# Patient Record
Sex: FEMALE | Race: WHITE | Employment: OTHER | URBAN - METROPOLITAN AREA
[De-identification: names, ages, dates, MRNs, and addresses within clinical notes are randomized per-mention and may not be internally consistent; named-entity substitution may affect disease eponyms.]

---

## 2018-01-17 ENCOUNTER — HOSPITAL ENCOUNTER (OUTPATIENT)
Age: 58
Setting detail: OBSERVATION
LOS: 1 days | Discharge: HOME | End: 2018-01-18
Attending: INTERNAL MEDICINE | Admitting: INTERNAL MEDICINE
Payer: MEDICARE

## 2018-01-17 ENCOUNTER — APPOINTMENT (OUTPATIENT)
Dept: GENERAL RADIOLOGY | Age: 58
End: 2018-01-17
Payer: MEDICARE

## 2018-01-17 ENCOUNTER — APPOINTMENT (OUTPATIENT)
Dept: CT IMAGING | Age: 58
End: 2018-01-17
Payer: MEDICARE

## 2018-01-17 ENCOUNTER — APPOINTMENT (OUTPATIENT)
Dept: ULTRASOUND IMAGING | Age: 58
End: 2018-01-17
Payer: MEDICARE

## 2018-01-17 ENCOUNTER — HOSPITAL ENCOUNTER (EMERGENCY)
Age: 58
Discharge: OTHER INSTITUTION | End: 2018-01-17
Attending: EMERGENCY MEDICINE
Payer: MEDICARE

## 2018-01-17 VITALS
HEIGHT: 61 IN | WEIGHT: 160 LBS | OXYGEN SATURATION: 99 % | BODY MASS INDEX: 30.21 KG/M2 | SYSTOLIC BLOOD PRESSURE: 110 MMHG | RESPIRATION RATE: 13 BRPM | DIASTOLIC BLOOD PRESSURE: 66 MMHG | TEMPERATURE: 98 F | HEART RATE: 90 BPM

## 2018-01-17 DIAGNOSIS — R55 SYNCOPE AND COLLAPSE: ICD-10-CM

## 2018-01-17 DIAGNOSIS — E86.0 DEHYDRATION: Primary | ICD-10-CM

## 2018-01-17 DIAGNOSIS — D72.829 LEUKOCYTOSIS, UNSPECIFIED TYPE: ICD-10-CM

## 2018-01-17 DIAGNOSIS — R77.8 TROPONIN LEVEL ELEVATED: ICD-10-CM

## 2018-01-17 LAB
ALBUMIN SERPL-MCNC: 3.8 G/DL (ref 3.9–4.9)
ALP BLD-CCNC: 92 U/L (ref 40–130)
ALT SERPL-CCNC: 18 U/L (ref 0–33)
AMORPHOUS: NORMAL
AMYLASE: 35 U/L (ref 28–100)
ANION GAP SERPL CALCULATED.3IONS-SCNC: 19 MEQ/L (ref 7–13)
AST SERPL-CCNC: 15 U/L (ref 0–35)
BACTERIA: NORMAL /HPF
BANDED NEUTROPHILS RELATIVE PERCENT: 10 % (ref 5–11)
BASOPHILS ABSOLUTE: 0 K/UL (ref 0–0.2)
BASOPHILS RELATIVE PERCENT: 0 %
BETA-HYDROXYBUTYRATE: 14.1 MG/DL (ref 0.2–2.8)
BILIRUB SERPL-MCNC: 0.4 MG/DL (ref 0–1.2)
BILIRUBIN URINE: ABNORMAL
BLOOD, URINE: NEGATIVE
BUN BLDV-MCNC: 33 MG/DL (ref 6–20)
CALCIUM SERPL-MCNC: 9.2 MG/DL (ref 8.6–10.2)
CHLORIDE BLD-SCNC: 94 MEQ/L (ref 98–107)
CLARITY: CLEAR
CO2: 25 MEQ/L (ref 22–29)
COLOR: YELLOW
CREAT SERPL-MCNC: 1.27 MG/DL (ref 0.5–0.9)
EKG ATRIAL RATE: 91 BPM
EKG P AXIS: 73 DEGREES
EKG P-R INTERVAL: 134 MS
EKG Q-T INTERVAL: 360 MS
EKG QRS DURATION: 76 MS
EKG QTC CALCULATION (BAZETT): 442 MS
EKG R AXIS: 94 DEGREES
EKG T AXIS: 17 DEGREES
EKG VENTRICULAR RATE: 91 BPM
EOSINOPHILS ABSOLUTE: 0 K/UL (ref 0–0.7)
EOSINOPHILS RELATIVE PERCENT: 0 %
EPITHELIAL CELLS, UA: NORMAL /HPF
ETHANOL PERCENT: NORMAL G/DL
ETHANOL: <10 MG/DL (ref 0–0.08)
GFR AFRICAN AMERICAN: 52.5
GFR NON-AFRICAN AMERICAN: 43.4
GLOBULIN: 3.5 G/DL (ref 2.3–3.5)
GLUCOSE BLD-MCNC: 173 MG/DL (ref 60–115)
GLUCOSE BLD-MCNC: 176 MG/DL (ref 60–115)
GLUCOSE BLD-MCNC: 198 MG/DL (ref 74–109)
GLUCOSE URINE: NEGATIVE MG/DL
HBA1C MFR BLD: 8.9 % (ref 4.8–5.9)
HCT VFR BLD CALC: 52.5 % (ref 37–47)
HEMOGLOBIN: 17.6 G/DL (ref 12–16)
INR BLD: 4.9
KETONES, URINE: >=160 MG/DL
LACTIC ACID: 1.9 MMOL/L (ref 0.5–2.2)
LEUKOCYTE ESTERASE, URINE: NEGATIVE
LIPASE: 15 U/L (ref 13–60)
LYMPHOCYTES ABSOLUTE: 5.3 K/UL (ref 1–4.8)
LYMPHOCYTES RELATIVE PERCENT: 17 %
MCH RBC QN AUTO: 32 PG (ref 27–31.3)
MCHC RBC AUTO-ENTMCNC: 33.4 % (ref 33–37)
MCV RBC AUTO: 95.8 FL (ref 82–100)
MONOCYTES ABSOLUTE: 1.2 K/UL (ref 0.2–0.8)
MONOCYTES RELATIVE PERCENT: 4 %
MUCUS: PRESENT
NEUTROPHILS ABSOLUTE: 24.4 K/UL (ref 1.4–6.5)
NEUTROPHILS RELATIVE PERCENT: 69 %
NITRITE, URINE: NEGATIVE
PDW BLD-RTO: 12.9 % (ref 11.5–14.5)
PERFORMED ON: ABNORMAL
PERFORMED ON: ABNORMAL
PH UA: 5.5 (ref 5–9)
PLATELET # BLD: 254 K/UL (ref 130–400)
PLATELET SLIDE REVIEW: ADEQUATE
POTASSIUM SERPL-SCNC: 3.5 MEQ/L (ref 3.5–5.1)
PROTEIN UA: 30 MG/DL
PROTHROMBIN TIME: 50 SEC (ref 9.6–12.3)
RBC # BLD: 5.49 M/UL (ref 4.2–5.4)
RBC # BLD: NORMAL 10*6/UL
RBC UA: NORMAL /HPF (ref 0–2)
SODIUM BLD-SCNC: 138 MEQ/L (ref 132–144)
SPECIFIC GRAVITY UA: >=1.03 (ref 1–1.03)
TOTAL PROTEIN: 7.3 G/DL (ref 6.4–8.1)
TROPONIN: 0.03 NG/ML (ref 0–0.01)
TROPONIN: 0.05 NG/ML (ref 0–0.01)
URINE REFLEX TO CULTURE: YES
UROBILINOGEN, URINE: 0.2 E.U./DL
WBC # BLD: 30.9 K/UL (ref 4.8–10.8)
WBC UA: NORMAL /HPF (ref 0–5)

## 2018-01-17 PROCEDURE — 82010 KETONE BODYS QUAN: CPT

## 2018-01-17 PROCEDURE — 2580000003 HC RX 258: Performed by: EMERGENCY MEDICINE

## 2018-01-17 PROCEDURE — 83036 HEMOGLOBIN GLYCOSYLATED A1C: CPT

## 2018-01-17 PROCEDURE — 82150 ASSAY OF AMYLASE: CPT

## 2018-01-17 PROCEDURE — 87077 CULTURE AEROBIC IDENTIFY: CPT

## 2018-01-17 PROCEDURE — 36415 COLL VENOUS BLD VENIPUNCTURE: CPT

## 2018-01-17 PROCEDURE — 6370000000 HC RX 637 (ALT 250 FOR IP): Performed by: INTERNAL MEDICINE

## 2018-01-17 PROCEDURE — 2580000003 HC RX 258: Performed by: INTERNAL MEDICINE

## 2018-01-17 PROCEDURE — 85610 PROTHROMBIN TIME: CPT

## 2018-01-17 PROCEDURE — 70450 CT HEAD/BRAIN W/O DYE: CPT

## 2018-01-17 PROCEDURE — 87086 URINE CULTURE/COLONY COUNT: CPT

## 2018-01-17 PROCEDURE — 87149 DNA/RNA DIRECT PROBE: CPT

## 2018-01-17 PROCEDURE — 2500000003 HC RX 250 WO HCPCS: Performed by: EMERGENCY MEDICINE

## 2018-01-17 PROCEDURE — 76705 ECHO EXAM OF ABDOMEN: CPT

## 2018-01-17 PROCEDURE — 85025 COMPLETE CBC W/AUTO DIFF WBC: CPT

## 2018-01-17 PROCEDURE — 99285 EMERGENCY DEPT VISIT HI MDM: CPT

## 2018-01-17 PROCEDURE — 96375 TX/PRO/DX INJ NEW DRUG ADDON: CPT

## 2018-01-17 PROCEDURE — 83605 ASSAY OF LACTIC ACID: CPT

## 2018-01-17 PROCEDURE — 93005 ELECTROCARDIOGRAM TRACING: CPT

## 2018-01-17 PROCEDURE — 1210000000 HC MED SURG R&B

## 2018-01-17 PROCEDURE — 80053 COMPREHEN METABOLIC PANEL: CPT

## 2018-01-17 PROCEDURE — 71046 X-RAY EXAM CHEST 2 VIEWS: CPT

## 2018-01-17 PROCEDURE — 96361 HYDRATE IV INFUSION ADD-ON: CPT

## 2018-01-17 PROCEDURE — 6360000002 HC RX W HCPCS: Performed by: EMERGENCY MEDICINE

## 2018-01-17 PROCEDURE — G0378 HOSPITAL OBSERVATION PER HR: HCPCS

## 2018-01-17 PROCEDURE — 81001 URINALYSIS AUTO W/SCOPE: CPT

## 2018-01-17 PROCEDURE — 83690 ASSAY OF LIPASE: CPT

## 2018-01-17 PROCEDURE — 84484 ASSAY OF TROPONIN QUANT: CPT

## 2018-01-17 PROCEDURE — 96374 THER/PROPH/DIAG INJ IV PUSH: CPT

## 2018-01-17 PROCEDURE — S0028 INJECTION, FAMOTIDINE, 20 MG: HCPCS | Performed by: EMERGENCY MEDICINE

## 2018-01-17 PROCEDURE — 87040 BLOOD CULTURE FOR BACTERIA: CPT

## 2018-01-17 PROCEDURE — G0480 DRUG TEST DEF 1-7 CLASSES: HCPCS

## 2018-01-17 RX ORDER — ATORVASTATIN CALCIUM 10 MG/1
20 TABLET, FILM COATED ORAL DAILY
Status: CANCELLED | OUTPATIENT
Start: 2018-01-17

## 2018-01-17 RX ORDER — SODIUM CHLORIDE 9 MG/ML
INJECTION, SOLUTION INTRAVENOUS CONTINUOUS
Status: DISCONTINUED | OUTPATIENT
Start: 2018-01-17 | End: 2018-01-18

## 2018-01-17 RX ORDER — ONDANSETRON 2 MG/ML
4 INJECTION INTRAMUSCULAR; INTRAVENOUS EVERY 6 HOURS PRN
Status: DISCONTINUED | OUTPATIENT
Start: 2018-01-17 | End: 2018-01-18 | Stop reason: HOSPADM

## 2018-01-17 RX ORDER — SODIUM CHLORIDE 9 MG/ML
INJECTION, SOLUTION INTRAVENOUS CONTINUOUS
Status: DISCONTINUED | OUTPATIENT
Start: 2018-01-17 | End: 2018-01-17 | Stop reason: HOSPADM

## 2018-01-17 RX ORDER — ONDANSETRON 2 MG/ML
4 INJECTION INTRAMUSCULAR; INTRAVENOUS EVERY 6 HOURS PRN
Status: CANCELLED | OUTPATIENT
Start: 2018-01-17

## 2018-01-17 RX ORDER — DULOXETIN HYDROCHLORIDE 30 MG/1
60 CAPSULE, DELAYED RELEASE ORAL DAILY
Status: CANCELLED | OUTPATIENT
Start: 2018-01-17

## 2018-01-17 RX ORDER — MAGNESIUM OXIDE 400 MG/1
400 TABLET ORAL 2 TIMES DAILY
Status: CANCELLED | OUTPATIENT
Start: 2018-01-17

## 2018-01-17 RX ORDER — CARVEDILOL 12.5 MG/1
12.5 TABLET ORAL 2 TIMES DAILY
Status: DISCONTINUED | OUTPATIENT
Start: 2018-01-17 | End: 2018-01-18 | Stop reason: HOSPADM

## 2018-01-17 RX ORDER — LEVOTHYROXINE SODIUM 0.07 MG/1
75 TABLET ORAL DAILY
Status: DISCONTINUED | OUTPATIENT
Start: 2018-01-18 | End: 2018-01-18 | Stop reason: HOSPADM

## 2018-01-17 RX ORDER — DIPHENHYDRAMINE HYDROCHLORIDE 50 MG/ML
50 INJECTION INTRAMUSCULAR; INTRAVENOUS ONCE
Status: COMPLETED | OUTPATIENT
Start: 2018-01-17 | End: 2018-01-17

## 2018-01-17 RX ORDER — CLONAZEPAM 1 MG/1
1 TABLET ORAL NIGHTLY
Status: DISCONTINUED | OUTPATIENT
Start: 2018-01-17 | End: 2018-01-18 | Stop reason: HOSPADM

## 2018-01-17 RX ORDER — HYDROCODONE BITARTRATE AND ACETAMINOPHEN 5; 325 MG/1; MG/1
1 TABLET ORAL EVERY 4 HOURS PRN
Status: CANCELLED | OUTPATIENT
Start: 2018-01-17

## 2018-01-17 RX ORDER — WARFARIN SODIUM 7.5 MG/1
7.5 TABLET ORAL
Status: ON HOLD | COMMUNITY
End: 2019-01-23 | Stop reason: HOSPADM

## 2018-01-17 RX ORDER — LABETALOL HYDROCHLORIDE 5 MG/ML
5 INJECTION, SOLUTION INTRAVENOUS ONCE
Status: COMPLETED | OUTPATIENT
Start: 2018-01-17 | End: 2018-01-17

## 2018-01-17 RX ORDER — SODIUM CHLORIDE 0.9 % (FLUSH) 0.9 %
10 SYRINGE (ML) INJECTION PRN
Status: CANCELLED | OUTPATIENT
Start: 2018-01-17

## 2018-01-17 RX ORDER — METOCLOPRAMIDE 5 MG/1
5 TABLET ORAL
Status: DISCONTINUED | OUTPATIENT
Start: 2018-01-18 | End: 2018-01-18 | Stop reason: HOSPADM

## 2018-01-17 RX ORDER — SODIUM CHLORIDE 0.9 % (FLUSH) 0.9 %
3 SYRINGE (ML) INJECTION EVERY 8 HOURS
Status: DISCONTINUED | OUTPATIENT
Start: 2018-01-17 | End: 2018-01-17 | Stop reason: HOSPADM

## 2018-01-17 RX ORDER — METOCLOPRAMIDE 5 MG/1
5 TABLET ORAL 2 TIMES DAILY
Status: ON HOLD | COMMUNITY
End: 2019-01-23 | Stop reason: HOSPADM

## 2018-01-17 RX ORDER — MORPHINE SULFATE 4 MG/ML
4 INJECTION, SOLUTION INTRAMUSCULAR; INTRAVENOUS ONCE
Status: COMPLETED | OUTPATIENT
Start: 2018-01-17 | End: 2018-01-17

## 2018-01-17 RX ORDER — DIGOXIN 250 MCG
250 TABLET ORAL DAILY
Status: DISCONTINUED | OUTPATIENT
Start: 2018-01-18 | End: 2018-01-18 | Stop reason: HOSPADM

## 2018-01-17 RX ORDER — INSULIN GLARGINE 100 [IU]/ML
27 INJECTION, SOLUTION SUBCUTANEOUS NIGHTLY
Status: CANCELLED | OUTPATIENT
Start: 2018-01-17

## 2018-01-17 RX ORDER — INSULIN GLARGINE 100 [IU]/ML
27 INJECTION, SOLUTION SUBCUTANEOUS NIGHTLY
COMMUNITY

## 2018-01-17 RX ORDER — HYDROCODONE BITARTRATE AND ACETAMINOPHEN 5; 325 MG/1; MG/1
2 TABLET ORAL EVERY 4 HOURS PRN
Status: DISCONTINUED | OUTPATIENT
Start: 2018-01-17 | End: 2018-01-18

## 2018-01-17 RX ORDER — SODIUM CHLORIDE 0.9 % (FLUSH) 0.9 %
10 SYRINGE (ML) INJECTION PRN
Status: DISCONTINUED | OUTPATIENT
Start: 2018-01-17 | End: 2018-01-18 | Stop reason: HOSPADM

## 2018-01-17 RX ORDER — METOCLOPRAMIDE 10 MG/1
5 TABLET ORAL 2 TIMES DAILY
Status: CANCELLED | OUTPATIENT
Start: 2018-01-17

## 2018-01-17 RX ORDER — SODIUM CHLORIDE 0.9 % (FLUSH) 0.9 %
10 SYRINGE (ML) INJECTION EVERY 12 HOURS SCHEDULED
Status: DISCONTINUED | OUTPATIENT
Start: 2018-01-17 | End: 2018-01-18 | Stop reason: HOSPADM

## 2018-01-17 RX ORDER — ONDANSETRON 2 MG/ML
4 INJECTION INTRAMUSCULAR; INTRAVENOUS ONCE
Status: COMPLETED | OUTPATIENT
Start: 2018-01-17 | End: 2018-01-17

## 2018-01-17 RX ORDER — ROPINIROLE 2 MG/1
2 TABLET, FILM COATED ORAL NIGHTLY
COMMUNITY

## 2018-01-17 RX ORDER — ASPIRIN 81 MG/1
81 TABLET ORAL DAILY
Status: DISCONTINUED | OUTPATIENT
Start: 2018-01-18 | End: 2018-01-18 | Stop reason: HOSPADM

## 2018-01-17 RX ORDER — DIGOXIN 250 MCG
125 TABLET ORAL DAILY
COMMUNITY

## 2018-01-17 RX ORDER — DEXTROSE MONOHYDRATE 50 MG/ML
100 INJECTION, SOLUTION INTRAVENOUS PRN
Status: CANCELLED | OUTPATIENT
Start: 2018-01-17

## 2018-01-17 RX ORDER — LEVOTHYROXINE SODIUM 0.07 MG/1
75 TABLET ORAL DAILY
Status: CANCELLED | OUTPATIENT
Start: 2018-01-17

## 2018-01-17 RX ORDER — ACETAMINOPHEN 325 MG/1
650 TABLET ORAL EVERY 4 HOURS PRN
Status: DISCONTINUED | OUTPATIENT
Start: 2018-01-17 | End: 2018-01-18 | Stop reason: HOSPADM

## 2018-01-17 RX ORDER — CLONAZEPAM 1 MG/1
1 TABLET ORAL NIGHTLY
Status: ON HOLD | COMMUNITY
End: 2019-08-09

## 2018-01-17 RX ORDER — METOCLOPRAMIDE HYDROCHLORIDE 5 MG/ML
10 INJECTION INTRAMUSCULAR; INTRAVENOUS ONCE
Status: COMPLETED | OUTPATIENT
Start: 2018-01-17 | End: 2018-01-17

## 2018-01-17 RX ORDER — DEXTROSE MONOHYDRATE 50 MG/ML
100 INJECTION, SOLUTION INTRAVENOUS PRN
Status: DISCONTINUED | OUTPATIENT
Start: 2018-01-17 | End: 2018-01-18 | Stop reason: HOSPADM

## 2018-01-17 RX ORDER — INSULIN GLARGINE 100 [IU]/ML
27 INJECTION, SOLUTION SUBCUTANEOUS NIGHTLY
Status: DISCONTINUED | OUTPATIENT
Start: 2018-01-17 | End: 2018-01-18 | Stop reason: HOSPADM

## 2018-01-17 RX ORDER — VALSARTAN 80 MG/1
80 TABLET ORAL DAILY
Status: DISCONTINUED | OUTPATIENT
Start: 2018-01-18 | End: 2018-01-18 | Stop reason: HOSPADM

## 2018-01-17 RX ORDER — CARVEDILOL 6.25 MG/1
12.5 TABLET ORAL 2 TIMES DAILY
Status: CANCELLED | OUTPATIENT
Start: 2018-01-17

## 2018-01-17 RX ORDER — SODIUM CHLORIDE 0.9 % (FLUSH) 0.9 %
10 SYRINGE (ML) INJECTION EVERY 12 HOURS SCHEDULED
Status: CANCELLED | OUTPATIENT
Start: 2018-01-17

## 2018-01-17 RX ORDER — ASPIRIN 81 MG/1
81 TABLET ORAL DAILY
COMMUNITY

## 2018-01-17 RX ORDER — HYDROCODONE BITARTRATE AND ACETAMINOPHEN 5; 325 MG/1; MG/1
1 TABLET ORAL EVERY 4 HOURS PRN
Status: DISCONTINUED | OUTPATIENT
Start: 2018-01-17 | End: 2018-01-18

## 2018-01-17 RX ORDER — VALSARTAN 80 MG/1
40 TABLET ORAL DAILY
COMMUNITY

## 2018-01-17 RX ORDER — SODIUM CHLORIDE 9 MG/ML
INJECTION, SOLUTION INTRAVENOUS CONTINUOUS
Status: CANCELLED | OUTPATIENT
Start: 2018-01-17

## 2018-01-17 RX ORDER — ATORVASTATIN CALCIUM 20 MG/1
20 TABLET, FILM COATED ORAL DAILY
Status: DISCONTINUED | OUTPATIENT
Start: 2018-01-18 | End: 2018-01-18 | Stop reason: HOSPADM

## 2018-01-17 RX ORDER — DULOXETIN HYDROCHLORIDE 60 MG/1
60 CAPSULE, DELAYED RELEASE ORAL DAILY
COMMUNITY

## 2018-01-17 RX ORDER — DEXTROSE MONOHYDRATE 25 G/50ML
12.5 INJECTION, SOLUTION INTRAVENOUS PRN
Status: CANCELLED | OUTPATIENT
Start: 2018-01-17

## 2018-01-17 RX ORDER — NICOTINE POLACRILEX 4 MG
15 LOZENGE BUCCAL PRN
Status: CANCELLED | OUTPATIENT
Start: 2018-01-17

## 2018-01-17 RX ORDER — ATORVASTATIN CALCIUM 20 MG/1
20 TABLET, FILM COATED ORAL DAILY
COMMUNITY

## 2018-01-17 RX ORDER — ASPIRIN 81 MG/1
81 TABLET ORAL DAILY
Status: CANCELLED | OUTPATIENT
Start: 2018-01-17

## 2018-01-17 RX ORDER — DIGOXIN 125 MCG
250 TABLET ORAL DAILY
Status: CANCELLED | OUTPATIENT
Start: 2018-01-17

## 2018-01-17 RX ORDER — LEVOTHYROXINE SODIUM 0.07 MG/1
88 TABLET ORAL DAILY
Status: ON HOLD | COMMUNITY
End: 2019-08-09

## 2018-01-17 RX ORDER — ACETAMINOPHEN 325 MG/1
650 TABLET ORAL EVERY 4 HOURS PRN
Status: CANCELLED | OUTPATIENT
Start: 2018-01-17

## 2018-01-17 RX ORDER — NICOTINE POLACRILEX 4 MG
15 LOZENGE BUCCAL PRN
Status: DISCONTINUED | OUTPATIENT
Start: 2018-01-17 | End: 2018-01-18 | Stop reason: HOSPADM

## 2018-01-17 RX ORDER — CARVEDILOL 12.5 MG/1
12.5 TABLET ORAL 2 TIMES DAILY
COMMUNITY

## 2018-01-17 RX ORDER — HYDROCODONE BITARTRATE AND ACETAMINOPHEN 5; 325 MG/1; MG/1
2 TABLET ORAL EVERY 4 HOURS PRN
Status: CANCELLED | OUTPATIENT
Start: 2018-01-17

## 2018-01-17 RX ORDER — MAGNESIUM OXIDE 400 MG/1
400 TABLET ORAL 2 TIMES DAILY
Status: ON HOLD | COMMUNITY
End: 2019-01-08 | Stop reason: HOSPADM

## 2018-01-17 RX ORDER — 0.9 % SODIUM CHLORIDE 0.9 %
1000 INTRAVENOUS SOLUTION INTRAVENOUS ONCE
Status: COMPLETED | OUTPATIENT
Start: 2018-01-17 | End: 2018-01-17

## 2018-01-17 RX ORDER — DULOXETIN HYDROCHLORIDE 60 MG/1
60 CAPSULE, DELAYED RELEASE ORAL DAILY
Status: DISCONTINUED | OUTPATIENT
Start: 2018-01-18 | End: 2018-01-18 | Stop reason: HOSPADM

## 2018-01-17 RX ORDER — VALSARTAN 80 MG/1
80 TABLET ORAL DAILY
Status: CANCELLED | OUTPATIENT
Start: 2018-01-17

## 2018-01-17 RX ORDER — DEXTROSE MONOHYDRATE 25 G/50ML
12.5 INJECTION, SOLUTION INTRAVENOUS PRN
Status: DISCONTINUED | OUTPATIENT
Start: 2018-01-17 | End: 2018-01-18 | Stop reason: HOSPADM

## 2018-01-17 RX ADMIN — CARVEDILOL 12.5 MG: 12.5 TABLET, FILM COATED ORAL at 21:40

## 2018-01-17 RX ADMIN — Medication 10 ML: at 21:41

## 2018-01-17 RX ADMIN — DIPHENHYDRAMINE HYDROCHLORIDE 50 MG: 50 INJECTION INTRAMUSCULAR; INTRAVENOUS at 14:29

## 2018-01-17 RX ADMIN — LABETALOL HYDROCHLORIDE 5 MG: 5 INJECTION, SOLUTION INTRAVENOUS at 15:33

## 2018-01-17 RX ADMIN — TOPIRAMATE 200 MG: 200 TABLET, FILM COATED ORAL at 21:40

## 2018-01-17 RX ADMIN — INSULIN LISPRO 1 UNITS: 100 INJECTION, SOLUTION INTRAVENOUS; SUBCUTANEOUS at 23:48

## 2018-01-17 RX ADMIN — CLONAZEPAM 1 MG: 1 TABLET ORAL at 21:40

## 2018-01-17 RX ADMIN — ROPINIROLE HYDROCHLORIDE 2.5 MG: 2 TABLET, FILM COATED ORAL at 21:40

## 2018-01-17 RX ADMIN — SODIUM CHLORIDE: 9 INJECTION, SOLUTION INTRAVENOUS at 21:40

## 2018-01-17 RX ADMIN — ONDANSETRON 4 MG: 2 INJECTION INTRAMUSCULAR; INTRAVENOUS at 15:33

## 2018-01-17 RX ADMIN — INSULIN GLARGINE 27 UNITS: 100 INJECTION, SOLUTION SUBCUTANEOUS at 23:48

## 2018-01-17 RX ADMIN — SODIUM CHLORIDE: 9 INJECTION, SOLUTION INTRAVENOUS at 14:31

## 2018-01-17 RX ADMIN — Medication 3 ML: at 14:32

## 2018-01-17 RX ADMIN — FAMOTIDINE 20 MG: 10 INJECTION, SOLUTION INTRAVENOUS at 14:29

## 2018-01-17 RX ADMIN — SODIUM CHLORIDE 1000 ML: 9 INJECTION, SOLUTION INTRAVENOUS at 14:36

## 2018-01-17 RX ADMIN — MORPHINE SULFATE 4 MG: 4 INJECTION INTRAVENOUS at 15:33

## 2018-01-17 RX ADMIN — METOCLOPRAMIDE 10 MG: 5 INJECTION, SOLUTION INTRAMUSCULAR; INTRAVENOUS at 14:29

## 2018-01-17 RX ADMIN — LABETALOL HYDROCHLORIDE 5 MG: 5 INJECTION, SOLUTION INTRAVENOUS at 15:34

## 2018-01-17 RX ADMIN — Medication 400 MG: at 21:40

## 2018-01-17 ASSESSMENT — ENCOUNTER SYMPTOMS
CHEST TIGHTNESS: 0
COUGH: 0
TROUBLE SWALLOWING: 0
FACIAL SWELLING: 0
EYE DISCHARGE: 0
SORE THROAT: 0
VOMITING: 0
STRIDOR: 0
EYE PAIN: 0
NAUSEA: 1
BLOOD IN STOOL: 0
BACK PAIN: 0
DIARRHEA: 0
SINUS PRESSURE: 0
VOICE CHANGE: 0
CONSTIPATION: 0
WHEEZING: 0
EYE REDNESS: 0
CHOKING: 0
ABDOMINAL PAIN: 0
SHORTNESS OF BREATH: 0

## 2018-01-17 ASSESSMENT — PAIN SCALES - GENERAL
PAINLEVEL_OUTOF10: 0
PAINLEVEL_OUTOF10: 0
PAINLEVEL_OUTOF10: 8

## 2018-01-17 NOTE — ED NOTES
Spoke with ultrasound tech and she stated that she will be down to get the patient for ultrasound @ [facility].  01/17/18 1520

## 2018-01-17 NOTE — ED TRIAGE NOTES
Patient in after having a syncope episode yesterday , denies head injury and  states she was out with her sister drinking on Saturday night and got sick and started vomiting on Sunday afternoon. She also had loose stools . Complains of  Indigestion, denies chest pain ,jaw or arm pain. hypertension noted on arrival .

## 2018-01-17 NOTE — ED PROVIDER NOTES
METOCLOPRAMIDE (REGLAN) 5 MG TABLET    Take 5 mg by mouth 2 times daily     TOPIRAMATE (TOPAMAX PO)    Take 200 mg by mouth 2 times daily     VALSARTAN (DIOVAN) 80 MG TABLET    Take 80 mg by mouth daily    WARFARIN (COUMADIN) 7.5 MG TABLET    Take 7.5 mg by mouth Daily with supper       ALLERGIES     Review of patient's allergies indicates no known allergies. FAMILY HISTORY     History reviewed. No pertinent family history. SOCIAL HISTORY       Social History     Social History    Marital status:      Spouse name: N/A    Number of children: N/A    Years of education: N/A     Social History Main Topics    Smoking status: Never Smoker    Smokeless tobacco: Never Used    Alcohol use Yes    Drug use: Yes     Types: Marijuana    Sexual activity: Yes     Partners: Male     Other Topics Concern    None     Social History Narrative    None       SCREENINGS             PHYSICAL EXAM    (up to 7 for level 4, 8 or more for level 5)     ED Triage Vitals [01/17/18 1344]   BP Temp Temp src Pulse Resp SpO2 Height Weight   (!) 190/102 97.5 °F (36.4 °C) -- 95 16 95 % 5' 1\" (1.549 m) 160 lb (72.6 kg)       Physical Exam   Constitutional: She is oriented to person, place, and time. She appears well-developed. Alert cooperative patient looks mildly dehydrated cooperative moving all extremities very well   HENT:   Head: Normocephalic and atraumatic. Attention given to the scalp patient has no scalp hematoma no laceration to the head   Neck: Normal range of motion. Neck supple. No JVD present. No thyromegaly present. Cardiovascular: Normal rate and normal heart sounds. Exam reveals no gallop. No murmur heard. Pulmonary/Chest: Breath sounds normal. No respiratory distress. She has no wheezes. Abdominal: Soft. Bowel sounds are normal. She exhibits no mass. There is no rebound. Musculoskeletal: Normal range of motion. She exhibits no edema or tenderness.    Lymphadenopathy:     She has no cervical 30minutes, excluding separately reportable procedures. There was a high probability of clinically significant/life threatening deterioration in the patient's condition which required my urgent intervention. ONSULTS:  None    PROCEDURES:  Unless otherwise noted below, none     Procedures    FINAL IMPRESSION      1. Dehydration    2. Syncope and collapse    3. Troponin level elevated    4. Leukocytosis, unspecified type          DISPOSITION/PLAN   DISPOSITION        PATIENT REFERRED TO:  No follow-up provider specified.     DISCHARGE MEDICATIONS:  New Prescriptions    No medications on file          (Please note that portions of this note were completed with a voice recognition program.  Efforts were made to edit the dictations but occasionally words are mis-transcribed.)    Shavon Street MD (electronically signed)  Attending Emergency Physician       Shavon Street MD  01/17/18 1313

## 2018-01-18 VITALS
SYSTOLIC BLOOD PRESSURE: 80 MMHG | BODY MASS INDEX: 28.3 KG/M2 | RESPIRATION RATE: 16 BRPM | OXYGEN SATURATION: 100 % | DIASTOLIC BLOOD PRESSURE: 63 MMHG | HEIGHT: 61 IN | WEIGHT: 149.91 LBS | TEMPERATURE: 97.3 F | HEART RATE: 99 BPM

## 2018-01-18 PROBLEM — F10.10 ALCOHOL ABUSE: Status: ACTIVE | Noted: 2018-01-18

## 2018-01-18 PROBLEM — I50.22 CHRONIC SYSTOLIC CHF (CONGESTIVE HEART FAILURE) (HCC): Status: ACTIVE | Noted: 2017-11-21

## 2018-01-18 LAB
ALBUMIN SERPL-MCNC: 2.7 G/DL (ref 3.9–4.9)
ALP BLD-CCNC: 69 U/L (ref 40–130)
ALT SERPL-CCNC: 14 U/L (ref 0–33)
ANION GAP SERPL CALCULATED.3IONS-SCNC: 13 MEQ/L (ref 7–13)
AST SERPL-CCNC: 16 U/L (ref 0–35)
BILIRUB SERPL-MCNC: 0.2 MG/DL (ref 0–1.2)
BUN BLDV-MCNC: 26 MG/DL (ref 6–20)
CALCIUM SERPL-MCNC: 7.6 MG/DL (ref 8.6–10.2)
CHLORIDE BLD-SCNC: 101 MEQ/L (ref 98–107)
CO2: 24 MEQ/L (ref 22–29)
CREAT SERPL-MCNC: 0.96 MG/DL (ref 0.5–0.9)
EKG ATRIAL RATE: 79 BPM
EKG P AXIS: 74 DEGREES
EKG P-R INTERVAL: 136 MS
EKG Q-T INTERVAL: 362 MS
EKG QRS DURATION: 94 MS
EKG QTC CALCULATION (BAZETT): 415 MS
EKG R AXIS: 86 DEGREES
EKG T AXIS: 23 DEGREES
EKG VENTRICULAR RATE: 79 BPM
GFR AFRICAN AMERICAN: >60
GFR NON-AFRICAN AMERICAN: 59.9
GLOBULIN: 2.7 G/DL (ref 2.3–3.5)
GLUCOSE BLD-MCNC: 180 MG/DL (ref 60–115)
GLUCOSE BLD-MCNC: 220 MG/DL (ref 60–115)
GLUCOSE BLD-MCNC: 262 MG/DL (ref 74–109)
HCT VFR BLD CALC: 41.1 % (ref 37–47)
HEMOGLOBIN: 13.6 G/DL (ref 12–16)
INR BLD: 4.3
MCH RBC QN AUTO: 32.8 PG (ref 27–31.3)
MCHC RBC AUTO-ENTMCNC: 33.1 % (ref 33–37)
MCV RBC AUTO: 99.1 FL (ref 82–100)
PDW BLD-RTO: 13.1 % (ref 11.5–14.5)
PERFORMED ON: ABNORMAL
PERFORMED ON: ABNORMAL
PLATELET # BLD: 196 K/UL (ref 130–400)
POTASSIUM REFLEX MAGNESIUM: 3.7 MEQ/L (ref 3.5–5.1)
PROTHROMBIN TIME: 45.5 SEC (ref 8.1–13.7)
RBC # BLD: 4.15 M/UL (ref 4.2–5.4)
SODIUM BLD-SCNC: 138 MEQ/L (ref 132–144)
TOTAL PROTEIN: 5.4 G/DL (ref 6.4–8.1)
TROPONIN: 0.03 NG/ML (ref 0–0.01)
WBC # BLD: 14.8 K/UL (ref 4.8–10.8)

## 2018-01-18 PROCEDURE — 85027 COMPLETE CBC AUTOMATED: CPT

## 2018-01-18 PROCEDURE — G0378 HOSPITAL OBSERVATION PER HR: HCPCS

## 2018-01-18 PROCEDURE — 6370000000 HC RX 637 (ALT 250 FOR IP): Performed by: INTERNAL MEDICINE

## 2018-01-18 PROCEDURE — 84484 ASSAY OF TROPONIN QUANT: CPT

## 2018-01-18 PROCEDURE — 93005 ELECTROCARDIOGRAM TRACING: CPT

## 2018-01-18 PROCEDURE — 36415 COLL VENOUS BLD VENIPUNCTURE: CPT

## 2018-01-18 PROCEDURE — 2580000003 HC RX 258: Performed by: INTERNAL MEDICINE

## 2018-01-18 PROCEDURE — 85610 PROTHROMBIN TIME: CPT

## 2018-01-18 PROCEDURE — 80053 COMPREHEN METABOLIC PANEL: CPT

## 2018-01-18 RX ORDER — PANTOPRAZOLE SODIUM 40 MG/1
40 TABLET, DELAYED RELEASE ORAL
Status: DISCONTINUED | OUTPATIENT
Start: 2018-01-19 | End: 2018-01-18 | Stop reason: HOSPADM

## 2018-01-18 RX ORDER — PANTOPRAZOLE SODIUM 40 MG/1
40 TABLET, DELAYED RELEASE ORAL
Qty: 30 TABLET | Refills: 0 | Status: ON HOLD | OUTPATIENT
Start: 2018-01-19 | End: 2019-01-06 | Stop reason: ALTCHOICE

## 2018-01-18 RX ADMIN — Medication 400 MG: at 08:56

## 2018-01-18 RX ADMIN — INSULIN LISPRO 1 UNITS: 100 INJECTION, SOLUTION INTRAVENOUS; SUBCUTANEOUS at 08:53

## 2018-01-18 RX ADMIN — DIGOXIN 250 MCG: 0.25 TABLET ORAL at 08:54

## 2018-01-18 RX ADMIN — ASPIRIN 81 MG: 81 TABLET, COATED ORAL at 08:54

## 2018-01-18 RX ADMIN — INSULIN LISPRO 2 UNITS: 100 INJECTION, SOLUTION INTRAVENOUS; SUBCUTANEOUS at 12:56

## 2018-01-18 RX ADMIN — ATORVASTATIN CALCIUM 20 MG: 20 TABLET, FILM COATED ORAL at 08:54

## 2018-01-18 RX ADMIN — METOCLOPRAMIDE HYDROCHLORIDE 5 MG: 5 TABLET ORAL at 08:54

## 2018-01-18 RX ADMIN — DULOXETINE HYDROCHLORIDE 60 MG: 60 CAPSULE, DELAYED RELEASE ORAL at 08:55

## 2018-01-18 RX ADMIN — VALSARTAN 80 MG: 80 TABLET ORAL at 08:54

## 2018-01-18 RX ADMIN — TOPIRAMATE 200 MG: 200 TABLET, FILM COATED ORAL at 08:55

## 2018-01-18 RX ADMIN — Medication 10 ML: at 08:56

## 2018-01-18 ASSESSMENT — ENCOUNTER SYMPTOMS
DIARRHEA: 1
COUGH: 1
HEMOPTYSIS: 0
SHORTNESS OF BREATH: 1
HEARTBURN: 0
DOUBLE VISION: 0
BLOOD IN STOOL: 0
WHEEZING: 1
ABDOMINAL PAIN: 0
NAUSEA: 1
VOMITING: 1
CONSTIPATION: 0
SORE THROAT: 0

## 2018-01-18 NOTE — H&P
Addi Arizmendi is an 65 y.o.  female. Past medical history of ischemic cardiomyopathy, long-term Coumadin use, diabetes type 2, hypothyroidism. Patient was binge drinking alcohol with sister, became nauseated and vomiting with diarrhea. Apparently passed out. Patient presented to ER in [facility].  Given IV fluids. Patient complains some chest discomfort, some shortness of breath. No longer has nausea vomiting or diarrhea. Patient feels better since she received IV fluids. Past Medical History:   Diagnosis Date    [facility]     CHF (congestive heart failure) (HCC)     Depression     Diabetes mellitus ([facility]     History of heart artery stent     Hyperlipidemia     Hypertension     Pacemaker      Past Surgical History:   Procedure Laterality Date    CARDIAC SURGERY       SECTION      HYSTERECTOMY      VASCULAR SURGERY       Social History     Social History    Marital status:      Spouse name: N/A    Number of children: N/A    Years of education: N/A     Social History Main Topics    Smoking status: Never Smoker    Smokeless tobacco: Never Used    Alcohol use Yes    Drug use: Yes     Types: Marijuana    Sexual activity: Yes     Partners: Male     Other Topics Concern    None     Social History Narrative    None     No family history on file. Noncontributory  Allergies: No Known Allergies    Active Problems:    Dehydration    Blood pressure 95/75, pulse 83, temperature 98.4 °F (36.9 °C), temperature source Oral, resp. rate 16, height 5' 1\" (1.549 m), weight 149 lb 14.6 oz (68 kg), SpO2 97 %. Review of Systems   Constitutional: Positive for chills and malaise/fatigue. Negative for fever. HENT: Negative for sore throat. Eyes: Negative for double vision. Respiratory: Positive for cough, shortness of breath and wheezing. Negative for hemoptysis. Cardiovascular: Positive for chest pain.  Negative for palpitations, claudication, leg swelling and PND. Gastrointestinal: Positive for diarrhea, nausea and vomiting. Negative for abdominal pain, blood in stool, constipation, heartburn and melena. Genitourinary: Negative for dysuria, frequency and urgency. Musculoskeletal: Positive for myalgias. Skin: Negative for itching and rash. Neurological: Positive for dizziness, loss of consciousness and weakness. Negative for seizures. Psychiatric/Behavioral: Positive for substance abuse. Physical Exam   Constitutional: She is oriented to person, place, and time. HENT:   Head: Normocephalic and atraumatic. Eyes: Conjunctivae and EOM are normal. Pupils are equal, round, and reactive to light. Neck: Neck supple. Cardiovascular: Normal rate, regular rhythm and normal heart sounds. Pulmonary/Chest: Effort normal and breath sounds normal.   Abdominal: Soft. Bowel sounds are normal. She exhibits no distension. There is no tenderness. Lymphadenopathy:     She has no cervical adenopathy. Neurological: She is alert and oriented to person, place, and time. No cranial nerve deficit. Skin: Skin is warm and dry. Nursing note and vitals reviewed.     Results for orders placed or performed during the hospital encounter of 01/17/18   CBC   Result Value Ref Range    WBC 14.8 (H) 4.8 - 10.8 K/uL    RBC 4.15 (L) 4.20 - 5.40 M/uL    Hemoglobin 13.6 12.0 - 16.0 g/dL    Hematocrit 41.1 37.0 - 47.0 %    MCV 99.1 82.0 - 100.0 fL    MCH 32.8 (H) 27.0 - 31.3 pg    MCHC 33.1 33.0 - 37.0 %    RDW 13.1 11.5 - 14.5 %    Platelets 065 246 - 307 K/uL   Hemoglobin A1c   Result Value Ref Range    Hemoglobin A1C 8.9 (H) 4.8 - 5.9 %   Comprehensive Metabolic Panel w/ Reflex to MG   Result Value Ref Range    Sodium 138 132 - 144 mEq/L    Potassium reflex Magnesium 3.7 3.5 - 5.1 mEq/L    Chloride 101 98 - 107 mEq/L    CO2 24 22 - 29 mEq/L    Anion Gap 13 7 - 13 mEq/L    Glucose 262 (H) 74 - 109 mg/dL    BUN 26 (H) 6 - 20 mg/dL CREATININE 0.96 (H) 0.50 - 0.90 mg/dL    GFR Non-African American 59.9 (L) >60    GFR  >60.0 >60    Calcium 7.6 (L) 8.6 - 10.2 mg/dL    Total Protein 5.4 (L) 6.4 - 8.1 g/dL    Alb 2.7 (L) 3.9 - 4.9 g/dL    Total Bilirubin 0.2 0.0 - 1.2 mg/dL    Alkaline Phosphatase 69 40 - 130 U/L    ALT 14 0 - 33 U/L    AST 16 0 - 35 U/L    Globulin 2.7 2.3 - 3.5 g/dL   Troponin   Result Value Ref Range    Troponin 0.025 (HH) 0.000 - 0.010 ng/mL   Troponin   Result Value Ref Range    Troponin 0.033 (HH) 0.000 - 0.010 ng/mL   Protime-INR   Result Value Ref Range    Protime 45.5 (H) 8.1 - 13.7 sec    INR 4.3    POCT Glucose   Result Value Ref Range    POC Glucose 176 (H) 60 - 115 mg/dl    Performed on ACCU-CHEK    POCT Glucose   Result Value Ref Range    POC Glucose 180 (H) 60 - 115 mg/dl    Performed on ACCU-CHEK    EKG 12 Lead   Result Value Ref Range    Ventricular Rate 79 BPM    Atrial Rate 79 BPM    P-R Interval 136 ms    QRS Duration 94 ms    Q-T Interval 362 ms    QTc Calculation (Bazett) 415 ms    P Axis 74 degrees    R Axis 86 degrees    T Axis 23 degrees         Assessment:  Syncope  Elevated troponins  Nausea and vomiting, now resolved  Dehydration  Alcohol abuse  Coronary artery disease  Ischemic cardiomyopathy  Diabetes type 2  Hypothyroidism  Long-term anticoagulation      Plan:  Admit to medical floor  IV fluids  Telemetry  Serial troponins ordered  Coumadin today, due to elevated INR  Daily PT and INR  Cardiology was consulted  Insulin coverage  Resume home medications when appropriate

## 2018-01-18 NOTE — PROGRESS NOTES
Physical Therapy  Facility/Department: [facility] SURG L750/F173-44      PT evaluation attempted 1/18/18, at 10:13 AM, however this patient status is currently observation. Per facility protocol, PT evaluation and treatment orders for patients in observation status must include a PT specific diagnosis (i.e. \"difficulty ambulating\", \"lack of coordination\", etc.) These order specifications may be added to the \"comments\" section of the PT evaluation and treatment order. Requested updated Physical Therapy orders from referring provider via \"sticky note\". Coordination team notified.      We thank you for your referral!    [address] [place] [address],  [address] [place] [address] Physical Therapy Department    Electronically signed by Esa Prieto PT on 1/18/18 at 10:13 AM

## 2018-01-19 LAB
BLOOD CULTURE, ROUTINE: ABNORMAL
BLOOD CULTURE, ROUTINE: ABNORMAL
CULTURE, BLOOD 2: ABNORMAL
ORGANISM: ABNORMAL
URINE CULTURE, ROUTINE: NORMAL

## 2018-01-19 PROCEDURE — 93010 ELECTROCARDIOGRAM REPORT: CPT | Performed by: INTERNAL MEDICINE

## 2018-04-11 PROBLEM — E86.0 DEHYDRATION: Status: RESOLVED | Noted: 2018-01-17 | Resolved: 2018-04-11

## 2018-06-17 ENCOUNTER — HOSPITAL ENCOUNTER (EMERGENCY)
Age: 58
Discharge: HOME | End: 2018-06-17
Attending: EMERGENCY MEDICINE
Payer: MEDICARE

## 2018-06-17 ENCOUNTER — APPOINTMENT (OUTPATIENT)
Dept: GENERAL RADIOLOGY | Age: 58
End: 2018-06-17
Payer: MEDICARE

## 2018-06-17 VITALS
HEART RATE: 84 BPM | WEIGHT: 155 LBS | HEIGHT: 61 IN | TEMPERATURE: 98.7 F | BODY MASS INDEX: 29.27 KG/M2 | RESPIRATION RATE: 19 BRPM | OXYGEN SATURATION: 96 % | DIASTOLIC BLOOD PRESSURE: 61 MMHG | SYSTOLIC BLOOD PRESSURE: 119 MMHG

## 2018-06-17 DIAGNOSIS — S82.002A CLOSED DISPLACED FRACTURE OF LEFT PATELLA, UNSPECIFIED FRACTURE MORPHOLOGY, INITIAL ENCOUNTER: Primary | ICD-10-CM

## 2018-06-17 PROCEDURE — 73564 X-RAY EXAM KNEE 4 OR MORE: CPT

## 2018-06-17 PROCEDURE — 6370000000 HC RX 637 (ALT 250 FOR IP): Performed by: EMERGENCY MEDICINE

## 2018-06-17 PROCEDURE — 99283 EMERGENCY DEPT VISIT LOW MDM: CPT

## 2018-06-17 PROCEDURE — 29505 APPLICATION LONG LEG SPLINT: CPT

## 2018-06-17 RX ORDER — HYDROCODONE BITARTRATE AND ACETAMINOPHEN 5; 325 MG/1; MG/1
1 TABLET ORAL EVERY 6 HOURS PRN
Qty: 20 TABLET | Refills: 0 | Status: SHIPPED | OUTPATIENT
Start: 2018-06-17 | End: 2018-06-24

## 2018-06-17 RX ORDER — HYDROCODONE BITARTRATE AND ACETAMINOPHEN 5; 325 MG/1; MG/1
1 TABLET ORAL ONCE
Status: COMPLETED | OUTPATIENT
Start: 2018-06-17 | End: 2018-06-17

## 2018-06-17 RX ADMIN — HYDROCODONE BITARTRATE AND ACETAMINOPHEN 1 TABLET: 5; 325 TABLET ORAL at 19:41

## 2018-06-17 ASSESSMENT — PAIN DESCRIPTION - ORIENTATION
ORIENTATION: LEFT
ORIENTATION: LEFT

## 2018-06-17 ASSESSMENT — ENCOUNTER SYMPTOMS
CHEST TIGHTNESS: 0
EYE PAIN: 0
SINUS PRESSURE: 0
VOICE CHANGE: 0
FACIAL SWELLING: 0
DIARRHEA: 0
CONSTIPATION: 0
COUGH: 0
STRIDOR: 0
BLOOD IN STOOL: 0
VOMITING: 0
SORE THROAT: 0
TROUBLE SWALLOWING: 0
CHOKING: 0
WHEEZING: 0
EYE REDNESS: 0
ABDOMINAL PAIN: 0
BACK PAIN: 0
SHORTNESS OF BREATH: 0
EYE DISCHARGE: 0

## 2018-06-17 ASSESSMENT — PAIN DESCRIPTION - PAIN TYPE
TYPE: ACUTE PAIN
TYPE: ACUTE PAIN

## 2018-06-17 ASSESSMENT — PAIN DESCRIPTION - ONSET: ONSET: SUDDEN

## 2018-06-17 ASSESSMENT — PAIN DESCRIPTION - DESCRIPTORS
DESCRIPTORS: SHARP;SHOOTING
DESCRIPTORS: ACHING

## 2018-06-17 ASSESSMENT — PAIN DESCRIPTION - LOCATION
LOCATION: KNEE
LOCATION: KNEE

## 2018-06-17 ASSESSMENT — PAIN SCALES - GENERAL: PAINLEVEL_OUTOF10: 10

## 2018-06-17 ASSESSMENT — PAIN DESCRIPTION - FREQUENCY: FREQUENCY: CONTINUOUS

## 2018-07-02 ENCOUNTER — HOSPITAL ENCOUNTER (EMERGENCY)
Age: 58
Discharge: HOME | End: 2018-07-02
Payer: MEDICARE

## 2018-07-02 VITALS
SYSTOLIC BLOOD PRESSURE: 111 MMHG | TEMPERATURE: 98.4 F | WEIGHT: 156 LBS | HEART RATE: 107 BPM | OXYGEN SATURATION: 98 % | BODY MASS INDEX: 28.71 KG/M2 | RESPIRATION RATE: 16 BRPM | HEIGHT: 62 IN | DIASTOLIC BLOOD PRESSURE: 79 MMHG

## 2018-07-02 DIAGNOSIS — R73.9 HYPERGLYCEMIA: Primary | ICD-10-CM

## 2018-07-02 LAB
ALBUMIN SERPL-MCNC: 3.1 G/DL (ref 3.9–4.9)
ALP BLD-CCNC: 62 U/L (ref 40–130)
ALT SERPL-CCNC: 29 U/L (ref 0–33)
ANION GAP SERPL CALCULATED.3IONS-SCNC: 10 MEQ/L (ref 7–13)
AST SERPL-CCNC: 31 U/L (ref 0–35)
BASOPHILS ABSOLUTE: 0.1 K/UL (ref 0–0.2)
BASOPHILS RELATIVE PERCENT: 1.2 %
BETA-HYDROXYBUTYRATE: 4 MG/DL (ref 0.2–2.8)
BILIRUB SERPL-MCNC: <0.2 MG/DL (ref 0–1.2)
BUN BLDV-MCNC: 22 MG/DL (ref 6–20)
CALCIUM SERPL-MCNC: 8.6 MG/DL (ref 8.6–10.2)
CHLORIDE BLD-SCNC: 100 MEQ/L (ref 98–107)
CHP ED QC CHECK: NORMAL
CO2: 30 MEQ/L (ref 22–29)
CREAT SERPL-MCNC: 0.73 MG/DL (ref 0.5–0.9)
EOSINOPHILS ABSOLUTE: 0.1 K/UL (ref 0–0.7)
EOSINOPHILS RELATIVE PERCENT: 0.7 %
GFR AFRICAN AMERICAN: >60
GFR NON-AFRICAN AMERICAN: >60
GLOBULIN: 3.4 G/DL (ref 2.3–3.5)
GLUCOSE BLD-MCNC: 137 MG/DL
GLUCOSE BLD-MCNC: 137 MG/DL (ref 60–115)
GLUCOSE BLD-MCNC: 141 MG/DL (ref 74–109)
HCT VFR BLD CALC: 38.8 % (ref 37–47)
HEMOGLOBIN: 12.6 G/DL (ref 12–16)
INR BLD: 3.5
LYMPHOCYTES ABSOLUTE: 2.7 K/UL (ref 1–4.8)
LYMPHOCYTES RELATIVE PERCENT: 26.1 %
MCH RBC QN AUTO: 31.6 PG (ref 27–31.3)
MCHC RBC AUTO-ENTMCNC: 32.5 % (ref 33–37)
MCV RBC AUTO: 97.2 FL (ref 82–100)
MONOCYTES ABSOLUTE: 0.7 K/UL (ref 0.2–0.8)
MONOCYTES RELATIVE PERCENT: 6.4 %
NEUTROPHILS ABSOLUTE: 6.8 K/UL (ref 1.4–6.5)
NEUTROPHILS RELATIVE PERCENT: 65.6 %
PDW BLD-RTO: 13.8 % (ref 11.5–14.5)
PERFORMED ON: ABNORMAL
PLATELET # BLD: 252 K/UL (ref 130–400)
PLATELET SLIDE REVIEW: NORMAL
POTASSIUM SERPL-SCNC: 3.9 MEQ/L (ref 3.5–5.1)
PROTHROMBIN TIME: 37.4 SEC (ref 9.6–12.3)
RBC # BLD: 3.99 M/UL (ref 4.2–5.4)
RBC # BLD: NORMAL 10*6/UL
SLIDE REVIEW: ABNORMAL
SODIUM BLD-SCNC: 140 MEQ/L (ref 132–144)
TOTAL PROTEIN: 6.5 G/DL (ref 6.4–8.1)
WBC # BLD: 10.4 K/UL (ref 4.8–10.8)

## 2018-07-02 PROCEDURE — 85610 PROTHROMBIN TIME: CPT

## 2018-07-02 PROCEDURE — 82010 KETONE BODYS QUAN: CPT

## 2018-07-02 PROCEDURE — 6370000000 HC RX 637 (ALT 250 FOR IP): Performed by: NURSE PRACTITIONER

## 2018-07-02 PROCEDURE — 36415 COLL VENOUS BLD VENIPUNCTURE: CPT

## 2018-07-02 PROCEDURE — 2580000003 HC RX 258: Performed by: NURSE PRACTITIONER

## 2018-07-02 PROCEDURE — 80053 COMPREHEN METABOLIC PANEL: CPT

## 2018-07-02 PROCEDURE — 99284 EMERGENCY DEPT VISIT MOD MDM: CPT

## 2018-07-02 PROCEDURE — 85025 COMPLETE CBC W/AUTO DIFF WBC: CPT

## 2018-07-02 RX ORDER — 0.9 % SODIUM CHLORIDE 0.9 %
2000 INTRAVENOUS SOLUTION INTRAVENOUS ONCE
Status: COMPLETED | OUTPATIENT
Start: 2018-07-02 | End: 2018-07-02

## 2018-07-02 RX ORDER — OXYCODONE HYDROCHLORIDE AND ACETAMINOPHEN 5; 325 MG/1; MG/1
2 TABLET ORAL EVERY 4 HOURS PRN
Status: ON HOLD | COMMUNITY
End: 2019-01-06

## 2018-07-02 RX ORDER — OXYCODONE HYDROCHLORIDE AND ACETAMINOPHEN 5; 325 MG/1; MG/1
2 TABLET ORAL ONCE
Status: COMPLETED | OUTPATIENT
Start: 2018-07-02 | End: 2018-07-02

## 2018-07-02 RX ADMIN — OXYCODONE HYDROCHLORIDE AND ACETAMINOPHEN 2 TABLET: 5; 325 TABLET ORAL at 16:06

## 2018-07-02 RX ADMIN — SODIUM CHLORIDE 2000 ML: 9 INJECTION, SOLUTION INTRAVENOUS at 15:37

## 2018-07-02 ASSESSMENT — PAIN DESCRIPTION - ORIENTATION: ORIENTATION: LEFT

## 2018-07-02 ASSESSMENT — PAIN DESCRIPTION - DESCRIPTORS: DESCRIPTORS: ACHING

## 2018-07-02 ASSESSMENT — ENCOUNTER SYMPTOMS
SHORTNESS OF BREATH: 0
VOMITING: 0
NAUSEA: 0
DIARRHEA: 0
ABDOMINAL PAIN: 0
COUGH: 0

## 2018-07-02 ASSESSMENT — PAIN SCALES - GENERAL
PAINLEVEL_OUTOF10: 7
PAINLEVEL_OUTOF10: 7

## 2018-07-02 ASSESSMENT — PAIN DESCRIPTION - PAIN TYPE: TYPE: ACUTE PAIN

## 2018-07-02 ASSESSMENT — PAIN DESCRIPTION - LOCATION: LOCATION: KNEE

## 2018-07-02 NOTE — ED PROVIDER NOTES
HISTORY     Past Medical History:   Diagnosis Date    [facility]     CHF [facility]congestive heart failure) ([facility]     Depression     Diabetes mellitus ([facility]     History of heart artery stent     Hyperlipidemia     Hypertension     Pacemaker      Past Surgical History:   Procedure Laterality Date    CARDIAC SURGERY       SECTION      HYSTERECTOMY      VASCULAR SURGERY       Social History     Social History    Marital status:      Spouse name: N/A    Number of children: N/A    Years of education: N/A     Social History Main Topics    Smoking status: Never Smoker    Smokeless tobacco: Never Used    Alcohol use Yes      Comment: once a week    Drug use: No    Sexual activity: Yes     Partners: Male     Other Topics Concern    None     Social History Narrative    None       SCREENINGS             PHYSICAL EXAM    (up to 7 for level 4, 8 or more for level 5)     ED Triage Vitals [18 1438]   BP Temp Temp Source Pulse Resp SpO2 Height Weight   111/79 98.4 °F (36.9 °C) Oral 107 16 98 % 5' 2\" (1.575 m) 156 lb (70.8 kg)       Physical Exam   Constitutional: She is oriented to person, place, and time. She appears well-developed and well-nourished. She is active. No distress. HENT:   Head: Normocephalic and atraumatic. Mouth/Throat: Mucous membranes are normal.   Neck: Normal range of motion. Neck supple. Cardiovascular: Normal rate, regular rhythm, normal heart sounds, intact distal pulses and normal pulses. Pulmonary/Chest: Effort normal and breath sounds normal.   Abdominal: Soft. Normal appearance and bowel sounds are normal. There is no tenderness. Neurological: She is alert and oriented to person, place, and time. She has normal strength. Skin: Skin is warm, dry and intact. No rash noted. She is not diaphoretic. Nursing note and vitals reviewed.       DIAGNOSTIC RESULTS     EKG: All EKG's are interpreted by the Emergency Department Physician who either signs or Co-signs this chart in the absence of a cardiologist.        RADIOLOGY:   Non-plain film images such as CT, Ultrasound and MRI are read by the radiologist. Plain radiographic images are visualized and preliminarily interpreted by the emergency physician with the below findings:        Interpretation per the Radiologist below, if available at the time of this note:    No orders to display         ED BEDSIDE ULTRASOUND:   Performed by ED Physician - none    LABS:  Labs Reviewed   CBC WITH AUTO DIFFERENTIAL - Abnormal; Notable for the following:        Result Value    RBC 3.99 (*)     MCH 31.6 (*)     MCHC 32.5 (*)     Neutrophils # 6.8 (*)     All other components within normal limits   COMPREHENSIVE METABOLIC PANEL - Abnormal; Notable for the following:     CO2 30 (*)     Glucose 141 (*)     BUN 22 (*)     Alb 3.1 (*)     All other components within normal limits   BETA-HYDROXYBUTYRATE - Abnormal; Notable for the following:     Beta-Hydroxybutyrate 4.0 (*)     All other components within normal limits   POCT GLUCOSE - Abnormal; Notable for the following:     POC Glucose 137 (*)     All other components within normal limits   POCT GLUCOSE - Normal   URINE RT REFLEX TO CULTURE   PROTIME-INR       All other labs were within normal range or not returned as of this dictation. EMERGENCY DEPARTMENT COURSE and DIFFERENTIAL DIAGNOSIS/MDM:   Vitals:    Vitals:    07/02/18 1438   BP: 111/79   Pulse: 107   Resp: 16   Temp: 98.4 °F (36.9 °C)   TempSrc: Oral   SpO2: 98%   Weight: 156 lb (70.8 kg)   Height: 5' 2\" (1.575 m)            MDM patient was reevaluated. She continues to appear well nontoxic and in no distress. Her laboratory studies did not demonstrate any acute pathology. She is not acidotic here in the emergency department. She will be discharged home. I've advised her to follow up with her diabetic provider. She can also follow up with her primary care provider.

## 2018-07-02 NOTE — ED TRIAGE NOTES
Pt arrives to ER  For hyperglycemia x 1 week. Pt was sent by [facility] NP. Per pt, sugars running high at monitor at home since knee sx on the 06/25/18.  Pt took insulin and got sugars down to 200's but was still advised to come to ED

## 2019-01-06 ENCOUNTER — APPOINTMENT (OUTPATIENT)
Dept: GENERAL RADIOLOGY | Age: 59
DRG: 637 | End: 2019-01-06
Payer: MEDICARE

## 2019-01-06 ENCOUNTER — HOSPITAL ENCOUNTER (INPATIENT)
Age: 59
LOS: 3 days | Discharge: HOME | DRG: 637 | End: 2019-01-09
Attending: STUDENT IN AN ORGANIZED HEALTH CARE EDUCATION/TRAINING PROGRAM | Admitting: INTERNAL MEDICINE
Payer: MEDICARE

## 2019-01-06 ENCOUNTER — APPOINTMENT (OUTPATIENT)
Dept: CT IMAGING | Age: 59
DRG: 637 | End: 2019-01-06
Payer: MEDICARE

## 2019-01-06 DIAGNOSIS — E78.5 DYSLIPIDEMIA (HIGH LDL; LOW HDL): ICD-10-CM

## 2019-01-06 DIAGNOSIS — D72.829 LEUKOCYTOSIS, UNSPECIFIED TYPE: ICD-10-CM

## 2019-01-06 DIAGNOSIS — N17.9 AKI (ACUTE KIDNEY INJURY) (HCC): ICD-10-CM

## 2019-01-06 DIAGNOSIS — R79.1 SUPRATHERAPEUTIC INR: ICD-10-CM

## 2019-01-06 DIAGNOSIS — R41.0 DISORIENTATION: ICD-10-CM

## 2019-01-06 DIAGNOSIS — E11.10 DIABETIC KETOACIDOSIS WITHOUT COMA ASSOCIATED WITH TYPE 2 DIABETES MELLITUS (HCC): Primary | ICD-10-CM

## 2019-01-06 DIAGNOSIS — R34 ANURIA AND OLIGURIA: ICD-10-CM

## 2019-01-06 DIAGNOSIS — R63.8 POOR FLUID INTAKE: ICD-10-CM

## 2019-01-06 DIAGNOSIS — R77.8 ELEVATED TROPONIN: ICD-10-CM

## 2019-01-06 DIAGNOSIS — R11.2 INTRACTABLE VOMITING WITH NAUSEA, UNSPECIFIED VOMITING TYPE: ICD-10-CM

## 2019-01-06 DIAGNOSIS — E78.1 HYPERTRIGLYCERIDEMIA: ICD-10-CM

## 2019-01-06 DIAGNOSIS — E86.0 DEHYDRATION: ICD-10-CM

## 2019-01-06 DIAGNOSIS — K76.82 HEPATIC ENCEPHALOPATHY: ICD-10-CM

## 2019-01-06 PROBLEM — I21.4 NSTEMI (NON-ST ELEVATED MYOCARDIAL INFARCTION) (HCC): Status: ACTIVE | Noted: 2019-01-06

## 2019-01-06 PROBLEM — E10.10 DKA, TYPE 1, NOT AT GOAL (HCC): Status: ACTIVE | Noted: 2019-01-06

## 2019-01-06 PROBLEM — E10.10 DKA, TYPE 1, NOT AT GOAL (HCC): Chronic | Status: ACTIVE | Noted: 2019-01-06

## 2019-01-06 PROBLEM — F32.A DEPRESSION: Chronic | Status: ACTIVE | Noted: 2019-01-06

## 2019-01-06 PROBLEM — M47.817 LUMBOSACRAL SPONDYLOSIS WITHOUT MYELOPATHY: Status: ACTIVE | Noted: 2019-01-06

## 2019-01-06 PROBLEM — Z87.81 S/P ORIF (OPEN REDUCTION INTERNAL FIXATION) FRACTURE: Status: ACTIVE | Noted: 2018-07-03

## 2019-01-06 PROBLEM — G93.41 ACUTE METABOLIC ENCEPHALOPATHY: Chronic | Status: ACTIVE | Noted: 2019-01-06

## 2019-01-06 PROBLEM — G93.41 ACUTE METABOLIC ENCEPHALOPATHY: Status: ACTIVE | Noted: 2019-01-06

## 2019-01-06 PROBLEM — Z95.0 PACEMAKER: Chronic | Status: ACTIVE | Noted: 2019-01-06

## 2019-01-06 PROBLEM — Z79.01 ON BRIDGING TREATMENT WITH LOVENOX: Status: ACTIVE | Noted: 2018-02-26

## 2019-01-06 PROBLEM — F10.10 ALCOHOL ABUSE: Chronic | Status: ACTIVE | Noted: 2018-01-18

## 2019-01-06 PROBLEM — S82.009A PATELLA FRACTURE: Status: ACTIVE | Noted: 2018-06-19

## 2019-01-06 PROBLEM — Z98.890 S/P ORIF (OPEN REDUCTION INTERNAL FIXATION) FRACTURE: Status: ACTIVE | Noted: 2018-07-03

## 2019-01-06 PROBLEM — Z12.11 SCREENING FOR COLON CANCER: Status: ACTIVE | Noted: 2018-02-26

## 2019-01-06 LAB
ACETAMINOPHEN LEVEL: <5 UG/ML (ref 10–30)
ALBUMIN SERPL-MCNC: 3.4 G/DL (ref 3.9–4.9)
ALP BLD-CCNC: 87 U/L (ref 40–130)
ALT SERPL-CCNC: 25 U/L (ref 0–33)
AMMONIA: 64 UMOL/L (ref 11–51)
AMPHETAMINE SCREEN, URINE: ABNORMAL
ANION GAP SERPL CALCULATED.3IONS-SCNC: 20 MEQ/L (ref 7–13)
APTT: 52.3 SEC (ref 21.6–35.4)
AST SERPL-CCNC: 33 U/L (ref 0–35)
ATYPICAL LYMPHOCYTE RELATIVE PERCENT: 4 %
BARBITURATE SCREEN URINE: ABNORMAL
BASE EXCESS VENOUS: -5 (ref -3–3)
BASOPHILS ABSOLUTE: 0 K/UL (ref 0–0.2)
BASOPHILS RELATIVE PERCENT: 1 %
BENZODIAZEPINE SCREEN, URINE: ABNORMAL
BETA-HYDROXYBUTYRATE: 16.6 MG/DL (ref 0.2–2.8)
BILIRUB SERPL-MCNC: 0.5 MG/DL (ref 0–1.2)
BUN BLDV-MCNC: 53 MG/DL (ref 6–20)
C-REACTIVE PROTEIN, HIGH SENSITIVITY: 12.1 MG/L (ref 0–5)
CALCIUM IONIZED: 0.9 MMOL/L (ref 1.12–1.32)
CALCIUM SERPL-MCNC: 9.3 MG/DL (ref 8.6–10.2)
CANNABINOID SCREEN URINE: POSITIVE
CARBOXYHEMOGLOBIN: 3.4 % (ref 0–4)
CHLORIDE BLD-SCNC: 89 MEQ/L (ref 98–107)
CHOLESTEROL, TOTAL: 295 MG/DL (ref 0–199)
CO2: 23 MEQ/L (ref 22–29)
COCAINE METABOLITE SCREEN URINE: ABNORMAL
CREAT SERPL-MCNC: 1.44 MG/DL (ref 0.5–0.9)
DIGOXIN LEVEL: 1.4 NG/ML (ref 0.8–2)
EKG ATRIAL RATE: 106 BPM
EKG P AXIS: 74 DEGREES
EKG P-R INTERVAL: 128 MS
EKG Q-T INTERVAL: 334 MS
EKG QRS DURATION: 74 MS
EKG QTC CALCULATION (BAZETT): 443 MS
EKG R AXIS: 77 DEGREES
EKG T AXIS: 9 DEGREES
EKG VENTRICULAR RATE: 106 BPM
EOSINOPHILS ABSOLUTE: 0.3 K/UL (ref 0–0.7)
EOSINOPHILS RELATIVE PERCENT: 1 %
ETHANOL PERCENT: NORMAL G/DL
ETHANOL: <10 MG/DL (ref 0–0.08)
GFR AFRICAN AMERICAN: 45.3
GFR AFRICAN AMERICAN: 51
GFR NON-AFRICAN AMERICAN: 37.4
GFR NON-AFRICAN AMERICAN: 42
GLOBULIN: 4.5 G/DL (ref 2.3–3.5)
GLUCOSE BLD-MCNC: 263 MG/DL (ref 60–115)
GLUCOSE BLD-MCNC: 298 MG/DL (ref 60–115)
GLUCOSE BLD-MCNC: 303 MG/DL (ref 60–115)
GLUCOSE BLD-MCNC: 320 MG/DL (ref 60–115)
GLUCOSE BLD-MCNC: 325 MG/DL (ref 60–115)
GLUCOSE BLD-MCNC: 334 MG/DL (ref 74–109)
HBA1C MFR BLD: 9.8 % (ref 4.8–5.9)
HCO3 VENOUS: 20.1 MMOL/L (ref 23–29)
HCT VFR BLD CALC: 59.7 % (ref 37–47)
HDLC SERPL-MCNC: 35 MG/DL (ref 40–59)
HEMOGLOBIN: 17.5 GM/DL (ref 12–16)
HEMOGLOBIN: 20 G/DL (ref 12–16)
INR BLD: 11
LACTATE: 1.79 MMOL/L (ref 0.4–2)
LACTIC ACID: 2.7 MMOL/L (ref 0.5–2.2)
LDL CHOLESTEROL CALCULATED: 182 MG/DL (ref 0–129)
LYMPHOCYTES ABSOLUTE: 1.8 K/UL (ref 1–4.8)
LYMPHOCYTES RELATIVE PERCENT: 3 %
Lab: ABNORMAL
MAGNESIUM: 2.6 MG/DL (ref 1.7–2.3)
MAGNESIUM: 2.6 MG/DL (ref 1.7–2.3)
MCH RBC QN AUTO: 32.4 PG (ref 27–31.3)
MCHC RBC AUTO-ENTMCNC: 33.5 % (ref 33–37)
MCV RBC AUTO: 96.7 FL (ref 82–100)
MONOCYTES ABSOLUTE: 0.5 K/UL (ref 0.2–0.8)
MONOCYTES RELATIVE PERCENT: 1.8 %
NEUTROPHILS ABSOLUTE: 23.1 K/UL (ref 1.4–6.5)
NEUTROPHILS RELATIVE PERCENT: 91 %
O2 SAT, VEN: 92 %
OPIATE SCREEN URINE: ABNORMAL
PCO2, VEN: 32.9 MM HG (ref 40–50)
PDW BLD-RTO: 13.6 % (ref 11.5–14.5)
PERFORMED ON: ABNORMAL
PH VENOUS: 7.39 (ref 7.35–7.45)
PHENCYCLIDINE SCREEN URINE: ABNORMAL
PLATELET # BLD: 243 K/UL (ref 130–400)
PO2, VEN: 64 MM HG
POC CHLORIDE: 111 MEQ/L (ref 99–110)
POC CREATININE WHOLE BLOOD: 1.5
POC CREATININE: 1.3 MG/DL (ref 0.6–1.1)
POC HEMATOCRIT: 51 % (ref 36–48)
POC POTASSIUM: 5.6 MEQ/L (ref 3.5–5.1)
POC SAMPLE TYPE: ABNORMAL
POC SODIUM: 130 MEQ/L (ref 136–145)
POTASSIUM SERPL-SCNC: 3.9 MEQ/L (ref 3.5–5.1)
PRO-BNP: NORMAL PG/ML
PROTHROMBIN TIME: 102.1 SEC (ref 9–11.5)
RAPID INFLUENZA  B AGN: NEGATIVE
RAPID INFLUENZA A AGN: NEGATIVE
RBC # BLD: 6.17 M/UL (ref 4.2–5.4)
RBC # BLD: NORMAL 10*6/UL
SALICYLATE, SERUM: <0.3 MG/DL (ref 15–30)
SMUDGE CELLS: 6.3
SODIUM BLD-SCNC: 132 MEQ/L (ref 132–144)
TCO2 CALC VENOUS: 21 MMOL/L
TOTAL CK: 153 U/L (ref 0–170)
TOTAL PROTEIN: 7.9 G/DL (ref 6.4–8.1)
TRIGL SERPL-MCNC: 390 MG/DL (ref 0–200)
TROPONIN: 0.05 NG/ML (ref 0–0.01)
TSH SERPL DL<=0.05 MIU/L-ACNC: 1.96 UIU/ML (ref 0.27–4.2)
WBC # BLD: 25.4 K/UL (ref 4.8–10.8)

## 2019-01-06 PROCEDURE — 85025 COMPLETE CBC W/AUTO DIFF WBC: CPT

## 2019-01-06 PROCEDURE — 80053 COMPREHEN METABOLIC PANEL: CPT

## 2019-01-06 PROCEDURE — 6360000004 HC RX CONTRAST MEDICATION: Performed by: STUDENT IN AN ORGANIZED HEALTH CARE EDUCATION/TRAINING PROGRAM

## 2019-01-06 PROCEDURE — 96365 THER/PROPH/DIAG IV INF INIT: CPT

## 2019-01-06 PROCEDURE — 2580000003 HC RX 258: Performed by: STUDENT IN AN ORGANIZED HEALTH CARE EDUCATION/TRAINING PROGRAM

## 2019-01-06 PROCEDURE — 6370000000 HC RX 637 (ALT 250 FOR IP): Performed by: INTERNAL MEDICINE

## 2019-01-06 PROCEDURE — 93005 ELECTROCARDIOGRAM TRACING: CPT

## 2019-01-06 PROCEDURE — 87040 BLOOD CULTURE FOR BACTERIA: CPT

## 2019-01-06 PROCEDURE — 82565 ASSAY OF CREATININE: CPT

## 2019-01-06 PROCEDURE — 82550 ASSAY OF CK (CPK): CPT

## 2019-01-06 PROCEDURE — 82375 ASSAY CARBOXYHB QUANT: CPT

## 2019-01-06 PROCEDURE — 84295 ASSAY OF SERUM SODIUM: CPT

## 2019-01-06 PROCEDURE — G0480 DRUG TEST DEF 1-7 CLASSES: HCPCS

## 2019-01-06 PROCEDURE — 6360000002 HC RX W HCPCS: Performed by: STUDENT IN AN ORGANIZED HEALTH CARE EDUCATION/TRAINING PROGRAM

## 2019-01-06 PROCEDURE — 86141 C-REACTIVE PROTEIN HS: CPT

## 2019-01-06 PROCEDURE — 6370000000 HC RX 637 (ALT 250 FOR IP): Performed by: STUDENT IN AN ORGANIZED HEALTH CARE EDUCATION/TRAINING PROGRAM

## 2019-01-06 PROCEDURE — 2000000000 HC ICU R&B

## 2019-01-06 PROCEDURE — 96361 HYDRATE IV INFUSION ADD-ON: CPT

## 2019-01-06 PROCEDURE — 83036 HEMOGLOBIN GLYCOSYLATED A1C: CPT

## 2019-01-06 PROCEDURE — 83735 ASSAY OF MAGNESIUM: CPT

## 2019-01-06 PROCEDURE — 80162 ASSAY OF DIGOXIN TOTAL: CPT

## 2019-01-06 PROCEDURE — 6370000000 HC RX 637 (ALT 250 FOR IP): Performed by: PHYSICIAN ASSISTANT

## 2019-01-06 PROCEDURE — 84443 ASSAY THYROID STIM HORMONE: CPT

## 2019-01-06 PROCEDURE — 6360000002 HC RX W HCPCS: Performed by: PHYSICIAN ASSISTANT

## 2019-01-06 PROCEDURE — 96375 TX/PRO/DX INJ NEW DRUG ADDON: CPT

## 2019-01-06 PROCEDURE — 70450 CT HEAD/BRAIN W/O DYE: CPT

## 2019-01-06 PROCEDURE — 85730 THROMBOPLASTIN TIME PARTIAL: CPT

## 2019-01-06 PROCEDURE — 36415 COLL VENOUS BLD VENIPUNCTURE: CPT

## 2019-01-06 PROCEDURE — 84484 ASSAY OF TROPONIN QUANT: CPT

## 2019-01-06 PROCEDURE — 82435 ASSAY OF BLOOD CHLORIDE: CPT

## 2019-01-06 PROCEDURE — 84132 ASSAY OF SERUM POTASSIUM: CPT

## 2019-01-06 PROCEDURE — 82140 ASSAY OF AMMONIA: CPT

## 2019-01-06 PROCEDURE — 82010 KETONE BODYS QUAN: CPT

## 2019-01-06 PROCEDURE — 85014 HEMATOCRIT: CPT

## 2019-01-06 PROCEDURE — 82803 BLOOD GASES ANY COMBINATION: CPT

## 2019-01-06 PROCEDURE — 99285 EMERGENCY DEPT VISIT HI MDM: CPT

## 2019-01-06 PROCEDURE — 80307 DRUG TEST PRSMV CHEM ANLYZR: CPT

## 2019-01-06 PROCEDURE — 83605 ASSAY OF LACTIC ACID: CPT

## 2019-01-06 PROCEDURE — 74177 CT ABD & PELVIS W/CONTRAST: CPT

## 2019-01-06 PROCEDURE — 2580000003 HC RX 258: Performed by: PHYSICIAN ASSISTANT

## 2019-01-06 PROCEDURE — 87804 INFLUENZA ASSAY W/OPTIC: CPT

## 2019-01-06 PROCEDURE — 80061 LIPID PANEL: CPT

## 2019-01-06 PROCEDURE — 36600 WITHDRAWAL OF ARTERIAL BLOOD: CPT

## 2019-01-06 PROCEDURE — 82948 REAGENT STRIP/BLOOD GLUCOSE: CPT

## 2019-01-06 PROCEDURE — 85610 PROTHROMBIN TIME: CPT

## 2019-01-06 PROCEDURE — 6360000002 HC RX W HCPCS

## 2019-01-06 PROCEDURE — 71045 X-RAY EXAM CHEST 1 VIEW: CPT

## 2019-01-06 PROCEDURE — 82330 ASSAY OF CALCIUM: CPT

## 2019-01-06 PROCEDURE — 83880 ASSAY OF NATRIURETIC PEPTIDE: CPT

## 2019-01-06 RX ORDER — LEVOTHYROXINE SODIUM 0.07 MG/1
75 TABLET ORAL DAILY
Status: DISCONTINUED | OUTPATIENT
Start: 2019-01-07 | End: 2019-01-09 | Stop reason: HOSPADM

## 2019-01-06 RX ORDER — PHYTONADIONE 10 MG/ML
10 INJECTION, EMULSION INTRAMUSCULAR; INTRAVENOUS; SUBCUTANEOUS ONCE
Status: COMPLETED | OUTPATIENT
Start: 2019-01-06 | End: 2019-01-06

## 2019-01-06 RX ORDER — OXYBUTYNIN CHLORIDE 5 MG/1
5 TABLET ORAL DAILY
COMMUNITY
Start: 2018-11-07

## 2019-01-06 RX ORDER — NICOTINE POLACRILEX 4 MG
15 LOZENGE BUCCAL PRN
Status: DISCONTINUED | OUTPATIENT
Start: 2019-01-06 | End: 2019-01-09 | Stop reason: HOSPADM

## 2019-01-06 RX ORDER — SODIUM CHLORIDE 9 MG/ML
INJECTION, SOLUTION INTRAVENOUS CONTINUOUS
Status: DISCONTINUED | OUTPATIENT
Start: 2019-01-06 | End: 2019-01-07

## 2019-01-06 RX ORDER — BUPROPION HYDROCHLORIDE 150 MG/1
150 TABLET, EXTENDED RELEASE ORAL 2 TIMES DAILY
Status: DISCONTINUED | OUTPATIENT
Start: 2019-01-06 | End: 2019-01-09 | Stop reason: HOSPADM

## 2019-01-06 RX ORDER — CARVEDILOL 12.5 MG/1
12.5 TABLET ORAL 2 TIMES DAILY
Status: DISCONTINUED | OUTPATIENT
Start: 2019-01-06 | End: 2019-01-09 | Stop reason: HOSPADM

## 2019-01-06 RX ORDER — SODIUM CHLORIDE 0.9 % (FLUSH) 0.9 %
10 SYRINGE (ML) INJECTION PRN
Status: DISCONTINUED | OUTPATIENT
Start: 2019-01-06 | End: 2019-01-09 | Stop reason: HOSPADM

## 2019-01-06 RX ORDER — CLONAZEPAM 1 MG/1
1 TABLET ORAL NIGHTLY
Status: DISCONTINUED | OUTPATIENT
Start: 2019-01-06 | End: 2019-01-09 | Stop reason: HOSPADM

## 2019-01-06 RX ORDER — ATORVASTATIN CALCIUM 20 MG/1
20 TABLET, FILM COATED ORAL DAILY
Status: DISCONTINUED | OUTPATIENT
Start: 2019-01-07 | End: 2019-01-09 | Stop reason: HOSPADM

## 2019-01-06 RX ORDER — OXYBUTYNIN CHLORIDE 5 MG/1
5 TABLET ORAL DAILY
Status: DISCONTINUED | OUTPATIENT
Start: 2019-01-07 | End: 2019-01-09 | Stop reason: HOSPADM

## 2019-01-06 RX ORDER — LACTULOSE 10 G/15ML
20 SOLUTION ORAL 3 TIMES DAILY
Status: DISCONTINUED | OUTPATIENT
Start: 2019-01-06 | End: 2019-01-09 | Stop reason: HOSPADM

## 2019-01-06 RX ORDER — DULOXETIN HYDROCHLORIDE 60 MG/1
60 CAPSULE, DELAYED RELEASE ORAL DAILY
Status: DISCONTINUED | OUTPATIENT
Start: 2019-01-07 | End: 2019-01-09 | Stop reason: HOSPADM

## 2019-01-06 RX ORDER — 0.9 % SODIUM CHLORIDE 0.9 %
1000 INTRAVENOUS SOLUTION INTRAVENOUS ONCE
Status: COMPLETED | OUTPATIENT
Start: 2019-01-06 | End: 2019-01-06

## 2019-01-06 RX ORDER — DEXTROSE MONOHYDRATE 50 MG/ML
100 INJECTION, SOLUTION INTRAVENOUS PRN
Status: DISCONTINUED | OUTPATIENT
Start: 2019-01-06 | End: 2019-01-09 | Stop reason: HOSPADM

## 2019-01-06 RX ORDER — SODIUM CHLORIDE 0.9 % (FLUSH) 0.9 %
10 SYRINGE (ML) INJECTION EVERY 12 HOURS SCHEDULED
Status: DISCONTINUED | OUTPATIENT
Start: 2019-01-06 | End: 2019-01-09 | Stop reason: HOSPADM

## 2019-01-06 RX ORDER — ASPIRIN 81 MG/1
81 TABLET ORAL DAILY
Status: DISCONTINUED | OUTPATIENT
Start: 2019-01-07 | End: 2019-01-09 | Stop reason: HOSPADM

## 2019-01-06 RX ORDER — BUPROPION HYDROCHLORIDE 150 MG/1
200 TABLET, EXTENDED RELEASE ORAL 2 TIMES DAILY
Status: ON HOLD | COMMUNITY
Start: 2018-11-28 | End: 2019-08-09

## 2019-01-06 RX ORDER — ONDANSETRON 2 MG/ML
4 INJECTION INTRAMUSCULAR; INTRAVENOUS EVERY 6 HOURS PRN
Status: DISCONTINUED | OUTPATIENT
Start: 2019-01-06 | End: 2019-01-09 | Stop reason: HOSPADM

## 2019-01-06 RX ORDER — ONDANSETRON 2 MG/ML
INJECTION INTRAMUSCULAR; INTRAVENOUS
Status: COMPLETED
Start: 2019-01-06 | End: 2019-01-06

## 2019-01-06 RX ORDER — VALSARTAN 40 MG/1
40 TABLET ORAL DAILY
Status: DISCONTINUED | OUTPATIENT
Start: 2019-01-07 | End: 2019-01-07

## 2019-01-06 RX ORDER — DIGOXIN 250 MCG
250 TABLET ORAL DAILY
Status: DISCONTINUED | OUTPATIENT
Start: 2019-01-07 | End: 2019-01-09 | Stop reason: HOSPADM

## 2019-01-06 RX ORDER — DEXTROSE MONOHYDRATE 25 G/50ML
12.5 INJECTION, SOLUTION INTRAVENOUS PRN
Status: DISCONTINUED | OUTPATIENT
Start: 2019-01-06 | End: 2019-01-09 | Stop reason: HOSPADM

## 2019-01-06 RX ADMIN — CLONAZEPAM 1 MG: 1 TABLET ORAL at 21:54

## 2019-01-06 RX ADMIN — SODIUM CHLORIDE 1000 ML: 9 INJECTION, SOLUTION INTRAVENOUS at 16:44

## 2019-01-06 RX ADMIN — SODIUM CHLORIDE 0.5 UNITS/HR: 9 INJECTION, SOLUTION INTRAVENOUS at 19:50

## 2019-01-06 RX ADMIN — ROPINIROLE HYDROCHLORIDE 2.5 MG: 2 TABLET, FILM COATED ORAL at 21:55

## 2019-01-06 RX ADMIN — SODIUM CHLORIDE 1000 ML: 9 INJECTION, SOLUTION INTRAVENOUS at 19:49

## 2019-01-06 RX ADMIN — LACTULOSE 20 G: 20 SOLUTION ORAL at 21:54

## 2019-01-06 RX ADMIN — Medication 10 ML: at 22:09

## 2019-01-06 RX ADMIN — PHYTONADIONE 10 MG: 10 INJECTION, EMULSION INTRAMUSCULAR; INTRAVENOUS; SUBCUTANEOUS at 21:56

## 2019-01-06 RX ADMIN — ONDANSETRON 4 MG: 2 INJECTION INTRAMUSCULAR; INTRAVENOUS at 17:52

## 2019-01-06 RX ADMIN — ONDANSETRON 4 MG: 2 INJECTION INTRAMUSCULAR; INTRAVENOUS at 21:56

## 2019-01-06 RX ADMIN — PIPERACILLIN SODIUM,TAZOBACTAM SODIUM 3.38 G: 3; .375 INJECTION, POWDER, FOR SOLUTION INTRAVENOUS at 17:10

## 2019-01-06 RX ADMIN — IOPAMIDOL 100 ML: 612 INJECTION, SOLUTION INTRAVENOUS at 17:35

## 2019-01-06 RX ADMIN — BUPROPION HYDROCHLORIDE 150 MG: 150 TABLET, EXTENDED RELEASE ORAL at 21:54

## 2019-01-06 RX ADMIN — SODIUM CHLORIDE 8 UNITS/HR: 9 INJECTION, SOLUTION INTRAVENOUS at 22:06

## 2019-01-06 RX ADMIN — SODIUM CHLORIDE: 9 INJECTION, SOLUTION INTRAVENOUS at 22:06

## 2019-01-06 ASSESSMENT — ENCOUNTER SYMPTOMS
SHORTNESS OF BREATH: 0
ABDOMINAL PAIN: 0
TROUBLE SWALLOWING: 0
DIARRHEA: 0
BACK PAIN: 0
SINUS PRESSURE: 0
CHEST TIGHTNESS: 0
NAUSEA: 1
VOMITING: 1
COUGH: 0

## 2019-01-07 LAB
AMYLASE: 27 U/L (ref 28–100)
ANION GAP SERPL CALCULATED.3IONS-SCNC: 11 MEQ/L (ref 7–13)
BASOPHILS ABSOLUTE: 0.1 K/UL (ref 0–0.2)
BASOPHILS RELATIVE PERCENT: 0.5 %
BUN BLDV-MCNC: 34 MG/DL (ref 6–20)
CALCIUM SERPL-MCNC: 7.3 MG/DL (ref 8.6–10.2)
CHLORIDE BLD-SCNC: 102 MEQ/L (ref 98–107)
CO2: 25 MEQ/L (ref 22–29)
CREAT SERPL-MCNC: 1.07 MG/DL (ref 0.5–0.9)
EOSINOPHILS ABSOLUTE: 0 K/UL (ref 0–0.7)
EOSINOPHILS RELATIVE PERCENT: 0.1 %
FOLATE: >20 NG/ML (ref 7.3–26.1)
GFR AFRICAN AMERICAN: 43
GFR AFRICAN AMERICAN: >60
GFR NON-AFRICAN AMERICAN: 36
GFR NON-AFRICAN AMERICAN: 52.7
GLUCOSE BLD-MCNC: 104 MG/DL (ref 60–115)
GLUCOSE BLD-MCNC: 106 MG/DL (ref 74–109)
GLUCOSE BLD-MCNC: 170 MG/DL (ref 60–115)
GLUCOSE BLD-MCNC: 205 MG/DL (ref 60–115)
GLUCOSE BLD-MCNC: 324 MG/DL (ref 60–115)
GLUCOSE BLD-MCNC: 336 MG/DL (ref 60–115)
GLUCOSE BLD-MCNC: 89 MG/DL (ref 60–115)
GLUCOSE BLD-MCNC: 93 MG/DL (ref 60–115)
GLUCOSE BLD-MCNC: 98 MG/DL (ref 60–115)
GLUCOSE BLD-MCNC: 99 MG/DL (ref 60–115)
HCT VFR BLD CALC: 44.4 % (ref 37–47)
HEMOGLOBIN: 15.2 G/DL (ref 12–16)
HOMOCYSTEINE: 5.2 UMOL/L (ref 0–15)
INR BLD: 7.5
LACTIC ACID: 1 MMOL/L (ref 0.5–2.2)
LIPASE: 15 U/L (ref 13–60)
LYMPHOCYTES ABSOLUTE: 3.8 K/UL (ref 1–4.8)
LYMPHOCYTES RELATIVE PERCENT: 20.1 %
MCH RBC QN AUTO: 32.9 PG (ref 27–31.3)
MCHC RBC AUTO-ENTMCNC: 34.2 % (ref 33–37)
MCV RBC AUTO: 96.3 FL (ref 82–100)
MONOCYTES ABSOLUTE: 0.9 K/UL (ref 0.2–0.8)
MONOCYTES RELATIVE PERCENT: 4.7 %
NEUTROPHILS ABSOLUTE: 14.1 K/UL (ref 1.4–6.5)
NEUTROPHILS RELATIVE PERCENT: 74.6 %
PDW BLD-RTO: 13.6 % (ref 11.5–14.5)
PERFORMED ON: ABNORMAL
PERFORMED ON: NORMAL
PLATELET # BLD: 171 K/UL (ref 130–400)
POC CREATININE: 1.5 MG/DL (ref 0.6–1.1)
POC SAMPLE TYPE: ABNORMAL
POTASSIUM REFLEX MAGNESIUM: 3.7 MEQ/L (ref 3.5–5.1)
PROTHROMBIN TIME: 71 SEC (ref 9–11.5)
RBC # BLD: 4.61 M/UL (ref 4.2–5.4)
SODIUM BLD-SCNC: 138 MEQ/L (ref 132–144)
VITAMIN B-12: >2000 PG/ML (ref 232–1245)
WBC # BLD: 18.9 K/UL (ref 4.8–10.8)

## 2019-01-07 PROCEDURE — 6370000000 HC RX 637 (ALT 250 FOR IP): Performed by: INTERNAL MEDICINE

## 2019-01-07 PROCEDURE — 82746 ASSAY OF FOLIC ACID SERUM: CPT

## 2019-01-07 PROCEDURE — G8987 SELF CARE CURRENT STATUS: HCPCS

## 2019-01-07 PROCEDURE — 83690 ASSAY OF LIPASE: CPT

## 2019-01-07 PROCEDURE — 82607 VITAMIN B-12: CPT

## 2019-01-07 PROCEDURE — 97165 OT EVAL LOW COMPLEX 30 MIN: CPT

## 2019-01-07 PROCEDURE — 83605 ASSAY OF LACTIC ACID: CPT

## 2019-01-07 PROCEDURE — 93010 ELECTROCARDIOGRAM REPORT: CPT | Performed by: INTERNAL MEDICINE

## 2019-01-07 PROCEDURE — G8979 MOBILITY GOAL STATUS: HCPCS

## 2019-01-07 PROCEDURE — 2580000003 HC RX 258: Performed by: INTERNAL MEDICINE

## 2019-01-07 PROCEDURE — 99221 1ST HOSP IP/OBS SF/LOW 40: CPT | Performed by: INTERNAL MEDICINE

## 2019-01-07 PROCEDURE — 6360000002 HC RX W HCPCS: Performed by: PHYSICIAN ASSISTANT

## 2019-01-07 PROCEDURE — 36415 COLL VENOUS BLD VENIPUNCTURE: CPT

## 2019-01-07 PROCEDURE — 97162 PT EVAL MOD COMPLEX 30 MIN: CPT

## 2019-01-07 PROCEDURE — 85025 COMPLETE CBC W/AUTO DIFF WBC: CPT

## 2019-01-07 PROCEDURE — G8989 SELF CARE D/C STATUS: HCPCS

## 2019-01-07 PROCEDURE — G8988 SELF CARE GOAL STATUS: HCPCS

## 2019-01-07 PROCEDURE — 83090 ASSAY OF HOMOCYSTEINE: CPT

## 2019-01-07 PROCEDURE — 80048 BASIC METABOLIC PNL TOTAL CA: CPT

## 2019-01-07 PROCEDURE — 6370000000 HC RX 637 (ALT 250 FOR IP): Performed by: PHYSICIAN ASSISTANT

## 2019-01-07 PROCEDURE — 81241 F5 GENE: CPT

## 2019-01-07 PROCEDURE — 2580000003 HC RX 258: Performed by: PHYSICIAN ASSISTANT

## 2019-01-07 PROCEDURE — 82150 ASSAY OF AMYLASE: CPT

## 2019-01-07 PROCEDURE — 99222 1ST HOSP IP/OBS MODERATE 55: CPT | Performed by: INTERNAL MEDICINE

## 2019-01-07 PROCEDURE — G8978 MOBILITY CURRENT STATUS: HCPCS

## 2019-01-07 PROCEDURE — 85610 PROTHROMBIN TIME: CPT

## 2019-01-07 PROCEDURE — 1210000000 HC MED SURG R&B

## 2019-01-07 RX ORDER — SODIUM CHLORIDE 9 MG/ML
INJECTION, SOLUTION INTRAVENOUS CONTINUOUS
Status: DISCONTINUED | OUTPATIENT
Start: 2019-01-07 | End: 2019-01-08

## 2019-01-07 RX ORDER — INSULIN GLARGINE 100 [IU]/ML
30 INJECTION, SOLUTION SUBCUTANEOUS NIGHTLY
Status: DISCONTINUED | OUTPATIENT
Start: 2019-01-07 | End: 2019-01-07

## 2019-01-07 RX ORDER — DEXTROSE MONOHYDRATE 50 MG/ML
INJECTION, SOLUTION INTRAVENOUS CONTINUOUS
Status: DISCONTINUED | OUTPATIENT
Start: 2019-01-07 | End: 2019-01-07

## 2019-01-07 RX ORDER — INSULIN GLARGINE 100 [IU]/ML
35 INJECTION, SOLUTION SUBCUTANEOUS NIGHTLY
Status: DISCONTINUED | OUTPATIENT
Start: 2019-01-07 | End: 2019-01-09 | Stop reason: HOSPADM

## 2019-01-07 RX ORDER — METOCLOPRAMIDE HYDROCHLORIDE 5 MG/ML
10 INJECTION INTRAMUSCULAR; INTRAVENOUS EVERY 4 HOURS PRN
Status: DISCONTINUED | OUTPATIENT
Start: 2019-01-07 | End: 2019-01-09 | Stop reason: HOSPADM

## 2019-01-07 RX ORDER — PANTOPRAZOLE SODIUM 40 MG/1
40 TABLET, DELAYED RELEASE ORAL
Status: DISCONTINUED | OUTPATIENT
Start: 2019-01-07 | End: 2019-01-09 | Stop reason: HOSPADM

## 2019-01-07 RX ADMIN — Medication 5000 UNITS: at 09:08

## 2019-01-07 RX ADMIN — OXYBUTYNIN CHLORIDE 5 MG: 5 TABLET ORAL at 09:08

## 2019-01-07 RX ADMIN — INSULIN LISPRO 8 UNITS: 100 INJECTION, SOLUTION INTRAVENOUS; SUBCUTANEOUS at 16:42

## 2019-01-07 RX ADMIN — PANTOPRAZOLE SODIUM 40 MG: 40 TABLET, DELAYED RELEASE ORAL at 16:46

## 2019-01-07 RX ADMIN — DIGOXIN 250 MCG: 250 TABLET ORAL at 09:08

## 2019-01-07 RX ADMIN — PANTOPRAZOLE SODIUM 40 MG: 40 TABLET, DELAYED RELEASE ORAL at 12:05

## 2019-01-07 RX ADMIN — Medication 10 ML: at 09:20

## 2019-01-07 RX ADMIN — LACTULOSE 20 G: 20 SOLUTION ORAL at 09:08

## 2019-01-07 RX ADMIN — CARVEDILOL 12.5 MG: 12.5 TABLET, FILM COATED ORAL at 21:21

## 2019-01-07 RX ADMIN — PIPERACILLIN SODIUM,TAZOBACTAM SODIUM 3.38 G: 3; .375 INJECTION, POWDER, FOR SOLUTION INTRAVENOUS at 18:36

## 2019-01-07 RX ADMIN — ATORVASTATIN CALCIUM 20 MG: 20 TABLET, FILM COATED ORAL at 21:21

## 2019-01-07 RX ADMIN — CLONAZEPAM 1 MG: 1 TABLET ORAL at 21:21

## 2019-01-07 RX ADMIN — VALSARTAN 40 MG: 40 TABLET, FILM COATED ORAL at 09:08

## 2019-01-07 RX ADMIN — LACTULOSE 20 G: 20 SOLUTION ORAL at 14:55

## 2019-01-07 RX ADMIN — INSULIN GLARGINE 35 UNITS: 100 INJECTION, SOLUTION SUBCUTANEOUS at 21:20

## 2019-01-07 RX ADMIN — DEXTROSE MONOHYDRATE: 50 INJECTION, SOLUTION INTRAVENOUS at 09:51

## 2019-01-07 RX ADMIN — BUPROPION HYDROCHLORIDE 150 MG: 150 TABLET, EXTENDED RELEASE ORAL at 21:21

## 2019-01-07 RX ADMIN — ROPINIROLE HYDROCHLORIDE 2.5 MG: 2 TABLET, FILM COATED ORAL at 21:21

## 2019-01-07 RX ADMIN — Medication 10 ML: at 21:22

## 2019-01-07 RX ADMIN — LEVOTHYROXINE SODIUM 75 MCG: 75 TABLET ORAL at 06:13

## 2019-01-07 RX ADMIN — PIPERACILLIN SODIUM,TAZOBACTAM SODIUM 3.38 G: 3; .375 INJECTION, POWDER, FOR SOLUTION INTRAVENOUS at 01:50

## 2019-01-07 RX ADMIN — LACTULOSE 20 G: 20 SOLUTION ORAL at 21:20

## 2019-01-07 RX ADMIN — CARVEDILOL 12.5 MG: 12.5 TABLET, FILM COATED ORAL at 09:08

## 2019-01-07 RX ADMIN — SODIUM CHLORIDE: 9 INJECTION, SOLUTION INTRAVENOUS at 18:36

## 2019-01-07 RX ADMIN — BUPROPION HYDROCHLORIDE 150 MG: 150 TABLET, EXTENDED RELEASE ORAL at 09:08

## 2019-01-07 RX ADMIN — PIPERACILLIN SODIUM,TAZOBACTAM SODIUM 3.38 G: 3; .375 INJECTION, POWDER, FOR SOLUTION INTRAVENOUS at 09:10

## 2019-01-07 RX ADMIN — DULOXETINE HYDROCHLORIDE 60 MG: 60 CAPSULE, DELAYED RELEASE ORAL at 09:08

## 2019-01-07 RX ADMIN — DEXTROSE MONOHYDRATE 100 ML/HR: 50 INJECTION, SOLUTION INTRAVENOUS at 09:50

## 2019-01-07 ASSESSMENT — ENCOUNTER SYMPTOMS
SHORTNESS OF BREATH: 0
VOMITING: 1
EYE DISCHARGE: 0
NAUSEA: 1

## 2019-01-08 LAB
ANION GAP SERPL CALCULATED.3IONS-SCNC: 10 MEQ/L (ref 7–13)
BASOPHILS ABSOLUTE: 0.1 K/UL (ref 0–0.2)
BASOPHILS RELATIVE PERCENT: 0.5 %
BUN BLDV-MCNC: 14 MG/DL (ref 6–20)
CALCIUM SERPL-MCNC: 7.1 MG/DL (ref 8.6–10.2)
CHLORIDE BLD-SCNC: 102 MEQ/L (ref 98–107)
CO2: 24 MEQ/L (ref 22–29)
CREAT SERPL-MCNC: 0.78 MG/DL (ref 0.5–0.9)
EOSINOPHILS ABSOLUTE: 0.1 K/UL (ref 0–0.7)
EOSINOPHILS RELATIVE PERCENT: 0.5 %
GFR AFRICAN AMERICAN: >60
GFR NON-AFRICAN AMERICAN: >60
GLUCOSE BLD-MCNC: 100 MG/DL (ref 60–115)
GLUCOSE BLD-MCNC: 112 MG/DL (ref 60–115)
GLUCOSE BLD-MCNC: 203 MG/DL (ref 60–115)
GLUCOSE BLD-MCNC: 254 MG/DL (ref 60–115)
GLUCOSE BLD-MCNC: 257 MG/DL (ref 74–109)
HCT VFR BLD CALC: 37.1 % (ref 37–47)
HEMOGLOBIN: 12.6 G/DL (ref 12–16)
INR BLD: 1.3
LACTIC ACID: 0.8 MMOL/L (ref 0.5–2.2)
LYMPHOCYTES ABSOLUTE: 3.2 K/UL (ref 1–4.8)
LYMPHOCYTES RELATIVE PERCENT: 25.8 %
MAGNESIUM: 1.8 MG/DL (ref 1.7–2.3)
MCH RBC QN AUTO: 33.3 PG (ref 27–31.3)
MCHC RBC AUTO-ENTMCNC: 34 % (ref 33–37)
MCV RBC AUTO: 98 FL (ref 82–100)
MONOCYTES ABSOLUTE: 0.7 K/UL (ref 0.2–0.8)
MONOCYTES RELATIVE PERCENT: 5.9 %
NEUTROPHILS ABSOLUTE: 8.4 K/UL (ref 1.4–6.5)
NEUTROPHILS RELATIVE PERCENT: 67.3 %
PDW BLD-RTO: 13.4 % (ref 11.5–14.5)
PERFORMED ON: ABNORMAL
PERFORMED ON: ABNORMAL
PERFORMED ON: NORMAL
PERFORMED ON: NORMAL
PLATELET # BLD: 125 K/UL (ref 130–400)
POTASSIUM REFLEX MAGNESIUM: 3.3 MEQ/L (ref 3.5–5.1)
PROTHROMBIN TIME: 12.7 SEC (ref 9–11.5)
RBC # BLD: 3.79 M/UL (ref 4.2–5.4)
SODIUM BLD-SCNC: 136 MEQ/L (ref 132–144)
WBC # BLD: 12.5 K/UL (ref 4.8–10.8)

## 2019-01-08 PROCEDURE — 2580000003 HC RX 258: Performed by: PHYSICIAN ASSISTANT

## 2019-01-08 PROCEDURE — 6360000002 HC RX W HCPCS: Performed by: PHYSICIAN ASSISTANT

## 2019-01-08 PROCEDURE — 97116 GAIT TRAINING THERAPY: CPT

## 2019-01-08 PROCEDURE — 83735 ASSAY OF MAGNESIUM: CPT

## 2019-01-08 PROCEDURE — 36415 COLL VENOUS BLD VENIPUNCTURE: CPT

## 2019-01-08 PROCEDURE — 1210000000 HC MED SURG R&B

## 2019-01-08 PROCEDURE — 85025 COMPLETE CBC W/AUTO DIFF WBC: CPT

## 2019-01-08 PROCEDURE — 6370000000 HC RX 637 (ALT 250 FOR IP): Performed by: PHYSICIAN ASSISTANT

## 2019-01-08 PROCEDURE — 6370000000 HC RX 637 (ALT 250 FOR IP): Performed by: INTERNAL MEDICINE

## 2019-01-08 PROCEDURE — 85610 PROTHROMBIN TIME: CPT

## 2019-01-08 PROCEDURE — 83605 ASSAY OF LACTIC ACID: CPT

## 2019-01-08 PROCEDURE — 80048 BASIC METABOLIC PNL TOTAL CA: CPT

## 2019-01-08 PROCEDURE — 99233 SBSQ HOSP IP/OBS HIGH 50: CPT | Performed by: INTERNAL MEDICINE

## 2019-01-08 PROCEDURE — 2580000003 HC RX 258: Performed by: INTERNAL MEDICINE

## 2019-01-08 PROCEDURE — 6360000002 HC RX W HCPCS: Performed by: INTERNAL MEDICINE

## 2019-01-08 RX ORDER — POTASSIUM CHLORIDE AND SODIUM CHLORIDE 900; 300 MG/100ML; MG/100ML
INJECTION, SOLUTION INTRAVENOUS CONTINUOUS
Status: DISCONTINUED | OUTPATIENT
Start: 2019-01-08 | End: 2019-01-09 | Stop reason: HOSPADM

## 2019-01-08 RX ADMIN — CARVEDILOL 12.5 MG: 12.5 TABLET, FILM COATED ORAL at 09:30

## 2019-01-08 RX ADMIN — PANTOPRAZOLE SODIUM 40 MG: 40 TABLET, DELAYED RELEASE ORAL at 16:37

## 2019-01-08 RX ADMIN — ASPIRIN 81 MG: 81 TABLET, COATED ORAL at 09:30

## 2019-01-08 RX ADMIN — SODIUM CHLORIDE: 9 INJECTION, SOLUTION INTRAVENOUS at 02:17

## 2019-01-08 RX ADMIN — PIPERACILLIN SODIUM,TAZOBACTAM SODIUM 3.38 G: 3; .375 INJECTION, POWDER, FOR SOLUTION INTRAVENOUS at 16:37

## 2019-01-08 RX ADMIN — POTASSIUM CHLORIDE AND SODIUM CHLORIDE: 900; 300 INJECTION, SOLUTION INTRAVENOUS at 15:07

## 2019-01-08 RX ADMIN — LEVOTHYROXINE SODIUM 75 MCG: 75 TABLET ORAL at 06:10

## 2019-01-08 RX ADMIN — DULOXETINE HYDROCHLORIDE 60 MG: 60 CAPSULE, DELAYED RELEASE ORAL at 09:30

## 2019-01-08 RX ADMIN — CARVEDILOL 12.5 MG: 12.5 TABLET, FILM COATED ORAL at 21:02

## 2019-01-08 RX ADMIN — ROPINIROLE HYDROCHLORIDE 2.5 MG: 2 TABLET, FILM COATED ORAL at 21:00

## 2019-01-08 RX ADMIN — ATORVASTATIN CALCIUM 20 MG: 20 TABLET, FILM COATED ORAL at 21:02

## 2019-01-08 RX ADMIN — CLONAZEPAM 1 MG: 1 TABLET ORAL at 21:02

## 2019-01-08 RX ADMIN — INSULIN GLARGINE 35 UNITS: 100 INJECTION, SOLUTION SUBCUTANEOUS at 22:13

## 2019-01-08 RX ADMIN — ONDANSETRON 4 MG: 2 INJECTION INTRAMUSCULAR; INTRAVENOUS at 09:30

## 2019-01-08 RX ADMIN — PIPERACILLIN SODIUM,TAZOBACTAM SODIUM 3.38 G: 3; .375 INJECTION, POWDER, FOR SOLUTION INTRAVENOUS at 09:30

## 2019-01-08 RX ADMIN — INSULIN LISPRO 6 UNITS: 100 INJECTION, SOLUTION INTRAVENOUS; SUBCUTANEOUS at 08:40

## 2019-01-08 RX ADMIN — Medication 10 ML: at 09:31

## 2019-01-08 RX ADMIN — PIPERACILLIN SODIUM,TAZOBACTAM SODIUM 3.38 G: 3; .375 INJECTION, POWDER, FOR SOLUTION INTRAVENOUS at 02:16

## 2019-01-08 RX ADMIN — OXYBUTYNIN CHLORIDE 5 MG: 5 TABLET ORAL at 09:30

## 2019-01-08 RX ADMIN — DIGOXIN 250 MCG: 250 TABLET ORAL at 09:30

## 2019-01-08 RX ADMIN — BUPROPION HYDROCHLORIDE 150 MG: 150 TABLET, EXTENDED RELEASE ORAL at 21:02

## 2019-01-08 RX ADMIN — BUPROPION HYDROCHLORIDE 150 MG: 150 TABLET, EXTENDED RELEASE ORAL at 09:30

## 2019-01-08 RX ADMIN — LACTULOSE 20 G: 20 SOLUTION ORAL at 13:58

## 2019-01-08 RX ADMIN — PANTOPRAZOLE SODIUM 40 MG: 40 TABLET, DELAYED RELEASE ORAL at 06:10

## 2019-01-08 RX ADMIN — LACTULOSE 20 G: 20 SOLUTION ORAL at 09:30

## 2019-01-08 RX ADMIN — METOCLOPRAMIDE 10 MG: 5 INJECTION, SOLUTION INTRAMUSCULAR; INTRAVENOUS at 16:37

## 2019-01-08 RX ADMIN — Medication 5000 UNITS: at 09:30

## 2019-01-08 RX ADMIN — SODIUM CHLORIDE: 9 INJECTION, SOLUTION INTRAVENOUS at 12:16

## 2019-01-09 VITALS
RESPIRATION RATE: 18 BRPM | HEIGHT: 61 IN | DIASTOLIC BLOOD PRESSURE: 63 MMHG | WEIGHT: 150 LBS | SYSTOLIC BLOOD PRESSURE: 112 MMHG | BODY MASS INDEX: 28.32 KG/M2 | HEART RATE: 85 BPM | TEMPERATURE: 98.1 F | OXYGEN SATURATION: 98 %

## 2019-01-09 PROBLEM — E11.10 DIABETIC KETOACIDOSIS WITHOUT COMA ASSOCIATED WITH TYPE 2 DIABETES MELLITUS (HCC): Status: RESOLVED | Noted: 2019-01-06 | Resolved: 2019-01-09

## 2019-01-09 PROBLEM — E10.10 TYPE 1 DIABETES MELLITUS WITH KETOACIDOSIS (HCC): Status: ACTIVE | Noted: 2019-01-09

## 2019-01-09 LAB
ANION GAP SERPL CALCULATED.3IONS-SCNC: 8 MEQ/L (ref 7–13)
BASOPHILS ABSOLUTE: 0 K/UL (ref 0–0.2)
BASOPHILS RELATIVE PERCENT: 0.4 %
BUN BLDV-MCNC: 8 MG/DL (ref 6–20)
CALCIUM SERPL-MCNC: 7.3 MG/DL (ref 8.6–10.2)
CHLORIDE BLD-SCNC: 108 MEQ/L (ref 98–107)
CO2: 23 MEQ/L (ref 22–29)
CREAT SERPL-MCNC: 0.78 MG/DL (ref 0.5–0.9)
EOSINOPHILS ABSOLUTE: 0.1 K/UL (ref 0–0.7)
EOSINOPHILS RELATIVE PERCENT: 0.4 %
GFR AFRICAN AMERICAN: >60
GFR NON-AFRICAN AMERICAN: >60
GLUCOSE BLD-MCNC: 103 MG/DL (ref 60–115)
GLUCOSE BLD-MCNC: 117 MG/DL (ref 74–109)
GLUCOSE BLD-MCNC: 141 MG/DL (ref 60–115)
HCT VFR BLD CALC: 37.2 % (ref 37–47)
HEMOGLOBIN: 12.4 G/DL (ref 12–16)
INR BLD: 1
LACTIC ACID: 0.6 MMOL/L (ref 0.5–2.2)
LYMPHOCYTES ABSOLUTE: 3.7 K/UL (ref 1–4.8)
LYMPHOCYTES RELATIVE PERCENT: 32.9 %
MCH RBC QN AUTO: 33.4 PG (ref 27–31.3)
MCHC RBC AUTO-ENTMCNC: 33.5 % (ref 33–37)
MCV RBC AUTO: 99.9 FL (ref 82–100)
MONOCYTES ABSOLUTE: 0.7 K/UL (ref 0.2–0.8)
MONOCYTES RELATIVE PERCENT: 6.6 %
NEUTROPHILS ABSOLUTE: 6.7 K/UL (ref 1.4–6.5)
NEUTROPHILS RELATIVE PERCENT: 59.7 %
PDW BLD-RTO: 13.8 % (ref 11.5–14.5)
PERFORMED ON: ABNORMAL
PERFORMED ON: NORMAL
PLATELET # BLD: 128 K/UL (ref 130–400)
POTASSIUM REFLEX MAGNESIUM: 3.7 MEQ/L (ref 3.5–5.1)
PROTHROMBIN TIME: 10.5 SEC (ref 9–11.5)
RBC # BLD: 3.72 M/UL (ref 4.2–5.4)
SODIUM BLD-SCNC: 139 MEQ/L (ref 132–144)
WBC # BLD: 11.3 K/UL (ref 4.8–10.8)

## 2019-01-09 PROCEDURE — 85025 COMPLETE CBC W/AUTO DIFF WBC: CPT

## 2019-01-09 PROCEDURE — 36415 COLL VENOUS BLD VENIPUNCTURE: CPT

## 2019-01-09 PROCEDURE — 99232 SBSQ HOSP IP/OBS MODERATE 35: CPT | Performed by: INTERNAL MEDICINE

## 2019-01-09 PROCEDURE — 83605 ASSAY OF LACTIC ACID: CPT

## 2019-01-09 PROCEDURE — 80048 BASIC METABOLIC PNL TOTAL CA: CPT

## 2019-01-09 PROCEDURE — 85610 PROTHROMBIN TIME: CPT

## 2019-01-09 PROCEDURE — 6370000000 HC RX 637 (ALT 250 FOR IP): Performed by: INTERNAL MEDICINE

## 2019-01-09 PROCEDURE — 6360000002 HC RX W HCPCS: Performed by: INTERNAL MEDICINE

## 2019-01-09 RX ADMIN — BUPROPION HYDROCHLORIDE 150 MG: 150 TABLET, EXTENDED RELEASE ORAL at 08:28

## 2019-01-09 RX ADMIN — DIGOXIN 250 MCG: 250 TABLET ORAL at 08:28

## 2019-01-09 RX ADMIN — PANTOPRAZOLE SODIUM 40 MG: 40 TABLET, DELAYED RELEASE ORAL at 16:48

## 2019-01-09 RX ADMIN — ASPIRIN 81 MG: 81 TABLET, COATED ORAL at 08:29

## 2019-01-09 RX ADMIN — POTASSIUM CHLORIDE AND SODIUM CHLORIDE: 900; 300 INJECTION, SOLUTION INTRAVENOUS at 03:25

## 2019-01-09 RX ADMIN — CARVEDILOL 12.5 MG: 12.5 TABLET, FILM COATED ORAL at 08:28

## 2019-01-09 RX ADMIN — PANTOPRAZOLE SODIUM 40 MG: 40 TABLET, DELAYED RELEASE ORAL at 04:45

## 2019-01-09 RX ADMIN — DULOXETINE HYDROCHLORIDE 60 MG: 60 CAPSULE, DELAYED RELEASE ORAL at 08:28

## 2019-01-09 RX ADMIN — Medication 5000 UNITS: at 08:28

## 2019-01-09 RX ADMIN — OXYBUTYNIN CHLORIDE 5 MG: 5 TABLET ORAL at 08:28

## 2019-01-09 RX ADMIN — POTASSIUM CHLORIDE AND SODIUM CHLORIDE: 900; 300 INJECTION, SOLUTION INTRAVENOUS at 12:58

## 2019-01-09 RX ADMIN — INSULIN LISPRO 2 UNITS: 100 INJECTION, SOLUTION INTRAVENOUS; SUBCUTANEOUS at 12:22

## 2019-01-09 RX ADMIN — LEVOTHYROXINE SODIUM 75 MCG: 75 TABLET ORAL at 04:45

## 2019-01-11 LAB
BLOOD CULTURE, ROUTINE: NORMAL
CULTURE, BLOOD 2: NORMAL

## 2019-01-21 ENCOUNTER — HOSPITAL ENCOUNTER (OUTPATIENT)
Age: 59
Setting detail: OBSERVATION
Discharge: HOME | End: 2019-01-23
Attending: INTERNAL MEDICINE | Admitting: INTERNAL MEDICINE
Payer: MEDICARE

## 2019-01-21 DIAGNOSIS — R11.2 INTRACTABLE VOMITING WITH NAUSEA, UNSPECIFIED VOMITING TYPE: ICD-10-CM

## 2019-01-21 DIAGNOSIS — Z79.01 ON BRIDGING TREATMENT WITH LOVENOX: ICD-10-CM

## 2019-01-21 DIAGNOSIS — E86.0 DEHYDRATION: ICD-10-CM

## 2019-01-21 DIAGNOSIS — N17.9 AKI (ACUTE KIDNEY INJURY) (HCC): Primary | ICD-10-CM

## 2019-01-21 DIAGNOSIS — R00.0 TACHYCARDIA: ICD-10-CM

## 2019-01-21 DIAGNOSIS — D72.829 LEUKOCYTOSIS, UNSPECIFIED TYPE: ICD-10-CM

## 2019-01-21 PROBLEM — R11.0 NAUSEA: Status: ACTIVE | Noted: 2019-01-21

## 2019-01-21 LAB
ACANTHOCYTES: 0
ALBUMIN SERPL-MCNC: 3.7 G/DL (ref 3.9–4.9)
ALP BLD-CCNC: 73 U/L (ref 40–130)
ALT SERPL-CCNC: 19 U/L (ref 0–33)
AMYLASE: 26 U/L (ref 28–100)
ANION GAP SERPL CALCULATED.3IONS-SCNC: 18 MEQ/L (ref 7–13)
ANISOCYTOSIS: 0
AST SERPL-CCNC: 29 U/L (ref 0–35)
ATYPICAL LYMPHOCYTE RELATIVE PERCENT: 4 %
AUER RODS: 0
BACTERIA: ABNORMAL /HPF
BASE EXCESS VENOUS: 0 (ref -3–3)
BASOPHILIC STIPPLING: 0
BASOPHILS ABSOLUTE: 0.2 K/UL (ref 0–0.2)
BASOPHILS RELATIVE PERCENT: 1 %
BETA-HYDROXYBUTYRATE: 17.8 MG/DL (ref 0.2–2.8)
BILIRUB SERPL-MCNC: 0.6 MG/DL (ref 0–1.2)
BILIRUBIN URINE: NEGATIVE
BLOOD, URINE: NEGATIVE
BUN BLDV-MCNC: 29 MG/DL (ref 6–20)
BURR CELLS: 0
CABOT RINGS: 0
CALCIUM IONIZED: 1.12 MMOL/L (ref 1.12–1.32)
CALCIUM SERPL-MCNC: 9.6 MG/DL (ref 8.6–10.2)
CASTS: ABNORMAL /LPF
CHLORIDE BLD-SCNC: 90 MEQ/L (ref 98–107)
CHP ED QC CHECK: NORMAL
CHP ED QC CHECK: NORMAL
CLARITY: ABNORMAL
CO2: 25 MEQ/L (ref 22–29)
COLOR: YELLOW
CREAT SERPL-MCNC: 1.26 MG/DL (ref 0.5–0.9)
DIGOXIN LEVEL: 1.2 NG/ML (ref 0.8–2)
DOHLE BODIES: 0
EKG ATRIAL RATE: 116 BPM
EKG P AXIS: 78 DEGREES
EKG P-R INTERVAL: 122 MS
EKG Q-T INTERVAL: 320 MS
EKG QRS DURATION: 72 MS
EKG QTC CALCULATION (BAZETT): 444 MS
EKG R AXIS: 85 DEGREES
EKG T AXIS: 33 DEGREES
EKG VENTRICULAR RATE: 116 BPM
EOSINOPHILS ABSOLUTE: 0 K/UL (ref 0–0.7)
EOSINOPHILS RELATIVE PERCENT: 0.1 %
EPITHELIAL CELLS, UA: ABNORMAL /HPF (ref 0–5)
ETHANOL PERCENT: NORMAL G/DL
ETHANOL: <10 MG/DL (ref 0–0.08)
GFR AFRICAN AMERICAN: 47
GFR AFRICAN AMERICAN: 52.8
GFR NON-AFRICAN AMERICAN: 39
GFR NON-AFRICAN AMERICAN: 43.6
GLOBULIN: 4 G/DL (ref 2.3–3.5)
GLUCOSE BLD-MCNC: 186 MG/DL
GLUCOSE BLD-MCNC: 186 MG/DL
GLUCOSE BLD-MCNC: 186 MG/DL (ref 60–115)
GLUCOSE BLD-MCNC: 190 MG/DL (ref 60–115)
GLUCOSE BLD-MCNC: 204 MG/DL (ref 74–109)
GLUCOSE URINE: NEGATIVE MG/DL
HAIRY CELLS: 0
HCO3 VENOUS: 25.8 MMOL/L (ref 23–29)
HCT VFR BLD CALC: 49.8 % (ref 37–47)
HEMOGLOBIN: 17.4 G/DL (ref 12–16)
HEMOGLOBIN: 20.4 GM/DL (ref 12–16)
HOWELL-JOLLY BODIES: 0
HYPERSEGMENTED NEUTROPHILS: 0
HYPOCHROMIA: 0
INR BLD: 2.6
KETONES, URINE: 40 MG/DL
LACTATE: 4.23 MMOL/L (ref 0.4–2)
LACTIC ACID: 2 MMOL/L (ref 0.5–2.2)
LACTIC ACID: 2.3 MMOL/L (ref 0.5–2.2)
LEUKOCYTE ESTERASE, URINE: NEGATIVE
LIPASE: 9 U/L (ref 13–60)
LYMPHOCYTES ABSOLUTE: 5.3 K/UL (ref 1–4.8)
LYMPHOCYTES RELATIVE PERCENT: 24 %
MACROCYTES: 0
MCH RBC QN AUTO: 33.8 PG (ref 27–31.3)
MCHC RBC AUTO-ENTMCNC: 34.9 % (ref 33–37)
MCV RBC AUTO: 96.9 FL (ref 82–100)
MICROCYTES: 0
MONOCYTES ABSOLUTE: 0.6 K/UL (ref 0.2–0.8)
MONOCYTES RELATIVE PERCENT: 2.9 %
NEUTROPHILS ABSOLUTE: 12.9 K/UL (ref 1.4–6.5)
NEUTROPHILS RELATIVE PERCENT: 68 %
NITRITE, URINE: POSITIVE
O2 SAT, VEN: 66 %
OVALOCYTES: 0
PAPPENHEIMER BODIES: 0
PCO2, VEN: 47.8 MM HG (ref 40–50)
PDW BLD-RTO: 13.5 % (ref 11.5–14.5)
PELGER HUET CELLS: 0 %
PERFORMED ON: ABNORMAL
PERFORMED ON: ABNORMAL
PH UA: 5 (ref 5–9)
PH VENOUS: 7.34 (ref 7.35–7.45)
PLATELET # BLD: 278 K/UL (ref 130–400)
PLATELET SLIDE REVIEW: NORMAL
PO2, VEN: 37 MM HG
POC CHLORIDE: 100 MEQ/L (ref 99–110)
POC CREATININE: 1.4 MG/DL (ref 0.6–1.1)
POC HEMATOCRIT: 60 % (ref 36–48)
POC POTASSIUM: 3.8 MEQ/L (ref 3.5–5.1)
POC SAMPLE TYPE: ABNORMAL
POC SODIUM: 134 MEQ/L (ref 136–145)
POIKILOCYTES: 0
POLYCHROMASIA: 0
POTASSIUM SERPL-SCNC: 3.7 MEQ/L (ref 3.5–5.1)
PROTEIN UA: NEGATIVE MG/DL
PROTHROMBIN TIME: 25.8 SEC (ref 9–11.5)
RBC # BLD: 5.14 M/UL (ref 4.2–5.4)
SCHISTOCYTES: 0
SICKLE CELLS: 0
SODIUM BLD-SCNC: 133 MEQ/L (ref 132–144)
SPECIFIC GRAVITY UA: 1.02 (ref 1–1.03)
SPHEROCYTES: 0
STOMATOCYTES: 0
TARGET CELLS: 0
TCO2 CALC VENOUS: 27 MMOL/L
TEAR DROP CELLS: 0
TOTAL PROTEIN: 7.7 G/DL (ref 6.4–8.1)
TOXIC GRANULATION: 0
TROPONIN: 0.01 NG/ML (ref 0–0.01)
URINE REFLEX TO CULTURE: YES
UROBILINOGEN, URINE: 0.2 E.U./DL
VACUOLATED NEUTROPHILS: 0
WBC # BLD: 18.9 K/UL (ref 4.8–10.8)
WBC UA: ABNORMAL /HPF (ref 0–5)

## 2019-01-21 PROCEDURE — 96361 HYDRATE IV INFUSION ADD-ON: CPT

## 2019-01-21 PROCEDURE — G0378 HOSPITAL OBSERVATION PER HR: HCPCS

## 2019-01-21 PROCEDURE — 85610 PROTHROMBIN TIME: CPT

## 2019-01-21 PROCEDURE — 82565 ASSAY OF CREATININE: CPT

## 2019-01-21 PROCEDURE — 82435 ASSAY OF BLOOD CHLORIDE: CPT

## 2019-01-21 PROCEDURE — 82010 KETONE BODYS QUAN: CPT

## 2019-01-21 PROCEDURE — 80053 COMPREHEN METABOLIC PANEL: CPT

## 2019-01-21 PROCEDURE — 96374 THER/PROPH/DIAG INJ IV PUSH: CPT

## 2019-01-21 PROCEDURE — 82330 ASSAY OF CALCIUM: CPT

## 2019-01-21 PROCEDURE — 85025 COMPLETE CBC W/AUTO DIFF WBC: CPT

## 2019-01-21 PROCEDURE — 83605 ASSAY OF LACTIC ACID: CPT

## 2019-01-21 PROCEDURE — 2580000003 HC RX 258: Performed by: EMERGENCY MEDICINE

## 2019-01-21 PROCEDURE — S0028 INJECTION, FAMOTIDINE, 20 MG: HCPCS | Performed by: NURSE PRACTITIONER

## 2019-01-21 PROCEDURE — 80162 ASSAY OF DIGOXIN TOTAL: CPT

## 2019-01-21 PROCEDURE — 93005 ELECTROCARDIOGRAM TRACING: CPT

## 2019-01-21 PROCEDURE — 85014 HEMATOCRIT: CPT

## 2019-01-21 PROCEDURE — 84484 ASSAY OF TROPONIN QUANT: CPT

## 2019-01-21 PROCEDURE — 87040 BLOOD CULTURE FOR BACTERIA: CPT

## 2019-01-21 PROCEDURE — 87186 SC STD MICRODIL/AGAR DIL: CPT

## 2019-01-21 PROCEDURE — 87077 CULTURE AEROBIC IDENTIFY: CPT

## 2019-01-21 PROCEDURE — 84295 ASSAY OF SERUM SODIUM: CPT

## 2019-01-21 PROCEDURE — 99285 EMERGENCY DEPT VISIT HI MDM: CPT

## 2019-01-21 PROCEDURE — G0480 DRUG TEST DEF 1-7 CLASSES: HCPCS

## 2019-01-21 PROCEDURE — 84132 ASSAY OF SERUM POTASSIUM: CPT

## 2019-01-21 PROCEDURE — 81001 URINALYSIS AUTO W/SCOPE: CPT

## 2019-01-21 PROCEDURE — 82150 ASSAY OF AMYLASE: CPT

## 2019-01-21 PROCEDURE — 82803 BLOOD GASES ANY COMBINATION: CPT

## 2019-01-21 PROCEDURE — 83690 ASSAY OF LIPASE: CPT

## 2019-01-21 PROCEDURE — 36415 COLL VENOUS BLD VENIPUNCTURE: CPT

## 2019-01-21 PROCEDURE — 87086 URINE CULTURE/COLONY COUNT: CPT

## 2019-01-21 PROCEDURE — 2580000003 HC RX 258: Performed by: NURSE PRACTITIONER

## 2019-01-21 PROCEDURE — 6360000002 HC RX W HCPCS: Performed by: NURSE PRACTITIONER

## 2019-01-21 PROCEDURE — 82948 REAGENT STRIP/BLOOD GLUCOSE: CPT

## 2019-01-21 RX ORDER — 0.9 % SODIUM CHLORIDE 0.9 %
1000 INTRAVENOUS SOLUTION INTRAVENOUS ONCE
Status: COMPLETED | OUTPATIENT
Start: 2019-01-21 | End: 2019-01-21

## 2019-01-21 RX ORDER — 0.9 % SODIUM CHLORIDE 0.9 %
1000 INTRAVENOUS SOLUTION INTRAVENOUS ONCE
Status: COMPLETED | OUTPATIENT
Start: 2019-01-21 | End: 2019-01-22

## 2019-01-21 RX ADMIN — FAMOTIDINE 20 MG: 10 INJECTION, SOLUTION INTRAVENOUS at 21:52

## 2019-01-21 RX ADMIN — SODIUM CHLORIDE 1000 ML: 9 INJECTION, SOLUTION INTRAVENOUS at 21:52

## 2019-01-21 RX ADMIN — SODIUM CHLORIDE 1000 ML: 9 INJECTION, SOLUTION INTRAVENOUS at 19:01

## 2019-01-21 ASSESSMENT — ENCOUNTER SYMPTOMS
TROUBLE SWALLOWING: 0
COUGH: 0
CONSTIPATION: 0
SORE THROAT: 0
CHEST TIGHTNESS: 0
VOMITING: 1
DIARRHEA: 0
ABDOMINAL DISTENTION: 0
COLOR CHANGE: 0
RHINORRHEA: 0
WHEEZING: 0
BACK PAIN: 0
ABDOMINAL PAIN: 1
SHORTNESS OF BREATH: 0
BLOOD IN STOOL: 0
NAUSEA: 1

## 2019-01-21 ASSESSMENT — PAIN DESCRIPTION - LOCATION: LOCATION: CHEST

## 2019-01-21 ASSESSMENT — PAIN SCALES - GENERAL: PAINLEVEL_OUTOF10: 4

## 2019-01-22 PROBLEM — K29.00 ACUTE GASTRITIS WITHOUT BLEEDING: Status: ACTIVE | Noted: 2019-01-22

## 2019-01-22 LAB
GLUCOSE BLD-MCNC: 127 MG/DL (ref 60–115)
GLUCOSE BLD-MCNC: 175 MG/DL (ref 60–115)
GLUCOSE BLD-MCNC: 188 MG/DL (ref 60–115)
GLUCOSE BLD-MCNC: 239 MG/DL (ref 60–115)
INR BLD: 2.5
LACTIC ACID: 1.3 MMOL/L (ref 0.5–2.2)
PERFORMED ON: ABNORMAL
PROTHROMBIN TIME: 24.1 SEC (ref 9–11.5)
TROPONIN: <0.01 NG/ML (ref 0–0.01)

## 2019-01-22 PROCEDURE — 96376 TX/PRO/DX INJ SAME DRUG ADON: CPT

## 2019-01-22 PROCEDURE — 6360000002 HC RX W HCPCS: Performed by: INTERNAL MEDICINE

## 2019-01-22 PROCEDURE — 85610 PROTHROMBIN TIME: CPT

## 2019-01-22 PROCEDURE — 6370000000 HC RX 637 (ALT 250 FOR IP): Performed by: NURSE PRACTITIONER

## 2019-01-22 PROCEDURE — 96372 THER/PROPH/DIAG INJ SC/IM: CPT

## 2019-01-22 PROCEDURE — 6360000002 HC RX W HCPCS: Performed by: NURSE PRACTITIONER

## 2019-01-22 PROCEDURE — 83605 ASSAY OF LACTIC ACID: CPT

## 2019-01-22 PROCEDURE — APPNB45 APP NON BILLABLE 31-45 MINUTES: Performed by: NURSE PRACTITIONER

## 2019-01-22 PROCEDURE — G0378 HOSPITAL OBSERVATION PER HR: HCPCS

## 2019-01-22 PROCEDURE — 2580000003 HC RX 258: Performed by: INTERNAL MEDICINE

## 2019-01-22 PROCEDURE — 84484 ASSAY OF TROPONIN QUANT: CPT

## 2019-01-22 PROCEDURE — 96361 HYDRATE IV INFUSION ADD-ON: CPT

## 2019-01-22 PROCEDURE — 96375 TX/PRO/DX INJ NEW DRUG ADDON: CPT

## 2019-01-22 PROCEDURE — 36415 COLL VENOUS BLD VENIPUNCTURE: CPT

## 2019-01-22 PROCEDURE — C9113 INJ PANTOPRAZOLE SODIUM, VIA: HCPCS | Performed by: INTERNAL MEDICINE

## 2019-01-22 PROCEDURE — 93010 ELECTROCARDIOGRAM REPORT: CPT | Performed by: INTERNAL MEDICINE

## 2019-01-22 PROCEDURE — 2580000003 HC RX 258: Performed by: NURSE PRACTITIONER

## 2019-01-22 RX ORDER — ASPIRIN 81 MG/1
81 TABLET ORAL DAILY
Status: DISCONTINUED | OUTPATIENT
Start: 2019-01-22 | End: 2019-01-22

## 2019-01-22 RX ORDER — PROMETHAZINE HYDROCHLORIDE 50 MG/ML
25 INJECTION, SOLUTION INTRAMUSCULAR; INTRAVENOUS EVERY 6 HOURS PRN
Status: DISCONTINUED | OUTPATIENT
Start: 2019-01-22 | End: 2019-01-22

## 2019-01-22 RX ORDER — SODIUM CHLORIDE 0.9 % (FLUSH) 0.9 %
10 SYRINGE (ML) INJECTION EVERY 12 HOURS SCHEDULED
Status: DISCONTINUED | OUTPATIENT
Start: 2019-01-22 | End: 2019-01-23 | Stop reason: HOSPADM

## 2019-01-22 RX ORDER — FOLIC ACID 1 MG/1
1 TABLET ORAL DAILY
Status: DISCONTINUED | OUTPATIENT
Start: 2019-01-22 | End: 2019-01-23 | Stop reason: HOSPADM

## 2019-01-22 RX ORDER — AMPICILLIN TRIHYDRATE 250 MG
500 CAPSULE ORAL DAILY
Status: DISCONTINUED | OUTPATIENT
Start: 2019-01-22 | End: 2019-01-22 | Stop reason: CLARIF

## 2019-01-22 RX ORDER — DIGOXIN 125 MCG
125 TABLET ORAL DAILY
Status: DISCONTINUED | OUTPATIENT
Start: 2019-01-22 | End: 2019-01-23 | Stop reason: HOSPADM

## 2019-01-22 RX ORDER — ONDANSETRON 2 MG/ML
4 INJECTION INTRAMUSCULAR; INTRAVENOUS EVERY 6 HOURS PRN
Status: DISCONTINUED | OUTPATIENT
Start: 2019-01-22 | End: 2019-01-23 | Stop reason: HOSPADM

## 2019-01-22 RX ORDER — ATORVASTATIN CALCIUM 20 MG/1
20 TABLET, FILM COATED ORAL DAILY
Status: DISCONTINUED | OUTPATIENT
Start: 2019-01-22 | End: 2019-01-23 | Stop reason: HOSPADM

## 2019-01-22 RX ORDER — INSULIN GLARGINE 100 [IU]/ML
30 INJECTION, SOLUTION SUBCUTANEOUS NIGHTLY
Status: DISCONTINUED | OUTPATIENT
Start: 2019-01-22 | End: 2019-01-23 | Stop reason: HOSPADM

## 2019-01-22 RX ORDER — OXYBUTYNIN CHLORIDE 5 MG/1
5 TABLET ORAL DAILY
Status: DISCONTINUED | OUTPATIENT
Start: 2019-01-22 | End: 2019-01-23 | Stop reason: HOSPADM

## 2019-01-22 RX ORDER — TOPIRAMATE 100 MG/1
200 TABLET, FILM COATED ORAL 2 TIMES DAILY
Status: DISCONTINUED | OUTPATIENT
Start: 2019-01-22 | End: 2019-01-23 | Stop reason: HOSPADM

## 2019-01-22 RX ORDER — SODIUM CHLORIDE 0.9 % (FLUSH) 0.9 %
10 SYRINGE (ML) INJECTION PRN
Status: DISCONTINUED | OUTPATIENT
Start: 2019-01-22 | End: 2019-01-23 | Stop reason: HOSPADM

## 2019-01-22 RX ORDER — PANTOPRAZOLE SODIUM 40 MG/10ML
40 INJECTION, POWDER, LYOPHILIZED, FOR SOLUTION INTRAVENOUS DAILY
Status: DISCONTINUED | OUTPATIENT
Start: 2019-01-22 | End: 2019-01-23 | Stop reason: HOSPADM

## 2019-01-22 RX ORDER — BUPROPION HYDROCHLORIDE 150 MG/1
150 TABLET, EXTENDED RELEASE ORAL 2 TIMES DAILY
Status: DISCONTINUED | OUTPATIENT
Start: 2019-01-22 | End: 2019-01-23 | Stop reason: HOSPADM

## 2019-01-22 RX ORDER — HYDRALAZINE HYDROCHLORIDE 20 MG/ML
10 INJECTION INTRAMUSCULAR; INTRAVENOUS EVERY 4 HOURS PRN
Status: DISCONTINUED | OUTPATIENT
Start: 2019-01-22 | End: 2019-01-23 | Stop reason: HOSPADM

## 2019-01-22 RX ORDER — PROMETHAZINE HYDROCHLORIDE 25 MG/ML
25 INJECTION, SOLUTION INTRAMUSCULAR; INTRAVENOUS EVERY 6 HOURS PRN
Status: DISCONTINUED | OUTPATIENT
Start: 2019-01-22 | End: 2019-01-23 | Stop reason: HOSPADM

## 2019-01-22 RX ORDER — CLONAZEPAM 1 MG/1
1 TABLET ORAL NIGHTLY
Status: DISCONTINUED | OUTPATIENT
Start: 2019-01-22 | End: 2019-01-23 | Stop reason: HOSPADM

## 2019-01-22 RX ORDER — METOCLOPRAMIDE 5 MG/1
5 TABLET ORAL
Status: DISCONTINUED | OUTPATIENT
Start: 2019-01-22 | End: 2019-01-22

## 2019-01-22 RX ORDER — LEVOTHYROXINE SODIUM 88 UG/1
88 TABLET ORAL DAILY
Status: DISCONTINUED | OUTPATIENT
Start: 2019-01-22 | End: 2019-01-23 | Stop reason: HOSPADM

## 2019-01-22 RX ORDER — AMPICILLIN TRIHYDRATE 250 MG
500 CAPSULE ORAL DAILY
COMMUNITY

## 2019-01-22 RX ORDER — THIAMINE MONONITRATE (VIT B1) 100 MG
50 TABLET ORAL DAILY
Status: DISCONTINUED | OUTPATIENT
Start: 2019-01-22 | End: 2019-01-23 | Stop reason: HOSPADM

## 2019-01-22 RX ORDER — NICOTINE POLACRILEX 4 MG
15 LOZENGE BUCCAL PRN
Status: DISCONTINUED | OUTPATIENT
Start: 2019-01-22 | End: 2019-01-23 | Stop reason: HOSPADM

## 2019-01-22 RX ORDER — DULOXETIN HYDROCHLORIDE 60 MG/1
60 CAPSULE, DELAYED RELEASE ORAL DAILY
Status: DISCONTINUED | OUTPATIENT
Start: 2019-01-22 | End: 2019-01-23 | Stop reason: HOSPADM

## 2019-01-22 RX ORDER — SODIUM CHLORIDE 9 MG/ML
INJECTION, SOLUTION INTRAVENOUS CONTINUOUS
Status: DISCONTINUED | OUTPATIENT
Start: 2019-01-22 | End: 2019-01-23 | Stop reason: HOSPADM

## 2019-01-22 RX ORDER — VALSARTAN 40 MG/1
40 TABLET ORAL DAILY
Status: DISCONTINUED | OUTPATIENT
Start: 2019-01-22 | End: 2019-01-22

## 2019-01-22 RX ORDER — DEXTROSE MONOHYDRATE 50 MG/ML
100 INJECTION, SOLUTION INTRAVENOUS PRN
Status: DISCONTINUED | OUTPATIENT
Start: 2019-01-22 | End: 2019-01-23 | Stop reason: HOSPADM

## 2019-01-22 RX ORDER — SUCRALFATE ORAL 1 G/10ML
1 SUSPENSION ORAL
Status: DISCONTINUED | OUTPATIENT
Start: 2019-01-22 | End: 2019-01-23 | Stop reason: HOSPADM

## 2019-01-22 RX ORDER — 0.9 % SODIUM CHLORIDE 0.9 %
10 VIAL (ML) INJECTION DAILY
Status: DISCONTINUED | OUTPATIENT
Start: 2019-01-22 | End: 2019-01-23 | Stop reason: HOSPADM

## 2019-01-22 RX ORDER — DEXTROSE MONOHYDRATE 25 G/50ML
12.5 INJECTION, SOLUTION INTRAVENOUS PRN
Status: DISCONTINUED | OUTPATIENT
Start: 2019-01-22 | End: 2019-01-23 | Stop reason: HOSPADM

## 2019-01-22 RX ORDER — CARVEDILOL 12.5 MG/1
12.5 TABLET ORAL 2 TIMES DAILY
Status: DISCONTINUED | OUTPATIENT
Start: 2019-01-22 | End: 2019-01-23 | Stop reason: HOSPADM

## 2019-01-22 RX ADMIN — INSULIN LISPRO 4 UNITS: 100 INJECTION, SOLUTION INTRAVENOUS; SUBCUTANEOUS at 17:36

## 2019-01-22 RX ADMIN — INSULIN GLARGINE 30 UNITS: 100 INJECTION, SOLUTION SUBCUTANEOUS at 22:10

## 2019-01-22 RX ADMIN — CARVEDILOL 12.5 MG: 12.5 TABLET, FILM COATED ORAL at 21:52

## 2019-01-22 RX ADMIN — METOCLOPRAMIDE HYDROCHLORIDE 5 MG: 5 TABLET ORAL at 05:26

## 2019-01-22 RX ADMIN — HYDRALAZINE HYDROCHLORIDE 10 MG: 20 INJECTION INTRAMUSCULAR; INTRAVENOUS at 14:54

## 2019-01-22 RX ADMIN — SUCRALFATE 1 G: 1 SUSPENSION ORAL at 11:08

## 2019-01-22 RX ADMIN — ONDANSETRON 4 MG: 2 INJECTION INTRAMUSCULAR; INTRAVENOUS at 17:31

## 2019-01-22 RX ADMIN — DIGOXIN 125 MCG: 125 TABLET ORAL at 14:36

## 2019-01-22 RX ADMIN — CLONAZEPAM 1 MG: 1 TABLET ORAL at 21:52

## 2019-01-22 RX ADMIN — BUPROPION HYDROCHLORIDE 150 MG: 150 TABLET, EXTENDED RELEASE ORAL at 15:58

## 2019-01-22 RX ADMIN — SUCRALFATE 1 G: 1 SUSPENSION ORAL at 15:57

## 2019-01-22 RX ADMIN — SODIUM CHLORIDE 100 ML/HR: 9 INJECTION, SOLUTION INTRAVENOUS at 04:29

## 2019-01-22 RX ADMIN — ONDANSETRON 4 MG: 2 INJECTION INTRAMUSCULAR; INTRAVENOUS at 08:35

## 2019-01-22 RX ADMIN — TOPIRAMATE 200 MG: 100 TABLET, FILM COATED ORAL at 15:58

## 2019-01-22 RX ADMIN — ROPINIROLE HYDROCHLORIDE 2.5 MG: 2 TABLET, FILM COATED ORAL at 21:53

## 2019-01-22 RX ADMIN — PROMETHAZINE HYDROCHLORIDE 25 MG: 25 INJECTION INTRAMUSCULAR; INTRAVENOUS at 14:13

## 2019-01-22 RX ADMIN — OXYBUTYNIN CHLORIDE 5 MG: 5 TABLET ORAL at 15:58

## 2019-01-22 RX ADMIN — CARVEDILOL 12.5 MG: 12.5 TABLET, FILM COATED ORAL at 14:36

## 2019-01-22 RX ADMIN — DULOXETINE HYDROCHLORIDE 60 MG: 60 CAPSULE, DELAYED RELEASE ORAL at 15:57

## 2019-01-22 RX ADMIN — TOPIRAMATE 200 MG: 100 TABLET, FILM COATED ORAL at 21:53

## 2019-01-22 RX ADMIN — Medication 10 ML: at 05:03

## 2019-01-22 RX ADMIN — Medication 10 ML: at 08:35

## 2019-01-22 RX ADMIN — LEVOTHYROXINE SODIUM 88 MCG: 88 TABLET ORAL at 05:27

## 2019-01-22 RX ADMIN — SODIUM CHLORIDE: 9 INJECTION, SOLUTION INTRAVENOUS at 14:38

## 2019-01-22 RX ADMIN — PANTOPRAZOLE SODIUM 40 MG: 40 INJECTION, POWDER, FOR SOLUTION INTRAVENOUS at 05:03

## 2019-01-22 RX ADMIN — ATORVASTATIN CALCIUM 20 MG: 20 TABLET, FILM COATED ORAL at 15:58

## 2019-01-22 RX ADMIN — WARFARIN SODIUM 12.5 MG: 2.5 TABLET ORAL at 17:30

## 2019-01-22 RX ADMIN — BUPROPION HYDROCHLORIDE 150 MG: 150 TABLET, EXTENDED RELEASE ORAL at 21:53

## 2019-01-22 ASSESSMENT — PAIN DESCRIPTION - DESCRIPTORS: DESCRIPTORS: ACHING;BURNING

## 2019-01-22 ASSESSMENT — ENCOUNTER SYMPTOMS
VOMITING: 1
ABDOMINAL PAIN: 0
DIARRHEA: 0
BLOOD IN STOOL: 0
SHORTNESS OF BREATH: 0
CHEST TIGHTNESS: 0
ANAL BLEEDING: 0
TROUBLE SWALLOWING: 0
ABDOMINAL DISTENTION: 0
CONSTIPATION: 0
RECTAL PAIN: 0
NAUSEA: 1

## 2019-01-22 ASSESSMENT — PAIN DESCRIPTION - FREQUENCY: FREQUENCY: INTERMITTENT

## 2019-01-22 ASSESSMENT — PAIN SCALES - GENERAL
PAINLEVEL_OUTOF10: 3
PAINLEVEL_OUTOF10: 1
PAINLEVEL_OUTOF10: 0

## 2019-01-22 ASSESSMENT — PAIN DESCRIPTION - LOCATION: LOCATION: ABDOMEN

## 2019-01-23 VITALS
HEIGHT: 61 IN | DIASTOLIC BLOOD PRESSURE: 65 MMHG | RESPIRATION RATE: 18 BRPM | SYSTOLIC BLOOD PRESSURE: 118 MMHG | OXYGEN SATURATION: 92 % | TEMPERATURE: 98.2 F | BODY MASS INDEX: 27.85 KG/M2 | HEART RATE: 60 BPM | WEIGHT: 147.49 LBS

## 2019-01-23 LAB
ALBUMIN SERPL-MCNC: 2.5 G/DL (ref 3.9–4.9)
ALP BLD-CCNC: 47 U/L (ref 40–130)
ALT SERPL-CCNC: 11 U/L (ref 0–33)
ANION GAP SERPL CALCULATED.3IONS-SCNC: 11 MEQ/L (ref 7–13)
AST SERPL-CCNC: 23 U/L (ref 0–35)
BASOPHILS ABSOLUTE: 0 K/UL (ref 0–0.2)
BASOPHILS RELATIVE PERCENT: 0.3 %
BILIRUB SERPL-MCNC: 0.3 MG/DL (ref 0–1.2)
BUN BLDV-MCNC: 12 MG/DL (ref 6–20)
CALCIUM SERPL-MCNC: 7.8 MG/DL (ref 8.6–10.2)
CHLORIDE BLD-SCNC: 106 MEQ/L (ref 98–107)
CO2: 22 MEQ/L (ref 22–29)
CREAT SERPL-MCNC: 0.7 MG/DL (ref 0.5–0.9)
EOSINOPHILS ABSOLUTE: 0 K/UL (ref 0–0.7)
EOSINOPHILS RELATIVE PERCENT: 0.4 %
GFR AFRICAN AMERICAN: >60
GFR NON-AFRICAN AMERICAN: >60
GLOBULIN: 2.5 G/DL (ref 2.3–3.5)
GLUCOSE BLD-MCNC: 248 MG/DL (ref 60–115)
GLUCOSE BLD-MCNC: 311 MG/DL (ref 60–115)
GLUCOSE BLD-MCNC: 88 MG/DL (ref 60–115)
GLUCOSE BLD-MCNC: 96 MG/DL (ref 74–109)
HCT VFR BLD CALC: 33.6 % (ref 37–47)
HEMOGLOBIN: 11.7 G/DL (ref 12–16)
INR BLD: 2.4
LYMPHOCYTES ABSOLUTE: 3.6 K/UL (ref 1–4.8)
LYMPHOCYTES RELATIVE PERCENT: 39.4 %
MCH RBC QN AUTO: 34 PG (ref 27–31.3)
MCHC RBC AUTO-ENTMCNC: 34.8 % (ref 33–37)
MCV RBC AUTO: 97.6 FL (ref 82–100)
MONOCYTES ABSOLUTE: 0.6 K/UL (ref 0.2–0.8)
MONOCYTES RELATIVE PERCENT: 6.2 %
NEUTROPHILS ABSOLUTE: 4.9 K/UL (ref 1.4–6.5)
NEUTROPHILS RELATIVE PERCENT: 53.7 %
PDW BLD-RTO: 13.6 % (ref 11.5–14.5)
PERFORMED ON: ABNORMAL
PERFORMED ON: ABNORMAL
PERFORMED ON: NORMAL
PLATELET # BLD: 175 K/UL (ref 130–400)
POTASSIUM SERPL-SCNC: 3.3 MEQ/L (ref 3.5–5.1)
PROTHROMBIN TIME: 23.2 SEC (ref 9–11.5)
RBC # BLD: 3.44 M/UL (ref 4.2–5.4)
SODIUM BLD-SCNC: 139 MEQ/L (ref 132–144)
TOTAL PROTEIN: 5 G/DL (ref 6.4–8.1)
WBC # BLD: 9.1 K/UL (ref 4.8–10.8)

## 2019-01-23 PROCEDURE — G0378 HOSPITAL OBSERVATION PER HR: HCPCS

## 2019-01-23 PROCEDURE — 96376 TX/PRO/DX INJ SAME DRUG ADON: CPT

## 2019-01-23 PROCEDURE — 6360000002 HC RX W HCPCS: Performed by: INTERNAL MEDICINE

## 2019-01-23 PROCEDURE — 6360000002 HC RX W HCPCS: Performed by: NURSE PRACTITIONER

## 2019-01-23 PROCEDURE — C9113 INJ PANTOPRAZOLE SODIUM, VIA: HCPCS | Performed by: INTERNAL MEDICINE

## 2019-01-23 PROCEDURE — 2580000003 HC RX 258: Performed by: INTERNAL MEDICINE

## 2019-01-23 PROCEDURE — 96361 HYDRATE IV INFUSION ADD-ON: CPT

## 2019-01-23 PROCEDURE — 80053 COMPREHEN METABOLIC PANEL: CPT

## 2019-01-23 PROCEDURE — 6370000000 HC RX 637 (ALT 250 FOR IP): Performed by: NURSE PRACTITIONER

## 2019-01-23 PROCEDURE — 85025 COMPLETE CBC W/AUTO DIFF WBC: CPT

## 2019-01-23 PROCEDURE — 85610 PROTHROMBIN TIME: CPT

## 2019-01-23 PROCEDURE — APPNB15 APP NON BILLABLE TIME 0-15 MINS: Performed by: NURSE PRACTITIONER

## 2019-01-23 PROCEDURE — 36415 COLL VENOUS BLD VENIPUNCTURE: CPT

## 2019-01-23 RX ORDER — PROMETHAZINE HYDROCHLORIDE 25 MG/1
25 TABLET ORAL EVERY 6 HOURS PRN
Qty: 20 TABLET | Refills: 0 | Status: SHIPPED | OUTPATIENT
Start: 2019-01-23 | End: 2019-01-30

## 2019-01-23 RX ORDER — CEPHALEXIN 500 MG/1
500 CAPSULE ORAL 4 TIMES DAILY
Qty: 10 CAPSULE | Refills: 0 | Status: SHIPPED | OUTPATIENT
Start: 2019-01-23 | End: 2019-01-28

## 2019-01-23 RX ORDER — PANTOPRAZOLE SODIUM 40 MG/1
40 TABLET, DELAYED RELEASE ORAL
Qty: 180 TABLET | Refills: 1 | Status: SHIPPED | OUTPATIENT
Start: 2019-01-23

## 2019-01-23 RX ORDER — MENTHOL AND METHYL SALICYLATE 7.6; 29 G/100G; G/100G
1 OINTMENT TOPICAL 4 TIMES DAILY PRN
Status: DISCONTINUED | OUTPATIENT
Start: 2019-01-23 | End: 2019-01-23 | Stop reason: CLARIF

## 2019-01-23 RX ORDER — SUCRALFATE 1 G/1
1 TABLET ORAL 4 TIMES DAILY
Qty: 120 TABLET | Refills: 3 | Status: SHIPPED | OUTPATIENT
Start: 2019-01-23

## 2019-01-23 RX ADMIN — ATORVASTATIN CALCIUM 20 MG: 20 TABLET, FILM COATED ORAL at 08:02

## 2019-01-23 RX ADMIN — SUCRALFATE 1 G: 1 SUSPENSION ORAL at 06:06

## 2019-01-23 RX ADMIN — LEVOTHYROXINE SODIUM 88 MCG: 88 TABLET ORAL at 05:37

## 2019-01-23 RX ADMIN — BUPROPION HYDROCHLORIDE 150 MG: 150 TABLET, EXTENDED RELEASE ORAL at 08:03

## 2019-01-23 RX ADMIN — CARVEDILOL 12.5 MG: 12.5 TABLET, FILM COATED ORAL at 08:02

## 2019-01-23 RX ADMIN — TOPIRAMATE 200 MG: 100 TABLET, FILM COATED ORAL at 08:03

## 2019-01-23 RX ADMIN — PANTOPRAZOLE SODIUM 40 MG: 40 INJECTION, POWDER, FOR SOLUTION INTRAVENOUS at 06:06

## 2019-01-23 RX ADMIN — DIGOXIN 125 MCG: 125 TABLET ORAL at 08:02

## 2019-01-23 RX ADMIN — SODIUM CHLORIDE: 9 INJECTION, SOLUTION INTRAVENOUS at 12:14

## 2019-01-23 RX ADMIN — SUCRALFATE 1 G: 1 SUSPENSION ORAL at 12:09

## 2019-01-23 RX ADMIN — INSULIN LISPRO 8 UNITS: 100 INJECTION, SOLUTION INTRAVENOUS; SUBCUTANEOUS at 12:09

## 2019-01-23 RX ADMIN — OXYBUTYNIN CHLORIDE 5 MG: 5 TABLET ORAL at 08:02

## 2019-01-23 RX ADMIN — SUCRALFATE 1 G: 1 SUSPENSION ORAL at 17:40

## 2019-01-23 RX ADMIN — DULOXETINE HYDROCHLORIDE 60 MG: 60 CAPSULE, DELAYED RELEASE ORAL at 08:02

## 2019-01-23 RX ADMIN — FOLIC ACID 1 MG: 1 TABLET ORAL at 08:02

## 2019-01-23 RX ADMIN — SODIUM CHLORIDE: 9 INJECTION, SOLUTION INTRAVENOUS at 02:54

## 2019-01-23 RX ADMIN — ONDANSETRON 4 MG: 2 INJECTION INTRAMUSCULAR; INTRAVENOUS at 02:54

## 2019-01-23 RX ADMIN — Medication 50 MG: at 08:01

## 2019-01-23 RX ADMIN — INSULIN LISPRO 4 UNITS: 100 INJECTION, SOLUTION INTRAVENOUS; SUBCUTANEOUS at 17:40

## 2019-01-23 ASSESSMENT — PAIN SCALES - GENERAL: PAINLEVEL_OUTOF10: 1

## 2019-01-24 LAB
ORGANISM: ABNORMAL
URINE CULTURE, ROUTINE: ABNORMAL
URINE CULTURE, ROUTINE: ABNORMAL

## 2019-01-26 LAB
BLOOD CULTURE, ROUTINE: NORMAL
CULTURE, BLOOD 2: NORMAL

## 2019-01-29 ENCOUNTER — OFFICE VISIT (OUTPATIENT)
Dept: GASTROENTEROLOGY | Age: 59
End: 2019-01-29
Payer: MEDICARE

## 2019-01-29 VITALS
OXYGEN SATURATION: 96 % | TEMPERATURE: 97.4 F | BODY MASS INDEX: 28.51 KG/M2 | HEART RATE: 85 BPM | HEIGHT: 61 IN | WEIGHT: 151 LBS

## 2019-01-29 DIAGNOSIS — K59.00 CONSTIPATION, UNSPECIFIED CONSTIPATION TYPE: ICD-10-CM

## 2019-01-29 DIAGNOSIS — K21.9 GASTROESOPHAGEAL REFLUX DISEASE, ESOPHAGITIS PRESENCE NOT SPECIFIED: ICD-10-CM

## 2019-01-29 DIAGNOSIS — R11.2 NAUSEA AND VOMITING, INTRACTABILITY OF VOMITING NOT SPECIFIED, UNSPECIFIED VOMITING TYPE: Primary | ICD-10-CM

## 2019-01-29 PROCEDURE — 1111F DSCHRG MED/CURRENT MED MERGE: CPT | Performed by: NURSE PRACTITIONER

## 2019-01-29 PROCEDURE — 99214 OFFICE O/P EST MOD 30 MIN: CPT | Performed by: NURSE PRACTITIONER

## 2019-01-29 PROCEDURE — G8427 DOCREV CUR MEDS BY ELIG CLIN: HCPCS | Performed by: NURSE PRACTITIONER

## 2019-01-29 PROCEDURE — G8598 ASA/ANTIPLAT THER USED: HCPCS | Performed by: NURSE PRACTITIONER

## 2019-01-29 PROCEDURE — G8419 CALC BMI OUT NRM PARAM NOF/U: HCPCS | Performed by: NURSE PRACTITIONER

## 2019-01-29 PROCEDURE — G8484 FLU IMMUNIZE NO ADMIN: HCPCS | Performed by: NURSE PRACTITIONER

## 2019-01-29 PROCEDURE — 3017F COLORECTAL CA SCREEN DOC REV: CPT | Performed by: NURSE PRACTITIONER

## 2019-01-29 PROCEDURE — 1036F TOBACCO NON-USER: CPT | Performed by: NURSE PRACTITIONER

## 2019-01-29 RX ORDER — WHEAT DEXTRIN 3 G/3.8 G
15 POWDER (GRAM) ORAL DAILY
Qty: 1 CAN | Refills: 3 | Status: SHIPPED | OUTPATIENT
Start: 2019-01-29

## 2019-01-29 RX ORDER — POLYETHYLENE GLYCOL 3350 17 G/17G
17 POWDER, FOR SOLUTION ORAL DAILY
Qty: 510 G | Refills: 3 | Status: SHIPPED | OUTPATIENT
Start: 2019-01-29 | End: 2019-02-28

## 2019-01-31 ENCOUNTER — ANESTHESIA (OUTPATIENT)
Dept: OPERATING ROOM | Age: 59
End: 2019-01-31
Payer: MEDICARE

## 2019-01-31 ENCOUNTER — ANESTHESIA EVENT (OUTPATIENT)
Dept: OPERATING ROOM | Age: 59
End: 2019-01-31
Payer: MEDICARE

## 2019-01-31 ENCOUNTER — HOSPITAL ENCOUNTER (OUTPATIENT)
Age: 59
Setting detail: OUTPATIENT SURGERY
Discharge: HOME | End: 2019-01-31
Attending: INTERNAL MEDICINE | Admitting: INTERNAL MEDICINE
Payer: MEDICARE

## 2019-01-31 VITALS — DIASTOLIC BLOOD PRESSURE: 58 MMHG | OXYGEN SATURATION: 100 % | SYSTOLIC BLOOD PRESSURE: 87 MMHG

## 2019-01-31 VITALS
RESPIRATION RATE: 16 BRPM | OXYGEN SATURATION: 96 % | WEIGHT: 154 LBS | HEART RATE: 75 BPM | SYSTOLIC BLOOD PRESSURE: 108 MMHG | BODY MASS INDEX: 29.07 KG/M2 | HEIGHT: 61 IN | DIASTOLIC BLOOD PRESSURE: 59 MMHG | TEMPERATURE: 97.8 F

## 2019-01-31 DIAGNOSIS — K31.84 GASTROPARESIS: Primary | Chronic | ICD-10-CM

## 2019-01-31 DIAGNOSIS — E10.10 TYPE 1 DIABETES MELLITUS WITH KETOACIDOSIS WITHOUT COMA (HCC): ICD-10-CM

## 2019-01-31 DIAGNOSIS — R11.2 NAUSEA AND VOMITING, INTRACTABILITY OF VOMITING NOT SPECIFIED, UNSPECIFIED VOMITING TYPE: ICD-10-CM

## 2019-01-31 LAB
GLUCOSE BLD-MCNC: 222 MG/DL (ref 60–115)
PERFORMED ON: ABNORMAL

## 2019-01-31 PROCEDURE — 3609017100 HC EGD: Performed by: INTERNAL MEDICINE

## 2019-01-31 PROCEDURE — 6360000002 HC RX W HCPCS: Performed by: NURSE ANESTHETIST, CERTIFIED REGISTERED

## 2019-01-31 PROCEDURE — 88305 TISSUE EXAM BY PATHOLOGIST: CPT

## 2019-01-31 PROCEDURE — 2709999900 HC NON-CHARGEABLE SUPPLY: Performed by: INTERNAL MEDICINE

## 2019-01-31 PROCEDURE — 2580000003 HC RX 258: Performed by: INTERNAL MEDICINE

## 2019-01-31 PROCEDURE — 3700000001 HC ADD 15 MINUTES (ANESTHESIA): Performed by: INTERNAL MEDICINE

## 2019-01-31 PROCEDURE — 7100000011 HC PHASE II RECOVERY - ADDTL 15 MIN: Performed by: INTERNAL MEDICINE

## 2019-01-31 PROCEDURE — 88312 SPECIAL STAINS GROUP 1: CPT

## 2019-01-31 PROCEDURE — 43239 EGD BIOPSY SINGLE/MULTIPLE: CPT | Performed by: INTERNAL MEDICINE

## 2019-01-31 PROCEDURE — 88313 SPECIAL STAINS GROUP 2: CPT

## 2019-01-31 PROCEDURE — 2580000003 HC RX 258: Performed by: ANESTHESIOLOGY

## 2019-01-31 PROCEDURE — 7100000010 HC PHASE II RECOVERY - FIRST 15 MIN: Performed by: INTERNAL MEDICINE

## 2019-01-31 PROCEDURE — 3700000000 HC ANESTHESIA ATTENDED CARE: Performed by: INTERNAL MEDICINE

## 2019-01-31 RX ORDER — FENTANYL CITRATE 50 UG/ML
50 INJECTION, SOLUTION INTRAMUSCULAR; INTRAVENOUS EVERY 10 MIN PRN
Status: DISCONTINUED | OUTPATIENT
Start: 2019-01-31 | End: 2019-01-31 | Stop reason: HOSPADM

## 2019-01-31 RX ORDER — MAGNESIUM HYDROXIDE 1200 MG/15ML
LIQUID ORAL PRN
Status: DISCONTINUED | OUTPATIENT
Start: 2019-01-31 | End: 2019-01-31 | Stop reason: HOSPADM

## 2019-01-31 RX ORDER — HYDROCODONE BITARTRATE AND ACETAMINOPHEN 5; 325 MG/1; MG/1
1 TABLET ORAL PRN
Status: DISCONTINUED | OUTPATIENT
Start: 2019-01-31 | End: 2019-01-31 | Stop reason: HOSPADM

## 2019-01-31 RX ORDER — PROPOFOL 10 MG/ML
INJECTION, EMULSION INTRAVENOUS PRN
Status: DISCONTINUED | OUTPATIENT
Start: 2019-01-31 | End: 2019-01-31 | Stop reason: SDUPTHER

## 2019-01-31 RX ORDER — SODIUM CHLORIDE, SODIUM LACTATE, POTASSIUM CHLORIDE, CALCIUM CHLORIDE 600; 310; 30; 20 MG/100ML; MG/100ML; MG/100ML; MG/100ML
INJECTION, SOLUTION INTRAVENOUS CONTINUOUS
Status: DISCONTINUED | OUTPATIENT
Start: 2019-01-31 | End: 2019-01-31 | Stop reason: HOSPADM

## 2019-01-31 RX ORDER — LIDOCAINE HYDROCHLORIDE 10 MG/ML
1 INJECTION, SOLUTION EPIDURAL; INFILTRATION; INTRACAUDAL; PERINEURAL ONCE
Status: DISCONTINUED | OUTPATIENT
Start: 2019-01-31 | End: 2019-01-31 | Stop reason: HOSPADM

## 2019-01-31 RX ORDER — DIPHENHYDRAMINE HYDROCHLORIDE 50 MG/ML
12.5 INJECTION INTRAMUSCULAR; INTRAVENOUS
Status: DISCONTINUED | OUTPATIENT
Start: 2019-01-31 | End: 2019-01-31 | Stop reason: HOSPADM

## 2019-01-31 RX ORDER — MEPERIDINE HYDROCHLORIDE 25 MG/ML
12.5 INJECTION INTRAMUSCULAR; INTRAVENOUS; SUBCUTANEOUS EVERY 5 MIN PRN
Status: DISCONTINUED | OUTPATIENT
Start: 2019-01-31 | End: 2019-01-31 | Stop reason: HOSPADM

## 2019-01-31 RX ORDER — METOCLOPRAMIDE HYDROCHLORIDE 5 MG/ML
10 INJECTION INTRAMUSCULAR; INTRAVENOUS
Status: DISCONTINUED | OUTPATIENT
Start: 2019-01-31 | End: 2019-01-31 | Stop reason: HOSPADM

## 2019-01-31 RX ORDER — ONDANSETRON 2 MG/ML
4 INJECTION INTRAMUSCULAR; INTRAVENOUS
Status: DISCONTINUED | OUTPATIENT
Start: 2019-01-31 | End: 2019-01-31 | Stop reason: HOSPADM

## 2019-01-31 RX ORDER — HYDROCODONE BITARTRATE AND ACETAMINOPHEN 5; 325 MG/1; MG/1
2 TABLET ORAL PRN
Status: DISCONTINUED | OUTPATIENT
Start: 2019-01-31 | End: 2019-01-31 | Stop reason: HOSPADM

## 2019-01-31 RX ADMIN — PROPOFOL 60 MG: 10 INJECTION, EMULSION INTRAVENOUS at 10:43

## 2019-01-31 RX ADMIN — PROPOFOL 80 MG: 10 INJECTION, EMULSION INTRAVENOUS at 10:42

## 2019-01-31 RX ADMIN — PROPOFOL 40 MG: 10 INJECTION, EMULSION INTRAVENOUS at 10:44

## 2019-01-31 RX ADMIN — SODIUM CHLORIDE, POTASSIUM CHLORIDE, SODIUM LACTATE AND CALCIUM CHLORIDE: 600; 310; 30; 20 INJECTION, SOLUTION INTRAVENOUS at 09:59

## 2019-01-31 ASSESSMENT — PULMONARY FUNCTION TESTS
PIF_VALUE: 1
PIF_VALUE: 0

## 2019-01-31 ASSESSMENT — PAIN SCALES - GENERAL
PAINLEVEL_OUTOF10: 0

## 2019-01-31 ASSESSMENT — PAIN - FUNCTIONAL ASSESSMENT: PAIN_FUNCTIONAL_ASSESSMENT: 0-10

## 2019-02-05 PROBLEM — Z12.11 SCREENING FOR COLON CANCER: Status: RESOLVED | Noted: 2018-02-26 | Resolved: 2019-02-05

## 2019-02-21 PROBLEM — E86.0 DEHYDRATION: Status: RESOLVED | Noted: 2018-01-17 | Resolved: 2019-02-21

## 2019-05-03 ENCOUNTER — APPOINTMENT (OUTPATIENT)
Dept: CT IMAGING | Age: 59
DRG: 683 | End: 2019-05-03
Payer: MEDICARE

## 2019-05-03 ENCOUNTER — APPOINTMENT (OUTPATIENT)
Dept: GENERAL RADIOLOGY | Age: 59
DRG: 683 | End: 2019-05-03
Payer: MEDICARE

## 2019-05-03 ENCOUNTER — HOSPITAL ENCOUNTER (INPATIENT)
Age: 59
LOS: 2 days | Discharge: HOME | DRG: 683 | End: 2019-05-05
Attending: STUDENT IN AN ORGANIZED HEALTH CARE EDUCATION/TRAINING PROGRAM | Admitting: INTERNAL MEDICINE
Payer: MEDICARE

## 2019-05-03 DIAGNOSIS — R79.1 ELEVATED INR: ICD-10-CM

## 2019-05-03 DIAGNOSIS — D72.829 LEUKOCYTOSIS, UNSPECIFIED TYPE: ICD-10-CM

## 2019-05-03 DIAGNOSIS — R55 SYNCOPE, UNSPECIFIED SYNCOPE TYPE: ICD-10-CM

## 2019-05-03 DIAGNOSIS — R41.0 CONFUSION: ICD-10-CM

## 2019-05-03 DIAGNOSIS — E86.1 HYPOVOLEMIA DEHYDRATION: Primary | ICD-10-CM

## 2019-05-03 LAB
ALBUMIN SERPL-MCNC: 3.8 G/DL (ref 3.5–4.6)
ALP BLD-CCNC: 87 U/L (ref 40–130)
ALT SERPL-CCNC: 23 U/L (ref 0–33)
AMMONIA: 37 UMOL/L (ref 11–51)
AMPHETAMINE SCREEN, URINE: ABNORMAL
ANION GAP SERPL CALCULATED.3IONS-SCNC: 17 MEQ/L (ref 9–15)
APTT: 47.9 SEC (ref 21.6–35.4)
AST SERPL-CCNC: 32 U/L (ref 0–35)
BARBITURATE SCREEN URINE: ABNORMAL
BASOPHILS ABSOLUTE: 0.3 K/UL (ref 0–0.2)
BASOPHILS RELATIVE PERCENT: 1.6 %
BENZODIAZEPINE SCREEN, URINE: ABNORMAL
BILIRUB SERPL-MCNC: 0.4 MG/DL (ref 0.2–0.7)
BILIRUBIN URINE: NEGATIVE
BLOOD, URINE: NEGATIVE
BUN BLDV-MCNC: 35 MG/DL (ref 6–20)
CALCIUM SERPL-MCNC: 8.9 MG/DL (ref 8.5–9.9)
CANNABINOID SCREEN URINE: POSITIVE
CHLORIDE BLD-SCNC: 87 MEQ/L (ref 95–107)
CLARITY: CLEAR
CO2: 25 MEQ/L (ref 20–31)
COCAINE METABOLITE SCREEN URINE: ABNORMAL
COLOR: ABNORMAL
CREAT SERPL-MCNC: 1.3 MG/DL (ref 0.5–0.9)
EKG ATRIAL RATE: 94 BPM
EKG P AXIS: 74 DEGREES
EKG P-R INTERVAL: 138 MS
EKG Q-T INTERVAL: 376 MS
EKG QRS DURATION: 82 MS
EKG QTC CALCULATION (BAZETT): 470 MS
EKG R AXIS: 100 DEGREES
EKG T AXIS: 15 DEGREES
EKG VENTRICULAR RATE: 94 BPM
EOSINOPHILS ABSOLUTE: 0 K/UL (ref 0–0.7)
EOSINOPHILS RELATIVE PERCENT: 0.2 %
ETHANOL PERCENT: NORMAL G/DL
ETHANOL: <10 MG/DL (ref 0–0.08)
GFR AFRICAN AMERICAN: 50.9
GFR NON-AFRICAN AMERICAN: 42
GLOBULIN: 3.6 G/DL (ref 2.3–3.5)
GLUCOSE BLD-MCNC: 274 MG/DL (ref 70–99)
GLUCOSE BLD-MCNC: 354 MG/DL (ref 60–115)
GLUCOSE URINE: 250 MG/DL
HBA1C MFR BLD: 8.5 % (ref 4.8–5.9)
HCT VFR BLD CALC: 50.4 % (ref 37–47)
HEMOGLOBIN: 17.2 G/DL (ref 12–16)
INR BLD: 5.5
KETONES, URINE: 15 MG/DL
LACTIC ACID: 2 MMOL/L (ref 0.5–2.2)
LEUKOCYTE ESTERASE, URINE: NEGATIVE
LYMPHOCYTES ABSOLUTE: 4.7 K/UL (ref 1–4.8)
LYMPHOCYTES RELATIVE PERCENT: 24.9 %
Lab: ABNORMAL
MCH RBC QN AUTO: 33.3 PG (ref 27–31.3)
MCHC RBC AUTO-ENTMCNC: 34.1 % (ref 33–37)
MCV RBC AUTO: 97.6 FL (ref 82–100)
MONOCYTES ABSOLUTE: 0.8 K/UL (ref 0.2–0.8)
MONOCYTES RELATIVE PERCENT: 4.1 %
NEUTROPHILS ABSOLUTE: 13.1 K/UL (ref 1.4–6.5)
NEUTROPHILS RELATIVE PERCENT: 69.2 %
NITRITE, URINE: NEGATIVE
OPIATE SCREEN URINE: ABNORMAL
PDW BLD-RTO: 13.4 % (ref 11.5–14.5)
PERFORMED ON: ABNORMAL
PH UA: 6 (ref 5–9)
PHENCYCLIDINE SCREEN URINE: ABNORMAL
PLATELET # BLD: 204 K/UL (ref 130–400)
PLATELET SLIDE REVIEW: NORMAL
POTASSIUM SERPL-SCNC: 3.6 MEQ/L (ref 3.4–4.9)
PROCALCITONIN: 0.07 NG/ML (ref 0–0.15)
PROTEIN UA: NEGATIVE MG/DL
PROTHROMBIN TIME: 52.2 SEC (ref 9–11.5)
RBC # BLD: 5.16 M/UL (ref 4.2–5.4)
SODIUM BLD-SCNC: 129 MEQ/L (ref 135–144)
SPECIFIC GRAVITY UA: 1.05 (ref 1–1.03)
TOTAL PROTEIN: 7.4 G/DL (ref 6.3–8)
TROPONIN: <0.01 NG/ML (ref 0–0.01)
URINE REFLEX TO CULTURE: ABNORMAL
UROBILINOGEN, URINE: 0.2 E.U./DL
WBC # BLD: 18.9 K/UL (ref 4.8–10.8)

## 2019-05-03 PROCEDURE — 2580000003 HC RX 258: Performed by: PHYSICIAN ASSISTANT

## 2019-05-03 PROCEDURE — 93010 ELECTROCARDIOGRAM REPORT: CPT | Performed by: INTERNAL MEDICINE

## 2019-05-03 PROCEDURE — 83036 HEMOGLOBIN GLYCOSYLATED A1C: CPT

## 2019-05-03 PROCEDURE — 85730 THROMBOPLASTIN TIME PARTIAL: CPT

## 2019-05-03 PROCEDURE — 1210000000 HC MED SURG R&B

## 2019-05-03 PROCEDURE — 99222 1ST HOSP IP/OBS MODERATE 55: CPT | Performed by: INTERNAL MEDICINE

## 2019-05-03 PROCEDURE — 84439 ASSAY OF FREE THYROXINE: CPT

## 2019-05-03 PROCEDURE — 6360000002 HC RX W HCPCS: Performed by: PHYSICIAN ASSISTANT

## 2019-05-03 PROCEDURE — G0480 DRUG TEST DEF 1-7 CLASSES: HCPCS

## 2019-05-03 PROCEDURE — 6360000004 HC RX CONTRAST MEDICATION: Performed by: PHYSICIAN ASSISTANT

## 2019-05-03 PROCEDURE — 6360000002 HC RX W HCPCS: Performed by: NURSE PRACTITIONER

## 2019-05-03 PROCEDURE — 84484 ASSAY OF TROPONIN QUANT: CPT

## 2019-05-03 PROCEDURE — 85025 COMPLETE CBC W/AUTO DIFF WBC: CPT

## 2019-05-03 PROCEDURE — 6370000000 HC RX 637 (ALT 250 FOR IP): Performed by: NURSE PRACTITIONER

## 2019-05-03 PROCEDURE — 93005 ELECTROCARDIOGRAM TRACING: CPT

## 2019-05-03 PROCEDURE — 82306 VITAMIN D 25 HYDROXY: CPT

## 2019-05-03 PROCEDURE — 84145 PROCALCITONIN (PCT): CPT

## 2019-05-03 PROCEDURE — 80307 DRUG TEST PRSMV CHEM ANLYZR: CPT

## 2019-05-03 PROCEDURE — 99285 EMERGENCY DEPT VISIT HI MDM: CPT

## 2019-05-03 PROCEDURE — 80053 COMPREHEN METABOLIC PANEL: CPT

## 2019-05-03 PROCEDURE — 2580000003 HC RX 258: Performed by: INTERNAL MEDICINE

## 2019-05-03 PROCEDURE — 36415 COLL VENOUS BLD VENIPUNCTURE: CPT

## 2019-05-03 PROCEDURE — 83605 ASSAY OF LACTIC ACID: CPT

## 2019-05-03 PROCEDURE — 70450 CT HEAD/BRAIN W/O DYE: CPT

## 2019-05-03 PROCEDURE — 85610 PROTHROMBIN TIME: CPT

## 2019-05-03 PROCEDURE — 84443 ASSAY THYROID STIM HORMONE: CPT

## 2019-05-03 PROCEDURE — 87040 BLOOD CULTURE FOR BACTERIA: CPT

## 2019-05-03 PROCEDURE — 96366 THER/PROPH/DIAG IV INF ADDON: CPT

## 2019-05-03 PROCEDURE — 96365 THER/PROPH/DIAG IV INF INIT: CPT

## 2019-05-03 PROCEDURE — 81003 URINALYSIS AUTO W/O SCOPE: CPT

## 2019-05-03 PROCEDURE — 74176 CT ABD & PELVIS W/O CONTRAST: CPT

## 2019-05-03 PROCEDURE — 6370000000 HC RX 637 (ALT 250 FOR IP): Performed by: INTERNAL MEDICINE

## 2019-05-03 PROCEDURE — 96376 TX/PRO/DX INJ SAME DRUG ADON: CPT

## 2019-05-03 PROCEDURE — 96375 TX/PRO/DX INJ NEW DRUG ADDON: CPT

## 2019-05-03 PROCEDURE — 71045 X-RAY EXAM CHEST 1 VIEW: CPT

## 2019-05-03 PROCEDURE — 71275 CT ANGIOGRAPHY CHEST: CPT

## 2019-05-03 PROCEDURE — 82140 ASSAY OF AMMONIA: CPT

## 2019-05-03 PROCEDURE — 2580000003 HC RX 258

## 2019-05-03 RX ORDER — SODIUM CHLORIDE 0.9 % (FLUSH) 0.9 %
10 SYRINGE (ML) INJECTION PRN
Status: DISCONTINUED | OUTPATIENT
Start: 2019-05-03 | End: 2019-05-05 | Stop reason: HOSPADM

## 2019-05-03 RX ORDER — PROCHLORPERAZINE MALEATE 10 MG
5 TABLET ORAL EVERY 6 HOURS PRN
Status: DISCONTINUED | OUTPATIENT
Start: 2019-05-03 | End: 2019-05-05 | Stop reason: HOSPADM

## 2019-05-03 RX ORDER — PANTOPRAZOLE SODIUM 40 MG/1
40 TABLET, DELAYED RELEASE ORAL
Status: DISCONTINUED | OUTPATIENT
Start: 2019-05-04 | End: 2019-05-05 | Stop reason: HOSPADM

## 2019-05-03 RX ORDER — ATORVASTATIN CALCIUM 20 MG/1
20 TABLET, FILM COATED ORAL DAILY
Status: DISCONTINUED | OUTPATIENT
Start: 2019-05-03 | End: 2019-05-05 | Stop reason: HOSPADM

## 2019-05-03 RX ORDER — SODIUM CHLORIDE 9 MG/ML
30 INJECTION, SOLUTION INTRAVENOUS ONCE
Status: COMPLETED | OUTPATIENT
Start: 2019-05-03 | End: 2019-05-03

## 2019-05-03 RX ORDER — BUPROPION HYDROCHLORIDE 150 MG/1
150 TABLET, EXTENDED RELEASE ORAL 2 TIMES DAILY
Status: DISCONTINUED | OUTPATIENT
Start: 2019-05-03 | End: 2019-05-05 | Stop reason: HOSPADM

## 2019-05-03 RX ORDER — ASPIRIN 81 MG/1
81 TABLET ORAL DAILY
Status: DISCONTINUED | OUTPATIENT
Start: 2019-05-03 | End: 2019-05-05 | Stop reason: HOSPADM

## 2019-05-03 RX ORDER — DEXTROSE MONOHYDRATE 50 MG/ML
100 INJECTION, SOLUTION INTRAVENOUS PRN
Status: DISCONTINUED | OUTPATIENT
Start: 2019-05-03 | End: 2019-05-05 | Stop reason: HOSPADM

## 2019-05-03 RX ORDER — SUCRALFATE 1 G/1
1 TABLET ORAL 4 TIMES DAILY
Status: DISCONTINUED | OUTPATIENT
Start: 2019-05-03 | End: 2019-05-05 | Stop reason: HOSPADM

## 2019-05-03 RX ORDER — SODIUM CHLORIDE 9 MG/ML
INJECTION, SOLUTION INTRAVENOUS CONTINUOUS
Status: DISCONTINUED | OUTPATIENT
Start: 2019-05-03 | End: 2019-05-03

## 2019-05-03 RX ORDER — CLONAZEPAM 1 MG/1
1 TABLET ORAL NIGHTLY
Status: DISCONTINUED | OUTPATIENT
Start: 2019-05-03 | End: 2019-05-05 | Stop reason: HOSPADM

## 2019-05-03 RX ORDER — ACETAMINOPHEN 325 MG/1
650 TABLET ORAL EVERY 4 HOURS PRN
Status: DISCONTINUED | OUTPATIENT
Start: 2019-05-03 | End: 2019-05-05 | Stop reason: HOSPADM

## 2019-05-03 RX ORDER — INSULIN GLARGINE 100 [IU]/ML
30 INJECTION, SOLUTION SUBCUTANEOUS NIGHTLY
Status: DISCONTINUED | OUTPATIENT
Start: 2019-05-03 | End: 2019-05-03

## 2019-05-03 RX ORDER — SODIUM CHLORIDE 0.9 % (FLUSH) 0.9 %
10 SYRINGE (ML) INJECTION ONCE
Status: DISCONTINUED | OUTPATIENT
Start: 2019-05-03 | End: 2019-05-05 | Stop reason: HOSPADM

## 2019-05-03 RX ORDER — DEXTROSE, SODIUM CHLORIDE, SODIUM LACTATE, POTASSIUM CHLORIDE, AND CALCIUM CHLORIDE 5; .6; .31; .03; .02 G/100ML; G/100ML; G/100ML; G/100ML; G/100ML
INJECTION, SOLUTION INTRAVENOUS CONTINUOUS
Status: DISCONTINUED | OUTPATIENT
Start: 2019-05-03 | End: 2019-05-04

## 2019-05-03 RX ORDER — INSULIN GLARGINE 100 [IU]/ML
30 INJECTION, SOLUTION SUBCUTANEOUS NIGHTLY
Status: DISCONTINUED | OUTPATIENT
Start: 2019-05-03 | End: 2019-05-04

## 2019-05-03 RX ORDER — SODIUM CHLORIDE, SODIUM LACTATE, POTASSIUM CHLORIDE, AND CALCIUM CHLORIDE .6; .31; .03; .02 G/100ML; G/100ML; G/100ML; G/100ML
1000 INJECTION, SOLUTION INTRAVENOUS ONCE
Status: COMPLETED | OUTPATIENT
Start: 2019-05-03 | End: 2019-05-04

## 2019-05-03 RX ORDER — LEVOTHYROXINE SODIUM 88 UG/1
88 TABLET ORAL DAILY
Status: DISCONTINUED | OUTPATIENT
Start: 2019-05-04 | End: 2019-05-05 | Stop reason: HOSPADM

## 2019-05-03 RX ORDER — NICOTINE POLACRILEX 4 MG
15 LOZENGE BUCCAL PRN
Status: DISCONTINUED | OUTPATIENT
Start: 2019-05-03 | End: 2019-05-05 | Stop reason: HOSPADM

## 2019-05-03 RX ORDER — OXYBUTYNIN CHLORIDE 5 MG/1
5 TABLET ORAL DAILY
Status: DISCONTINUED | OUTPATIENT
Start: 2019-05-03 | End: 2019-05-05 | Stop reason: HOSPADM

## 2019-05-03 RX ORDER — ONDANSETRON 2 MG/ML
4 INJECTION INTRAMUSCULAR; INTRAVENOUS EVERY 6 HOURS
Status: DISCONTINUED | OUTPATIENT
Start: 2019-05-03 | End: 2019-05-05 | Stop reason: HOSPADM

## 2019-05-03 RX ORDER — DEXTROSE MONOHYDRATE 25 G/50ML
12.5 INJECTION, SOLUTION INTRAVENOUS PRN
Status: DISCONTINUED | OUTPATIENT
Start: 2019-05-03 | End: 2019-05-05 | Stop reason: HOSPADM

## 2019-05-03 RX ORDER — SODIUM CHLORIDE 0.9 % (FLUSH) 0.9 %
10 SYRINGE (ML) INJECTION EVERY 12 HOURS SCHEDULED
Status: DISCONTINUED | OUTPATIENT
Start: 2019-05-03 | End: 2019-05-05 | Stop reason: HOSPADM

## 2019-05-03 RX ORDER — DULOXETIN HYDROCHLORIDE 60 MG/1
60 CAPSULE, DELAYED RELEASE ORAL DAILY
Status: DISCONTINUED | OUTPATIENT
Start: 2019-05-03 | End: 2019-05-05 | Stop reason: HOSPADM

## 2019-05-03 RX ORDER — 0.9 % SODIUM CHLORIDE 0.9 %
1000 INTRAVENOUS SOLUTION INTRAVENOUS ONCE
Status: DISCONTINUED | OUTPATIENT
Start: 2019-05-03 | End: 2019-05-03

## 2019-05-03 RX ORDER — METOCLOPRAMIDE HYDROCHLORIDE 5 MG/ML
10 INJECTION INTRAMUSCULAR; INTRAVENOUS EVERY 6 HOURS PRN
Status: DISCONTINUED | OUTPATIENT
Start: 2019-05-03 | End: 2019-05-05 | Stop reason: HOSPADM

## 2019-05-03 RX ORDER — ONDANSETRON 2 MG/ML
4 INJECTION INTRAMUSCULAR; INTRAVENOUS ONCE
Status: COMPLETED | OUTPATIENT
Start: 2019-05-03 | End: 2019-05-03

## 2019-05-03 RX ORDER — PHYTONADIONE 5 MG/1
5 TABLET ORAL ONCE
Status: DISCONTINUED | OUTPATIENT
Start: 2019-05-03 | End: 2019-05-03

## 2019-05-03 RX ORDER — SODIUM CHLORIDE 9 MG/ML
INJECTION, SOLUTION INTRAVENOUS
Status: COMPLETED
Start: 2019-05-03 | End: 2019-05-03

## 2019-05-03 RX ADMIN — ONDANSETRON 4 MG: 2 INJECTION INTRAMUSCULAR; INTRAVENOUS at 21:42

## 2019-05-03 RX ADMIN — VANCOMYCIN HYDROCHLORIDE 1000 MG: 1 INJECTION, POWDER, LYOPHILIZED, FOR SOLUTION INTRAVENOUS at 13:49

## 2019-05-03 RX ADMIN — IOPAMIDOL 100 ML: 612 INJECTION, SOLUTION INTRAVENOUS at 12:22

## 2019-05-03 RX ADMIN — INSULIN GLARGINE 30 UNITS: 100 INJECTION, SOLUTION SUBCUTANEOUS at 21:38

## 2019-05-03 RX ADMIN — ONDANSETRON 4 MG: 2 INJECTION INTRAMUSCULAR; INTRAVENOUS at 11:13

## 2019-05-03 RX ADMIN — SODIUM CHLORIDE, POTASSIUM CHLORIDE, SODIUM LACTATE AND CALCIUM CHLORIDE 1000 ML: 600; 310; 30; 20 INJECTION, SOLUTION INTRAVENOUS at 20:20

## 2019-05-03 RX ADMIN — SODIUM CHLORIDE 2013 ML: 9 INJECTION, SOLUTION INTRAVENOUS at 11:12

## 2019-05-03 RX ADMIN — ROPINIROLE HYDROCHLORIDE 2.5 MG: 2 TABLET, FILM COATED ORAL at 21:43

## 2019-05-03 RX ADMIN — BUPROPION HYDROCHLORIDE 150 MG: 150 TABLET, EXTENDED RELEASE ORAL at 21:43

## 2019-05-03 RX ADMIN — SUCRALFATE 1 G: 1 TABLET ORAL at 21:43

## 2019-05-03 RX ADMIN — SODIUM CHLORIDE, SODIUM LACTATE, POTASSIUM CHLORIDE, CALCIUM CHLORIDE AND DEXTROSE MONOHYDRATE: 5; 600; 310; 30; 20 INJECTION, SOLUTION INTRAVENOUS at 23:35

## 2019-05-03 RX ADMIN — PIPERACILLIN SODIUM,TAZOBACTAM SODIUM 3.38 G: 3; .375 INJECTION, POWDER, FOR SOLUTION INTRAVENOUS at 11:13

## 2019-05-03 RX ADMIN — CLONAZEPAM 1 MG: 1 TABLET ORAL at 21:43

## 2019-05-03 SDOH — HEALTH STABILITY: MENTAL HEALTH: HOW OFTEN DO YOU HAVE A DRINK CONTAINING ALCOHOL?: NEVER

## 2019-05-03 ASSESSMENT — ENCOUNTER SYMPTOMS
NAUSEA: 1
DIARRHEA: 0
VOMITING: 0
ABDOMINAL PAIN: 0
SORE THROAT: 0
COUGH: 0
PHOTOPHOBIA: 0
EYE PAIN: 0
BACK PAIN: 0
CHEST TIGHTNESS: 1
SHORTNESS OF BREATH: 1
RHINORRHEA: 0

## 2019-05-03 ASSESSMENT — PAIN DESCRIPTION - PAIN TYPE: TYPE: ACUTE PAIN

## 2019-05-03 ASSESSMENT — PAIN DESCRIPTION - LOCATION: LOCATION: BACK

## 2019-05-03 NOTE — H&P
Hospital Medicine History & Physical      PCP: Nyasia Ghotra DO    Date of Admission: 5/3/2019    Date of Service: Pt seen/examined on 5/3/19  Admitted to Inpatient with expected LOS greater than two midnights due to medical therapy. Chief Complaint: Nausea, vomiting , poor oral intake     History Of Present Illness:      63 y.o. female who presented to [facility] with above complaint which started about 2 week ago after she had  13 tooth extraction following a dental abscess . Pt states she had a week course of amoxicillin prior to extraction seem to be feeling better. However c/o nausea and vomiting and inability to keep any food down. Pt states she doesn't feel constipated but hasn't had a BM in days because she hasn't been eating well. She admit to  having severe gastroparesis and has been on Daphne  for years but states she was taken off it about 2 months ago because its considered a black box medication. She admits to intermittent abdominal pain but denies any lightheadedness, fever, chills. Pt also with leukocytosis, she states its chronic. INR also elevated @ 5.5 states she hasn't had her INR check in 2 weeks. Past Medical History:          Diagnosis Date    [facility]bral [facility] occlusion [facility] infarction ([facility]     CHF (congestive heart failure) (HCC)     Depression     Diabetes mellitus ([address]     History of heart artery stent     Hyperlipidemia     Hypertension     Pacemaker        Past Surgical History:          Procedure Laterality Date    CARDIAC SURGERY       SECTION      HYSTERECTOMY      UPPER GASTROINTESTINAL ENDOSCOPY N/A 2019    EGD performed by Brendan Johnson MD at [address] [place] [address]         Medications Prior to Admission:      Prior to Admission medications    Medication Sig Start Date End Date Taking?  Authorizing Provider   Wheat Dextrin (BENEFIBER) POWD Take 15 g by mouth daily 19   Eva Washington, APRN - CNP   pantoprazole (PROTONIX) 40 MG tablet Take 1 tablet by mouth 2 times daily (before meals) 1/23/19   Jenny Castro DO   sucralfate (CARAFATE) 1 GM tablet Take 1 tablet by mouth 4 times daily 1/23/19   Jenny Castro, DO   Cinnamon 500 MG CAPS Take 500 mg by mouth daily    Historical Provider, MD   insulin lispro (HUMALOG) 100 UNIT/ML injection vial Inject into the skin 3 times daily (before meals)     Historical Provider, MD   buPROPion [facility] SR) 150 MG extended release tablet Take 150 mg by mouth 2 times daily 11/28/18   Historical Provider, MD   vitamin D-3 (CHOLECALCIFEROL) 5000 units TABS Take 5,000 Units by mouth daily 8/1/18   Historical Provider, MD   oxybutynin (DITROPAN) 5 MG tablet Take 5 mg by mouth daily 11/7/18   Historical Provider, MD   levothyroxine (SYNTHROID) 75 MCG tablet Take 88 mcg by mouth Daily     Historical Provider, MD   carvedilol (COREG) 12.5 MG tablet Take 12.5 mg by mouth 2 times daily     Historical Provider, MD   Topiramate (TOPAMAX PO) Take 200 mg by mouth 2 times daily     Historical Provider, MD   DULoxetine (CYMBALTA) 60 MG extended release capsule Take 60 mg by mouth daily    Historical Provider, MD   atorvastatin (LIPITOR) 20 MG tablet Take 20 mg by mouth daily    Historical Provider, MD   digoxin (LANOXIN) 250 MCG tablet Take 125 mcg by mouth daily     Historical Provider, MD   valsartan (DIOVAN) 80 MG tablet Take 40 mg by mouth daily     Historical Provider, MD   aspirin 81 MG EC tablet Take 81 mg by mouth daily    Historical Provider, MD   insulin glargine (LANTUS) 100 UNIT/ML injection vial Inject 30 Units into the skin nightly     Historical Provider, MD   clonazePAM (KLONOPIN) 1 MG tablet Take 1 mg by mouth nightly. Historical Provider, MD   rOPINIRole (REQUIP) 2 MG tablet Take 2.5 mg by mouth nightly     Historical Provider, MD       Allergies:  Patient has no known allergies.     Social History:      The patient currently lives @ home     TOBACCO:   reports that she has never 05/03/19  1030   TROPONINI <0.010       Urinalysis:      Lab Results   Component Value Date    NITRU Negative 05/03/2019    WBCUA 10-20 01/21/2019    BACTERIA MANY 01/21/2019    RBCUA 0-2 01/17/2018    BLOODU Negative 05/03/2019    SPECGRAV 1.054 05/03/2019    GLUCOSEU 250 05/03/2019       Radiology:     CXR: I have reviewed the CXR with the following interpretation:   EKG:  I have reviewed the EKG with the following interpretation:    CT HEAD WO CONTRAST   Preliminary Result   SMALL REMOTE OCCIPITAL INFARCTS. NO NEW SUPERIMPOSED ABNORMALITY, COMPARED TO JANUARY 6, 2019. All CT scans at this facility use dose modulation, iterative reconstruction, and/or weight based dosing when appropriate to reduce radiation dose to as low as reasonably achievable. CTA Chest W WO  (PE study)   Final Result   1. NO EVIDENCE OF CENTRAL OR SEGMENTAL PULMONARY EMBOLISM. 2.  VISUALIZED PULMONARY PARENCHYMA IS UNREMARKABLE. All CT scans at this facility use dose modulation, iterative reconstruction, and/or weight based dosing when appropriate to reduce radiation dose to as low as reasonably achievable.             XR CHEST PORTABLE   Final Result   NO ACUTE ACTIVE CARDIOPULMONARY PROCESS          ASSESSMENT/plan   Hypotension/ Hyponatremia/VIKKI: d/t dehydration from  N/V and poor oral intake, slightly improved with IV hydration, will continue, hold b/p for now until SBP improved above 120, monitor b/p closely and daily renal function and sodium level      Intractable nausea and vomiting:partly d/t gastroparesis from uncontrolled DM,   routine antiemetics, start on clear liquid and advance as tolerated , adequate BG control , consult GI if unresolved    Supra therapeutic INR: hold coumadin and resume when INR therapeutic, give a dose of Vit K, monitor INR daily     Abdominal Pain:obtain CT abdomen     Leukocytosis: partly chronic, will monitor for now , pt afebrile     DM II: Poct glucose with SSI    S/p tooth extraction: pt express improvement     DVT Prophylaxis: pt on warfarin  Diet: liquid/cc, advance as tolerated   Code Status:full     Dispo - inpatient        Kati Noonan APRN - AZIZA    Thank you Chiquita Zamorano DO for the opportunity to be involved in this patient's care. If you have any questions or concerns please feel free to contact me.

## 2019-05-03 NOTE — ED NOTES
Pt ok for clear liquids per admitting NP.  Given carloz mist per request     Wendy Buitrago RN  05/03/19 0025

## 2019-05-03 NOTE — ED TRIAGE NOTES
Late treage note by this RN, pt to ed from home with c/o nausea, vomiting and not feeling well. Pt states that she has abdominal pain and that oral pain is managed. Pt has 13 teeth pulled on tuesday reports not eating well. Pt arrives in ER with systolic BP is the 91'G. Pt reports she was taking ammoxacillin for one week prior to oral procedure. Pt is AOx3, calm and cooperative.  Pt has pacemaker defib at left chest wall

## 2019-05-03 NOTE — ED NOTES
hospitalist at the bedside     eWndy Buitrago [facility]  05/03/19 0411

## 2019-05-03 NOTE — ED NOTES
Pt hand off report received from Maged Carty. This RN at bedside with [address].      Macho Cowart, RN  05/03/19 [address] Dom[address] Sanchez RN  05/03/19 9551

## 2019-05-03 NOTE — ED PROVIDER NOTES
[address] [facility] ED  eMERGENCY dEPARTMENTeNCOUnter      Pt Name: Lila Alba  MRN: [id number]  [place] 1960  Date ofevaluation: 5/3/2019  Provider: Kristina Pratt PA-C    CHIEF COMPLAINT       Chief Complaint   Patient presents with    Hypotension     confusion, syncopal episode, nausea, unable to eat or drink since 13 teeth were pulled week and half ago. HISTORY OF PRESENT ILLNESS   (Location/Symptom, Timing/Onset,Context/Setting, Quality, Duration, Modifying Factors, Severity)  Note limiting factors. Lila Alba is a 63 y.o. female who presents to the emergency department hypotension. pts daughter took her bp and it was low. Daughter contributes most of the history but the patient is alert and oriented and adding some things. Daughter states she has not been eating or drinking since dental extraction of several upper teeth. Pt tolerated the outpatient procedure fine except for some pain afterwards. Daughter states she has been confused, pt  Clarifies saying things are like a fog. She has had multiple syncopal episodes over the last week, described as \"crumbling\" vs collapse. She admits to some sob this morning with some tightness but this has resolved. Pt is on warfarin. She was not on atb after the procedure. HPI    NursingNotes were reviewed. REVIEW OF SYSTEMS    (2-9 systems for level 4, 10 or more for level 5)     Review of Systems   Constitutional: Positive for activity change, appetite change, chills, diaphoresis and fatigue. Negative for fever. HENT: Positive for dental problem. Negative for congestion, rhinorrhea and sore throat. Eyes: Negative for photophobia and pain. Respiratory: Positive for chest tightness (resolved) and shortness of breath. Negative for cough. Cardiovascular: Negative for chest pain and palpitations. Gastrointestinal: Positive for nausea. Negative for abdominal pain, diarrhea and vomiting.    Genitourinary: Negative for dysuria and flank pain.   Musculoskeletal: Negative for back pain. Skin: Negative for rash. Neurological: Positive for syncope and weakness (general). Negative for dizziness, light-headedness and headaches. Psychiatric/Behavioral: Negative. All other systems reviewed and are negative. Except as noted above the remainder of the review of systems was reviewed and negative. PAST MEDICAL HISTORY     Past Medical History:   Diagnosis Date    [facility]erebral artery occlusion [facility] infarction ([facility] [facility]     CHF (congestive heart failure) (HCC)     Depression     Diabetes mellitus ([facility] [address] [facility]     History of heart artery stent     Hyperlipidemia     Hypertension     Pacemaker          SURGICALHISTORY       Past Surgical History:   Procedure Laterality Date    CARDIAC SURGERY       SECTION      HYSTERECTOMY      UPPER GASTROINTESTINAL ENDOSCOPY N/A 2019    EGD performed by Rhonda John MD at [facility]. [facility]       Previous Medications    ASPIRIN 81 MG EC TABLET    Take 81 mg by mouth daily    ATORVASTATIN (LIPITOR) 20 MG TABLET    Take 20 mg by mouth daily    BUPROPION (WELLBUTRIN SR) 150 MG EXTENDED RELEASE TABLET    Take 150 mg by mouth 2 times daily    CARVEDILOL (COREG) 12.5 MG TABLET    Take 12.5 mg by mouth 2 times daily     CINNAMON 500 MG CAPS    Take 500 mg by mouth daily    CLONAZEPAM (KLONOPIN) 1 MG TABLET    Take 1 mg by mouth nightly.     DIGOXIN (LANOXIN) 250 MCG TABLET    Take 125 mcg by mouth daily     DULOXETINE (CYMBALTA) 60 MG EXTENDED RELEASE CAPSULE    Take 60 mg by mouth daily    INSULIN GLARGINE (LANTUS) 100 UNIT/ML INJECTION VIAL    Inject 30 Units into the skin nightly     INSULIN LISPRO (HUMALOG) 100 UNIT/ML INJECTION VIAL    Inject into the skin 3 times daily (before meals)     LEVOTHYROXINE (SYNTHROID) 75 MCG TABLET    Take 88 mcg by mouth Daily     OXYBUTYNIN (DITROPAN) 5 MG TABLET    Take 5 mg by mouth daily    PANTOPRAZOLE (PROTONIX) 40 MG TABLET    Take 1 tablet by mouth 2 times daily (before meals)    ROPINIROLE (REQUIP) 2 MG TABLET    Take 2.5 mg by mouth nightly     SUCRALFATE (CARAFATE) 1 GM TABLET    Take 1 tablet by mouth 4 times daily    TOPIRAMATE (TOPAMAX PO)    Take 200 mg by mouth 2 times daily     VALSARTAN (DIOVAN) 80 MG TABLET    Take 40 mg by mouth daily     VITAMIN D-3 (CHOLECALCIFEROL) 5000 UNITS TABS    Take 5,000 Units by mouth daily    WHEAT DEXTRIN (BENEFIBER) POWD    Take 15 g by mouth daily       ALLERGIES     Patient has no known allergies. FAMILY HISTORY     History reviewed. No pertinent family history.        SOCIAL HISTORY       Social History     Socioeconomic History    Marital status:      Spouse name: None    Number of children: None    Years of education: None    Highest education level: None   Occupational History    None   Social Needs    Financial resource strain: None    Food insecurity:     Worry: None     Inability: None    Transportation needs:     Medical: None     Non-medical: None   Tobacco Use    Smoking status: Never Smoker    Smokeless tobacco: Never Used   Substance and Sexual Activity    Alcohol use: Yes     Comment: once a week    Drug use: Yes     Types: Marijuana    Sexual activity: Yes     Partners: Male   Lifestyle    Physical activity:     Days per week: None     Minutes per session: None    Stress: None   Relationships    Social connections:     Talks on phone: None     Gets together: None     Attends Druze service: None     Active member of club or organization: None     Attends meetings of clubs or organizations: None     Relationship status: None    Intimate partner violence:     Fear of current or ex partner: None     Emotionally abused: None     Physically abused: None     Forced sexual activity: None   Other Topics Concern    None   Social History Narrative    None       SCREENINGS      @FLOW([id number])@      PHYSICAL EXAM    (up to 7 for level 4, 8 or more for level 5)     ED Triage Vitals [05/03/19 1014]   BP Temp Temp Source Pulse Resp SpO2 Height Weight   82/73 97.7 °F (36.5 °C) Oral 129 18 99 % 5' 1\" (1.549 m) 148 lb (67.1 kg)       Physical Exam   Constitutional: She is oriented to person, place, and time. She appears well-developed and well-nourished. She is active and cooperative. She appears ill. No distress. HENT:   Head: Normocephalic and atraumatic. Right Ear: Hearing, tympanic membrane, external ear and ear canal normal.   Left Ear: Hearing, tympanic membrane, external ear and ear canal normal.   Nose: Nose normal.   Mouth/Throat: Uvula is midline, oropharynx is clear and moist and mucous membranes are normal. Abnormal dentition. No teeth on upper. Appears well healing. No abscess. Eyes: Conjunctivae and lids are normal.   Neck: Normal range of motion. Neck supple. Cardiovascular: Regular rhythm, normal heart sounds, intact distal pulses and normal pulses. Tachycardia present.   ekg sinus rhythm with pvc 94bpm st elevation v3,4,5,6 same as previous ekg on 1/21/19. Dr. Bert Madsen reviewed. Pulmonary/Chest: Effort normal and breath sounds normal.   Abdominal: Soft. Normal appearance and bowel sounds are normal. There is no tenderness. Lymphadenopathy:     She has no cervical adenopathy. Neurological: She is alert and oriented to person, place, and time. She has normal strength. Skin: Skin is warm, dry and intact. Capillary refill takes less than 2 seconds. No rash noted. She is not diaphoretic. Psychiatric: Judgment and thought content normal.   Nursing note and vitals reviewed.       DIAGNOSTIC RESULTS     EKG: All EKG's are interpreted by the Emergency Department Physician who either signs or Co-signsthis chart in the absence of a cardiologist.    See PE    RADIOLOGY:   Non-plain filmimages such as CT, Ultrasound and MRI are read by the radiologist. Plain radiographic images are visualized and preliminarily interpreted by the emergency physician with the below findings:        Interpretation per the Radiologist below, if available at the time ofthis note:    CT HEAD [address]. NO NEW SUPERIMPOSED ABNORMALITY, COMPARED TO JANUARY 6, 2019. All CT scans at this facility use dose modulation, iterative reconstruction, and/or weight based dosing when appropriate to reduce radiation dose to as low as reasonably achievable. CTA Chest W WO  (PE study)   Final Result   1. NO EVIDENCE OF CENTRAL OR SEGMENTAL PULMONARY EMBOLISM. 2.  VISUALIZED PULMONARY PARENCHYMA IS UNREMARKABLE. All CT scans at this facility use dose modulation, iterative reconstruction, and/or weight based dosing when appropriate to reduce radiation dose to as low as reasonably achievable.             XR CHEST PORTABLE   Final Result   NO ACUTE ACTIVE CARDIOPULMONARY PROCESS            ED BEDSIDE ULTRASOUND:   Performed by ED Physician - none    LABS:  Labs Reviewed   COMPREHENSIVE METABOLIC PANEL - Abnormal; Notable for the following components:       Result Value    Sodium 129 (*)     Chloride 87 (*)     Anion Gap 17 (*)     Glucose 274 (*)     BUN 35 (*)     CREATININE 1.30 (*)     GFR Non- 42.0 (*)     GFR  50.9 (*)     Globulin 3.6 (*)     All other components within normal limits   CBC WITH AUTO DIFFERENTIAL - Abnormal; Notable for the following components:    WBC 18.9 (*)     Hemoglobin 17.2 (*)     Hematocrit 50.4 (*)     MCH 33.3 (*)     Neutrophils # 13.1 (*)     Basophils # 0.3 (*)     All other components within normal limits   APTT - Abnormal; Notable for the following components:    aPTT 47.9 (*)     All other components within normal limits    Narrative:     CALL  [facility] tel. [phone],  INR results called to and read back by Patsy Morelos (ER RN), 05/03/2019  12:00, by Jerry Angel   PROTIME-INR - Abnormal; Notable for the following components:    Protime 52.2 (*) type          DISPOSITION/PLAN   DISPOSITION Admitted 05/03/2019 01:38:28 PM      PATIENT REFERREDTO:  Nilda Levin DO  [address] [facility] [address] [phone]            DISCHARGEMEDICATIONS:  New Prescriptions    No medications on file          (Please note that portions of this note were completed with a voice recognition program.  Efforts were made to edit the dictations but occasionally words are mis-transcribed.)    Benny Runner (electronically signed)  Attending Emergency Physician         Tai Charles PA-C  05/03/19 3588

## 2019-05-03 NOTE — PROGRESS NOTES
Clinical Pharmacy Note    Kareem Nascimento is a 63 y.o. female for whom pharmacy has been asked to manage warfarin therapy. Reason for Admission: hypotension/syncope    Consulting Physician: Dr. Adelita Valentine  Warfarin dose prior to admission: [facility] \"7.5 mg daily\" - previously higher  Warfarin Indication: cerebral venous sinus thrombosis  Target INR range: 2-3  Order for concomitant anticoagulant therapy: none    Outpatient warfarin provider: [facility] coumadin clinic    Past Medical History:   Diagnosis Date    Cerebral artery occlusion with cerebral infarction [facility])     CHF (congestive heart failure) ([facility] [address]     Depression     Diabetes mellitus ([facility]     History of heart artery stent     Hyperlipidemia     Hypertension     Pacemaker                Recent Labs     05/03/19  1030   INR 5.5*     Recent Labs     05/03/19  1030   HGB 17.2*   HCT 50.4*          Current warfarin drug-drug interactions:   none    Date INR Warfarin Dose   5/3 5.5 Hold - vit k                                   Patient INR 5.5 today, ordered vit k by physician. Reassess tomorrow. Daily PT/INR until stable within therapeutic range. Thank you for the consult.     Adelfo Miller, PharmD  Staff Pharmacist  5/3/2019 4:15 PM

## 2019-05-04 LAB
ALBUMIN SERPL-MCNC: 2.6 G/DL (ref 3.5–4.6)
ALP BLD-CCNC: 55 U/L (ref 40–130)
ALT SERPL-CCNC: 14 U/L (ref 0–33)
ANION GAP SERPL CALCULATED.3IONS-SCNC: 13 MEQ/L (ref 9–15)
AST SERPL-CCNC: 23 U/L (ref 0–35)
BILIRUB SERPL-MCNC: 0.3 MG/DL (ref 0.2–0.7)
BUN BLDV-MCNC: 21 MG/DL (ref 6–20)
CALCIUM SERPL-MCNC: 7.7 MG/DL (ref 8.5–9.9)
CHLORIDE BLD-SCNC: 100 MEQ/L (ref 95–107)
CO2: 21 MEQ/L (ref 20–31)
CREAT SERPL-MCNC: 0.87 MG/DL (ref 0.5–0.9)
GFR AFRICAN AMERICAN: >60
GFR NON-AFRICAN AMERICAN: >60
GLOBULIN: 2.6 G/DL (ref 2.3–3.5)
GLUCOSE BLD-MCNC: 206 MG/DL (ref 60–115)
GLUCOSE BLD-MCNC: 301 MG/DL (ref 70–99)
GLUCOSE BLD-MCNC: 34 MG/DL (ref 60–115)
GLUCOSE BLD-MCNC: 419 MG/DL (ref 60–115)
GLUCOSE BLD-MCNC: 84 MG/DL (ref 60–115)
HCT VFR BLD CALC: 36.2 % (ref 37–47)
HEMOGLOBIN: 12.4 G/DL (ref 12–16)
INR BLD: 5.2
MCH RBC QN AUTO: 33.6 PG (ref 27–31.3)
MCHC RBC AUTO-ENTMCNC: 34.2 % (ref 33–37)
MCV RBC AUTO: 98.2 FL (ref 82–100)
PDW BLD-RTO: 13.1 % (ref 11.5–14.5)
PERFORMED ON: ABNORMAL
PERFORMED ON: NORMAL
PLATELET # BLD: 135 K/UL (ref 130–400)
POTASSIUM REFLEX MAGNESIUM: 3.6 MEQ/L (ref 3.4–4.9)
PROTHROMBIN TIME: 50.1 SEC (ref 9–11.5)
RBC # BLD: 3.69 M/UL (ref 4.2–5.4)
SODIUM BLD-SCNC: 134 MEQ/L (ref 135–144)
T4 FREE: 1.61 NG/DL (ref 0.84–1.68)
TOTAL PROTEIN: 5.2 G/DL (ref 6.3–8)
TSH SERPL DL<=0.05 MIU/L-ACNC: 4.06 UIU/ML (ref 0.44–3.86)
VITAMIN D 25-HYDROXY: 93.6 NG/ML (ref 30–100)
WBC # BLD: 9.3 K/UL (ref 4.8–10.8)

## 2019-05-04 PROCEDURE — 99232 SBSQ HOSP IP/OBS MODERATE 35: CPT | Performed by: PHYSICIAN ASSISTANT

## 2019-05-04 PROCEDURE — 2580000003 HC RX 258: Performed by: PHYSICIAN ASSISTANT

## 2019-05-04 PROCEDURE — 85610 PROTHROMBIN TIME: CPT

## 2019-05-04 PROCEDURE — 2580000003 HC RX 258: Performed by: NURSE PRACTITIONER

## 2019-05-04 PROCEDURE — 36415 COLL VENOUS BLD VENIPUNCTURE: CPT

## 2019-05-04 PROCEDURE — 96375 TX/PRO/DX INJ NEW DRUG ADDON: CPT

## 2019-05-04 PROCEDURE — 6360000002 HC RX W HCPCS: Performed by: NURSE PRACTITIONER

## 2019-05-04 PROCEDURE — 96376 TX/PRO/DX INJ SAME DRUG ADON: CPT

## 2019-05-04 PROCEDURE — 1210000000 HC MED SURG R&B

## 2019-05-04 PROCEDURE — 6370000000 HC RX 637 (ALT 250 FOR IP): Performed by: PHYSICIAN ASSISTANT

## 2019-05-04 PROCEDURE — 80053 COMPREHEN METABOLIC PANEL: CPT

## 2019-05-04 PROCEDURE — 6370000000 HC RX 637 (ALT 250 FOR IP): Performed by: NURSE PRACTITIONER

## 2019-05-04 PROCEDURE — 85027 COMPLETE CBC AUTOMATED: CPT

## 2019-05-04 RX ORDER — INSULIN GLARGINE 100 [IU]/ML
35 INJECTION, SOLUTION SUBCUTANEOUS NIGHTLY
Status: DISCONTINUED | OUTPATIENT
Start: 2019-05-04 | End: 2019-05-05

## 2019-05-04 RX ORDER — SODIUM CHLORIDE 9 MG/ML
INJECTION, SOLUTION INTRAVENOUS CONTINUOUS
Status: DISCONTINUED | OUTPATIENT
Start: 2019-05-04 | End: 2019-05-05 | Stop reason: HOSPADM

## 2019-05-04 RX ADMIN — ATORVASTATIN CALCIUM 20 MG: 20 TABLET, FILM COATED ORAL at 10:29

## 2019-05-04 RX ADMIN — BUPROPION HYDROCHLORIDE 150 MG: 150 TABLET, EXTENDED RELEASE ORAL at 21:40

## 2019-05-04 RX ADMIN — LEVOTHYROXINE SODIUM 88 MCG: 88 TABLET ORAL at 06:49

## 2019-05-04 RX ADMIN — INSULIN GLARGINE 35 UNITS: 100 INJECTION, SOLUTION SUBCUTANEOUS at 21:37

## 2019-05-04 RX ADMIN — OXYBUTYNIN CHLORIDE 5 MG: 5 TABLET ORAL at 10:30

## 2019-05-04 RX ADMIN — SODIUM CHLORIDE: 9 INJECTION, SOLUTION INTRAVENOUS at 17:31

## 2019-05-04 RX ADMIN — ASPIRIN 81 MG: 81 TABLET, COATED ORAL at 10:30

## 2019-05-04 RX ADMIN — ONDANSETRON 4 MG: 2 INJECTION INTRAMUSCULAR; INTRAVENOUS at 10:30

## 2019-05-04 RX ADMIN — TOPIRAMATE 200 MG: 200 TABLET, FILM COATED ORAL at 10:29

## 2019-05-04 RX ADMIN — CLONAZEPAM 1 MG: 1 TABLET ORAL at 21:40

## 2019-05-04 RX ADMIN — Medication 10 ML: at 21:42

## 2019-05-04 RX ADMIN — INSULIN LISPRO 12 UNITS: 100 INJECTION, SOLUTION INTRAVENOUS; SUBCUTANEOUS at 13:28

## 2019-05-04 RX ADMIN — TOPIRAMATE 200 MG: 200 TABLET, FILM COATED ORAL at 21:39

## 2019-05-04 RX ADMIN — PANTOPRAZOLE SODIUM 40 MG: 40 TABLET, DELAYED RELEASE ORAL at 06:49

## 2019-05-04 RX ADMIN — SUCRALFATE 1 G: 1 TABLET ORAL at 21:39

## 2019-05-04 RX ADMIN — METOCLOPRAMIDE 10 MG: 5 INJECTION, SOLUTION INTRAMUSCULAR; INTRAVENOUS at 00:26

## 2019-05-04 RX ADMIN — BUPROPION HYDROCHLORIDE 150 MG: 150 TABLET, EXTENDED RELEASE ORAL at 10:29

## 2019-05-04 RX ADMIN — ROPINIROLE HYDROCHLORIDE 2.5 MG: 2 TABLET, FILM COATED ORAL at 21:40

## 2019-05-04 RX ADMIN — ONDANSETRON 4 MG: 2 INJECTION INTRAMUSCULAR; INTRAVENOUS at 17:32

## 2019-05-04 RX ADMIN — ONDANSETRON 4 MG: 2 INJECTION INTRAMUSCULAR; INTRAVENOUS at 21:40

## 2019-05-04 RX ADMIN — DULOXETINE HYDROCHLORIDE 60 MG: 60 CAPSULE, DELAYED RELEASE ORAL at 10:29

## 2019-05-04 RX ADMIN — METOCLOPRAMIDE 10 MG: 5 INJECTION, SOLUTION INTRAMUSCULAR; INTRAVENOUS at 06:49

## 2019-05-04 RX ADMIN — Medication 10 ML: at 10:27

## 2019-05-04 ASSESSMENT — PAIN DESCRIPTION - PAIN TYPE: TYPE: ACUTE PAIN

## 2019-05-04 ASSESSMENT — PAIN DESCRIPTION - LOCATION: LOCATION: HEAD

## 2019-05-04 NOTE — PROGRESS NOTES
Endocrinology Progress Note    Assessment and Plan:   Assessment-  1. Type 1 diabetes  2. Hypothyroidism  3. Nausea and vomiting  4. Hyponatremia  5. VIKKI        Plan-  1. Increase Lantus 35 units at bedtime  2. Continue Humalog 10 units 3 times a day with meals  3. Stop LR and , D5 IV fluids  4. Start normal saline IV at 100 ML's per hour  5. Monitor glycemic control closely    POC Glucose:   Recent Labs     05/03/19  2059   POCGLU 354*     HGBA1C:  Recent Labs     05/03/19  1048   LABA1C 8.5*     CBC:   Recent Labs     05/03/19  1030 05/04/19  0529   WBC 18.9* 9.3   HGB 17.2* 12.4    135     CMP:    Recent Labs     05/03/19  1030 05/04/19  0529   * 134*   K 3.6 3.6   CL 87* 100   CO2 25 21   BUN 35* 21*   CREATININE 1.30* 0.87   GLUCOSE 274* 301*   CALCIUM 8.9 7.7*   LABGLOM 42.0* >60.0         CC:   Chief Complaint   Patient presents with    Hypotension     confusion, syncopal episode, nausea, unable to eat or drink since 13 teeth were pulled week and half ago. Subjective: Interval History: Patient is a 64-year-old type 2 insulin-dependent diabetic admitted for hypovolemia dehydration. IV fluids have been started. Glycemic control is improving.       Review of systems: denies polyuria, polydipsia, ABD pain, flank pain, N/V/D, or diaphoresis  Medications:   Scheduled Meds:   sodium chloride flush  10 mL Intravenous Once    sodium chloride flush  10 mL Intravenous 2 times per day    ondansetron  4 mg Intravenous Q6H    insulin lispro  0-12 Units Subcutaneous TID     insulin lispro  0-6 Units Subcutaneous Nightly    warfarin (COUMADIN) daily dosing (placeholder)   Other RX Placeholder    insulin glargine  30 Units Subcutaneous Nightly    insulin lispro  10 Units Subcutaneous TID     aspirin  81 mg Oral Daily    atorvastatin  20 mg Oral Daily    buPROPion  150 mg Oral BID    clonazePAM  1 mg Oral Nightly    DULoxetine  60 mg Oral Daily    levothyroxine  88 mcg Oral Daily    oxybutynin  5 mg Oral Daily    pantoprazole  40 mg Oral BID AC    rOPINIRole  2.5 mg Oral Nightly    sucralfate  1 g Oral 4x Daily    topiramate  200 mg Oral BID     Continuous Infusions:   dextrose      dextrose 5% in lactated ringers 100 mL/hr at 05/03/19 4620       Objective:   Vitals: BP (!) 90/47   Pulse 93   Temp 98.1 °F (36.7 °C)   Resp 18   Ht 5' 1\" (1.549 m)   Wt 148 lb (67.1 kg)   SpO2 98%   BMI 27.96 kg/m²    Wt Readings from Last 3 Encounters:   05/03/19 148 lb (67.1 kg)   01/31/19 154 lb (69.9 kg)   01/29/19 151 lb (68.5 kg)        General appearance: alert, appears older than stated age, cooperative and no distress  Skin: Skin color, texture, turgor normal. No rashes or lesions. Neck: no lymphadenopathy  Lungs: clear to auscultation bilaterally  Heart: regular rate and rhythm, S1, S2 normal, no murmur, click, rub or gallop  Abdomen: soft, non-tender. Bowel sounds normal. No masses,  no organomegaly.   Extremities: extremities normal, atraumatic, no cyanosis or edema    Patient Active Problem List:     Acquired hypothyroidism     Coronary atherosclerosis     Chronic systolic CHF (congestive heart failure) ([address]     Essential hypertension, benign     Gastroparesis     Alcohol abuse     Long term (current) use of anticoagulants     Lumbosacral spondylosis without myelopathy     Patella fracture     S/P ORIF (open reduction internal fixation) fracture     On bridging treatment with lovenox     Vitamin D deficiency     Supratherapeutic INR     Acute metabolic encephalopathy     NSTEMI (non-ST elevated myocardial infarction) ([address]     Pacemaker     Depression     VIKKI (acute kidney injury) ([address]     Nausea & vomiting     Type 1 diabetes mellitus with ketoacidosis (HCC)     Nausea     Acute gastritis without bleeding     Hypovolemia dehydration            Electronically signed by CHRISTINE Alfonso on 5/4/2019 at 10:05 AM

## 2019-05-04 NOTE — CARE COORDINATION
5/4/19 I  MET WITH THE PT. SHE STATES SHE LIVES WITH HER SPOUSE. NO DME OR COMMUNITY SERVICES. SHE DENIES ANY DISCHARGE NEEDS.

## 2019-05-04 NOTE — FLOWSHEET NOTE
Pt VSS, no acute distress. Pt had lunchtime bloodsugar of 400, covered as ordered, dropped to 30's, interventions given, up to 84. Per endocrine keep one more night to stabilize.  Call light in clint, cont to monitor Electronically signed by Cheri Hsu RN on 5/4/2019 at 5:38 PM

## 2019-05-04 NOTE — CONSULTS
Pharmacy Note  Vancomycin Consult    Cindy Carlin is a 63 y.o. female started on Vancomycin for possible infection; consult received from [place], [place] to dose vanco in ED. Also receiving the following antibiotics: Zosyn 3.375 g IVPB x1. Patient Active Problem List   Diagnosis    Acquired hypothyroidism    Coronary atherosclerosis    Chronic systolic CH[facility] (congestive heart failure) ([facility] [address]    Essential hypertension, benign    Gastroparesis    Alcohol abuse    Long term (current) use of anticoagulants    Lumbosacral spondylosis without myelopathy    Patella fracture    S/P ORIF (open reduction internal fixation) fracture    On bridging treatment with lovenox    Vitamin D deficiency    Supratherapeutic INR    Acute metabolic encephalopathy    NSTEMI (non-ST elevated myocardial infarction) ([facility] [address]    Pacemaker    Depression    VIKKI (acute kidney injury) ([facility] [address]    Nausea & vomiting    Type 1 diabetes mellitus with ketoacidosis (HCC)    Nausea    Acute gastritis without bleeding       Allergies:  Patient has no known allergies. Temp max: 97.7 F    No results for input(s): BUN in the last 72 hours. No results for input(s): CREATININE in the last 72 hours. Recent Labs     05/03/19  1030   WBC 18.9*       No intake or output data in the 24 hours ending 05/03/19 1121    Culture Date      Source                       Results  Results     Procedure Component Value Units Date/Time   Culture Blood #2 [[id number]] Collected: 05/03/19 1048   Order Status: Sent Specimen: Blood Updated: 05/03/19 1058   Culture Blood #1 [[id number]] Collected: 05/03/19 1045   Order Status: Sent Specimen: Blood Updated: 05/03/19 1046         Ht Readings from Last 1 Encounters:   05/03/19 5' 1\" (1.549 m)        Wt Readings from Last 1 Encounters:   05/03/19 148 lb (67.1 kg)         Body mass index is 27.96 kg/m².     CrCl cannot be calculated (Patient's most recent lab result is older than the maximum 10 days
than nausea, vomiting, poor p.o. intake, and  fatigue, 14-review of system was negative. PHYSICAL EXAMINATION:  GENERAL:  The patient is alert and awake, in no obvious distress. VITAL SIGNS:  Blood pressure 103/57, pulse rate was at 84, respiratory  rate was 18, and temperature 97.7. NECK:  Supple. Tongue was dry. LUNGS:  Clear to auscultation bilaterally. No wheezing or crackles. HEART:  Heart sounds are normal.  ABDOMEN:  Soft and nonobese. EXTREMITIES:  Reveal no edema. LABORATORIES:  As above. ASSESSMENT:  Uncontrolled diabetes, hypovolemia, dehydration, and  possible gastroparesis. PLAN:  We would start her on Lantus 30, Humalog 10 with each meals. Continue Synthroid 88 mcg daily. Get lab work for free T4 and TSH. Continue to monitor electrolytes. Goal will be to get her sugars  between 140 to 200 range. Long-term A1c goal of 7 or lower. Thank you for the consult.         Sierra Marshall MD    D: 05/03/2019 20:39:47       T: 05/03/2019 22:12:26     [email or web address]  Job#: [id number]     Doc#: [id number]    CC:

## 2019-05-04 NOTE — DISCHARGE SUMMARY
Hospital Medicine Discharge Summary    Kareem Nascimento  :  1960  MRN:  [id number]    Admit date:  5/3/2019  Discharge date:  19    Admitting Physician:  Marian Luna MD  Primary Care Physician:  Vince Foster DO    Kareem Nascimento is a 63 y.o. female that was admitted and treated at [facility] for the following medical issues: Active Problems:    Acquired hypothyroidism    Type 1 diabetes mellitus with ketoacidosis (HCC)    Hypovolemia dehydration  Resolved Problems:    * No resolved hospital problems. *      Discharge Diagnoses: Active Problems:    Acquired hypothyroidism    Type 1 diabetes mellitus with ketoacidosis (HCC)    Hypovolemia dehydration  Resolved Problems:    * No resolved hospital problems. *    Chief Complaint   Patient presents with    Hypotension     confusion, syncopal episode, nausea, unable to eat or drink since 13 teeth were pulled week and half ago. Hospital Course:   Kareem Nascimento is a 63 y.o. female who was admitted to the hospital with hypovolemia and dehydration secondary to poor oral intake after multiple dental extraction. Responded well to IV fluids, tolerating oral diet, subsequent discharged with outpatient follow-up. Pt was discharge in a stable condition. BP (!) 129/56   Pulse 82   Temp 97.5 °F (36.4 °C)   Resp 18   Ht 5' 1\" (1.549 m)   Wt 148 lb (67.1 kg)   SpO2 100%   BMI 27.96 kg/m²     Patient was seen by the following consultants while admitted to [facility]:   Consults:  [address] [place]  IP CONSULT TO ENDOCRINOLOGY    Significant Diagnostic Studies:    Ct Abdomen Pelvis Wo Contrast Additional Contrast? Radiologist Recommendation    Result Date: 5/3/2019  CT of the Abdomen and Pelvis without intravenous contrast medium History:  Generalized abdominal pain with nausea.  Technical Factors: CT imaging of the abdomen and pelvis were obtained and formatted as 5 mm contiguous axial images from the domes of the diaphragm to the symphysis pubis. Sagittal and coronal reconstructions were also obtained. Oral contrast medium:  None. Intravenous contrast medium:  None. Comparison:  None. Findings: Lungs:  Lung bases are clear. Liver:  Normal in size, shape, and attenuation. Bile Ducts:  Normal in caliber. Gallbladder:  No stones or wall thickening. Pancreas:  Normal without masses, cysts, ductal dilatation or calcification. Spleen:  Normal in size without masses or calcifications. No splenules. Kidneys:  Contrast medium from a previous CT scan performed earlier today is identified within the renal collecting system including evaluation calculi or other pelvicalyceal pathology. . No hydronephrosis. Adrenals:  Normal. Small bowel:  Normal in caliber. AppendixJanyce Rhina. ] Colon:  Normal in caliber. Peritoneum:  No ascites, free air, or fluid collections. Vessels: Aorta normal in course and caliber. Lymph nodes:  Retroperitoneal:  No enlarged retroperitoneal lymph nodes. Mesenteric:  No enlarged mesenteric lymph nodes. Pelvic: No enlarged pelvic lymph nodes. Ureters: Normal in course and caliber. Presence of calcification cannot be assessed. Bladder: No wall thickening. Reproductive organs: Surgically absent. Abdominal Wall:  No hernia identified. No diastasis of rectus musculature. No edema or masses. Bones:  No bone lesions. No degenerative changes. No post operative changes. Limited study as described. Cannot completely assess for acute pathology secondary to residual contrast medium. Hysterectomy. Otherwise, negative CT abdomen pelvis. All CT scans at this facility use dose modulation, iterative reconstruction, and/or weight based dosing when appropriate to reduce radiation dose to as low as reasonably achievable. Ct Head Wo Contrast    Result Date: 5/3/2019  EXAMINATION:  CT HEAD WO CONTRAST: CLINICAL HISTORY:   SYNCOPE. COMPARISONS:  January 6, 2018 unenhanced CT. reduce radiation dose to as low as reasonably achievable. Xr Chest Portable    Result Date: 5/3/2019  EXAMINATION: CHEST PORTABLE VIEW  CLINICAL HISTORY: Syncopal episode COMPARISONS: 2019  FINDINGS: Single  views of the chest is submitted. The side ICD device. Overlying the cardiac silhouette. The cardiac silhouette is of normal size configuration. The mediastinum is unremarkable. Pulmonary vascular unremarkable. Right sided trachea. No focal infiltrates. No Pneumothoraces. NO ACUTE ACTIVE CARDIOPULMONARY PROCESS      Discharge Medications:       Chanell Solitario   Home Medication Instructions PB    Printed on:19 4188   Medication Information                      aspirin 81 MG EC tablet  Take 81 mg by mouth daily             atorvastatin (LIPITOR) 20 MG tablet  Take 20 mg by mouth daily             buPROPion (WELLBUTRIN SR) 150 MG extended release tablet  Take 150 mg by mouth 2 times daily             carvedilol (COREG) 12.5 MG tablet  Take 12.5 mg by mouth 2 times daily              Cinnamon 500 MG CAPS  Take 500 mg by mouth daily             clonazePAM (KLONOPIN) 1 MG tablet  Take 1 mg by mouth nightly.              digoxin (LANOXIN) 250 MCG tablet  Take 125 mcg by mouth daily              DULoxetine (CYMBALTA) 60 MG extended release capsule  Take 60 mg by mouth daily             insulin glargine (LANTUS) 100 UNIT/ML injection vial  Inject 30 Units into the skin nightly              insulin lispro (HUMALOG) 100 UNIT/ML injection vial  Inject into the skin 3 times daily (before meals)              levothyroxine (SYNTHROID) 75 MCG tablet  Take 88 mcg by mouth Daily              oxybutynin (DITROPAN) 5 MG tablet  Take 5 mg by mouth daily             pantoprazole (PROTONIX) 40 MG tablet  Take 1 tablet by mouth 2 times daily (before meals)             rOPINIRole (REQUIP) 2 MG tablet  Take 2.5 mg by mouth nightly              sucralfate (CARAFATE) 1 GM tablet  Take 1 tablet by mouth 4 times daily             Topiramate (TOPAMAX PO)  Take 200 mg by mouth 2 times daily              valsartan (DIOVAN) 80 MG tablet  Take 40 mg by mouth daily              vitamin D-3 (CHOLECALCIFEROL) 5000 units TABS  Take 5,000 Units by mouth daily             Wheat Dextrin (BENEFIBER) POWD  Take 15 g by mouth daily                 Disposition:   If discharged to Home, Any [place] [id number] needs that were indicated and/or required as been addressed and set up by Social Work. Condition at discharge: Pt was medically stable at the time of discharge. Activity: activity as tolerated    Total time taken for discharging this patient: 40 minutes. Greater than 70% of time was spent focused exclusively on this patient. Time was taken to review chart, discuss plans with consultants, reconciling medications, discussing plan answering questions with patient.      Signed:  Lady Damon  5/4/2019, 4:42 PM

## 2019-05-04 NOTE — PROGRESS NOTES
Clinical Pharmacy Note    Warfarin consult follow-up    Recent Labs     05/04/19  0529   INR 5.2*     Recent Labs     05/03/19  1030 05/04/19  0529   HGB 17.2* 12.4   HCT 50.4* 36.2*    135     Significant drug:drug interactions:  Patient continues on levothyroxine    Date INR Warfarin Dose   05/03/19 5.5 Hold - vit k   05/04/19 5.2 HOLD                                              Notes:  INR remains supra therapeutic today. Patient received vitamin K 5 mg once yesterday. Will continue to HOLD warfarin today. Daily PT/INR until stable within therapeutic range.

## 2019-05-05 VITALS
TEMPERATURE: 97.9 F | DIASTOLIC BLOOD PRESSURE: 62 MMHG | WEIGHT: 150 LBS | OXYGEN SATURATION: 100 % | RESPIRATION RATE: 16 BRPM | HEART RATE: 80 BPM | HEIGHT: 61 IN | SYSTOLIC BLOOD PRESSURE: 145 MMHG | BODY MASS INDEX: 28.32 KG/M2

## 2019-05-05 LAB
ALBUMIN SERPL-MCNC: 2.8 G/DL (ref 3.5–4.6)
ALP BLD-CCNC: 50 U/L (ref 40–130)
ALT SERPL-CCNC: 14 U/L (ref 0–33)
ANION GAP SERPL CALCULATED.3IONS-SCNC: 10 MEQ/L (ref 9–15)
AST SERPL-CCNC: 19 U/L (ref 0–35)
BILIRUB SERPL-MCNC: <0.2 MG/DL (ref 0.2–0.7)
BUN BLDV-MCNC: 10 MG/DL (ref 6–20)
CALCIUM SERPL-MCNC: 7.7 MG/DL (ref 8.5–9.9)
CHLORIDE BLD-SCNC: 110 MEQ/L (ref 95–107)
CO2: 24 MEQ/L (ref 20–31)
CREAT SERPL-MCNC: 0.72 MG/DL (ref 0.5–0.9)
GFR AFRICAN AMERICAN: >60
GFR NON-AFRICAN AMERICAN: >60
GLOBULIN: 2.4 G/DL (ref 2.3–3.5)
GLUCOSE BLD-MCNC: 101 MG/DL (ref 60–115)
GLUCOSE BLD-MCNC: 46 MG/DL (ref 60–115)
GLUCOSE BLD-MCNC: 60 MG/DL (ref 60–115)
GLUCOSE BLD-MCNC: 83 MG/DL (ref 60–115)
GLUCOSE BLD-MCNC: 97 MG/DL (ref 60–115)
GLUCOSE BLD-MCNC: 99 MG/DL (ref 70–99)
HCT VFR BLD CALC: 35.5 % (ref 37–47)
HEMOGLOBIN: 12 G/DL (ref 12–16)
INR BLD: 2.1
MCH RBC QN AUTO: 33.2 PG (ref 27–31.3)
MCHC RBC AUTO-ENTMCNC: 33.7 % (ref 33–37)
MCV RBC AUTO: 98.3 FL (ref 82–100)
PDW BLD-RTO: 13.2 % (ref 11.5–14.5)
PERFORMED ON: ABNORMAL
PERFORMED ON: NORMAL
PLATELET # BLD: 134 K/UL (ref 130–400)
POTASSIUM REFLEX MAGNESIUM: 3.8 MEQ/L (ref 3.4–4.9)
PROTHROMBIN TIME: 21.1 SEC (ref 9–11.5)
RBC # BLD: 3.61 M/UL (ref 4.2–5.4)
SODIUM BLD-SCNC: 144 MEQ/L (ref 135–144)
TOTAL PROTEIN: 5.2 G/DL (ref 6.3–8)
WBC # BLD: 9.5 K/UL (ref 4.8–10.8)

## 2019-05-05 PROCEDURE — 96376 TX/PRO/DX INJ SAME DRUG ADON: CPT

## 2019-05-05 PROCEDURE — 36415 COLL VENOUS BLD VENIPUNCTURE: CPT

## 2019-05-05 PROCEDURE — 85610 PROTHROMBIN TIME: CPT

## 2019-05-05 PROCEDURE — 2580000003 HC RX 258: Performed by: NURSE PRACTITIONER

## 2019-05-05 PROCEDURE — 85027 COMPLETE CBC AUTOMATED: CPT

## 2019-05-05 PROCEDURE — 80053 COMPREHEN METABOLIC PANEL: CPT

## 2019-05-05 PROCEDURE — 2580000003 HC RX 258: Performed by: PHYSICIAN ASSISTANT

## 2019-05-05 PROCEDURE — 6370000000 HC RX 637 (ALT 250 FOR IP): Performed by: NURSE PRACTITIONER

## 2019-05-05 PROCEDURE — 6360000002 HC RX W HCPCS: Performed by: NURSE PRACTITIONER

## 2019-05-05 RX ORDER — INSULIN GLARGINE 100 [IU]/ML
25 INJECTION, SOLUTION SUBCUTANEOUS NIGHTLY
Status: DISCONTINUED | OUTPATIENT
Start: 2019-05-05 | End: 2019-05-05 | Stop reason: HOSPADM

## 2019-05-05 RX ADMIN — SUCRALFATE 1 G: 1 TABLET ORAL at 12:57

## 2019-05-05 RX ADMIN — ONDANSETRON 4 MG: 2 INJECTION INTRAMUSCULAR; INTRAVENOUS at 16:12

## 2019-05-05 RX ADMIN — SODIUM CHLORIDE: 9 INJECTION, SOLUTION INTRAVENOUS at 03:42

## 2019-05-05 RX ADMIN — TOPIRAMATE 200 MG: 200 TABLET, FILM COATED ORAL at 09:16

## 2019-05-05 RX ADMIN — DULOXETINE HYDROCHLORIDE 60 MG: 60 CAPSULE, DELAYED RELEASE ORAL at 09:16

## 2019-05-05 RX ADMIN — ONDANSETRON 4 MG: 2 INJECTION INTRAMUSCULAR; INTRAVENOUS at 03:42

## 2019-05-05 RX ADMIN — Medication 10 ML: at 09:18

## 2019-05-05 RX ADMIN — WARFARIN SODIUM 7.5 MG: 2.5 TABLET ORAL at 16:12

## 2019-05-05 RX ADMIN — ATORVASTATIN CALCIUM 20 MG: 20 TABLET, FILM COATED ORAL at 09:16

## 2019-05-05 RX ADMIN — ASPIRIN 81 MG: 81 TABLET, COATED ORAL at 09:15

## 2019-05-05 RX ADMIN — ONDANSETRON 4 MG: 2 INJECTION INTRAMUSCULAR; INTRAVENOUS at 09:15

## 2019-05-05 RX ADMIN — ACETAMINOPHEN 650 MG: 325 TABLET ORAL at 09:15

## 2019-05-05 RX ADMIN — ACETAMINOPHEN 650 MG: 325 TABLET ORAL at 17:54

## 2019-05-05 RX ADMIN — SUCRALFATE 1 G: 1 TABLET ORAL at 09:15

## 2019-05-05 RX ADMIN — OXYBUTYNIN CHLORIDE 5 MG: 5 TABLET ORAL at 09:16

## 2019-05-05 RX ADMIN — PANTOPRAZOLE SODIUM 40 MG: 40 TABLET, DELAYED RELEASE ORAL at 16:13

## 2019-05-05 RX ADMIN — BUPROPION HYDROCHLORIDE 150 MG: 150 TABLET, EXTENDED RELEASE ORAL at 09:16

## 2019-05-05 RX ADMIN — PANTOPRAZOLE SODIUM 40 MG: 40 TABLET, DELAYED RELEASE ORAL at 05:39

## 2019-05-05 RX ADMIN — SODIUM CHLORIDE: 9 INJECTION, SOLUTION INTRAVENOUS at 16:12

## 2019-05-05 RX ADMIN — LEVOTHYROXINE SODIUM 88 MCG: 88 TABLET ORAL at 05:39

## 2019-05-05 ASSESSMENT — PAIN SCALES - GENERAL
PAINLEVEL_OUTOF10: 3
PAINLEVEL_OUTOF10: 5

## 2019-05-05 NOTE — PROGRESS NOTES
Hospitalist Progress Note      Date of Admission: 5/3/2019  Chief Complaint:    Chief Complaint   Patient presents with    Hypotension     confusion, syncopal episode, nausea, unable to eat or drink since 13 teeth were pulled week and half ago. Subjective:  No new complaints.   No nausea, vomiting, chest pain, or headache      Medications:    Infusion Medications    sodium chloride 100 mL/hr at 05/05/19 1612    dextrose       Scheduled Medications    insulin glargine  35 Units Subcutaneous Nightly    sodium chloride flush  10 mL Intravenous Once    sodium chloride flush  10 mL Intravenous 2 times per day    ondansetron  4 mg Intravenous Q6H    insulin lispro  0-12 Units Subcutaneous TID WC    insulin lispro  0-6 Units Subcutaneous Nightly    warfarin (COUMADIN) daily dosing (placeholder)   Other RX Placeholder    insulin lispro  10 Units Subcutaneous TID WC    aspirin  81 mg Oral Daily    atorvastatin  20 mg Oral Daily    buPROPion  150 mg Oral BID    clonazePAM  1 mg Oral Nightly    DULoxetine  60 mg Oral Daily    levothyroxine  88 mcg Oral Daily    oxybutynin  5 mg Oral Daily    pantoprazole  40 mg Oral BID AC    rOPINIRole  2.5 mg Oral Nightly    sucralfate  1 g Oral 4x Daily    topiramate  200 mg Oral BID     PRN Meds: sodium chloride flush, acetaminophen, glucose, dextrose, glucagon (rDNA), dextrose, metoclopramide, prochlorperazine    Intake/Output Summary (Last 24 hours) at 5/5/2019 1813  Last data filed at 5/5/2019 1312  Gross per 24 hour   Intake 2400 ml   Output --   Net 2400 ml     Exam:  BP (!) 145/62   Pulse 80   Temp 97.9 °F (36.6 °C)   Resp 16   Ht 5' 1\" (1.549 m)   Wt 150 lb (68 kg)   SpO2 100%   BMI 28.34 kg/m²   Head: Normocephalic, atraumatic  Sclera clear  Neck supple, nontender  Lungs: clear    Labs:   Recent Labs     05/03/19  1030 05/04/19  0529 05/05/19  0553   WBC 18.9* 9.3 9.5   HGB 17.2* 12.4 12.0   HCT 50.4* 36.2* 35.5*    135 134     Recent Labs 05/03/19  1030 05/04/19  0529 05/05/19  0553   * 134* 144   K 3.6 3.6 3.8   CL 87* 100 110*   CO2 25 21 24   BUN 35* 21* 10   CREATININE 1.30* 0.87 0.72   CALCIUM 8.9 7.7* 7.7*   AST 32 23 19   ALT 23 14 14   BILITOT 0.4 0.3 <0.2   ALKPHOS 87 55 50     Recent Labs     05/03/19  1030 05/04/19  0529 05/05/19  0553   INR 5.5* 5.2* 2.1     Recent Labs     05/03/19  1030   TROPONINI <0.010     Radiology:  CT ABDOMEN PELVIS WO CONTRAST Additional Contrast? Radiologist Recommendation   Final Result      Limited study as described. Cannot completely assess for acute pathology secondary to residual contrast medium. Hysterectomy. Otherwise, negative CT abdomen pelvis. All CT scans at this facility use dose modulation, iterative reconstruction, and/or weight based dosing when appropriate to reduce radiation dose to as low as reasonably achievable. CT HEAD WO CONTRAST   Final Result   SMALL REMOTE OCCIPITAL INFARCTS. NO NEW SUPERIMPOSED ABNORMALITY, COMPARED TO JANUARY 6, 2019. All CT scans at this facility use dose modulation, iterative reconstruction, and/or weight based dosing when appropriate to reduce radiation dose to as low as reasonably achievable. CTA Chest W WO  (PE study)   Final Result   1. NO EVIDENCE OF CENTRAL OR SEGMENTAL PULMONARY EMBOLISM. 2.  VISUALIZED PULMONARY PARENCHYMA IS UNREMARKABLE. All CT scans at this facility use dose modulation, iterative reconstruction, and/or weight based dosing when appropriate to reduce radiation dose to as low as reasonably achievable. XR CHEST PORTABLE   Final Result   NO ACUTE ACTIVE CARDIOPULMONARY PROCESS        Assessment/Plan:    dehydration resolved  Sugars likely to be ok with resumption of home regimen.      15 minutes total care time, >1/2 in unit/floor time and care coordination     Electronically signed by Gene Shaffer MD on 5/5/2019 at 6:13 PM

## 2019-05-05 NOTE — FLOWSHEET NOTE
Pt. Complained of sweatiness around 3 am. I checked her blood sugar and it was 47. I gave her apple juice, orange juice, and peanut butter and rechecked her at 3:30.  Her blood sugar had come up to 97

## 2019-05-05 NOTE — PROGRESS NOTES
Clinical Pharmacy Note    Warfarin consult follow-up    Recent Labs     05/05/19  0553   INR 2.1     Recent Labs     05/03/19  1030 05/04/19  0529 05/05/19  0553   HGB 17.2* 12.4 12.0   HCT 50.4* 36.2* 35.5*    135 134       Significant drug:drug interactions:  Patient continues on levothyroxine    Date INR Warfarin Dose   05/03/19 5.5 Hold - vit k   05/04/19 5.2 HOLD    05/05/19 2.1   7.5 mg                                     Notes:  Patient's INR is therapeutic today for goal 2-3. Warfarin was held 2 days d/t supra therapeutic INR. Will give home dose of warfarin 7.5 mg today. Daily PT/INR until stable within therapeutic range.

## 2019-05-08 LAB
BLOOD CULTURE, ROUTINE: NORMAL
CULTURE, BLOOD 2: NORMAL

## 2019-08-09 ENCOUNTER — APPOINTMENT (OUTPATIENT)
Dept: GENERAL RADIOLOGY | Age: 59
DRG: 872 | End: 2019-08-09
Attending: INTERNAL MEDICINE
Payer: MEDICARE

## 2019-08-09 ENCOUNTER — HOSPITAL ENCOUNTER (EMERGENCY)
Age: 59
Discharge: ANOTHER HOSPITAL | End: 2019-08-09
Attending: INTERNAL MEDICINE
Payer: MEDICARE

## 2019-08-09 ENCOUNTER — APPOINTMENT (OUTPATIENT)
Dept: GENERAL RADIOLOGY | Age: 59
End: 2019-08-09
Payer: MEDICARE

## 2019-08-09 ENCOUNTER — HOSPITAL ENCOUNTER (OUTPATIENT)
Age: 59
Setting detail: OBSERVATION
Discharge: HOME | DRG: 872 | End: 2019-08-14
Attending: INTERNAL MEDICINE | Admitting: INTERNAL MEDICINE
Payer: MEDICARE

## 2019-08-09 VITALS
TEMPERATURE: 98.2 F | SYSTOLIC BLOOD PRESSURE: 171 MMHG | BODY MASS INDEX: 26.68 KG/M2 | DIASTOLIC BLOOD PRESSURE: 95 MMHG | HEART RATE: 79 BPM | WEIGHT: 145 LBS | OXYGEN SATURATION: 97 % | RESPIRATION RATE: 20 BRPM | HEIGHT: 62 IN

## 2019-08-09 DIAGNOSIS — R07.9 CHEST PAIN, UNSPECIFIED TYPE: ICD-10-CM

## 2019-08-09 DIAGNOSIS — I10 ESSENTIAL HYPERTENSION: ICD-10-CM

## 2019-08-09 DIAGNOSIS — R73.9 HYPERGLYCEMIA: ICD-10-CM

## 2019-08-09 DIAGNOSIS — R19.7 NAUSEA VOMITING AND DIARRHEA: ICD-10-CM

## 2019-08-09 DIAGNOSIS — R11.2 NAUSEA VOMITING AND DIARRHEA: ICD-10-CM

## 2019-08-09 DIAGNOSIS — I50.9 CHRONIC CONGESTIVE HEART FAILURE, UNSPECIFIED HEART FAILURE TYPE (HCC): Primary | ICD-10-CM

## 2019-08-09 LAB
ALBUMIN SERPL-MCNC: 4.5 G/DL (ref 3.5–4.6)
ALP BLD-CCNC: 90 U/L (ref 40–130)
ALT SERPL-CCNC: 17 U/L (ref 0–33)
AMYLASE: 35 U/L (ref 22–93)
ANION GAP SERPL CALCULATED.3IONS-SCNC: 15 MEQ/L (ref 9–15)
AST SERPL-CCNC: 16 U/L (ref 0–35)
BACTERIA: NORMAL /HPF
BASOPHILS ABSOLUTE: 0.1 K/UL (ref 0–0.2)
BASOPHILS RELATIVE PERCENT: 0.4 %
BETA-HYDROXYBUTYRATE: 9.8 MG/DL (ref 0.2–2.8)
BILIRUB SERPL-MCNC: 0.3 MG/DL (ref 0.2–0.7)
BILIRUBIN URINE: NEGATIVE
BLOOD, URINE: ABNORMAL
BUN BLDV-MCNC: 17 MG/DL (ref 6–20)
CALCIUM SERPL-MCNC: 9.1 MG/DL (ref 8.5–9.9)
CHLORIDE BLD-SCNC: 98 MEQ/L (ref 95–107)
CHP ED QC CHECK: YES
CLARITY: ABNORMAL
CO2: 26 MEQ/L (ref 20–31)
COLOR: YELLOW
CREAT SERPL-MCNC: 0.89 MG/DL (ref 0.5–0.9)
D DIMER: <0.27 MG/L FEU (ref 0–0.5)
DIGOXIN LEVEL: 0.9 NG/ML (ref 0.8–2)
EKG ATRIAL RATE: 77 BPM
EKG ATRIAL RATE: 83 BPM
EKG P AXIS: 53 DEGREES
EKG P AXIS: 66 DEGREES
EKG P-R INTERVAL: 136 MS
EKG P-R INTERVAL: 148 MS
EKG Q-T INTERVAL: 360 MS
EKG Q-T INTERVAL: 408 MS
EKG QRS DURATION: 100 MS
EKG QRS DURATION: 84 MS
EKG QTC CALCULATION (BAZETT): 423 MS
EKG QTC CALCULATION (BAZETT): 461 MS
EKG R AXIS: 103 DEGREES
EKG R AXIS: 97 DEGREES
EKG T AXIS: 33 DEGREES
EKG T AXIS: 58 DEGREES
EKG VENTRICULAR RATE: 77 BPM
EKG VENTRICULAR RATE: 83 BPM
EOSINOPHILS ABSOLUTE: 0.1 K/UL (ref 0–0.7)
EOSINOPHILS RELATIVE PERCENT: 0.3 %
EPITHELIAL CELLS, UA: NORMAL /HPF
GFR AFRICAN AMERICAN: >60
GFR NON-AFRICAN AMERICAN: >60
GLOBULIN: 3.8 G/DL (ref 2.3–3.5)
GLUCOSE BLD-MCNC: 277 MG/DL
GLUCOSE BLD-MCNC: 277 MG/DL (ref 60–115)
GLUCOSE BLD-MCNC: 328 MG/DL (ref 70–99)
GLUCOSE BLD-MCNC: 334 MG/DL (ref 60–115)
GLUCOSE URINE: 500 MG/DL
HCT VFR BLD CALC: 44.3 % (ref 37–47)
HEMOGLOBIN: 14.9 G/DL (ref 12–16)
KETONES, URINE: 40 MG/DL
LACTIC ACID: 1.6 MMOL/L (ref 0.5–2.2)
LEUKOCYTE ESTERASE, URINE: NEGATIVE
LIPASE: 17 U/L (ref 12–95)
LYMPHOCYTES ABSOLUTE: 2.8 K/UL (ref 1–4.8)
LYMPHOCYTES RELATIVE PERCENT: 14.5 %
MCH RBC QN AUTO: 33 PG (ref 27–31.3)
MCHC RBC AUTO-ENTMCNC: 33.5 % (ref 33–37)
MCV RBC AUTO: 98.6 FL (ref 82–100)
MONOCYTES ABSOLUTE: 0.2 K/UL (ref 0.2–0.8)
MONOCYTES RELATIVE PERCENT: 1.1 %
NEUTROPHILS ABSOLUTE: 16.3 K/UL (ref 1.4–6.5)
NEUTROPHILS RELATIVE PERCENT: 83.7 %
NITRITE, URINE: NEGATIVE
PDW BLD-RTO: 13.8 % (ref 11.5–14.5)
PERFORMED ON: ABNORMAL
PERFORMED ON: ABNORMAL
PH UA: 8.5 (ref 5–9)
PLATELET # BLD: 214 K/UL (ref 130–400)
POTASSIUM SERPL-SCNC: 4 MEQ/L (ref 3.4–4.9)
PRO-BNP: 747 PG/ML
PROTEIN UA: NEGATIVE MG/DL
RBC # BLD: 4.5 M/UL (ref 4.2–5.4)
RBC UA: NORMAL /HPF (ref 0–2)
SODIUM BLD-SCNC: 139 MEQ/L (ref 135–144)
SPECIFIC GRAVITY UA: 1.01 (ref 1–1.03)
TOTAL PROTEIN: 8.3 G/DL (ref 6.3–8)
TROPONIN: 0.03 NG/ML (ref 0–0.01)
TROPONIN: <0.01 NG/ML (ref 0–0.01)
URINE REFLEX TO CULTURE: YES
UROBILINOGEN, URINE: 0.2 E.U./DL
WBC # BLD: 19.4 K/UL (ref 4.8–10.8)
WBC UA: NORMAL /HPF (ref 0–5)

## 2019-08-09 PROCEDURE — 83690 ASSAY OF LIPASE: CPT

## 2019-08-09 PROCEDURE — 2580000003 HC RX 258: Performed by: INTERNAL MEDICINE

## 2019-08-09 PROCEDURE — 82010 KETONE BODYS QUAN: CPT

## 2019-08-09 PROCEDURE — 6360000002 HC RX W HCPCS: Performed by: INTERNAL MEDICINE

## 2019-08-09 PROCEDURE — 93005 ELECTROCARDIOGRAM TRACING: CPT

## 2019-08-09 PROCEDURE — 82150 ASSAY OF AMYLASE: CPT

## 2019-08-09 PROCEDURE — 85025 COMPLETE CBC W/AUTO DIFF WBC: CPT

## 2019-08-09 PROCEDURE — G0378 HOSPITAL OBSERVATION PER HR: HCPCS

## 2019-08-09 PROCEDURE — 84484 ASSAY OF TROPONIN QUANT: CPT

## 2019-08-09 PROCEDURE — 96374 THER/PROPH/DIAG INJ IV PUSH: CPT

## 2019-08-09 PROCEDURE — G0379 DIRECT REFER HOSPITAL OBSERV: HCPCS

## 2019-08-09 PROCEDURE — 36415 COLL VENOUS BLD VENIPUNCTURE: CPT

## 2019-08-09 PROCEDURE — 87086 URINE CULTURE/COLONY COUNT: CPT

## 2019-08-09 PROCEDURE — 94640 AIRWAY INHALATION TREATMENT: CPT

## 2019-08-09 PROCEDURE — 83880 ASSAY OF NATRIURETIC PEPTIDE: CPT

## 2019-08-09 PROCEDURE — 71045 X-RAY EXAM CHEST 1 VIEW: CPT

## 2019-08-09 PROCEDURE — 83605 ASSAY OF LACTIC ACID: CPT

## 2019-08-09 PROCEDURE — 74018 RADEX ABDOMEN 1 VIEW: CPT

## 2019-08-09 PROCEDURE — 85379 FIBRIN DEGRADATION QUANT: CPT

## 2019-08-09 PROCEDURE — 80162 ASSAY OF DIGOXIN TOTAL: CPT

## 2019-08-09 PROCEDURE — 6370000000 HC RX 637 (ALT 250 FOR IP): Performed by: INTERNAL MEDICINE

## 2019-08-09 PROCEDURE — 80053 COMPREHEN METABOLIC PANEL: CPT

## 2019-08-09 PROCEDURE — 87040 BLOOD CULTURE FOR BACTERIA: CPT

## 2019-08-09 PROCEDURE — 99285 EMERGENCY DEPT VISIT HI MDM: CPT

## 2019-08-09 PROCEDURE — 81001 URINALYSIS AUTO W/SCOPE: CPT

## 2019-08-09 RX ORDER — SODIUM CHLORIDE 0.9 % (FLUSH) 0.9 %
3 SYRINGE (ML) INJECTION EVERY 8 HOURS
Status: DISCONTINUED | OUTPATIENT
Start: 2019-08-09 | End: 2019-08-09 | Stop reason: HOSPADM

## 2019-08-09 RX ORDER — WARFARIN SODIUM 5 MG/1
TABLET ORAL
COMMUNITY
Start: 2019-02-18

## 2019-08-09 RX ORDER — PRAMIPEXOLE DIHYDROCHLORIDE 0.5 MG/1
TABLET ORAL
COMMUNITY

## 2019-08-09 RX ORDER — WARFARIN SODIUM 1 MG/1
12.5 TABLET ORAL ONCE
Status: COMPLETED | OUTPATIENT
Start: 2019-08-09 | End: 2019-08-10

## 2019-08-09 RX ORDER — DULOXETIN HYDROCHLORIDE 60 MG/1
60 CAPSULE, DELAYED RELEASE ORAL DAILY
Status: DISCONTINUED | OUTPATIENT
Start: 2019-08-09 | End: 2019-08-14 | Stop reason: HOSPADM

## 2019-08-09 RX ORDER — 0.9 % SODIUM CHLORIDE 0.9 %
1000 INTRAVENOUS SOLUTION INTRAVENOUS ONCE
Status: COMPLETED | OUTPATIENT
Start: 2019-08-09 | End: 2019-08-09

## 2019-08-09 RX ORDER — DIGOXIN 125 MCG
125 TABLET ORAL DAILY
Status: DISCONTINUED | OUTPATIENT
Start: 2019-08-09 | End: 2019-08-14 | Stop reason: HOSPADM

## 2019-08-09 RX ORDER — CARVEDILOL 12.5 MG/1
12.5 TABLET ORAL 2 TIMES DAILY
Status: DISCONTINUED | OUTPATIENT
Start: 2019-08-09 | End: 2019-08-14 | Stop reason: HOSPADM

## 2019-08-09 RX ORDER — ASPIRIN 81 MG/1
324 TABLET, CHEWABLE ORAL ONCE
Status: COMPLETED | OUTPATIENT
Start: 2019-08-09 | End: 2019-08-09

## 2019-08-09 RX ORDER — METOCLOPRAMIDE 5 MG/1
5 TABLET ORAL
COMMUNITY
Start: 2019-05-13

## 2019-08-09 RX ORDER — SODIUM CHLORIDE 0.9 % (FLUSH) 0.9 %
10 SYRINGE (ML) INJECTION PRN
Status: DISCONTINUED | OUTPATIENT
Start: 2019-08-09 | End: 2019-08-14 | Stop reason: HOSPADM

## 2019-08-09 RX ORDER — VALSARTAN 40 MG/1
40 TABLET ORAL DAILY
Status: DISCONTINUED | OUTPATIENT
Start: 2019-08-09 | End: 2019-08-14 | Stop reason: HOSPADM

## 2019-08-09 RX ORDER — LUBIPROSTONE 24 UG/1
24 CAPSULE, GELATIN COATED ORAL
COMMUNITY
Start: 2019-06-12

## 2019-08-09 RX ORDER — NITROGLYCERIN 0.4 MG/1
0.4 TABLET SUBLINGUAL ONCE
Status: COMPLETED | OUTPATIENT
Start: 2019-08-09 | End: 2019-08-09

## 2019-08-09 RX ORDER — ONDANSETRON 2 MG/ML
4 INJECTION INTRAMUSCULAR; INTRAVENOUS EVERY 6 HOURS PRN
Status: DISCONTINUED | OUTPATIENT
Start: 2019-08-09 | End: 2019-08-14 | Stop reason: HOSPADM

## 2019-08-09 RX ORDER — WARFARIN SODIUM 7.5 MG/1
TABLET ORAL
COMMUNITY
Start: 2019-01-11

## 2019-08-09 RX ORDER — OXYBUTYNIN CHLORIDE 5 MG/1
5 TABLET ORAL DAILY
Status: DISCONTINUED | OUTPATIENT
Start: 2019-08-09 | End: 2019-08-14 | Stop reason: HOSPADM

## 2019-08-09 RX ORDER — INSULIN GLARGINE 100 [IU]/ML
27 INJECTION, SOLUTION SUBCUTANEOUS NIGHTLY
Status: DISCONTINUED | OUTPATIENT
Start: 2019-08-09 | End: 2019-08-14 | Stop reason: HOSPADM

## 2019-08-09 RX ORDER — FERROUS SULFATE 325(65) MG
325 TABLET ORAL 2 TIMES DAILY
Status: DISCONTINUED | OUTPATIENT
Start: 2019-08-09 | End: 2019-08-14 | Stop reason: HOSPADM

## 2019-08-09 RX ORDER — IPRATROPIUM BROMIDE AND ALBUTEROL SULFATE 2.5; .5 MG/3ML; MG/3ML
1 SOLUTION RESPIRATORY (INHALATION) ONCE
Status: COMPLETED | OUTPATIENT
Start: 2019-08-09 | End: 2019-08-09

## 2019-08-09 RX ORDER — PANTOPRAZOLE SODIUM 40 MG/1
40 TABLET, DELAYED RELEASE ORAL
Status: DISCONTINUED | OUTPATIENT
Start: 2019-08-10 | End: 2019-08-14 | Stop reason: HOSPADM

## 2019-08-09 RX ORDER — FERROUS SULFATE 325(65) MG
325 TABLET ORAL 2 TIMES DAILY
COMMUNITY
Start: 2019-07-16

## 2019-08-09 RX ORDER — ONDANSETRON 2 MG/ML
4 INJECTION INTRAMUSCULAR; INTRAVENOUS ONCE
Status: COMPLETED | OUTPATIENT
Start: 2019-08-09 | End: 2019-08-09

## 2019-08-09 RX ORDER — CLONAZEPAM 2 MG/1
TABLET ORAL
COMMUNITY
Start: 2019-07-16 | End: 2019-09-16

## 2019-08-09 RX ORDER — DEXTROSE MONOHYDRATE 50 MG/ML
100 INJECTION, SOLUTION INTRAVENOUS PRN
Status: DISCONTINUED | OUTPATIENT
Start: 2019-08-09 | End: 2019-08-14 | Stop reason: HOSPADM

## 2019-08-09 RX ORDER — BUPROPION HYDROCHLORIDE 200 MG/1
200 TABLET, EXTENDED RELEASE ORAL DAILY
COMMUNITY
Start: 2019-06-13

## 2019-08-09 RX ORDER — ROPINIROLE 2 MG/1
2 TABLET, FILM COATED ORAL NIGHTLY
Status: DISCONTINUED | OUTPATIENT
Start: 2019-08-09 | End: 2019-08-14 | Stop reason: HOSPADM

## 2019-08-09 RX ORDER — NITROGLYCERIN 0.4 MG/1
0.4 TABLET SUBLINGUAL
COMMUNITY
Start: 2013-11-27

## 2019-08-09 RX ORDER — SODIUM CHLORIDE 0.9 % (FLUSH) 0.9 %
10 SYRINGE (ML) INJECTION EVERY 12 HOURS SCHEDULED
Status: DISCONTINUED | OUTPATIENT
Start: 2019-08-09 | End: 2019-08-14 | Stop reason: HOSPADM

## 2019-08-09 RX ORDER — FUROSEMIDE 10 MG/ML
40 INJECTION INTRAMUSCULAR; INTRAVENOUS ONCE
Status: COMPLETED | OUTPATIENT
Start: 2019-08-09 | End: 2019-08-09

## 2019-08-09 RX ORDER — DEXTROSE MONOHYDRATE 25 G/50ML
12.5 INJECTION, SOLUTION INTRAVENOUS PRN
Status: DISCONTINUED | OUTPATIENT
Start: 2019-08-09 | End: 2019-08-14 | Stop reason: HOSPADM

## 2019-08-09 RX ORDER — LEVOTHYROXINE SODIUM 88 UG/1
88 TABLET ORAL DAILY
Status: DISCONTINUED | OUTPATIENT
Start: 2019-08-10 | End: 2019-08-14 | Stop reason: HOSPADM

## 2019-08-09 RX ORDER — AMPICILLIN TRIHYDRATE 250 MG
500 CAPSULE ORAL DAILY
Status: DISCONTINUED | OUTPATIENT
Start: 2019-08-09 | End: 2019-08-14 | Stop reason: HOSPADM

## 2019-08-09 RX ORDER — BUPROPION HYDROCHLORIDE 100 MG/1
200 TABLET, EXTENDED RELEASE ORAL DAILY
Status: DISCONTINUED | OUTPATIENT
Start: 2019-08-09 | End: 2019-08-14 | Stop reason: HOSPADM

## 2019-08-09 RX ORDER — ATORVASTATIN CALCIUM 20 MG/1
20 TABLET, FILM COATED ORAL DAILY
Status: DISCONTINUED | OUTPATIENT
Start: 2019-08-09 | End: 2019-08-14 | Stop reason: HOSPADM

## 2019-08-09 RX ORDER — NICOTINE POLACRILEX 4 MG
15 LOZENGE BUCCAL PRN
Status: DISCONTINUED | OUTPATIENT
Start: 2019-08-09 | End: 2019-08-14 | Stop reason: HOSPADM

## 2019-08-09 RX ORDER — LEVOTHYROXINE SODIUM 88 UG/1
TABLET ORAL
COMMUNITY
Start: 2018-11-13

## 2019-08-09 RX ORDER — ASPIRIN 81 MG/1
81 TABLET ORAL DAILY
Status: DISCONTINUED | OUTPATIENT
Start: 2019-08-09 | End: 2019-08-14 | Stop reason: HOSPADM

## 2019-08-09 RX ADMIN — ONDANSETRON 4 MG: 2 INJECTION INTRAMUSCULAR; INTRAVENOUS at 10:51

## 2019-08-09 RX ADMIN — VALSARTAN 40 MG: 40 TABLET, FILM COATED ORAL at 22:17

## 2019-08-09 RX ADMIN — ROPINIROLE HYDROCHLORIDE 2 MG: 2 TABLET, FILM COATED ORAL at 22:20

## 2019-08-09 RX ADMIN — NITROGLYCERIN 0.4 MG: 0.4 TABLET SUBLINGUAL at 12:42

## 2019-08-09 RX ADMIN — NITROGLYCERIN 0.4 MG: 0.4 TABLET SUBLINGUAL at 12:24

## 2019-08-09 RX ADMIN — Medication 10 ML: at 20:31

## 2019-08-09 RX ADMIN — BUPROPION HYDROCHLORIDE 200 MG: 100 TABLET, EXTENDED RELEASE ORAL at 22:18

## 2019-08-09 RX ADMIN — ONDANSETRON 4 MG: 2 INJECTION INTRAMUSCULAR; INTRAVENOUS at 22:40

## 2019-08-09 RX ADMIN — ASPIRIN 81 MG: 81 TABLET ORAL at 20:05

## 2019-08-09 RX ADMIN — IPRATROPIUM BROMIDE AND ALBUTEROL SULFATE 1 AMPULE: .5; 3 SOLUTION RESPIRATORY (INHALATION) at 12:07

## 2019-08-09 RX ADMIN — FUROSEMIDE 40 MG: 10 INJECTION, SOLUTION INTRAMUSCULAR; INTRAVENOUS at 12:04

## 2019-08-09 RX ADMIN — TOPIRAMATE 200 MG: 200 TABLET, FILM COATED ORAL at 22:17

## 2019-08-09 RX ADMIN — OXYBUTYNIN CHLORIDE 5 MG: 5 TABLET ORAL at 22:17

## 2019-08-09 RX ADMIN — ASPIRIN 81 MG 324 MG: 81 TABLET ORAL at 12:01

## 2019-08-09 RX ADMIN — CARVEDILOL 12.5 MG: 12.5 TABLET, FILM COATED ORAL at 20:28

## 2019-08-09 RX ADMIN — ATORVASTATIN CALCIUM 20 MG: 20 TABLET, FILM COATED ORAL at 22:18

## 2019-08-09 RX ADMIN — SODIUM CHLORIDE 1000 ML: 9 INJECTION, SOLUTION INTRAVENOUS at 10:51

## 2019-08-09 RX ADMIN — Medication 3 ML: at 11:03

## 2019-08-09 RX ADMIN — FERROUS SULFATE TAB 325 MG (65 MG ELEMENTAL FE) 325 MG: 325 (65 FE) TAB at 22:17

## 2019-08-09 RX ADMIN — ENOXAPARIN SODIUM 40 MG: 40 INJECTION SUBCUTANEOUS at 22:16

## 2019-08-09 RX ADMIN — DULOXETINE HYDROCHLORIDE 60 MG: 60 CAPSULE, DELAYED RELEASE ORAL at 22:19

## 2019-08-09 RX ADMIN — INSULIN GLARGINE 27 UNITS: 100 INJECTION, SOLUTION SUBCUTANEOUS at 22:20

## 2019-08-09 RX ADMIN — DIGOXIN 125 MCG: 125 TABLET ORAL at 20:27

## 2019-08-09 ASSESSMENT — PAIN SCALES - GENERAL
PAINLEVEL_OUTOF10: 0
PAINLEVEL_OUTOF10: 2

## 2019-08-09 ASSESSMENT — PAIN DESCRIPTION - LOCATION: LOCATION: CHEST

## 2019-08-09 ASSESSMENT — PAIN DESCRIPTION - PAIN TYPE: TYPE: ACUTE PAIN

## 2019-08-09 NOTE — ED TRIAGE NOTES
Patient presents to ED with c/o N/V, and Diarrhea thT STARTED THIS MORNING. pATIENT REPORTS TAKING A NATURAL SUPPLEMENT FOR THE FIRST TIME THAT MAY HAVE UPSET STOMACH BUT NOT FOR SURE.  DENIES BEING AROUND ANYONE WITH SIMILAR SYMPTOMS

## 2019-08-09 NOTE — H&P
glargine (LANTUS) 100 UNIT/ML injection vial Inject 27 Units into the skin nightly    Yes Historical Provider, MD   rOPINIRole (REQUIP) 2 MG tablet Take 2 mg by mouth nightly    Yes Historical Provider, MD       Allergies:  Patient has no known allergies. Social History:   TOBACCO:   reports that she has never smoked. She has never used smokeless tobacco.  ETOH:   reports that she drank alcohol. Family History:   No family history on file. REVIEW OF SYSTEMS:  Ten systems reviewed and negative except for stated in HPI    Physical Exam:    Vitals: BP (!) 188/109   Pulse 106   Temp 98 °F (36.7 °C) (Oral)   Resp 20   Ht 5' 1.5\" (1.562 m)   Wt 137 lb 9.6 oz (62.4 kg)   LMP  (LMP Unknown)   BMI 25.58 kg/m²   General appearance: alert, appears stated age and cooperative  Skin: Skin color, texture, turgor normal. No rashes or lesions  HEENT: Head: Normocephalic, no lesions, without obvious abnormality. . No dental issues   Neck: no JVD  Lungs: clear to auscultation bilaterally  Heart: regular rate and rhythm, S1, S2 normal, no murmur, click, rub or gallop  Abdomen: soft, non-tender; bowel sounds normal; no masses,  no organomegaly  Extremities: extremities normal, atraumatic, no cyanosis or edema  Neurologic: Mental status: Alert, oriented, thought content appropriate. CN 2-12 intact     Recent Labs     08/09/19  1049   WBC 19.4*   HGB 14.9        Recent Labs     08/09/19  1027 08/09/19  1049   NA  --  139   K  --  4.0   CL  --  98   CO2  --  26   BUN  --  17   CREATININE  --  0.89   GLUCOSE 277 328*   AST  --  16   ALT  --  17   BILITOT  --  0.3   ALKPHOS  --  90     Troponin T:   Recent Labs     08/09/19  1049   TROPONINI <0.010       ABGs: No results found for: PHART, PO2ART, HMW6EPM  INR: No results for input(s): INR in the last 72 hours.   URINALYSIS:  Recent Labs     08/09/19  1104   COLORU Yellow   PHUR 8.5   WBCUA 0-2   RBCUA 0-2   BACTERIA Few   CLARITYU SLCLOUDY   SPECGRAV 1.015

## 2019-08-09 NOTE — ED PROVIDER NOTES
[facility] ED  EMERGENCY DEPARTMENT ENCOUNTER      Pt Name: Guanakito Jung  MRN: [id number]  [place] 1960  Date of evaluation: 8/9/2019  Provider: Gerald Wood MD    [address] [place] [address]       Chief Complaint   Patient presents with    Nausea     started this morning    Emesis     started this morning    Diarrhea     started this morning         HISTORY OF PRESENT ILLNESS   (Location/Symptom, Timing/Onset, Context/Setting, Quality, Duration, Modifying Factors, Severity)  Note limiting factors. Guanakito Jung is a 63 y.o. female who has a history of diabetes presents to the emergency department for evaluation and management of vomiting and diarrhea which started this morning. She denies any sick contacts. She does not recall if she took her insulin this morning. She says that she is having some mild nonspecific chest discomfort as well. Has not seen any other providers for this. HPI    Nursing Notes were reviewed. REVIEW OF SYSTEMS    (2-9 systems for level 4, 10 or more for level 5)       REVIEW OF SYSTEMS    Constitutional: Negative for fatigue and fever. Respiratory: Negative for cough, chest tightness and shortness of breath. Cardiovascular: Positive for mild nonradiating chest pain, negative for palpitations and leg swelling. Gastrointestinal: Positive for vomiting, diarrhea negative for abdominal distention, abdominal pain   Endocrine: Negative for cold intolerance, heat intolerance, polydipsia, polyphagia and polyuria. Genitourinary: Negative for difficulty urinating, dysuria, hematuria and urgency. Musculoskeletal: Negative for arthralgias, back pain and neck pain. Skin: Negative for color change, pallor, rash and wound. Allergic/Immunologic: Negative for environmental allergiesand food allergies. Neurological: Negative for dizziness, speech difficulty, weakness, numbness and headaches. All other systems reviewed and are negative.     Except as noted above temo Other Topics Concern    None   Social History Narrative    None       SCREENINGS    [place] Coma Scale  Eye Opening: Spontaneous  Best Verbal Response: Oriented  Best Motor Response: Obeys commands  [place] Coma Scale Score: 15        PHYSICAL EXAM    (up to 7 for level 4, 8 or more for level 5)     ED Triage Vitals   BP Temp Temp src Pulse Resp SpO2 Height Weight   -- -- -- -- -- -- -- --       Physical Exam  Physical Exam   Constitutional:  Appears unwell but well-developed and well-nourished. No distress noted. Non toxic in appearance. HENT:     Mouth/Throat: Oropharynx is clear and mucosa moist. No oropharyngeal exudate noted. Posterior pharynx is pink and noninjected. Eyes: Conjunctivae and EOM are normal. Pupils are equal,round, and reactive to light. No scleral icterus. Neck: Normal range of motion. Neck supple. No tracheal deviation present. Cardiovascular: Normal rate, regular rhythm, normal heartsounds and intact distal pulses. Exam reveals no gallop or friction rub. No murmur heard. Pulmonary/Chest: Effort normal and breath sounds are symmetric and normal. No respiratory distress. There are no wheezes, rales or rhonchi. No tenderness is exhibited upon palpation of the chest wall. Abdominal: Soft. Bowel sounds are normal. No distension or no mass exhibitted. There is no tenderness, rebound, rigidity or guarding. Genitourinary:   No CVA tenderness noted on examination. Musculoskeletal: Normal range of motion. No edema, tenderness or deformity. Lymphadenopathy:  No cervical adenopathy. Neurological:   alert and oriented to person, place, and time. Normal speech, normal comprehension, normal cognition,  Reflexes are normal.  There are no cranial nerve deficits. Normal muscle tone, motor and sensory function exhibitted. Normal motor tone . Coordination and gait normal.   Skin: Skin is warm and dry. No rash noted. No diaphoresis. No erythema. No pallor.    Psychiatric: Flat affect but start transfer of this patient for CHF, chest pain, nausea, vomiting, diarrhea, hypertension. [MS]   [facility] He accepted the patient to abs telemetry on behalf of Dr. Diaz Miles.    [MS]      ED Course User Index  [MS] Stefany Gonzalez MD         ED Medicationsadministered this visit:    Medications   sodium chloride flush 0.9 % injection 3 mL (3 mLs Intravenous Given 8/9/19 1103)   0.9 % sodium chloride bolus (0 mLs Intravenous Stopped 8/9/19 1215)   ondansetron (ZOFRAN) injection 4 mg (4 mg Intravenous Given 8/9/19 1051)   ipratropium-albuterol (DUONEB) nebulizer solution 1 ampule (1 ampule Inhalation Given 8/9/19 1207)   aspirin chewable tablet 324 mg (324 mg Oral Given 8/9/19 1201)   furosemide (LASIX) injection 40 mg (40 mg Intravenous Given 8/9/19 1204)   nitroGLYCERIN (NITROSTAT) SL tablet 0.4 mg (0.4 mg Sublingual Given 8/9/19 1242)       New Prescriptions from this visit:    Discharge Medication List as of 8/9/2019  3:00 PM          Follow-up:  No follow-up provider specified. Final Impression:   1. Chronic congestive heart failure, unspecified heart failure type ([address]    2. Nausea vomiting and diarrhea    3. Hyperglycemia    4. Essential hypertension               (Please note that portions of this note werecompleted with a voice recognition program.  Efforts were made to edit the dictations but occasionally words are mis-transcribed.)    FINAL IMPRESSION      1. Chronic congestive heart failure, unspecified heart failure type ([address]    2. Nausea vomiting and diarrhea    3. Hyperglycemia    4. Essential hypertension          DISPOSITION/PLAN   DISPOSITION        PATIENT REFERRED TO:  No follow-up provider specified.     DISCHARGE MEDICATIONS:  Discharge Medication List as of 8/9/2019  3:00 PM             (Please note that portions of this note were completed with a voice recognition program.  Efforts were made to edit the dictations but occasionally words are mis-transcribed.)    Chris Moore MD (electronically signed)  Attending Emergency Physician            Chris Moore MD  08/09/19 2886

## 2019-08-09 NOTE — ED NOTES
At patient bedside with MD to see how Nitro worked and patient states she had 4/10 chest pain. She failed to report previous 9/10 chest discomfort earlier to both myself and MD. Currently resting in bed. Monitor in place. Will cont.  To monitor     Katty Robles RN  08/09/19 4146

## 2019-08-09 NOTE — ED NOTES
Patient up to bathroom with . When she returned she reported increased shortness of breathe. IVF slowed to [facility] and supplemental O2 applied at 2L. MD notified and repeat EKG ordered and resp tx given. IVF stopped and chest x-ray obtained.  remains at bedside. Will cont.  To monitor     Chantal Hutchison RN  08/09/19 6027

## 2019-08-09 NOTE — PROGRESS NOTES
20 [facility] sent to Dr. Ashley Hernandez for arrival of pt.      1600  Dr. Ashley Hernandez down to see pt. Spoke with pt and spouse.     1735  Message sent to Dr. Ashley Hernandez re: admission orders and informed of pts BP and Blood sugar

## 2019-08-10 ENCOUNTER — APPOINTMENT (OUTPATIENT)
Dept: CT IMAGING | Age: 59
DRG: 872 | End: 2019-08-10
Attending: INTERNAL MEDICINE
Payer: MEDICARE

## 2019-08-10 ENCOUNTER — APPOINTMENT (OUTPATIENT)
Dept: ULTRASOUND IMAGING | Age: 59
DRG: 872 | End: 2019-08-10
Attending: INTERNAL MEDICINE
Payer: MEDICARE

## 2019-08-10 LAB
ANION GAP SERPL CALCULATED.3IONS-SCNC: 23 MEQ/L (ref 9–15)
BUN BLDV-MCNC: 25 MG/DL (ref 6–20)
CALCIUM SERPL-MCNC: 9.1 MG/DL (ref 8.5–9.9)
CHLORIDE BLD-SCNC: 96 MEQ/L (ref 95–107)
CO2: 19 MEQ/L (ref 20–31)
CREAT SERPL-MCNC: 1.08 MG/DL (ref 0.5–0.9)
GFR AFRICAN AMERICAN: >60
GFR NON-AFRICAN AMERICAN: 52
GLUCOSE BLD-MCNC: 311 MG/DL (ref 60–115)
GLUCOSE BLD-MCNC: 314 MG/DL (ref 70–99)
HCT VFR BLD CALC: 47.6 % (ref 37–47)
HEMOGLOBIN: 16.3 G/DL (ref 12–16)
INR BLD: 1.8
MCH RBC QN AUTO: 33.7 PG (ref 27–31.3)
MCHC RBC AUTO-ENTMCNC: 34.1 % (ref 33–37)
MCV RBC AUTO: 98.9 FL (ref 82–100)
PDW BLD-RTO: 13.9 % (ref 11.5–14.5)
PERFORMED ON: ABNORMAL
PLATELET # BLD: 279 K/UL (ref 130–400)
POTASSIUM SERPL-SCNC: 3.7 MEQ/L (ref 3.4–4.9)
PROTHROMBIN TIME: 21.2 SEC (ref 12.3–14.9)
RBC # BLD: 4.82 M/UL (ref 4.2–5.4)
SODIUM BLD-SCNC: 138 MEQ/L (ref 135–144)
TROPONIN: 0.09 NG/ML (ref 0–0.01)
WBC # BLD: 27.4 K/UL (ref 4.8–10.8)

## 2019-08-10 PROCEDURE — 6360000002 HC RX W HCPCS: Performed by: INTERNAL MEDICINE

## 2019-08-10 PROCEDURE — 74177 CT ABD & PELVIS W/CONTRAST: CPT

## 2019-08-10 PROCEDURE — G0378 HOSPITAL OBSERVATION PER HR: HCPCS

## 2019-08-10 PROCEDURE — 6370000000 HC RX 637 (ALT 250 FOR IP): Performed by: INTERNAL MEDICINE

## 2019-08-10 PROCEDURE — 2060000000 HC ICU INTERMEDIATE R&B

## 2019-08-10 PROCEDURE — 80048 BASIC METABOLIC PNL TOTAL CA: CPT

## 2019-08-10 PROCEDURE — 36415 COLL VENOUS BLD VENIPUNCTURE: CPT

## 2019-08-10 PROCEDURE — 85610 PROTHROMBIN TIME: CPT

## 2019-08-10 PROCEDURE — 84484 ASSAY OF TROPONIN QUANT: CPT

## 2019-08-10 PROCEDURE — 6360000004 HC RX CONTRAST MEDICATION: Performed by: INTERNAL MEDICINE

## 2019-08-10 PROCEDURE — 85027 COMPLETE CBC AUTOMATED: CPT

## 2019-08-10 PROCEDURE — 2580000003 HC RX 258: Performed by: INTERNAL MEDICINE

## 2019-08-10 PROCEDURE — 76705 ECHO EXAM OF ABDOMEN: CPT

## 2019-08-10 PROCEDURE — 2700000000 HC OXYGEN THERAPY PER DAY

## 2019-08-10 RX ORDER — SODIUM CHLORIDE 9 MG/ML
INJECTION, SOLUTION INTRAVENOUS CONTINUOUS
Status: DISCONTINUED | OUTPATIENT
Start: 2019-08-10 | End: 2019-08-11

## 2019-08-10 RX ORDER — 0.9 % SODIUM CHLORIDE 0.9 %
500 INTRAVENOUS SOLUTION INTRAVENOUS ONCE
Status: COMPLETED | OUTPATIENT
Start: 2019-08-10 | End: 2019-08-10

## 2019-08-10 RX ADMIN — BUPROPION HYDROCHLORIDE 200 MG: 100 TABLET, EXTENDED RELEASE ORAL at 21:00

## 2019-08-10 RX ADMIN — Medication 10 ML: at 08:45

## 2019-08-10 RX ADMIN — PANTOPRAZOLE SODIUM 40 MG: 40 TABLET, DELAYED RELEASE ORAL at 08:45

## 2019-08-10 RX ADMIN — SODIUM CHLORIDE: 9 INJECTION, SOLUTION INTRAVENOUS at 16:04

## 2019-08-10 RX ADMIN — ONDANSETRON 4 MG: 2 INJECTION INTRAMUSCULAR; INTRAVENOUS at 06:58

## 2019-08-10 RX ADMIN — ROPINIROLE HYDROCHLORIDE 2 MG: 2 TABLET, FILM COATED ORAL at 21:00

## 2019-08-10 RX ADMIN — LEVOTHYROXINE SODIUM 88 MCG: 88 TABLET ORAL at 08:45

## 2019-08-10 RX ADMIN — ENOXAPARIN SODIUM 40 MG: 40 INJECTION SUBCUTANEOUS at 08:45

## 2019-08-10 RX ADMIN — CARVEDILOL 12.5 MG: 12.5 TABLET, FILM COATED ORAL at 21:00

## 2019-08-10 RX ADMIN — WARFARIN SODIUM 12.5 MG: 2.5 TABLET ORAL at 02:45

## 2019-08-10 RX ADMIN — Medication 10 ML: at 07:01

## 2019-08-10 RX ADMIN — IOPAMIDOL 100 ML: 612 INJECTION, SOLUTION INTRAVENOUS at 15:10

## 2019-08-10 RX ADMIN — ATORVASTATIN CALCIUM 20 MG: 20 TABLET, FILM COATED ORAL at 21:00

## 2019-08-10 RX ADMIN — DIGOXIN 125 MCG: 125 TABLET ORAL at 08:45

## 2019-08-10 RX ADMIN — ASPIRIN 81 MG: 81 TABLET ORAL at 08:45

## 2019-08-10 RX ADMIN — WARFARIN SODIUM 12.5 MG: 2.5 TABLET ORAL at 21:57

## 2019-08-10 RX ADMIN — ONDANSETRON 4 MG: 2 INJECTION INTRAMUSCULAR; INTRAVENOUS at 14:10

## 2019-08-10 RX ADMIN — Medication 10 ML: at 21:00

## 2019-08-10 RX ADMIN — FERROUS SULFATE TAB 325 MG (65 MG ELEMENTAL FE) 325 MG: 325 (65 FE) TAB at 08:45

## 2019-08-10 RX ADMIN — INSULIN LISPRO 8 UNITS: 100 INJECTION, SOLUTION INTRAVENOUS; SUBCUTANEOUS at 16:51

## 2019-08-10 RX ADMIN — OXYBUTYNIN CHLORIDE 5 MG: 5 TABLET ORAL at 08:45

## 2019-08-10 RX ADMIN — INSULIN GLARGINE 27 UNITS: 100 INJECTION, SOLUTION SUBCUTANEOUS at 22:01

## 2019-08-10 RX ADMIN — FERROUS SULFATE TAB 325 MG (65 MG ELEMENTAL FE) 325 MG: 325 (65 FE) TAB at 16:31

## 2019-08-10 RX ADMIN — SODIUM CHLORIDE 500 ML: 9 INJECTION, SOLUTION INTRAVENOUS at 13:48

## 2019-08-10 RX ADMIN — TOPIRAMATE 200 MG: 200 TABLET, FILM COATED ORAL at 08:45

## 2019-08-10 RX ADMIN — PANTOPRAZOLE SODIUM 40 MG: 40 TABLET, DELAYED RELEASE ORAL at 16:31

## 2019-08-10 RX ADMIN — VALSARTAN 40 MG: 40 TABLET, FILM COATED ORAL at 08:45

## 2019-08-10 RX ADMIN — DULOXETINE HYDROCHLORIDE 60 MG: 60 CAPSULE, DELAYED RELEASE ORAL at 08:45

## 2019-08-10 RX ADMIN — TOPIRAMATE 200 MG: 200 TABLET, FILM COATED ORAL at 21:00

## 2019-08-10 RX ADMIN — CARVEDILOL 12.5 MG: 12.5 TABLET, FILM COATED ORAL at 08:45

## 2019-08-10 ASSESSMENT — PAIN SCALES - GENERAL
PAINLEVEL_OUTOF10: 0

## 2019-08-10 NOTE — CONSULTS
08/09/19  2033   TROPONINI <0.010 0.025*       Hepatic Function Panel:  Recent Labs     08/09/19  1049   ALKPHOS 90   ALT 17   AST 16   PROT 8.3*   BILITOT 0.3   LABALBU 4.5       Pro-BNP:  Lab Results   Component Value Date    PROBNP 747 08/09/2019    PROBNP 20,514 01/06/2019       INR:  Recent Labs     08/10/19  0555   PROTIME 21.2*   INR 1.8       TSH:  Lab Results   Component Value Date    TSH 4.060 05/03/2019       Lipid Profile:  Lab Results   Component Value Date    TRIG 390 01/06/2019    HDL 35 01/06/2019    LDLCALC 182 01/06/2019       HgbA1C:  Lab Results   Component Value Date    LABA1C 8.5 05/03/2019       Lactate Level:  Recent Labs     08/09/19  1049   LACTA 1.6       CMP:  Recent Labs     08/09/19  1027 08/09/19  1049 08/10/19  0554   NA  --  139 138   K  --  4.0 3.7   CL  --  98 96   CO2  --  26 19*   BUN  --  17 25*   CREATININE  --  0.89 1.08*   GLUCOSE 277 328* 314*   CALCIUM  --  9.1 9.1   PROT  --  8.3*  --    LABALBU  --  4.5  --    BILITOT  --  0.3  --    ALKPHOS  --  90  --    AST  --  16  --    ALT  --  17  --        Amylase:  Recent Labs     08/09/19  1049   AMYLASE 35       Lipase:  Recent Labs     08/09/19  1049   LIPASE 17         Radiology:   XR ABDOMEN (KUB) (SINGLE AP VIEW)    (Results Pending)       Result Date: 8/9/2019  EXAMINATION: XR CHEST PORTABLE CLINICAL HISTORY:  shortness of breath COMPARISONS: 5/3/2019 FINDINGS: Cardiac size is borderline. Pulmonary vascularity is prominent. There is interstitial coarsening with \"Kerley-B lines,\" consistent with interstitial edema. There is no appreciable pleural fluid. There is left-sided ICD. There is no pneumothorax. There is no evidence of adenopathy. The bones are unremarkable. INTERSTITIAL EDEMA CONSISTENT WITH CONGESTIVE HEART FAILURE. Impression:   Active Problems:    Nausea & vomiting    Chronic congestive heart failure ([address]  Resolved Problems:    * No resolved hospital problems. *    1.  Severe nausea and vomiting along with bouts of diarrhea. Low grade fever and elevated WBC. Most suggestive of acute gastroenteritis. 2.  Prolonged episode of chest pressure yesterday in the setting of nausea, diaphoresis, and diarrhea. Borderline elevated troponin level. No acute ST changes. Reports similar symptoms when she had an MI years ago. 3. ASHD with remote infarct. Remote percutaneous  coronary intervention. Ischemic cardiomyopathy. Ejection fraction  of 25% in November 2012, [employer] IIC heart failure. Myocardial perfusion study in June showing anterior MI without ischemia. LVEF 36%. 4. Status post single-chamber ICD [employer] [id number] STVR ICD. Normal  device and lead function. 5. Previous episodes of hypotension, near syncope, and syncope. 6. Hypertension, benign, chronic, currently controlled. 7. Hypothyroidism, on replacement therapy. 8. Diabetes mellitus. 9. History of cerebrovascular accident with no neurologic sequelae. 10. History of severe cavernous sinus thrombosis, on chronic  warfarin therapy. 11. Medical marijuana usage. Recommendations:    Monitor on telemetry. Check serial enzymes/EKG's. GI evaluation. Eventual repeat cardiac catheterization in light of prolonged anginal type symptoms yesterday. Patient prefers this to repeat stress testing that was done in June 2019. Further recommendations to follow. Thank you for the opportunity to participate in the care of your patient. Do not hesitate to call if you have any questions.     Electronically signed by Virginia Lynch MD, [facility] on 8/10/2019 at 8:26 AM

## 2019-08-11 LAB
ANION GAP SERPL CALCULATED.3IONS-SCNC: 15 MEQ/L (ref 9–15)
BUN BLDV-MCNC: 28 MG/DL (ref 6–20)
CALCIUM SERPL-MCNC: 8 MG/DL (ref 8.5–9.9)
CHLORIDE BLD-SCNC: 98 MEQ/L (ref 95–107)
CO2: 24 MEQ/L (ref 20–31)
CREAT SERPL-MCNC: 0.88 MG/DL (ref 0.5–0.9)
GFR AFRICAN AMERICAN: >60
GFR NON-AFRICAN AMERICAN: >60
GLUCOSE BLD-MCNC: 161 MG/DL (ref 60–115)
GLUCOSE BLD-MCNC: 174 MG/DL (ref 60–115)
GLUCOSE BLD-MCNC: 219 MG/DL (ref 60–115)
GLUCOSE BLD-MCNC: 237 MG/DL (ref 60–115)
GLUCOSE BLD-MCNC: 271 MG/DL (ref 70–99)
GLUCOSE BLD-MCNC: 276 MG/DL (ref 60–115)
HCT VFR BLD CALC: 41.4 % (ref 37–47)
HEMOGLOBIN: 14.7 G/DL (ref 12–16)
INR BLD: 3.3
MCH RBC QN AUTO: 34.9 PG (ref 27–31.3)
MCHC RBC AUTO-ENTMCNC: 35.6 % (ref 33–37)
MCV RBC AUTO: 98.1 FL (ref 82–100)
PDW BLD-RTO: 13.6 % (ref 11.5–14.5)
PERFORMED ON: ABNORMAL
PLATELET # BLD: 224 K/UL (ref 130–400)
POTASSIUM SERPL-SCNC: 3.3 MEQ/L (ref 3.4–4.9)
PROTHROMBIN TIME: 35 SEC (ref 12.3–14.9)
RBC # BLD: 4.22 M/UL (ref 4.2–5.4)
SODIUM BLD-SCNC: 137 MEQ/L (ref 135–144)
URINE CULTURE, ROUTINE: NORMAL
WBC # BLD: 21.7 K/UL (ref 4.8–10.8)

## 2019-08-11 PROCEDURE — 85610 PROTHROMBIN TIME: CPT

## 2019-08-11 PROCEDURE — 85027 COMPLETE CBC AUTOMATED: CPT

## 2019-08-11 PROCEDURE — 2500000003 HC RX 250 WO HCPCS: Performed by: INTERNAL MEDICINE

## 2019-08-11 PROCEDURE — 2060000000 HC ICU INTERMEDIATE R&B

## 2019-08-11 PROCEDURE — 6370000000 HC RX 637 (ALT 250 FOR IP): Performed by: INTERNAL MEDICINE

## 2019-08-11 PROCEDURE — 80048 BASIC METABOLIC PNL TOTAL CA: CPT

## 2019-08-11 PROCEDURE — 6360000002 HC RX W HCPCS: Performed by: INTERNAL MEDICINE

## 2019-08-11 PROCEDURE — 36415 COLL VENOUS BLD VENIPUNCTURE: CPT

## 2019-08-11 PROCEDURE — G0378 HOSPITAL OBSERVATION PER HR: HCPCS

## 2019-08-11 PROCEDURE — 2580000003 HC RX 258: Performed by: INTERNAL MEDICINE

## 2019-08-11 RX ORDER — POTASSIUM CHLORIDE 7.45 MG/ML
10 INJECTION INTRAVENOUS
Status: COMPLETED | OUTPATIENT
Start: 2019-08-11 | End: 2019-08-11

## 2019-08-11 RX ORDER — POTASSIUM CHLORIDE, DEXTROSE MONOHYDRATE AND SODIUM CHLORIDE 300; 5; 900 MG/100ML; G/100ML; MG/100ML
INJECTION, SOLUTION INTRAVENOUS CONTINUOUS
Status: DISCONTINUED | OUTPATIENT
Start: 2019-08-11 | End: 2019-08-14 | Stop reason: HOSPADM

## 2019-08-11 RX ADMIN — Medication 10 ML: at 22:11

## 2019-08-11 RX ADMIN — ONDANSETRON 4 MG: 2 INJECTION INTRAMUSCULAR; INTRAVENOUS at 06:19

## 2019-08-11 RX ADMIN — DULOXETINE HYDROCHLORIDE 60 MG: 60 CAPSULE, DELAYED RELEASE ORAL at 09:04

## 2019-08-11 RX ADMIN — BUPROPION HYDROCHLORIDE 200 MG: 100 TABLET, EXTENDED RELEASE ORAL at 22:09

## 2019-08-11 RX ADMIN — ASPIRIN 81 MG: 81 TABLET ORAL at 09:04

## 2019-08-11 RX ADMIN — OXYBUTYNIN CHLORIDE 5 MG: 5 TABLET ORAL at 09:04

## 2019-08-11 RX ADMIN — ATORVASTATIN CALCIUM 20 MG: 20 TABLET, FILM COATED ORAL at 22:09

## 2019-08-11 RX ADMIN — PANTOPRAZOLE SODIUM 40 MG: 40 TABLET, DELAYED RELEASE ORAL at 06:19

## 2019-08-11 RX ADMIN — PANTOPRAZOLE SODIUM 40 MG: 40 TABLET, DELAYED RELEASE ORAL at 16:14

## 2019-08-11 RX ADMIN — Medication 10 ML: at 09:03

## 2019-08-11 RX ADMIN — CARVEDILOL 12.5 MG: 12.5 TABLET, FILM COATED ORAL at 22:09

## 2019-08-11 RX ADMIN — POTASSIUM CHLORIDE 10 MEQ: 7.46 INJECTION, SOLUTION INTRAVENOUS at 17:44

## 2019-08-11 RX ADMIN — POTASSIUM CHLORIDE 10 MEQ: 7.46 INJECTION, SOLUTION INTRAVENOUS at 19:35

## 2019-08-11 RX ADMIN — LEVOTHYROXINE SODIUM 88 MCG: 88 TABLET ORAL at 06:19

## 2019-08-11 RX ADMIN — CARVEDILOL 12.5 MG: 12.5 TABLET, FILM COATED ORAL at 09:04

## 2019-08-11 RX ADMIN — ROPINIROLE HYDROCHLORIDE 2 MG: 2 TABLET, FILM COATED ORAL at 22:08

## 2019-08-11 RX ADMIN — ENOXAPARIN SODIUM 40 MG: 40 INJECTION SUBCUTANEOUS at 09:03

## 2019-08-11 RX ADMIN — FERROUS SULFATE TAB 325 MG (65 MG ELEMENTAL FE) 325 MG: 325 (65 FE) TAB at 09:04

## 2019-08-11 RX ADMIN — INSULIN LISPRO 4 UNITS: 100 INJECTION, SOLUTION INTRAVENOUS; SUBCUTANEOUS at 11:40

## 2019-08-11 RX ADMIN — INSULIN LISPRO 4 UNITS: 100 INJECTION, SOLUTION INTRAVENOUS; SUBCUTANEOUS at 09:05

## 2019-08-11 RX ADMIN — POTASSIUM CHLORIDE, DEXTROSE MONOHYDRATE AND SODIUM CHLORIDE: 300; 5; 900 INJECTION, SOLUTION INTRAVENOUS at 17:44

## 2019-08-11 RX ADMIN — ONDANSETRON 4 MG: 2 INJECTION INTRAMUSCULAR; INTRAVENOUS at 17:51

## 2019-08-11 RX ADMIN — PIPERACILLIN SODIUM AND TAZOBACTAM SODIUM 3.38 G: 3; .375 INJECTION, POWDER, LYOPHILIZED, FOR SOLUTION INTRAVENOUS at 22:09

## 2019-08-11 RX ADMIN — INSULIN GLARGINE 27 UNITS: 100 INJECTION, SOLUTION SUBCUTANEOUS at 22:17

## 2019-08-11 RX ADMIN — SODIUM CHLORIDE 75 ML/HR: 9 INJECTION, SOLUTION INTRAVENOUS at 05:34

## 2019-08-11 RX ADMIN — DIGOXIN 125 MCG: 125 TABLET ORAL at 09:04

## 2019-08-11 RX ADMIN — POTASSIUM CHLORIDE 10 MEQ: 7.46 INJECTION, SOLUTION INTRAVENOUS at 21:31

## 2019-08-11 RX ADMIN — PIPERACILLIN SODIUM AND TAZOBACTAM SODIUM 3.38 G: 3; .375 INJECTION, POWDER, LYOPHILIZED, FOR SOLUTION INTRAVENOUS at 13:52

## 2019-08-11 RX ADMIN — INSULIN LISPRO 2 UNITS: 100 INJECTION, SOLUTION INTRAVENOUS; SUBCUTANEOUS at 17:07

## 2019-08-11 RX ADMIN — TOPIRAMATE 200 MG: 200 TABLET, FILM COATED ORAL at 09:04

## 2019-08-11 RX ADMIN — TOPIRAMATE 200 MG: 200 TABLET, FILM COATED ORAL at 22:08

## 2019-08-11 RX ADMIN — VALSARTAN 40 MG: 40 TABLET, FILM COATED ORAL at 09:04

## 2019-08-11 RX ADMIN — FERROUS SULFATE TAB 325 MG (65 MG ELEMENTAL FE) 325 MG: 325 (65 FE) TAB at 16:14

## 2019-08-11 ASSESSMENT — PAIN SCALES - GENERAL
PAINLEVEL_OUTOF10: 0

## 2019-08-11 NOTE — PROGRESS NOTES
Clinical Pharmacy Note    Warfarin consult follow-up    Recent Labs     08/11/19  1509   INR 3.3     Recent Labs     08/09/19  1049 08/10/19  0554 08/11/19  1509   HGB 14.9 16.3* 14.7   HCT 44.3 47.6* 41.4    279 224       Notes:  Date INR Warfarin Dose   08/09/19   12.5 mg (given 8/10 at 0245)    08/10/19  1.8  12.5 mg (given at 2157)    08/11/19  3.3  HOLD                                      INR of 3.3 is supra-therapeutic, goal range of 2-3. Elevated INR due to late administration of 8/9 dose on 8/10 at  0245 and dose scheduled for 2300 on 8/10 given at 2157. Will hold today's dose and re-evaluate INR tomorrow. Daily PT/INR until stable within therapeutic range. HUONG Mccall Ph.  8/11/2019  5:03 PM

## 2019-08-11 NOTE — PROGRESS NOTES
137   K 4.0 3.7 3.3*   CL 98 96 98   CO2 26 19* 24   BUN 17 25* 28*   CREATININE 0.89 1.08* 0.88   CALCIUM 9.1 9.1 8.0*   AST 16  --   --    ALT 17  --   --    BILITOT 0.3  --   --    ALKPHOS 90  --   --      Recent Labs     08/10/19  0555 08/11/19  1509   INR 1.8 3.3     Recent Labs     08/09/19  1049 08/09/19  2033 08/10/19  1323   TROPONINI <0.010 0.025* 0.094*     Radiology:  US ABDOMEN LIMITED   Final Result      THE GALLBLADDER WALL IS THICKENED AND THERE IS SURROUNDING PERICHOLECYSTIC FLUID. FINDINGS COULD BE SUSPICIOUS FOR acute CHOLECYSTITIS. CORRELATE CLINICALLY. CT ABDOMEN PELVIS W IV CONTRAST Additional Contrast? None   Final Result   1. THE GALLBLADDER WALL APPEARS SLIGHTLY IRREGULAR AND THERE IS DIFFUSE SURROUNDING PERICHOLECYSTIC FLUID. FINDINGS COULD BE SUSPICIOUS FOR ACUTE CHOLECYSTITIS. CORRELATE CLINICALLY         All CT scans at this facility use dose modulation, iterative reconstruction, and/or weight based dosing when appropriate to reduce radiation dose to as low as reasonably achievable. XR ABDOMEN (KUB) (SINGLE AP VIEW)   Final Result   NONSPECIFIC BOWEL GAS PATTERN      NM Cardiac Stress Test Nuclear Imaging    (Results Pending)     Assessment/Plan:    Acute cholecystitis: IV antibiotics, general surgery consult    Sepsis secondary to acute cholecystitis. On IV antibiotics. Leukocytosis: downtrending. Blood cultures remain negative. Continue to monitor. Hypokalemia: Change IV fluids from normal saline to D5 normal saline with 40 M EQ potassium per liter. Replace potassium via intravenous route given patient's intolerability to oral ingestion. Hypertension: Continue home medication, monitor blood pressure    Hypothyroidism: Continue home medication    Diabetes: Continue long and short acting insulin regimen. Chest pain/ischemic work-up as per cardiology.     35 minutes total care time, >1/2 in unit/floor time and care coordination     Electronically signed by Anju Blandon MD on 8/11/2019 at 4:57 PM

## 2019-08-11 NOTE — PROGRESS NOTES
Cardiology Progress Note      Rounding Cardiologist:  Gloria Melo MD  Primary Cardiologist: Dr. Bea Coffey     Date:  8/11/2019  Patient:  Johan Israel  YOB: 1960  MRN:  [id number]       Vibha Alamo was seen and examined today at bedside. Continue to have nausea. States she vomits whenever she tries to eat something. No abdominal pain. No chest pain or shortness of breath. Consult HPI:   Johan Israel is a 63 y.o.  female patient who is being at the request of Dr. Tae Pettit for inpatient consultation of nausea, diaphoresis, and chest pain. She was admitted on 8/9/2019. Previous [address] and [address] [address] records have been reviewed in detail. She describes onset early yesterday am of severe nausea, diaphoresis, and bouts of diarrhea. She subsequently developed some vague pressure in her chest.  She notes that discomfort lasted several hours and resolved after she fell asleep for a long period of time. She noted persistent nausea and hasn't been able to keep any food down. She has had intermittent bouts of severe sweating. She also notes onset of diarrhea yesterday morning. She was noted to have a low-grade fever and a white count of 19,000. She denies any ongoing chest pain. She denies any shortness of breath. States she had similar symptoms many years ago when she had a heart attack. She denies any orthopnea, PND, or increasing peripheral edema. She denies any palpitations, lightheadedness, near-syncope, or syncope. She denies any fever, chills, or cough. She denies any hemoptysis, hematemesis, melena, or hematochezia. She has a history of atherosclerotic heart disease with remote anterior ST segment elevation myocardial infarction. She underwent angioplasty and stenting at that time. She follows on a regular basis with Dr. Guille Segundo and Dr. Karlene Berry at [facility].  She has a history of essential hypertension, mixed

## 2019-08-12 ENCOUNTER — APPOINTMENT (OUTPATIENT)
Dept: NUCLEAR MEDICINE | Age: 59
DRG: 872 | End: 2019-08-12
Attending: INTERNAL MEDICINE
Payer: MEDICARE

## 2019-08-12 LAB
ALBUMIN SERPL-MCNC: 3.1 G/DL (ref 3.5–4.6)
ALP BLD-CCNC: 60 U/L (ref 40–130)
ALT SERPL-CCNC: 10 U/L (ref 0–33)
ANION GAP SERPL CALCULATED.3IONS-SCNC: 11 MEQ/L (ref 9–15)
AST SERPL-CCNC: 11 U/L (ref 0–35)
BILIRUB SERPL-MCNC: 0.4 MG/DL (ref 0.2–0.7)
BUN BLDV-MCNC: 18 MG/DL (ref 6–20)
CALCIUM SERPL-MCNC: 7.8 MG/DL (ref 8.5–9.9)
CHLORIDE BLD-SCNC: 104 MEQ/L (ref 95–107)
CO2: 22 MEQ/L (ref 20–31)
CREAT SERPL-MCNC: 0.78 MG/DL (ref 0.5–0.9)
GFR AFRICAN AMERICAN: >60
GFR NON-AFRICAN AMERICAN: >60
GLOBULIN: 2.9 G/DL (ref 2.3–3.5)
GLUCOSE BLD-MCNC: 231 MG/DL (ref 60–115)
GLUCOSE BLD-MCNC: 242 MG/DL (ref 70–99)
GLUCOSE BLD-MCNC: 252 MG/DL (ref 60–115)
GLUCOSE BLD-MCNC: 329 MG/DL (ref 60–115)
GLUCOSE BLD-MCNC: 365 MG/DL (ref 60–115)
HCT VFR BLD CALC: 41.6 % (ref 37–47)
HEMOGLOBIN: 14.3 G/DL (ref 12–16)
INR BLD: 3.6
MCH RBC QN AUTO: 34 PG (ref 27–31.3)
MCHC RBC AUTO-ENTMCNC: 34.3 % (ref 33–37)
MCV RBC AUTO: 99.1 FL (ref 82–100)
PDW BLD-RTO: 13.8 % (ref 11.5–14.5)
PERFORMED ON: ABNORMAL
PLATELET # BLD: 198 K/UL (ref 130–400)
POTASSIUM SERPL-SCNC: 4.1 MEQ/L (ref 3.4–4.9)
PROTHROMBIN TIME: 37.7 SEC (ref 12.3–14.9)
RBC # BLD: 4.2 M/UL (ref 4.2–5.4)
SODIUM BLD-SCNC: 137 MEQ/L (ref 135–144)
TOTAL PROTEIN: 6 G/DL (ref 6.3–8)
TROPONIN: 0.02 NG/ML (ref 0–0.01)
TROPONIN: 0.02 NG/ML (ref 0–0.01)
WBC # BLD: 15.1 K/UL (ref 4.8–10.8)

## 2019-08-12 PROCEDURE — 84484 ASSAY OF TROPONIN QUANT: CPT

## 2019-08-12 PROCEDURE — 2580000003 HC RX 258: Performed by: INTERNAL MEDICINE

## 2019-08-12 PROCEDURE — 80053 COMPREHEN METABOLIC PANEL: CPT

## 2019-08-12 PROCEDURE — 6370000000 HC RX 637 (ALT 250 FOR IP): Performed by: INTERNAL MEDICINE

## 2019-08-12 PROCEDURE — 85610 PROTHROMBIN TIME: CPT

## 2019-08-12 PROCEDURE — 3430000000 HC RX DIAGNOSTIC RADIOPHARMACEUTICAL: Performed by: INTERNAL MEDICINE

## 2019-08-12 PROCEDURE — 93017 CV STRESS TEST TRACING ONLY: CPT

## 2019-08-12 PROCEDURE — 6360000002 HC RX W HCPCS: Performed by: INTERNAL MEDICINE

## 2019-08-12 PROCEDURE — G0378 HOSPITAL OBSERVATION PER HR: HCPCS

## 2019-08-12 PROCEDURE — 78452 HT MUSCLE IMAGE SPECT MULT: CPT

## 2019-08-12 PROCEDURE — 94664 DEMO&/EVAL PT USE INHALER: CPT

## 2019-08-12 PROCEDURE — 85027 COMPLETE CBC AUTOMATED: CPT

## 2019-08-12 PROCEDURE — 2700000000 HC OXYGEN THERAPY PER DAY

## 2019-08-12 PROCEDURE — A9502 TC99M TETROFOSMIN: HCPCS | Performed by: INTERNAL MEDICINE

## 2019-08-12 PROCEDURE — 36415 COLL VENOUS BLD VENIPUNCTURE: CPT

## 2019-08-12 PROCEDURE — 93306 TTE W/DOPPLER COMPLETE: CPT

## 2019-08-12 PROCEDURE — 2060000000 HC ICU INTERMEDIATE R&B

## 2019-08-12 PROCEDURE — 94640 AIRWAY INHALATION TREATMENT: CPT

## 2019-08-12 RX ORDER — IPRATROPIUM BROMIDE AND ALBUTEROL SULFATE 2.5; .5 MG/3ML; MG/3ML
1 SOLUTION RESPIRATORY (INHALATION) EVERY 4 HOURS PRN
Status: DISCONTINUED | OUTPATIENT
Start: 2019-08-12 | End: 2019-08-14 | Stop reason: HOSPADM

## 2019-08-12 RX ORDER — IPRATROPIUM BROMIDE AND ALBUTEROL SULFATE 2.5; .5 MG/3ML; MG/3ML
1 SOLUTION RESPIRATORY (INHALATION)
Status: DISCONTINUED | OUTPATIENT
Start: 2019-08-12 | End: 2019-08-12

## 2019-08-12 RX ORDER — SODIUM CHLORIDE 0.9 % (FLUSH) 0.9 %
10 SYRINGE (ML) INJECTION PRN
Status: DISCONTINUED | OUTPATIENT
Start: 2019-08-12 | End: 2019-08-14 | Stop reason: HOSPADM

## 2019-08-12 RX ORDER — FUROSEMIDE 10 MG/ML
40 INJECTION INTRAMUSCULAR; INTRAVENOUS ONCE
Status: COMPLETED | OUTPATIENT
Start: 2019-08-12 | End: 2019-08-12

## 2019-08-12 RX ADMIN — ATORVASTATIN CALCIUM 20 MG: 20 TABLET, FILM COATED ORAL at 21:46

## 2019-08-12 RX ADMIN — Medication 10 ML: at 08:16

## 2019-08-12 RX ADMIN — TETROFOSMIN 11.8 MILLICURIE: 1.38 INJECTION, POWDER, LYOPHILIZED, FOR SOLUTION INTRAVENOUS at 09:02

## 2019-08-12 RX ADMIN — FUROSEMIDE 40 MG: 10 INJECTION, SOLUTION INTRAMUSCULAR; INTRAVENOUS at 13:38

## 2019-08-12 RX ADMIN — INSULIN LISPRO 8 UNITS: 100 INJECTION, SOLUTION INTRAVENOUS; SUBCUTANEOUS at 17:10

## 2019-08-12 RX ADMIN — LEVOTHYROXINE SODIUM 88 MCG: 88 TABLET ORAL at 06:14

## 2019-08-12 RX ADMIN — INSULIN LISPRO 6 UNITS: 100 INJECTION, SOLUTION INTRAVENOUS; SUBCUTANEOUS at 08:17

## 2019-08-12 RX ADMIN — PANTOPRAZOLE SODIUM 40 MG: 40 TABLET, DELAYED RELEASE ORAL at 06:14

## 2019-08-12 RX ADMIN — IPRATROPIUM BROMIDE AND ALBUTEROL SULFATE 1 AMPULE: .5; 3 SOLUTION RESPIRATORY (INHALATION) at 14:12

## 2019-08-12 RX ADMIN — PIPERACILLIN SODIUM AND TAZOBACTAM SODIUM 3.38 G: 3; .375 INJECTION, POWDER, LYOPHILIZED, FOR SOLUTION INTRAVENOUS at 13:39

## 2019-08-12 RX ADMIN — CARVEDILOL 12.5 MG: 12.5 TABLET, FILM COATED ORAL at 21:46

## 2019-08-12 RX ADMIN — TOPIRAMATE 200 MG: 200 TABLET, FILM COATED ORAL at 21:46

## 2019-08-12 RX ADMIN — PIPERACILLIN SODIUM AND TAZOBACTAM SODIUM 3.38 G: 3; .375 INJECTION, POWDER, LYOPHILIZED, FOR SOLUTION INTRAVENOUS at 06:16

## 2019-08-12 RX ADMIN — PIPERACILLIN SODIUM AND TAZOBACTAM SODIUM 3.38 G: 3; .375 INJECTION, POWDER, LYOPHILIZED, FOR SOLUTION INTRAVENOUS at 21:45

## 2019-08-12 RX ADMIN — Medication 10 ML: at 10:56

## 2019-08-12 RX ADMIN — TETROFOSMIN 31.6 MILLICURIE: 1.38 INJECTION, POWDER, LYOPHILIZED, FOR SOLUTION INTRAVENOUS at 10:56

## 2019-08-12 RX ADMIN — REGADENOSON 0.4 MG: 0.08 INJECTION, SOLUTION INTRAVENOUS at 10:56

## 2019-08-12 RX ADMIN — ROPINIROLE HYDROCHLORIDE 2 MG: 2 TABLET, FILM COATED ORAL at 21:46

## 2019-08-12 RX ADMIN — ONDANSETRON 4 MG: 2 INJECTION INTRAMUSCULAR; INTRAVENOUS at 08:16

## 2019-08-12 RX ADMIN — Medication 10 ML: at 10:57

## 2019-08-12 RX ADMIN — ONDANSETRON 4 MG: 2 INJECTION INTRAMUSCULAR; INTRAVENOUS at 13:38

## 2019-08-12 RX ADMIN — BUPROPION HYDROCHLORIDE 200 MG: 100 TABLET, EXTENDED RELEASE ORAL at 21:46

## 2019-08-12 RX ADMIN — Medication 10 ML: at 09:02

## 2019-08-12 ASSESSMENT — PAIN SCALES - GENERAL
PAINLEVEL_OUTOF10: 0

## 2019-08-12 NOTE — PROGRESS NOTES
ferrous sulfate tablet 325 mg  325 mg Oral BID Eleni Dominguez MD   325 mg at 08/11/19 1614    insulin glargine (LANTUS) injection vial 27 Units  27 Units Subcutaneous Nightly Eleni Dominguez MD   27 Units at 08/11/19 2217    levothyroxine (SYNTHROID) tablet 88 mcg  88 mcg Oral Daily Eleni Dominguez MD   88 mcg at 08/12/19 0614    oxybutynin (DITROPAN) tablet 5 mg  5 mg Oral Daily Eleni Dominguez MD   5 mg at 08/11/19 0904    pantoprazole (PROTONIX) tablet 40 mg  40 mg Oral BID AC Eleni Dominguez MD   40 mg at 08/12/19 1085    rOPINIRole (REQUIP) tablet 2 mg  2 mg Oral Nightly Eleni Dominguez MD   2 mg at 08/11/19 2208    topiramate (TOPAMAX) tablet 200 mg  200 mg Oral BID Eleni Dominguez MD   200 mg at 08/11/19 2208    valsartan (DIOVAN) tablet 40 mg  40 mg Oral Daily Eleni Dominguez MD   40 mg at 08/11/19 0904    glucose (GLUTOSE) 40 % oral gel 15 g  15 g Oral PRN Eleni Dominguez MD        dextrose 50 % IV solution  12.5 g Intravenous PRN Eleni Dominguez MD        glucagon (rDNA) injection 1 mg  1 mg Intramuscular PRN Eleni Dominguez MD        dextrose 5 % solution  100 mL/hr Intravenous PRN Eleni Dominguez MD        sodium chloride flush 0.9 % injection 10 mL  10 mL Intravenous 2 times per day Eleni Dominguez MD   10 mL at 08/12/19 0816    sodium chloride flush 0.9 % injection 10 mL  10 mL Intravenous PRN Eleni Dominguez MD   10 mL at 08/12/19 1057    magnesium hydroxide (MILK OF MAGNESIA) 400 MG/5ML suspension 30 mL  30 mL Oral Daily PRN Eleni Dominguez MD        ondansetron [facility]) injection 4 mg  4 mg Intravenous Q6H PRN Eleni Dominguez MD   4 mg at 08/12/19 1338    warfarin (COUMADIN) daily dosing (placeholder)   Other RX Placeholder Eleni Dominguez MD             OBJECTIVE:  Vital Signs:  Vitals:    08/12/19 1414   BP:    Pulse: 92   Resp: 16   Temp:    SpO2: 100%       Focal exam:      General Exam (except as mentioned above):  CONSTITUTIONAL: Awake, alert, no apparent distress  EYES:

## 2019-08-12 NOTE — PROGRESS NOTES
insulin lispro  0-6 Units Subcutaneous Nightly    aspirin  81 mg Oral Daily    Cinnamon  500 mg Oral Daily    atorvastatin  20 mg Oral Daily    carvedilol  12.5 mg Oral BID    buPROPion  200 mg Oral Daily    digoxin  125 mcg Oral Daily    DULoxetine  60 mg Oral Daily    ferrous sulfate  325 mg Oral BID    insulin glargine  27 Units Subcutaneous Nightly    levothyroxine  88 mcg Oral Daily    oxybutynin  5 mg Oral Daily    pantoprazole  40 mg Oral BID AC    rOPINIRole  2 mg Oral Nightly    topiramate  200 mg Oral BID    valsartan  40 mg Oral Daily    sodium chloride flush  10 mL Intravenous 2 times per day    warfarin (COUMADIN) daily dosing (placeholder)   Other RX Placeholder       INTRAVENOUS MEDICATIONS       dextrose 5% and 0.9% NaCl with KCl 40 mEq 75 mL/hr at 08/11/19 2042    dextrose         ASSESSMENT        1. Severe nausea and vomiting along with bouts of diarrhea. Low grade fever and elevated WBC. Acute cholecystitis. 2.  Prolonged episode of chest pressure in the setting of nausea, diaphoresis, and diarrhea. Borderline elevated troponin level. No acute ST changes. Reports similar symptoms when she had an MI years ago. 3. ASHD with remote infarct. Remote percutaneous  coronary intervention. Ischemic cardiomyopathy. Ejection fraction  of 25% in November 2012, [employer] IIC heart failure. Myocardial perfusion study in June showing anterior MI without ischemia. LVEF 36%. 4. Status post single-chamber ICD [employer] [id number] STVR ICD. Normal  device and lead function. 5. Previous episodes of hypotension, near syncope, and syncope. 6. Hypertension, benign, chronic, currently controlled. 7. Hypothyroidism, on replacement therapy. 8. Diabetes mellitus. 9. History of cerebrovascular accident with no neurologic sequelae.      10. History of severe cavernous sinus thrombosis, on chronic  warfarin

## 2019-08-13 LAB
ANION GAP SERPL CALCULATED.3IONS-SCNC: 13 MEQ/L (ref 9–15)
BASOPHILS ABSOLUTE: 0.1 K/UL (ref 0–0.2)
BASOPHILS RELATIVE PERCENT: 0.7 %
BUN BLDV-MCNC: 17 MG/DL (ref 6–20)
CALCIUM SERPL-MCNC: 7.9 MG/DL (ref 8.5–9.9)
CHLORIDE BLD-SCNC: 102 MEQ/L (ref 95–107)
CO2: 20 MEQ/L (ref 20–31)
CREAT SERPL-MCNC: 0.79 MG/DL (ref 0.5–0.9)
EOSINOPHILS ABSOLUTE: 0 K/UL (ref 0–0.7)
EOSINOPHILS RELATIVE PERCENT: 0.1 %
GFR AFRICAN AMERICAN: >60
GFR NON-AFRICAN AMERICAN: >60
GLUCOSE BLD-MCNC: 274 MG/DL (ref 60–115)
GLUCOSE BLD-MCNC: 307 MG/DL (ref 70–99)
GLUCOSE BLD-MCNC: 318 MG/DL (ref 60–115)
HCT VFR BLD CALC: 45.1 % (ref 37–47)
HEMOGLOBIN: 15.3 G/DL (ref 12–16)
INR BLD: 4.9
LYMPHOCYTES ABSOLUTE: 3.4 K/UL (ref 1–4.8)
LYMPHOCYTES RELATIVE PERCENT: 19.9 %
MCH RBC QN AUTO: 34.3 PG (ref 27–31.3)
MCHC RBC AUTO-ENTMCNC: 33.8 % (ref 33–37)
MCV RBC AUTO: 101.5 FL (ref 82–100)
MONOCYTES ABSOLUTE: 1 K/UL (ref 0.2–0.8)
MONOCYTES RELATIVE PERCENT: 5.6 %
NEUTROPHILS ABSOLUTE: 12.5 K/UL (ref 1.4–6.5)
NEUTROPHILS RELATIVE PERCENT: 73.7 %
PDW BLD-RTO: 13.8 % (ref 11.5–14.5)
PERFORMED ON: ABNORMAL
PERFORMED ON: ABNORMAL
PLATELET # BLD: 200 K/UL (ref 130–400)
POTASSIUM SERPL-SCNC: 4.7 MEQ/L (ref 3.4–4.9)
PROTHROMBIN TIME: 47.8 SEC (ref 12.3–14.9)
RBC # BLD: 4.44 M/UL (ref 4.2–5.4)
SODIUM BLD-SCNC: 135 MEQ/L (ref 135–144)
WBC # BLD: 17 K/UL (ref 4.8–10.8)

## 2019-08-13 PROCEDURE — 2500000003 HC RX 250 WO HCPCS: Performed by: INTERNAL MEDICINE

## 2019-08-13 PROCEDURE — 80048 BASIC METABOLIC PNL TOTAL CA: CPT

## 2019-08-13 PROCEDURE — 2060000000 HC ICU INTERMEDIATE R&B

## 2019-08-13 PROCEDURE — 6360000002 HC RX W HCPCS: Performed by: INTERNAL MEDICINE

## 2019-08-13 PROCEDURE — 6370000000 HC RX 637 (ALT 250 FOR IP): Performed by: INTERNAL MEDICINE

## 2019-08-13 PROCEDURE — 2580000003 HC RX 258: Performed by: INTERNAL MEDICINE

## 2019-08-13 PROCEDURE — 99222 1ST HOSP IP/OBS MODERATE 55: CPT | Performed by: COLON & RECTAL SURGERY

## 2019-08-13 PROCEDURE — 85025 COMPLETE CBC W/AUTO DIFF WBC: CPT

## 2019-08-13 PROCEDURE — 85610 PROTHROMBIN TIME: CPT

## 2019-08-13 PROCEDURE — G0378 HOSPITAL OBSERVATION PER HR: HCPCS

## 2019-08-13 PROCEDURE — 36415 COLL VENOUS BLD VENIPUNCTURE: CPT

## 2019-08-13 RX ORDER — PHYTONADIONE 5 MG/1
2.5 TABLET ORAL ONCE
Status: COMPLETED | OUTPATIENT
Start: 2019-08-13 | End: 2019-08-13

## 2019-08-13 RX ORDER — ACETAMINOPHEN 80 MG
TABLET,CHEWABLE ORAL ONCE
Status: DISCONTINUED | OUTPATIENT
Start: 2019-08-13 | End: 2019-08-14 | Stop reason: HOSPADM

## 2019-08-13 RX ORDER — SUCRALFATE 1 G/1
1 TABLET ORAL 4 TIMES DAILY
Status: DISCONTINUED | OUTPATIENT
Start: 2019-08-13 | End: 2019-08-14 | Stop reason: HOSPADM

## 2019-08-13 RX ORDER — CIPROFLOXACIN 2 MG/ML
400 INJECTION, SOLUTION INTRAVENOUS EVERY 12 HOURS
Status: DISCONTINUED | OUTPATIENT
Start: 2019-08-13 | End: 2019-08-14 | Stop reason: HOSPADM

## 2019-08-13 RX ADMIN — SUCRALFATE 1 G: 1 TABLET ORAL at 11:38

## 2019-08-13 RX ADMIN — TOPIRAMATE 200 MG: 200 TABLET, FILM COATED ORAL at 22:25

## 2019-08-13 RX ADMIN — OXYBUTYNIN CHLORIDE 5 MG: 5 TABLET ORAL at 08:10

## 2019-08-13 RX ADMIN — TOPIRAMATE 200 MG: 200 TABLET, FILM COATED ORAL at 08:11

## 2019-08-13 RX ADMIN — ATORVASTATIN CALCIUM 20 MG: 20 TABLET, FILM COATED ORAL at 22:18

## 2019-08-13 RX ADMIN — Medication 10 ML: at 22:19

## 2019-08-13 RX ADMIN — CARVEDILOL 12.5 MG: 12.5 TABLET, FILM COATED ORAL at 22:27

## 2019-08-13 RX ADMIN — ASPIRIN 81 MG: 81 TABLET ORAL at 08:10

## 2019-08-13 RX ADMIN — POTASSIUM CHLORIDE, DEXTROSE MONOHYDRATE AND SODIUM CHLORIDE: 300; 5; 900 INJECTION, SOLUTION INTRAVENOUS at 05:05

## 2019-08-13 RX ADMIN — PANTOPRAZOLE SODIUM 40 MG: 40 TABLET, DELAYED RELEASE ORAL at 16:33

## 2019-08-13 RX ADMIN — SUCRALFATE 1 G: 1 TABLET ORAL at 22:24

## 2019-08-13 RX ADMIN — DIGOXIN 125 MCG: 125 TABLET ORAL at 08:10

## 2019-08-13 RX ADMIN — METRONIDAZOLE 500 MG: 500 INJECTION, SOLUTION INTRAVENOUS at 16:33

## 2019-08-13 RX ADMIN — INSULIN LISPRO 6 UNITS: 100 INJECTION, SOLUTION INTRAVENOUS; SUBCUTANEOUS at 11:38

## 2019-08-13 RX ADMIN — INSULIN LISPRO 6 UNITS: 100 INJECTION, SOLUTION INTRAVENOUS; SUBCUTANEOUS at 08:12

## 2019-08-13 RX ADMIN — FERROUS SULFATE TAB 325 MG (65 MG ELEMENTAL FE) 325 MG: 325 (65 FE) TAB at 08:10

## 2019-08-13 RX ADMIN — POTASSIUM CHLORIDE, DEXTROSE MONOHYDRATE AND SODIUM CHLORIDE: 300; 5; 900 INJECTION, SOLUTION INTRAVENOUS at 16:35

## 2019-08-13 RX ADMIN — SUCRALFATE 1 G: 1 TABLET ORAL at 16:33

## 2019-08-13 RX ADMIN — FERROUS SULFATE TAB 325 MG (65 MG ELEMENTAL FE) 325 MG: 325 (65 FE) TAB at 22:21

## 2019-08-13 RX ADMIN — BUPROPION HYDROCHLORIDE 200 MG: 100 TABLET, EXTENDED RELEASE ORAL at 22:18

## 2019-08-13 RX ADMIN — INSULIN GLARGINE 27 UNITS: 100 INJECTION, SOLUTION SUBCUTANEOUS at 22:15

## 2019-08-13 RX ADMIN — CARVEDILOL 12.5 MG: 12.5 TABLET, FILM COATED ORAL at 08:10

## 2019-08-13 RX ADMIN — CIPROFLOXACIN 400 MG: 2 INJECTION, SOLUTION INTRAVENOUS at 16:33

## 2019-08-13 RX ADMIN — PIPERACILLIN SODIUM AND TAZOBACTAM SODIUM 3.38 G: 3; .375 INJECTION, POWDER, LYOPHILIZED, FOR SOLUTION INTRAVENOUS at 12:35

## 2019-08-13 RX ADMIN — PHYTONADIONE 2.5 MG: 5 TABLET ORAL at 16:42

## 2019-08-13 RX ADMIN — INSULIN LISPRO 6 UNITS: 100 INJECTION, SOLUTION INTRAVENOUS; SUBCUTANEOUS at 16:35

## 2019-08-13 RX ADMIN — ROPINIROLE HYDROCHLORIDE 2 MG: 2 TABLET, FILM COATED ORAL at 22:25

## 2019-08-13 RX ADMIN — PANTOPRAZOLE SODIUM 40 MG: 40 TABLET, DELAYED RELEASE ORAL at 05:43

## 2019-08-13 RX ADMIN — LEVOTHYROXINE SODIUM 88 MCG: 88 TABLET ORAL at 05:44

## 2019-08-13 RX ADMIN — VALSARTAN 40 MG: 40 TABLET, FILM COATED ORAL at 08:10

## 2019-08-13 RX ADMIN — PIPERACILLIN SODIUM AND TAZOBACTAM SODIUM 3.38 G: 3; .375 INJECTION, POWDER, LYOPHILIZED, FOR SOLUTION INTRAVENOUS at 05:43

## 2019-08-13 RX ADMIN — DULOXETINE HYDROCHLORIDE 60 MG: 60 CAPSULE, DELAYED RELEASE ORAL at 08:10

## 2019-08-13 RX ADMIN — METRONIDAZOLE 500 MG: 500 INJECTION, SOLUTION INTRAVENOUS at 23:44

## 2019-08-13 ASSESSMENT — PAIN DESCRIPTION - PAIN TYPE
TYPE: ACUTE PAIN;CHRONIC PAIN
TYPE: ACUTE PAIN

## 2019-08-13 ASSESSMENT — PAIN SCALES - GENERAL
PAINLEVEL_OUTOF10: 0

## 2019-08-13 ASSESSMENT — PAIN DESCRIPTION - LOCATION
LOCATION: GENERALIZED
LOCATION: GENERALIZED

## 2019-08-13 NOTE — PROGRESS NOTES
HDL 35 01/06/2019    LDLCALC 182 01/06/2019       HgbA1C:  Lab Results   Component Value Date    LABA1C 8.5 05/03/2019       CMP:  Recent Labs     08/11/19  0640 08/12/19  0600 08/13/19  0556    137 135   K 3.3* 4.1 4.7   CL 98 104 102   CO2 24 22 20   BUN 28* 18 17   CREATININE 0.88 0.78 0.79   GLUCOSE 271* 242* 307*   CALCIUM 8.0* 7.8* 7.9*   PROT  --  6.0*  --    LABALBU  --  3.1*  --    BILITOT  --  0.4  --    ALKPHOS  --  60  --    AST  --  11  --    ALT  --  10  --          RADIOLOGY     US ABDOMEN LIMITED   Final Result      THE GALLBLADDER WALL IS THICKENED AND THERE IS SURROUNDING PERICHOLECYSTIC FLUID. FINDINGS COULD BE SUSPICIOUS FOR acute CHOLECYSTITIS. CORRELATE CLINICALLY. CT ABDOMEN PELVIS W IV CONTRAST Additional Contrast? None   Final Result   1. THE GALLBLADDER WALL APPEARS SLIGHTLY IRREGULAR AND THERE IS DIFFUSE SURROUNDING PERICHOLECYSTIC FLUID. FINDINGS COULD BE SUSPICIOUS FOR ACUTE CHOLECYSTITIS. CORRELATE CLINICALLY         All CT scans at this facility use dose modulation, iterative reconstruction, and/or weight based dosing when appropriate to reduce radiation dose to as low as reasonably achievable. XR ABDOMEN (KUB) (SINGLE AP VIEW)   Final Result   NONSPECIFIC BOWEL GAS PATTERN      NM MYOCARDIAL SPECT REST EXERCISE OR RX    (Results Pending)       Result Date: 8/9/2019  EXAMINATION: XR CHEST PORTABLE CLINICAL HISTORY:  shortness of breath COMPARISONS: 5/3/2019 FINDINGS: Cardiac size is borderline. Pulmonary vascularity is prominent. There is interstitial coarsening with \"Kerley-B lines,\" consistent with interstitial edema. There is no appreciable pleural fluid. There is left-sided ICD. There is no pneumothorax. There is no evidence of adenopathy. The bones are unremarkable. INTERSTITIAL EDEMA CONSISTENT WITH CONGESTIVE HEART FAILURE.       CURRENT MEDICATIONS       sucralfate  1 g Oral 4x Daily    piperacillin-tazobactam  3.375 g Intravenous Q8H    insulin lispro

## 2019-08-13 NOTE — PROGRESS NOTES
normal  ENT:  Normocephalic, atraumatic  NECK:  Supple  BACK:  Symmetric  LUNGS:  CTAB except bilateral basilar crackles. CARDIOVASCULAR:  S1S2 present  ABDOMEN:  soft, non-distended, non-tender  MUSCULOSKELETAL:  There is no redness, warmth, or swelling of the joints. NEUROLOGIC:  Alert, awake, oriented x 3. No FND  EXTREMITIES: Warm and well perfused. LABS  Recent Labs     08/11/19  1509 08/12/19  0600   WBC 21.7* 15.1*   RBC 4.22 4.20   HGB 14.7 14.3   HCT 41.4 41.6   MCV 98.1 99.1   MCH 34.9* 34.0*   MCHC 35.6 34.3   RDW 13.6 13.8    198       Recent Labs     08/11/19  0640 08/12/19  0600    137   K 3.3* 4.1   CL 98 104   CO2 24 22   BUN 28* 18   CREATININE 0.88 0.78   GLUCOSE 271* 242*   CALCIUM 8.0* 7.8*       No results for input(s): MG in the last 72 hours. ASSESSMENT AND PLAN    Active Hospital Problems    Diagnosis Date Noted    Chronic congestive heart failure ([facility] [address] [I50.9] 08/09/2019    Nausea & vomiting [R11.2] 01/06/2019     - Discussed with patient options of surgery vs medical management. Patient has underlying heart disease with reduced ejection fraction and ischemic cardiomyopathy which outs patient at risk of cardiac complications during and after surgery. Patient has however responded appropriately to conservative management with abx and hydration. Conservative approach wopuld also be appropriate given her cardiac risks. This was discussed with patient and her . They will let me know. Meanwhile, will start her on liquid diet . NO nausea, vomiting , fever, hypotension or abdominal tenderness on exam    Hold coumadin for supra therapeutic INR    Sepsis: present on admission secondary to acute cholecystitis. Resolving    Acute cholecystitis    Hypothyroidism    Hypertension    Ischemic cardiomyopathy: Lexiscan show old infarct . No new acute changes seen. EF 32%.  S/p AICD in place    DVT prophylaxis: Supra therapeutic INR    Kat Santiago MD  Pager : [phone]

## 2019-08-13 NOTE — PROGRESS NOTES
Clinical Pharmacy Note    Warfarin consult follow-up    Recent Labs     08/13/19  0556   INR 4.9     Recent Labs     08/11/19  1509 08/12/19  0600 08/13/19  0556   HGB 14.7 14.3 15.3   HCT 41.4 41.6 45.1    198 200       Significant drug:drug interactions:  Day #3: Zosyn    Aspirin   Levothyroxine   Ropinirole     Notes:  Date INR Warfarin Dose   08/09/19   12.5 mg (given 8/10 at 0245)    08/10/19  1.8  12.5 mg (given at 2157)    08/11/19  3.3  HOLD    08/12/19  3.6  HOLD     08/13/19  4.9  HOLD                        INR of 4.9 is supra-therapeutic for goal range of 2-3 for cerebral artery occlusion with cerebral infarction. Patient no longer NPO, but has had some nausea and vomiting, which can contribute to supra-therapeutic INR. Home dose of warfarin is 82.5mg TWD (12.5mg daily EXCEPT 7.5mg on Wednesdays, as verified by [facility] warfarin clinic). Will HOLD warfarin therapy today. Continue with daily PT/INR until stable within therapeutic range.     Mahin Barakat, PharmD   8/13/2019 2:58 PM

## 2019-08-13 NOTE — CONSULTS
II-XII are grossly intact. Motor is 5 out of 5 bilaterally. Cerebellar finger to nose, heel to shin intact. Sensory is intact. Babinski down going, Romberg negative, and gait is normal.  SKIN:  no bruising or bleeding  DATA:    CBC:   Lab Results   Component Value Date    WBC 15.1 08/12/2019    RBC 4.20 08/12/2019    HGB 14.3 08/12/2019    HCT 41.6 08/12/2019    MCV 99.1 08/12/2019    MCH 34.0 08/12/2019    MCHC 34.3 08/12/2019    RDW 13.8 08/12/2019     08/12/2019    MPV 8.4 11/13/2013     CMP:    Lab Results   Component Value Date     08/12/2019    K 4.1 08/12/2019    K 3.8 05/05/2019     08/12/2019    CO2 22 08/12/2019    BUN 18 08/12/2019    CREATININE 0.78 08/12/2019    GFRAA >60.0 08/12/2019    LABGLOM >60.0 08/12/2019    GLUCOSE 242 08/12/2019    PROT 6.0 08/12/2019    LABALBU 3.1 08/12/2019    CALCIUM 7.8 08/12/2019    BILITOT 0.4 08/12/2019    ALKPHOS 60 08/12/2019    AST 11 08/12/2019    ALT 10 08/12/2019     Radiology Review: CT scan and ultrasound reviewed with the patient personally. IMPRESSION/RECOMMENDATIONS:      If patient does not have cardiac catheterization, I could proceed with laparoscopic possible open cholecystectomy once the INR is below 2. There is considerable risk above the normal population given her low ejection fraction as well as previous heart history. When I discussed potential heart risks, she was apprised to hear this as a possibility. She reports that she was told that the nuclear scan was fine. I explained to her the findings as well as the chronic issues from her previous heart disease. She is at a higher risk of a postoperative cardiac complication. I still believe that if she has persistent symptoms, cholecystectomy could be performed long as she understood the potential risks. I also discussed nonoperative treatment. I am waiting for today's INR to return to decide where we stand and best course of action in the near future.

## 2019-08-14 VITALS
HEART RATE: 100 BPM | DIASTOLIC BLOOD PRESSURE: 73 MMHG | OXYGEN SATURATION: 99 % | SYSTOLIC BLOOD PRESSURE: 121 MMHG | TEMPERATURE: 97.8 F | RESPIRATION RATE: 18 BRPM | BODY MASS INDEX: 26.37 KG/M2 | WEIGHT: 143.3 LBS | HEIGHT: 62 IN

## 2019-08-14 LAB
ANION GAP SERPL CALCULATED.3IONS-SCNC: 9 MEQ/L (ref 9–15)
BASOPHILS ABSOLUTE: 0 K/UL (ref 0–0.2)
BASOPHILS RELATIVE PERCENT: 0.4 %
BUN BLDV-MCNC: 14 MG/DL (ref 6–20)
CALCIUM SERPL-MCNC: 7.9 MG/DL (ref 8.5–9.9)
CHLORIDE BLD-SCNC: 106 MEQ/L (ref 95–107)
CO2: 20 MEQ/L (ref 20–31)
CREAT SERPL-MCNC: 0.9 MG/DL (ref 0.5–0.9)
EOSINOPHILS ABSOLUTE: 0 K/UL (ref 0–0.7)
EOSINOPHILS RELATIVE PERCENT: 0.4 %
GFR AFRICAN AMERICAN: >60
GFR NON-AFRICAN AMERICAN: >60
GLUCOSE BLD-MCNC: 195 MG/DL (ref 60–115)
GLUCOSE BLD-MCNC: 265 MG/DL (ref 60–115)
GLUCOSE BLD-MCNC: 288 MG/DL (ref 70–99)
HCT VFR BLD CALC: 37 % (ref 37–47)
HEMOGLOBIN: 12.9 G/DL (ref 12–16)
INR BLD: 1.3
LYMPHOCYTES ABSOLUTE: 3.9 K/UL (ref 1–4.8)
LYMPHOCYTES RELATIVE PERCENT: 38.1 %
MCH RBC QN AUTO: 34.4 PG (ref 27–31.3)
MCHC RBC AUTO-ENTMCNC: 34.9 % (ref 33–37)
MCV RBC AUTO: 98.4 FL (ref 82–100)
MONOCYTES ABSOLUTE: 0.6 K/UL (ref 0.2–0.8)
MONOCYTES RELATIVE PERCENT: 5.6 %
NEUTROPHILS ABSOLUTE: 5.6 K/UL (ref 1.4–6.5)
NEUTROPHILS RELATIVE PERCENT: 55.5 %
PDW BLD-RTO: 13.1 % (ref 11.5–14.5)
PERFORMED ON: ABNORMAL
PERFORMED ON: ABNORMAL
PLATELET # BLD: 164 K/UL (ref 130–400)
POTASSIUM SERPL-SCNC: 4 MEQ/L (ref 3.4–4.9)
PROTHROMBIN TIME: 16.3 SEC (ref 12.3–14.9)
RBC # BLD: 3.76 M/UL (ref 4.2–5.4)
SODIUM BLD-SCNC: 135 MEQ/L (ref 135–144)
WBC # BLD: 10.2 K/UL (ref 4.8–10.8)

## 2019-08-14 PROCEDURE — 6360000002 HC RX W HCPCS: Performed by: INTERNAL MEDICINE

## 2019-08-14 PROCEDURE — 2500000003 HC RX 250 WO HCPCS: Performed by: INTERNAL MEDICINE

## 2019-08-14 PROCEDURE — 85610 PROTHROMBIN TIME: CPT

## 2019-08-14 PROCEDURE — 6370000000 HC RX 637 (ALT 250 FOR IP): Performed by: INTERNAL MEDICINE

## 2019-08-14 PROCEDURE — 85025 COMPLETE CBC W/AUTO DIFF WBC: CPT

## 2019-08-14 PROCEDURE — G0378 HOSPITAL OBSERVATION PER HR: HCPCS

## 2019-08-14 PROCEDURE — 80048 BASIC METABOLIC PNL TOTAL CA: CPT

## 2019-08-14 PROCEDURE — 36415 COLL VENOUS BLD VENIPUNCTURE: CPT

## 2019-08-14 RX ORDER — WARFARIN SODIUM 5 MG/1
10 TABLET ORAL
Status: COMPLETED | OUTPATIENT
Start: 2019-08-14 | End: 2019-08-14

## 2019-08-14 RX ORDER — CIPROFLOXACIN 250 MG/1
500 TABLET, FILM COATED ORAL 2 TIMES DAILY
Qty: 40 TABLET | Refills: 0 | Status: SHIPPED | OUTPATIENT
Start: 2019-08-14 | End: 2019-08-24

## 2019-08-14 RX ORDER — SUCRALFATE 1 G/1
1 TABLET ORAL 4 TIMES DAILY
Qty: 120 TABLET | Refills: 3 | Status: SHIPPED | OUTPATIENT
Start: 2019-08-14

## 2019-08-14 RX ORDER — METRONIDAZOLE 500 MG/1
500 TABLET ORAL 3 TIMES DAILY
Qty: 30 TABLET | Refills: 0 | Status: SHIPPED | OUTPATIENT
Start: 2019-08-14 | End: 2019-08-24

## 2019-08-14 RX ADMIN — WARFARIN SODIUM 10 MG: 5 TABLET ORAL at 11:26

## 2019-08-14 RX ADMIN — VALSARTAN 40 MG: 40 TABLET, FILM COATED ORAL at 08:13

## 2019-08-14 RX ADMIN — POTASSIUM CHLORIDE, DEXTROSE MONOHYDRATE AND SODIUM CHLORIDE: 300; 5; 900 INJECTION, SOLUTION INTRAVENOUS at 05:58

## 2019-08-14 RX ADMIN — TOPIRAMATE 200 MG: 200 TABLET, FILM COATED ORAL at 08:13

## 2019-08-14 RX ADMIN — ENOXAPARIN SODIUM 70 MG: 80 INJECTION SUBCUTANEOUS at 11:26

## 2019-08-14 RX ADMIN — OXYBUTYNIN CHLORIDE 5 MG: 5 TABLET ORAL at 08:13

## 2019-08-14 RX ADMIN — CARVEDILOL 12.5 MG: 12.5 TABLET, FILM COATED ORAL at 08:13

## 2019-08-14 RX ADMIN — LEVOTHYROXINE SODIUM 88 MCG: 88 TABLET ORAL at 05:52

## 2019-08-14 RX ADMIN — DULOXETINE HYDROCHLORIDE 60 MG: 60 CAPSULE, DELAYED RELEASE ORAL at 08:13

## 2019-08-14 RX ADMIN — PANTOPRAZOLE SODIUM 40 MG: 40 TABLET, DELAYED RELEASE ORAL at 05:52

## 2019-08-14 RX ADMIN — METRONIDAZOLE 500 MG: 500 INJECTION, SOLUTION INTRAVENOUS at 08:13

## 2019-08-14 RX ADMIN — DIGOXIN 125 MCG: 125 TABLET ORAL at 08:13

## 2019-08-14 RX ADMIN — CIPROFLOXACIN 400 MG: 2 INJECTION, SOLUTION INTRAVENOUS at 05:48

## 2019-08-14 RX ADMIN — INSULIN LISPRO 6 UNITS: 100 INJECTION, SOLUTION INTRAVENOUS; SUBCUTANEOUS at 08:14

## 2019-08-14 RX ADMIN — FERROUS SULFATE TAB 325 MG (65 MG ELEMENTAL FE) 325 MG: 325 (65 FE) TAB at 08:13

## 2019-08-14 RX ADMIN — INSULIN LISPRO 6 UNITS: 100 INJECTION, SOLUTION INTRAVENOUS; SUBCUTANEOUS at 11:27

## 2019-08-14 RX ADMIN — SUCRALFATE 1 G: 1 TABLET ORAL at 08:14

## 2019-08-14 RX ADMIN — SUCRALFATE 1 G: 1 TABLET ORAL at 11:26

## 2019-08-14 RX ADMIN — ASPIRIN 81 MG: 81 TABLET ORAL at 08:13

## 2019-08-14 ASSESSMENT — PAIN SCALES - GENERAL
PAINLEVEL_OUTOF10: 0

## 2019-08-14 ASSESSMENT — PAIN DESCRIPTION - LOCATION: LOCATION: GENERALIZED

## 2019-08-14 ASSESSMENT — PAIN DESCRIPTION - PAIN TYPE: TYPE: ACUTE PAIN

## 2019-08-14 NOTE — PROGRESS NOTES
 ondansetron (ZOFRAN) injection 4 mg  4 mg Intravenous Q6H PRN Javon Durand MD   4 mg at 08/12/19 1338    warfarin (COUMADIN) daily dosing (placeholder)   Other RX Placeholder Javon Durand MD             OBJECTIVE:  Vital Signs:  Vitals:    08/14/19 0831   BP: 121/73   Pulse: 100   Resp: 18   Temp: 97.8 °F (36.6 °C)   SpO2: 99%       Focal exam:      General Exam (except as mentioned above):  CONSTITUTIONAL: Awake, alert, no apparent distress  EYES:  PERRL, conjunctiva normal  ENT:  Normocephalic, atraumatic  NECK:  Supple  BACK:  Symmetric  LUNGS:  CTAB except bilateral basilar crackles. CARDIOVASCULAR:  S1S2 present  ABDOMEN:  soft, non-distended, non-tender  MUSCULOSKELETAL:  There is no redness, warmth, or swelling of the joints. NEUROLOGIC:  Alert, awake, oriented x 3. No FND  EXTREMITIES: Warm and well perfused. LABS  Recent Labs     08/12/19  0600 08/13/19  0556 08/14/19  0624   WBC 15.1* 17.0* 10.2   RBC 4.20 4.44 3.76*   HGB 14.3 15.3 12.9   HCT 41.6 45.1 37.0   MCV 99.1 101.5* 98.4   MCH 34.0* 34.3* 34.4*   MCHC 34.3 33.8 34.9   RDW 13.8 13.8 13.1    200 164       Recent Labs     08/12/19  0600 08/13/19  0556 08/14/19  0624    135 135   K 4.1 4.7 4.0    102 106   CO2 22 20 20   BUN 18 17 14   CREATININE 0.78 0.79 0.90   GLUCOSE 242* 307* 288*   CALCIUM 7.8* 7.9* 7.9*       No results for input(s): MG in the last 72 hours. ASSESSMENT AND PLAN    Active Hospital Problems    Diagnosis Date Noted    Chronic congestive heart failure ([facility] [address] [I50.9] 08/09/2019    Nausea & vomiting [R11.2] 01/06/2019     Discussed with patient options of surgery vs medical management. Patient has underlying heart disease with reduced ejection fraction and ischemic cardiomyopathy which outs patient at risk of cardiac complications during and after surgery. Patient has however responded appropriately to conservative management with abx and hydration.  Conservative approach wopuld also be

## 2019-08-14 NOTE — FLOWSHEET NOTE
Reviewed discharge instructions with patient and . No questions or concerns voiced related to new RX as well as dose of coumadin. To follow up with coumadin clinic on the 16th , Friday. Patient and  deny any questions. Patient/ verbalized understanding of follow up and times. Aware of upcoming surgery. Will d/c NCP. Patient left via wc to home with .

## 2019-08-15 LAB
BLOOD CULTURE, ROUTINE: NORMAL
CULTURE, BLOOD 2: NORMAL

## 2019-08-19 LAB — SURGICAL PATHOLOGY REPORT: NORMAL

## 2020-05-13 LAB
APPEARANCE: ABNORMAL
BILIRUBIN, URINE: NEGATIVE
BLOOD, URINE: NEGATIVE
COLOR, URINE: YELLOW
GLUCOSE, URINE: ABNORMAL MG/DL
KETONES, URINE: ABNORMAL MG/DL
LEUKOCYTE ESTERASE, URINE: NEGATIVE
NITRITE, URINE: NEGATIVE
PH UA: 6 (ref 5–8)
PROTEIN UA: NEGATIVE MG/DL
SPECIFIC GRAVITY, URINE: 1.02 (ref 1–1)
SPECIMEN SOURCE: ABNORMAL
UROBILINOGEN, URINE: <2 MG/DL (ref 0–1.9)

## 2020-05-16 LAB — VANCOMYCIN TROUGH: 15.6 UG/ML (ref 5–20)

## 2020-05-26 ENCOUNTER — VIRTUAL VISIT (OUTPATIENT)
Dept: INFECTIOUS DISEASES | Age: 60
End: 2020-05-26
Payer: MEDICARE

## 2020-05-26 PROCEDURE — 1036F TOBACCO NON-USER: CPT | Performed by: INTERNAL MEDICINE

## 2020-05-26 PROCEDURE — G8419 CALC BMI OUT NRM PARAM NOF/U: HCPCS | Performed by: INTERNAL MEDICINE

## 2020-05-26 PROCEDURE — 3017F COLORECTAL CA SCREEN DOC REV: CPT | Performed by: INTERNAL MEDICINE

## 2020-05-26 PROCEDURE — G8427 DOCREV CUR MEDS BY ELIG CLIN: HCPCS | Performed by: INTERNAL MEDICINE

## 2020-05-26 PROCEDURE — 99213 OFFICE O/P EST LOW 20 MIN: CPT | Performed by: INTERNAL MEDICINE

## 2020-05-26 RX ORDER — FLUCONAZOLE 100 MG/1
150 TABLET ORAL DAILY
Qty: 3 TABLET | Refills: 0 | Status: SHIPPED | OUTPATIENT
Start: 2020-05-26 | End: 2020-05-28

## 2020-05-26 ASSESSMENT — ENCOUNTER SYMPTOMS
COUGH: 1
NAUSEA: 1

## 2023-06-21 ENCOUNTER — HOSPITAL ENCOUNTER (EMERGENCY)
Age: 63
Discharge: HOME | End: 2023-06-21
Payer: MEDICARE

## 2023-06-21 ENCOUNTER — APPOINTMENT (OUTPATIENT)
Dept: GENERAL RADIOLOGY | Age: 63
End: 2023-06-21
Payer: MEDICARE

## 2023-06-21 VITALS
TEMPERATURE: 97.5 F | HEART RATE: 80 BPM | SYSTOLIC BLOOD PRESSURE: 142 MMHG | DIASTOLIC BLOOD PRESSURE: 87 MMHG | RESPIRATION RATE: 18 BRPM | OXYGEN SATURATION: 96 %

## 2023-06-21 DIAGNOSIS — S39.012A STRAIN OF LUMBAR REGION, INITIAL ENCOUNTER: Primary | ICD-10-CM

## 2023-06-21 LAB
BILIRUB UR QL STRIP: ABNORMAL
CLARITY UR: ABNORMAL
COLOR UR: YELLOW
GLUCOSE UR STRIP-MCNC: 100 MG/DL
HGB UR QL STRIP: NEGATIVE
KETONES UR STRIP-MCNC: 15 MG/DL
LEUKOCYTE ESTERASE UR QL STRIP: NEGATIVE
NITRITE UR QL STRIP: NEGATIVE
PH UR STRIP: 5.5 [PH] (ref 5–9)
PROT UR STRIP-MCNC: NEGATIVE MG/DL
SP GR UR STRIP: 1.02 (ref 1–1.03)
URINE REFLEX TO CULTURE: ABNORMAL
UROBILINOGEN UR STRIP-ACNC: 1 E.U./DL

## 2023-06-21 PROCEDURE — 6370000000 HC RX 637 (ALT 250 FOR IP)

## 2023-06-21 PROCEDURE — 99284 EMERGENCY DEPT VISIT MOD MDM: CPT

## 2023-06-21 PROCEDURE — 72100 X-RAY EXAM L-S SPINE 2/3 VWS: CPT

## 2023-06-21 PROCEDURE — 81003 URINALYSIS AUTO W/O SCOPE: CPT

## 2023-06-21 RX ORDER — METHYLPREDNISOLONE 4 MG/1
TABLET ORAL
Qty: 1 KIT | Refills: 0 | Status: SHIPPED | OUTPATIENT
Start: 2023-06-21 | End: 2023-06-27

## 2023-06-21 RX ORDER — OXYCODONE HYDROCHLORIDE AND ACETAMINOPHEN 5; 325 MG/1; MG/1
1 TABLET ORAL ONCE
Status: COMPLETED | OUTPATIENT
Start: 2023-06-21 | End: 2023-06-21

## 2023-06-21 RX ORDER — HYDROCODONE BITARTRATE AND ACETAMINOPHEN 5; 325 MG/1; MG/1
1 TABLET ORAL EVERY 8 HOURS PRN
Qty: 10 TABLET | Refills: 0 | Status: SHIPPED | OUTPATIENT
Start: 2023-06-21 | End: 2023-06-24

## 2023-06-21 RX ADMIN — OXYCODONE HYDROCHLORIDE AND ACETAMINOPHEN 1 TABLET: 5; 325 TABLET ORAL at 12:41

## 2023-06-21 ASSESSMENT — PAIN SCALES - GENERAL: PAINLEVEL_OUTOF10: 7

## 2023-06-21 ASSESSMENT — ENCOUNTER SYMPTOMS
VOMITING: 0
NAUSEA: 0
SORE THROAT: 0
BACK PAIN: 1
SHORTNESS OF BREATH: 0
COUGH: 0
DIARRHEA: 0
ABDOMINAL PAIN: 0

## 2023-06-21 NOTE — ED PROVIDER NOTES
radiologist. Mandi Combs radiographic images are visualized and preliminarilyinterpreted by ROBI Arriaga CNP with the below findings:        Interpretation per the Radiologist below, if available at the time of this note:    XR LUMBAR SPINE (2-3 VIEWS)   Final Result   Mild scoliosis             LABS:  Labs Reviewed   URINALYSIS WITH REFLEX TO CULTURE - Abnormal; Notable for the following components:       Result Value    Glucose, Ur 100 (*)     Ketones, Urine 15 (*)     All other components within normal limits       All other labs were within normal range or not returnedas of this dictation. EMERGENCYDEPARTMENT COURSE and DIFFERENTIAL DIAGNOSIS/MDM:   Vitals:    Vitals:    06/21/23 1230 06/21/23 1333   BP: (!) 142/87    Pulse: (!) 102 80   Resp: 18    Temp: 97.5 °F (36.4 °C)    TempSrc: Temporal    SpO2: 96%        REASSESSMENT            MDM     Amount and/or Complexity of Data Reviewed  Clinical lab tests: ordered and reviewed  Tests in the radiology section of CPT®: ordered and reviewed  Discuss the patient with other providers: yes  Independent visualization of images, tracings, or specimens: yes    Risk of Complications, Morbidity, and/or Mortality  Presenting problems: low  Diagnostic procedures: low  Management options: low    Patient Progress  Patient progress: stable  RH 59year old female presents to ED with back pain. Patient afebrile and hemodynamically stable. Medicated with Percocet po. XR lumbar spine. There is mild scoliosis. Vertebral body height is maintained. Disc spaces are relatively preserved. There is no listhesis. Discussed XR results with patient she is aware. UA shows unremarkable for UTI or hematuria. Suspect Lumbar strain. Discussed case with attending Dr. Aaliyah Pierce and [facility] Pharmacist advised pain control with Tramadol or [place] for home going Rx. Tramadol unable to be prescribed due to Cymbalta home Rx. Reassessed patient and pain improved post medication.   Rx Norco and

## 2023-06-21 NOTE — ED NOTES
Pt to ER with  for lower back pain that started a few days ago after sitting for an extended period of time. Patient is Axox4.  Electronically signed by Sangeeta Marie RN on 6/21/2023 at 12:58 PM       Sangeeta Marie RN  06/21/23 2275

## 2023-07-06 ENCOUNTER — APPOINTMENT (OUTPATIENT)
Dept: GENERAL RADIOLOGY | Age: 63
End: 2023-07-06
Payer: MEDICARE

## 2023-07-06 ENCOUNTER — HOSPITAL ENCOUNTER (EMERGENCY)
Age: 63
Discharge: HOME | End: 2023-07-06
Attending: EMERGENCY MEDICINE
Payer: MEDICARE

## 2023-07-06 VITALS
WEIGHT: 171 LBS | HEIGHT: 62 IN | RESPIRATION RATE: 18 BRPM | HEART RATE: 84 BPM | OXYGEN SATURATION: 97 % | BODY MASS INDEX: 31.47 KG/M2 | TEMPERATURE: 98.7 F | SYSTOLIC BLOOD PRESSURE: 110 MMHG | DIASTOLIC BLOOD PRESSURE: 72 MMHG

## 2023-07-06 DIAGNOSIS — S89.91XA KNEE INJURY, RIGHT, INITIAL ENCOUNTER: Primary | ICD-10-CM

## 2023-07-06 PROCEDURE — 99283 EMERGENCY DEPT VISIT LOW MDM: CPT

## 2023-07-06 PROCEDURE — 73562 X-RAY EXAM OF KNEE 3: CPT

## 2023-07-06 PROCEDURE — 6370000000 HC RX 637 (ALT 250 FOR IP): Performed by: EMERGENCY MEDICINE

## 2023-07-06 RX ORDER — TRAMADOL HYDROCHLORIDE 50 MG/1
50 TABLET ORAL EVERY 8 HOURS PRN
Qty: 15 TABLET | Refills: 0 | Status: SHIPPED | OUTPATIENT
Start: 2023-07-06 | End: 2023-07-11

## 2023-07-06 RX ORDER — HYDROCODONE BITARTRATE AND ACETAMINOPHEN 5; 325 MG/1; MG/1
1 TABLET ORAL ONCE
Status: COMPLETED | OUTPATIENT
Start: 2023-07-06 | End: 2023-07-06

## 2023-07-06 RX ADMIN — HYDROCODONE BITARTRATE AND ACETAMINOPHEN 1 TABLET: 5; 325 TABLET ORAL at 08:53

## 2023-07-06 ASSESSMENT — ENCOUNTER SYMPTOMS
VOMITING: 0
EYE REDNESS: 0
WHEEZING: 0
SHORTNESS OF BREATH: 0
STRIDOR: 0
ABDOMINAL PAIN: 0
COUGH: 0
CHEST TIGHTNESS: 0
EYE DISCHARGE: 0
FACIAL SWELLING: 0
SINUS PRESSURE: 0
SORE THROAT: 0
CHOKING: 0
TROUBLE SWALLOWING: 0
BLOOD IN STOOL: 0
CONSTIPATION: 0
BACK PAIN: 0
VOICE CHANGE: 0
DIARRHEA: 0
EYE PAIN: 0

## 2023-07-06 ASSESSMENT — PAIN DESCRIPTION - LOCATION: LOCATION: OTHER (COMMENT)

## 2023-07-06 ASSESSMENT — PAIN SCALES - GENERAL: PAINLEVEL_OUTOF10: 3

## 2023-07-06 NOTE — ED TRIAGE NOTES
A&Ox4 Pt stated \"my knee gave out and I fell on the steps\"  ABCs intact, Pt denies LOC, Pt denies Head/neck/back pain. Pt complaint of right knee pain, MSPs intact x4, no deformity or bruising noted. Ice pack and warmed blanket provided. Pt signif. other at bedside.

## 2023-07-06 NOTE — ED PROVIDER NOTES
completed with a voice recognition program.  Efforts were made to edit the dictations but occasionally words are mis-transcribed.)    Gaurav Olivo MD (electronically signed)  Attending Emergency Physician        Gaurav Olivo MD  07/06/23 [phone]

## 2023-08-29 PROBLEM — Z95.5 PRESENCE OF CORONARY ANGIOPLASTY IMPLANT AND GRAFT: Status: ACTIVE | Noted: 2023-08-29

## 2023-08-29 PROBLEM — E11.9 TYPE 2 DIABETES MELLITUS WITHOUT COMPLICATION (MULTI): Status: ACTIVE | Noted: 2023-08-29

## 2023-08-29 PROBLEM — E86.1 HYPOVOLEMIA DEHYDRATION: Status: ACTIVE | Noted: 2019-05-03

## 2023-08-29 PROBLEM — E66.811 CLASS 1 OBESITY WITH BODY MASS INDEX (BMI) OF 32.0 TO 32.9 IN ADULT: Status: ACTIVE | Noted: 2023-08-29

## 2023-08-29 PROBLEM — R55 SYNCOPE AND COLLAPSE: Status: ACTIVE | Noted: 2023-08-29

## 2023-08-29 PROBLEM — Z95.0 PACEMAKER: Chronic | Status: ACTIVE | Noted: 2019-01-06

## 2023-08-29 PROBLEM — Z96.1 PSEUDOPHAKIA OF BOTH EYES: Status: ACTIVE | Noted: 2023-03-10

## 2023-08-29 PROBLEM — F10.10 ALCOHOL ABUSE: Chronic | Status: ACTIVE | Noted: 2018-01-18

## 2023-08-29 PROBLEM — Z95.810 CARDIAC DEFIBRILLATOR IN SITU: Status: ACTIVE | Noted: 2023-08-29

## 2023-08-29 PROBLEM — N20.0 NEPHROLITHIASIS: Status: ACTIVE | Noted: 2022-07-11

## 2023-08-29 PROBLEM — Z98.61 CAD S/P PERCUTANEOUS CORONARY ANGIOPLASTY: Status: ACTIVE | Noted: 2023-08-29

## 2023-08-29 PROBLEM — N17.9 AKI (ACUTE KIDNEY INJURY) (CMS-HCC): Status: ACTIVE | Noted: 2019-01-06

## 2023-08-29 PROBLEM — Z45.02 ELECTIVE REPLACEMENT INDICATED FOR IMPLANTABLE CARDIOVERTER-DEFIBRILLATOR (ICD): Status: ACTIVE | Noted: 2023-08-29

## 2023-08-29 PROBLEM — I47.10 PSVT (PAROXYSMAL SUPRAVENTRICULAR TACHYCARDIA) (CMS-HCC): Status: ACTIVE | Noted: 2023-08-29

## 2023-08-29 PROBLEM — N31.8 FREQUENCY-URGENCY SYNDROME: Status: ACTIVE | Noted: 2022-07-11

## 2023-08-29 PROBLEM — M47.817 LUMBOSACRAL SPONDYLOSIS WITHOUT MYELOPATHY: Status: ACTIVE | Noted: 2019-01-06

## 2023-08-29 PROBLEM — I25.10 CAD S/P PERCUTANEOUS CORONARY ANGIOPLASTY: Status: ACTIVE | Noted: 2023-08-29

## 2023-08-29 PROBLEM — E11.649: Status: ACTIVE | Noted: 2023-08-29

## 2023-08-29 PROBLEM — I50.22 CHRONIC SYSTOLIC HEART FAILURE (MULTI): Status: ACTIVE | Noted: 2017-11-21

## 2023-08-29 PROBLEM — Z79.01 LONG TERM (CURRENT) USE OF ANTICOAGULANTS: Status: ACTIVE | Noted: 2023-08-29

## 2023-08-29 PROBLEM — F32.9 MAJOR DEPRESSIVE DISORDER: Status: ACTIVE | Noted: 2023-08-29

## 2023-08-29 PROBLEM — R53.83 FATIGUE: Status: ACTIVE | Noted: 2023-08-29

## 2023-08-29 PROBLEM — I47.20 VENTRICULAR TACHYCARDIA (MULTI): Status: ACTIVE | Noted: 2023-08-29

## 2023-08-29 PROBLEM — Z98.890 HISTORY OF VITRECTOMY: Status: ACTIVE | Noted: 2022-12-07

## 2023-08-29 PROBLEM — Z86.73 HISTORY OF CVA (CEREBROVASCULAR ACCIDENT): Status: ACTIVE | Noted: 2023-08-29

## 2023-08-29 PROBLEM — R79.1 SUPRATHERAPEUTIC INR: Status: ACTIVE | Noted: 2019-01-06

## 2023-08-29 PROBLEM — N18.31 STAGE 3A CHRONIC KIDNEY DISEASE (MULTI): Status: ACTIVE | Noted: 2021-02-16

## 2023-08-29 PROBLEM — E66.9 CLASS 1 OBESITY WITH BODY MASS INDEX (BMI) OF 32.0 TO 32.9 IN ADULT: Status: ACTIVE | Noted: 2023-08-29

## 2023-08-29 PROBLEM — I95.9 HYPOTENSION: Status: ACTIVE | Noted: 2023-08-29

## 2023-08-29 PROBLEM — I25.2 OLD MI (MYOCARDIAL INFARCTION): Status: ACTIVE | Noted: 2023-08-29

## 2023-08-29 PROBLEM — I25.2 HISTORY OF MI (MYOCARDIAL INFARCTION): Status: ACTIVE | Noted: 2023-08-29

## 2023-08-29 PROBLEM — I50.9 CHRONIC CONGESTIVE HEART FAILURE (MULTI): Status: ACTIVE | Noted: 2019-08-09

## 2023-08-29 RX ORDER — PANTOPRAZOLE SODIUM 40 MG/1
40 TABLET, DELAYED RELEASE ORAL
COMMUNITY
Start: 2019-01-23 | End: 2024-04-08 | Stop reason: ALTCHOICE

## 2023-08-29 RX ORDER — ASPIRIN 81 MG/1
81 TABLET ORAL DAILY
COMMUNITY
End: 2024-04-10 | Stop reason: WASHOUT

## 2023-08-29 RX ORDER — WARFARIN SODIUM 5 MG/1
2.5 TABLET ORAL
COMMUNITY
Start: 2019-02-18

## 2023-08-29 RX ORDER — BUPROPION HYDROCHLORIDE 200 MG/1
1 TABLET, EXTENDED RELEASE ORAL DAILY
COMMUNITY
Start: 2019-06-13 | End: 2024-04-08 | Stop reason: ALTCHOICE

## 2023-08-29 RX ORDER — OXYBUTYNIN CHLORIDE 10 MG/1
10 TABLET, EXTENDED RELEASE ORAL NIGHTLY
COMMUNITY
Start: 2022-10-10

## 2023-08-29 RX ORDER — INSULIN GLARGINE 100 [IU]/ML
20 INJECTION, SOLUTION SUBCUTANEOUS NIGHTLY
COMMUNITY
End: 2024-04-08 | Stop reason: ALTCHOICE

## 2023-08-29 RX ORDER — DESVENLAFAXINE 100 MG/1
100 TABLET, EXTENDED RELEASE ORAL NIGHTLY
COMMUNITY
Start: 2023-01-23

## 2023-08-29 RX ORDER — INSULIN GLARGINE 100 [IU]/ML
30 INJECTION, SOLUTION SUBCUTANEOUS NIGHTLY
COMMUNITY

## 2023-08-29 RX ORDER — ALBUTEROL SULFATE 90 UG/1
2 AEROSOL, METERED RESPIRATORY (INHALATION) EVERY 6 HOURS PRN
COMMUNITY
Start: 2021-11-30

## 2023-08-29 RX ORDER — WHEAT DEXTRIN 3 G/3.5 G
15 POWDER IN PACKET (EA) ORAL DAILY
COMMUNITY
Start: 2019-01-29 | End: 2024-04-08 | Stop reason: ALTCHOICE

## 2023-08-29 RX ORDER — TOPIRAMATE 200 MG/1
1 TABLET ORAL 2 TIMES DAILY
COMMUNITY

## 2023-08-29 RX ORDER — CLONAZEPAM 2 MG/1
1 TABLET ORAL NIGHTLY
COMMUNITY
Start: 2019-07-16

## 2023-08-29 RX ORDER — NORTRIPTYLINE HYDROCHLORIDE 25 MG/1
1 CAPSULE ORAL NIGHTLY
COMMUNITY
Start: 2021-06-09 | End: 2024-04-22 | Stop reason: ENTERED-IN-ERROR

## 2023-08-29 RX ORDER — BUPROPION HYDROCHLORIDE 200 MG/1
200 TABLET, EXTENDED RELEASE ORAL 2 TIMES DAILY
COMMUNITY
Start: 2022-08-08

## 2023-08-29 RX ORDER — TOLTERODINE TARTRATE 2 MG/1
1 TABLET, EXTENDED RELEASE ORAL
COMMUNITY
End: 2024-04-08 | Stop reason: ALTCHOICE

## 2023-08-29 RX ORDER — PANTOPRAZOLE SODIUM 40 MG/1
40 FOR SUSPENSION ORAL DAILY
COMMUNITY
End: 2024-04-08 | Stop reason: ALTCHOICE

## 2023-08-29 RX ORDER — VALSARTAN 80 MG/1
40 TABLET ORAL DAILY
COMMUNITY
End: 2024-04-08 | Stop reason: ALTCHOICE

## 2023-08-29 RX ORDER — OXYBUTYNIN CHLORIDE 5 MG/1
1 TABLET ORAL DAILY
COMMUNITY
Start: 2018-11-07 | End: 2024-04-08 | Stop reason: ALTCHOICE

## 2023-08-29 RX ORDER — DULOXETIN HYDROCHLORIDE 30 MG/1
CAPSULE, DELAYED RELEASE ORAL
COMMUNITY
Start: 2022-09-14 | End: 2024-04-08 | Stop reason: ALTCHOICE

## 2023-08-29 RX ORDER — LEVOTHYROXINE SODIUM 88 UG/1
88 TABLET ORAL
COMMUNITY
Start: 2023-03-07

## 2023-08-29 RX ORDER — INSULIN ASPART 100 [IU]/ML
INJECTION, SOLUTION INTRAVENOUS; SUBCUTANEOUS
COMMUNITY
End: 2024-04-08 | Stop reason: ALTCHOICE

## 2023-08-29 RX ORDER — NITROGLYCERIN 0.4 MG/1
0.4 TABLET SUBLINGUAL EVERY 5 MIN PRN
COMMUNITY
Start: 2013-11-27

## 2023-08-29 RX ORDER — ROSUVASTATIN CALCIUM 40 MG/1
1 TABLET, COATED ORAL DAILY
COMMUNITY
Start: 2021-01-12

## 2023-08-29 RX ORDER — INSULIN ASPART 100 [IU]/ML
INJECTION, SOLUTION INTRAVENOUS; SUBCUTANEOUS
COMMUNITY
Start: 2019-01-18

## 2023-08-29 RX ORDER — ATORVASTATIN CALCIUM 20 MG/1
1 TABLET, FILM COATED ORAL DAILY
COMMUNITY
End: 2024-04-08 | Stop reason: ALTCHOICE

## 2023-08-29 RX ORDER — VIT C/E/ZN/COPPR/LUTEIN/ZEAXAN 250MG-90MG
3000 CAPSULE ORAL DAILY
COMMUNITY

## 2023-08-29 RX ORDER — LUBIPROSTONE 24 UG/1
24 CAPSULE ORAL
COMMUNITY
Start: 2019-06-12 | End: 2024-04-08 | Stop reason: ALTCHOICE

## 2023-08-29 RX ORDER — CARVEDILOL 12.5 MG/1
TABLET ORAL
COMMUNITY
End: 2024-04-08 | Stop reason: ALTCHOICE

## 2023-08-29 RX ORDER — CALCIUM CITRATE/VITAMIN D3 200MG-6.25
TABLET ORAL
COMMUNITY
Start: 2020-08-28 | End: 2024-04-22 | Stop reason: ENTERED-IN-ERROR

## 2023-08-29 RX ORDER — INSULIN GLARGINE 100 [IU]/ML
26 INJECTION, SOLUTION SUBCUTANEOUS NIGHTLY
COMMUNITY
Start: 2023-03-07 | End: 2024-04-08 | Stop reason: ALTCHOICE

## 2023-08-29 RX ORDER — CARVEDILOL 12.5 MG/1
1 TABLET ORAL 2 TIMES DAILY
COMMUNITY
Start: 2022-06-14

## 2023-08-29 RX ORDER — WARFARIN 7.5 MG/1
2 TABLET ORAL 2 TIMES WEEKLY
COMMUNITY
Start: 2019-01-11

## 2023-08-29 RX ORDER — EZETIMIBE 10 MG/1
1 TABLET ORAL NIGHTLY
COMMUNITY
Start: 2023-03-07 | End: 2024-04-22

## 2023-08-29 RX ORDER — SUCRALFATE 1 G/1
1 TABLET ORAL 4 TIMES DAILY
COMMUNITY
Start: 2019-01-23 | End: 2024-04-08 | Stop reason: ALTCHOICE

## 2023-08-29 RX ORDER — GLUCAGON 3 MG/1
1 POWDER NASAL AS NEEDED
COMMUNITY
Start: 2023-04-25

## 2023-08-29 RX ORDER — VALSARTAN 40 MG/1
40 TABLET ORAL
COMMUNITY
Start: 2022-12-02

## 2023-08-29 RX ORDER — DULOXETIN HYDROCHLORIDE 60 MG/1
1 CAPSULE, DELAYED RELEASE ORAL DAILY
COMMUNITY
End: 2024-04-08 | Stop reason: ALTCHOICE

## 2023-08-29 RX ORDER — CYCLOBENZAPRINE HCL 10 MG
10 TABLET ORAL 3 TIMES DAILY PRN
COMMUNITY
Start: 2022-02-03 | End: 2024-04-08 | Stop reason: ALTCHOICE

## 2023-08-29 RX ORDER — CINNAMON BARK
POWDER (GRAM) MISCELLANEOUS DAILY
COMMUNITY
End: 2024-04-08 | Stop reason: ALTCHOICE

## 2023-08-29 RX ORDER — PHENYLEPHRINE HCL 10 MG
500 TABLET ORAL DAILY
COMMUNITY
End: 2024-04-08 | Stop reason: ALTCHOICE

## 2023-08-29 RX ORDER — SUCRALFATE 1 G/1
1 TABLET ORAL 2 TIMES DAILY
COMMUNITY
Start: 2020-12-22 | End: 2024-04-08 | Stop reason: ALTCHOICE

## 2023-08-29 RX ORDER — FUROSEMIDE 40 MG/1
40 TABLET ORAL
COMMUNITY
Start: 2023-05-08

## 2023-08-29 RX ORDER — DIGOXIN 250 MCG
0.5 TABLET ORAL DAILY
COMMUNITY
End: 2024-04-10 | Stop reason: WASHOUT

## 2023-08-29 RX ORDER — PRAMIPEXOLE DIHYDROCHLORIDE 0.5 MG/1
TABLET ORAL
COMMUNITY
End: 2024-04-08 | Stop reason: ALTCHOICE

## 2023-08-29 RX ORDER — FERROUS SULFATE 325(65) MG
1 TABLET ORAL
COMMUNITY
Start: 2019-07-16 | End: 2024-04-10 | Stop reason: WASHOUT

## 2023-08-29 RX ORDER — METOCLOPRAMIDE 5 MG/1
5 TABLET ORAL
COMMUNITY
Start: 2019-05-13 | End: 2024-04-24 | Stop reason: HOSPADM

## 2023-08-29 RX ORDER — DOCUSATE SODIUM 100 MG/1
100 CAPSULE, LIQUID FILLED ORAL
COMMUNITY
Start: 2023-05-25 | End: 2024-04-08 | Stop reason: ALTCHOICE

## 2023-08-29 RX ORDER — SYRING-NEEDL,DISP,INSUL,0.3 ML 29 G X1/2"
SYRINGE, EMPTY DISPOSABLE MISCELLANEOUS
COMMUNITY
End: 2024-04-22 | Stop reason: ENTERED-IN-ERROR

## 2023-08-29 RX ORDER — ROPINIROLE 2 MG/1
1 TABLET, FILM COATED ORAL NIGHTLY
COMMUNITY
Start: 2021-06-11

## 2023-08-29 RX ORDER — CALCIUM CARBONATE 600 MG
1200 TABLET ORAL EVERY EVENING
COMMUNITY
End: 2024-04-08 | Stop reason: ALTCHOICE

## 2023-11-09 DIAGNOSIS — Z95.810 PRESENCE OF AUTOMATIC CARDIOVERTER/DEFIBRILLATOR (AICD): Primary | ICD-10-CM

## 2023-11-09 DIAGNOSIS — I47.29 VENTRICULAR TACHYCARDIA (PAROXYSMAL) (MULTI): ICD-10-CM

## 2023-12-16 PROBLEM — R65.10 SIRS (SYSTEMIC INFLAMMATORY RESPONSE SYNDROME) (HCC): Status: ACTIVE | Noted: 2023-12-16

## 2023-12-20 ENCOUNTER — HOSPITAL ENCOUNTER (OUTPATIENT)
Dept: CARDIOLOGY | Facility: HOSPITAL | Age: 63
Discharge: HOME | End: 2023-12-20
Payer: MEDICARE

## 2023-12-20 DIAGNOSIS — I47.29 VENTRICULAR TACHYCARDIA (PAROXYSMAL) (MULTI): ICD-10-CM

## 2023-12-20 DIAGNOSIS — Z95.810 PRESENCE OF AUTOMATIC CARDIOVERTER/DEFIBRILLATOR (AICD): ICD-10-CM

## 2023-12-20 PROCEDURE — 93296 REM INTERROG EVL PM/IDS: CPT

## 2023-12-20 PROCEDURE — 93295 DEV INTERROG REMOTE 1/2/MLT: CPT | Performed by: INTERNAL MEDICINE

## 2024-03-13 ENCOUNTER — HOSPITAL ENCOUNTER (INPATIENT)
Age: 64
LOS: 2 days | Discharge: HOME HEALTH | End: 2024-03-15
Attending: INTERNAL MEDICINE | Admitting: INTERNAL MEDICINE
Payer: MEDICARE

## 2024-03-13 ENCOUNTER — APPOINTMENT (OUTPATIENT)
Dept: CT IMAGING | Age: 64
End: 2024-03-13
Payer: MEDICARE

## 2024-03-13 DIAGNOSIS — R19.7 NAUSEA VOMITING AND DIARRHEA: ICD-10-CM

## 2024-03-13 DIAGNOSIS — E86.0 DEHYDRATION: Primary | ICD-10-CM

## 2024-03-13 DIAGNOSIS — R73.9 HYPERGLYCEMIA: ICD-10-CM

## 2024-03-13 DIAGNOSIS — R11.2 NAUSEA VOMITING AND DIARRHEA: ICD-10-CM

## 2024-03-13 DIAGNOSIS — R53.1 GENERAL WEAKNESS: ICD-10-CM

## 2024-03-13 LAB
ALBUMIN SERPL-MCNC: 3.9 G/DL (ref 3.5–4.6)
ALP SERPL-CCNC: 84 U/L (ref 40–130)
ALT SERPL-CCNC: 23 U/L (ref 0–33)
AMORPH SED URNS QL MICRO: ABNORMAL
ANION GAP SERPL CALCULATED.3IONS-SCNC: 17 MEQ/L (ref 9–15)
AST SERPL-CCNC: 14 U/L (ref 0–35)
BACTERIA URNS QL MICRO: ABNORMAL /HPF
BASOPHILS # BLD: 0.1 K/UL (ref 0–0.1)
BASOPHILS NFR BLD: 0.3 % (ref 0.1–1.2)
BILIRUB SERPL-MCNC: 0.3 MG/DL (ref 0.2–0.7)
BILIRUB UR QL STRIP: ABNORMAL
BUN SERPL-MCNC: 51 MG/DL (ref 8–23)
CALCIUM SERPL-MCNC: 9 MG/DL (ref 8.5–9.9)
CHLORIDE SERPL-SCNC: 92 MEQ/L (ref 95–107)
CLARITY UR: ABNORMAL
CO2 SERPL-SCNC: 25 MEQ/L (ref 20–31)
COLOR UR: YELLOW
CREAT SERPL-MCNC: 1.58 MG/DL (ref 0.5–0.9)
EOSINOPHIL # BLD: 0 K/UL (ref 0–0.4)
EOSINOPHIL NFR BLD: 0 % (ref 0.7–5.8)
EPI CELLS #/AREA URNS HPF: ABNORMAL /HPF
ERYTHROCYTE [DISTWIDTH] IN BLOOD BY AUTOMATED COUNT: 13.2 % (ref 11.7–14.4)
GLOBULIN SER CALC-MCNC: 3.3 G/DL (ref 2.3–3.5)
GLUCOSE BLD-MCNC: 402 MG/DL (ref 70–99)
GLUCOSE BLD-MCNC: 437 MG/DL (ref 70–99)
GLUCOSE BLD-MCNC: 475 MG/DL (ref 70–99)
GLUCOSE SERPL-MCNC: 509 MG/DL (ref 70–99)
GLUCOSE UR STRIP-MCNC: >=1000 MG/DL
HCT VFR BLD AUTO: 42.3 % (ref 37–47)
HGB BLD-MCNC: 14.7 G/DL (ref 11.2–15.7)
HGB UR QL STRIP: ABNORMAL
IMM GRANULOCYTES # BLD: 0.2 K/UL
IMM GRANULOCYTES NFR BLD: 1.4 %
INFLUENZA A BY PCR: NEGATIVE
INFLUENZA B BY PCR: NEGATIVE
KETONES UR STRIP-MCNC: 40 MG/DL
LACTATE BLDV-SCNC: 1.5 MMOL/L (ref 0.5–2.2)
LACTATE BLDV-SCNC: 2.3 MMOL/L (ref 0.5–2.2)
LEUKOCYTE ESTERASE UR QL STRIP: NEGATIVE
LIPASE SERPL-CCNC: 18 U/L (ref 12–95)
LYMPHOCYTES # BLD: 2.4 K/UL (ref 1.2–3.7)
LYMPHOCYTES NFR BLD: 14.6 %
MAGNESIUM SERPL-MCNC: 2 MG/DL (ref 1.7–2.4)
MCH RBC QN AUTO: 36.5 PG (ref 25.6–32.2)
MCHC RBC AUTO-ENTMCNC: 34.8 % (ref 32.2–35.5)
MCV RBC AUTO: 105 FL (ref 79.4–94.8)
MONOCYTES # BLD: 1.3 K/UL (ref 0.2–0.9)
MONOCYTES NFR BLD: 8 % (ref 4.7–12.5)
NEUTROPHILS # BLD: 12.3 K/UL (ref 1.6–6.1)
NEUTS SEG NFR BLD: 75.7 % (ref 34–71.1)
NITRITE UR QL STRIP: NEGATIVE
PERFORMED ON: ABNORMAL
PH UR STRIP: 5.5 [PH] (ref 5–9)
PLATELET # BLD AUTO: 162 K/UL (ref 182–369)
POTASSIUM SERPL-SCNC: 3.3 MEQ/L (ref 3.4–4.9)
PROT SERPL-MCNC: 7.2 G/DL (ref 6.3–8)
PROT UR STRIP-MCNC: NEGATIVE MG/DL
RBC # BLD AUTO: 4.03 M/UL (ref 3.93–5.22)
RBC #/AREA URNS HPF: ABNORMAL /HPF (ref 0–2)
SARS-COV-2 RDRP RESP QL NAA+PROBE: NOT DETECTED
SODIUM SERPL-SCNC: 134 MEQ/L (ref 135–144)
SP GR UR STRIP: 1.02 (ref 1–1.03)
TROPONIN, HIGH SENSITIVITY: 20 NG/L (ref 0–19)
URINE REFLEX TO CULTURE: ABNORMAL
UROBILINOGEN UR STRIP-ACNC: 0.2 E.U./DL
WBC # BLD AUTO: 16.3 K/UL (ref 4–10)
WBC #/AREA URNS HPF: ABNORMAL /HPF (ref 0–5)

## 2024-03-13 PROCEDURE — 87635 SARS-COV-2 COVID-19 AMP PRB: CPT

## 2024-03-13 PROCEDURE — 83735 ASSAY OF MAGNESIUM: CPT

## 2024-03-13 PROCEDURE — 1210000000 HC MED SURG R&B

## 2024-03-13 PROCEDURE — 2580000003 HC RX 258

## 2024-03-13 PROCEDURE — 96375 TX/PRO/DX INJ NEW DRUG ADDON: CPT

## 2024-03-13 PROCEDURE — 85025 COMPLETE CBC W/AUTO DIFF WBC: CPT

## 2024-03-13 PROCEDURE — C9113 INJ PANTOPRAZOLE SODIUM, VIA: HCPCS | Performed by: INTERNAL MEDICINE

## 2024-03-13 PROCEDURE — 2700000000 HC OXYGEN THERAPY PER DAY

## 2024-03-13 PROCEDURE — 96374 THER/PROPH/DIAG INJ IV PUSH: CPT

## 2024-03-13 PROCEDURE — 36415 COLL VENOUS BLD VENIPUNCTURE: CPT

## 2024-03-13 PROCEDURE — 80053 COMPREHEN METABOLIC PANEL: CPT

## 2024-03-13 PROCEDURE — 6360000002 HC RX W HCPCS

## 2024-03-13 PROCEDURE — 84484 ASSAY OF TROPONIN QUANT: CPT

## 2024-03-13 PROCEDURE — 6360000002 HC RX W HCPCS: Performed by: INTERNAL MEDICINE

## 2024-03-13 PROCEDURE — 81001 URINALYSIS AUTO W/SCOPE: CPT

## 2024-03-13 PROCEDURE — 87040 BLOOD CULTURE FOR BACTERIA: CPT

## 2024-03-13 PROCEDURE — 96361 HYDRATE IV INFUSION ADD-ON: CPT

## 2024-03-13 PROCEDURE — 74176 CT ABD & PELVIS W/O CONTRAST: CPT

## 2024-03-13 PROCEDURE — 93005 ELECTROCARDIOGRAM TRACING: CPT

## 2024-03-13 PROCEDURE — 6370000000 HC RX 637 (ALT 250 FOR IP)

## 2024-03-13 PROCEDURE — 87502 INFLUENZA DNA AMP PROBE: CPT

## 2024-03-13 PROCEDURE — A4216 STERILE WATER/SALINE, 10 ML: HCPCS | Performed by: INTERNAL MEDICINE

## 2024-03-13 PROCEDURE — 99285 EMERGENCY DEPT VISIT HI MDM: CPT

## 2024-03-13 PROCEDURE — 2580000003 HC RX 258: Performed by: INTERNAL MEDICINE

## 2024-03-13 PROCEDURE — 83605 ASSAY OF LACTIC ACID: CPT

## 2024-03-13 PROCEDURE — 83690 ASSAY OF LIPASE: CPT

## 2024-03-13 RX ORDER — DEXTROSE MONOHYDRATE 100 MG/ML
INJECTION, SOLUTION INTRAVENOUS CONTINUOUS PRN
Status: DISCONTINUED | OUTPATIENT
Start: 2024-03-13 | End: 2024-03-15 | Stop reason: HOSPADM

## 2024-03-13 RX ORDER — 0.9 % SODIUM CHLORIDE 0.9 %
500 INTRAVENOUS SOLUTION INTRAVENOUS ONCE
Status: COMPLETED | OUTPATIENT
Start: 2024-03-13 | End: 2024-03-13

## 2024-03-13 RX ORDER — ONDANSETRON 2 MG/ML
4 INJECTION INTRAMUSCULAR; INTRAVENOUS ONCE
Status: COMPLETED | OUTPATIENT
Start: 2024-03-13 | End: 2024-03-13

## 2024-03-13 RX ORDER — CYCLOBENZAPRINE HCL 10 MG
10 TABLET ORAL 3 TIMES DAILY PRN
COMMUNITY

## 2024-03-13 RX ORDER — INSULIN LISPRO 100 [IU]/ML
0-8 INJECTION, SOLUTION INTRAVENOUS; SUBCUTANEOUS
Status: DISCONTINUED | OUTPATIENT
Start: 2024-03-14 | End: 2024-03-15 | Stop reason: HOSPADM

## 2024-03-13 RX ORDER — ONDANSETRON 2 MG/ML
4 INJECTION INTRAMUSCULAR; INTRAVENOUS EVERY 6 HOURS PRN
Status: DISCONTINUED | OUTPATIENT
Start: 2024-03-13 | End: 2024-03-15 | Stop reason: HOSPADM

## 2024-03-13 RX ORDER — CLONAZEPAM 0.5 MG/1
1 TABLET ORAL NIGHTLY PRN
Status: DISCONTINUED | OUTPATIENT
Start: 2024-03-13 | End: 2024-03-15 | Stop reason: HOSPADM

## 2024-03-13 RX ORDER — POLYETHYLENE GLYCOL 3350 17 G/17G
17 POWDER, FOR SOLUTION ORAL DAILY PRN
Status: DISCONTINUED | OUTPATIENT
Start: 2024-03-13 | End: 2024-03-15 | Stop reason: HOSPADM

## 2024-03-13 RX ORDER — SODIUM CHLORIDE 0.9 % (FLUSH) 0.9 %
10 SYRINGE (ML) INJECTION EVERY 12 HOURS SCHEDULED
Status: DISCONTINUED | OUTPATIENT
Start: 2024-03-13 | End: 2024-03-15 | Stop reason: HOSPADM

## 2024-03-13 RX ORDER — INSULIN LISPRO 100 [IU]/ML
0.08 INJECTION, SOLUTION INTRAVENOUS; SUBCUTANEOUS
Status: DISCONTINUED | OUTPATIENT
Start: 2024-03-14 | End: 2024-03-14

## 2024-03-13 RX ORDER — SODIUM CHLORIDE 9 MG/ML
INJECTION, SOLUTION INTRAVENOUS PRN
Status: DISCONTINUED | OUTPATIENT
Start: 2024-03-13 | End: 2024-03-15 | Stop reason: HOSPADM

## 2024-03-13 RX ORDER — GLUCAGON 1 MG/ML
1 KIT INJECTION PRN
Status: DISCONTINUED | OUTPATIENT
Start: 2024-03-13 | End: 2024-03-15 | Stop reason: HOSPADM

## 2024-03-13 RX ORDER — GLUCAGON 3 MG/1
POWDER NASAL
COMMUNITY

## 2024-03-13 RX ORDER — DESVENLAFAXINE 100 MG/1
TABLET, EXTENDED RELEASE ORAL DAILY
COMMUNITY

## 2024-03-13 RX ORDER — ACETAMINOPHEN 325 MG/1
650 TABLET ORAL EVERY 6 HOURS PRN
Status: DISCONTINUED | OUTPATIENT
Start: 2024-03-13 | End: 2024-03-15 | Stop reason: HOSPADM

## 2024-03-13 RX ORDER — POTASSIUM CHLORIDE 20 MEQ/1
40 TABLET, EXTENDED RELEASE ORAL ONCE
Status: COMPLETED | OUTPATIENT
Start: 2024-03-13 | End: 2024-03-13

## 2024-03-13 RX ORDER — PANTOPRAZOLE SODIUM 40 MG/1
40 TABLET, DELAYED RELEASE ORAL
Status: DISCONTINUED | OUTPATIENT
Start: 2024-03-14 | End: 2024-03-15 | Stop reason: HOSPADM

## 2024-03-13 RX ORDER — PROMETHAZINE HYDROCHLORIDE 12.5 MG/1
12.5 TABLET ORAL EVERY 6 HOURS PRN
Status: DISCONTINUED | OUTPATIENT
Start: 2024-03-13 | End: 2024-03-15 | Stop reason: HOSPADM

## 2024-03-13 RX ORDER — INSULIN LISPRO 100 [IU]/ML
0-4 INJECTION, SOLUTION INTRAVENOUS; SUBCUTANEOUS NIGHTLY
Status: DISCONTINUED | OUTPATIENT
Start: 2024-03-13 | End: 2024-03-15 | Stop reason: HOSPADM

## 2024-03-13 RX ORDER — SODIUM CHLORIDE 0.9 % (FLUSH) 0.9 %
10 SYRINGE (ML) INJECTION PRN
Status: DISCONTINUED | OUTPATIENT
Start: 2024-03-13 | End: 2024-03-15 | Stop reason: HOSPADM

## 2024-03-13 RX ORDER — METOCLOPRAMIDE HYDROCHLORIDE 5 MG/ML
5 INJECTION INTRAMUSCULAR; INTRAVENOUS EVERY 4 HOURS PRN
Status: DISCONTINUED | OUTPATIENT
Start: 2024-03-13 | End: 2024-03-15 | Stop reason: HOSPADM

## 2024-03-13 RX ORDER — INSULIN GLARGINE 100 [IU]/ML
35 INJECTION, SOLUTION SUBCUTANEOUS NIGHTLY
Status: DISCONTINUED | OUTPATIENT
Start: 2024-03-13 | End: 2024-03-14

## 2024-03-13 RX ORDER — HYDROMORPHONE HYDROCHLORIDE 1 MG/ML
0.25 INJECTION, SOLUTION INTRAMUSCULAR; INTRAVENOUS; SUBCUTANEOUS EVERY 4 HOURS PRN
Status: DISCONTINUED | OUTPATIENT
Start: 2024-03-13 | End: 2024-03-15 | Stop reason: HOSPADM

## 2024-03-13 RX ORDER — ACETAMINOPHEN 650 MG/1
650 SUPPOSITORY RECTAL EVERY 6 HOURS PRN
Status: DISCONTINUED | OUTPATIENT
Start: 2024-03-13 | End: 2024-03-15 | Stop reason: HOSPADM

## 2024-03-13 RX ORDER — SODIUM CHLORIDE 9 MG/ML
INJECTION, SOLUTION INTRAVENOUS CONTINUOUS
Status: DISCONTINUED | OUTPATIENT
Start: 2024-03-13 | End: 2024-03-14

## 2024-03-13 RX ORDER — DOCUSATE SODIUM 100 MG/1
100 CAPSULE, LIQUID FILLED ORAL 2 TIMES DAILY
COMMUNITY

## 2024-03-13 RX ORDER — SUCRALFATE 1 G/1
1 TABLET ORAL 4 TIMES DAILY
Status: ON HOLD | COMMUNITY
End: 2024-03-13

## 2024-03-13 RX ORDER — SODIUM CHLORIDE AND POTASSIUM CHLORIDE 150; 900 MG/100ML; MG/100ML
INJECTION, SOLUTION INTRAVENOUS ONCE
Status: COMPLETED | OUTPATIENT
Start: 2024-03-13 | End: 2024-03-13

## 2024-03-13 RX ORDER — ROPINIROLE 1 MG/1
2 TABLET, FILM COATED ORAL NIGHTLY
Status: DISCONTINUED | OUTPATIENT
Start: 2024-03-13 | End: 2024-03-15 | Stop reason: HOSPADM

## 2024-03-13 RX ORDER — ENOXAPARIN SODIUM 100 MG/ML
40 INJECTION SUBCUTANEOUS DAILY
Status: DISCONTINUED | OUTPATIENT
Start: 2024-03-14 | End: 2024-03-14

## 2024-03-13 RX ORDER — IBUPROFEN 200 MG
200 TABLET ORAL EVERY 6 HOURS PRN
Status: ON HOLD | COMMUNITY
End: 2024-03-13

## 2024-03-13 RX ORDER — VITAMIN B COMPLEX
1 CAPSULE ORAL DAILY
Status: ON HOLD | COMMUNITY
End: 2024-03-13

## 2024-03-13 RX ADMIN — POTASSIUM CHLORIDE AND SODIUM CHLORIDE: 900; 150 INJECTION, SOLUTION INTRAVENOUS at 23:07

## 2024-03-13 RX ADMIN — PANTOPRAZOLE SODIUM 40 MG: 40 INJECTION, POWDER, FOR SOLUTION INTRAVENOUS at 23:59

## 2024-03-13 RX ADMIN — POTASSIUM CHLORIDE 40 MEQ: 1500 TABLET, EXTENDED RELEASE ORAL at 20:52

## 2024-03-13 RX ADMIN — SODIUM CHLORIDE 500 ML: 9 INJECTION, SOLUTION INTRAVENOUS at 19:01

## 2024-03-13 RX ADMIN — ONDANSETRON 4 MG: 2 INJECTION INTRAMUSCULAR; INTRAVENOUS at 19:01

## 2024-03-13 RX ADMIN — INSULIN HUMAN 8 UNITS: 100 INJECTION, SOLUTION PARENTERAL at 19:24

## 2024-03-13 RX ADMIN — SODIUM CHLORIDE: 9 INJECTION, SOLUTION INTRAVENOUS at 23:56

## 2024-03-13 ASSESSMENT — ENCOUNTER SYMPTOMS
NAUSEA: 1
DIARRHEA: 1
VOMITING: 1
SORE THROAT: 0
COUGH: 0
ABDOMINAL PAIN: 0
SHORTNESS OF BREATH: 0
BACK PAIN: 0

## 2024-03-13 ASSESSMENT — PAIN DESCRIPTION - DESCRIPTORS: DESCRIPTORS: ACHING

## 2024-03-13 ASSESSMENT — PAIN DESCRIPTION - ONSET: ONSET: ON-GOING

## 2024-03-13 ASSESSMENT — PAIN DESCRIPTION - PAIN TYPE: TYPE: ACUTE PAIN

## 2024-03-13 ASSESSMENT — PAIN - FUNCTIONAL ASSESSMENT: PAIN_FUNCTIONAL_ASSESSMENT: NONE - DENIES PAIN

## 2024-03-13 ASSESSMENT — PAIN DESCRIPTION - FREQUENCY: FREQUENCY: CONTINUOUS

## 2024-03-13 ASSESSMENT — LIFESTYLE VARIABLES
HOW OFTEN DO YOU HAVE A DRINK CONTAINING ALCOHOL: PATIENT DECLINED
HOW MANY STANDARD DRINKS CONTAINING ALCOHOL DO YOU HAVE ON A TYPICAL DAY: PATIENT DECLINED

## 2024-03-13 ASSESSMENT — PAIN DESCRIPTION - LOCATION: LOCATION: GENERALIZED

## 2024-03-13 NOTE — ED PROVIDER NOTES
as patient's renal function is elevated, to rule out any acute intra-abdominal process.  Patient's blood glucose improving to 402 after 1 hour post Humulin R  IV administration.  Discussed lab results at length with patient and plan of care she states understanding.  Patient sent down for CT abdomen and pelvis at this time.  Patient is resting in bed states her nausea has improved she has not had any episode of emesis since arrival to ED.  Care turned over to attending Dr. Mora at 2100 to monitor labs, and reevaluate patient after CT results are available.  CT abdomen and pelvis shows no acute intra-abdominal process.  Went into reassessed patient, she states she feels too weak and still feels very nauseous, she she expresses that she would like to be admitted.  She does not believe she can care for herself at home this time.  Will put call out to discuss admission for nausea, vomiting, diarrhea, dehydration, hyperglycemia, with [facility] hospitalist Dr. Fraga as patient would benefit from IV fluids.  Discussed case with Dr. Fraga he will admit patient to [facility].  Patient and  agree with plan of care for admission.  Care turned over to Dr. Fraga at this time.    PROCEDURES:    Procedures      FINAL IMPRESSION      1. Dehydration    2. Nausea vomiting and diarrhea    3. Hyperglycemia    4. General weakness          DISPOSITION/PLAN   DISPOSITION        PATIENT REFERRED TO:  No follow-up provider specified.    DISCHARGE MEDICATIONS:  New Prescriptions    No medications on file       (Please note that portions of this note were completed with a voice recognition program.  Efforts were made to edit the dictations but occasionally words are mis-transcribed.)    ROBI Solares - Lacie Cesar APRN - CNP  03/13/24 1083

## 2024-03-14 LAB
ANION GAP SERPL CALCULATED.3IONS-SCNC: 18 MEQ/L (ref 9–15)
BASOPHILS # BLD: 0.1 K/UL (ref 0–0.1)
BASOPHILS NFR BLD: 0.3 % (ref 0.1–1.2)
BUN SERPL-MCNC: 49 MG/DL (ref 8–23)
CALCIUM SERPL-MCNC: 8.7 MG/DL (ref 8.5–9.9)
CHLORIDE SERPL-SCNC: 97 MEQ/L (ref 95–107)
CO2 SERPL-SCNC: 21 MEQ/L (ref 20–31)
CREAT SERPL-MCNC: 1.46 MG/DL (ref 0.5–0.9)
EKG ATRIAL RATE: 115 BPM
EKG P AXIS: 79 DEGREES
EKG P-R INTERVAL: 128 MS
EKG Q-T INTERVAL: 318 MS
EKG QRS DURATION: 78 MS
EKG QTC CALCULATION (BAZETT): 439 MS
EKG R AXIS: 92 DEGREES
EKG T AXIS: 32 DEGREES
EKG VENTRICULAR RATE: 115 BPM
EOSINOPHIL # BLD: 0 K/UL (ref 0–0.4)
EOSINOPHIL NFR BLD: 0.1 % (ref 0.7–5.8)
ERYTHROCYTE [DISTWIDTH] IN BLOOD BY AUTOMATED COUNT: 12.9 % (ref 11.7–14.4)
GLUCOSE BLD-MCNC: 194 MG/DL (ref 70–99)
GLUCOSE BLD-MCNC: 284 MG/DL (ref 70–99)
GLUCOSE BLD-MCNC: 358 MG/DL (ref 70–99)
GLUCOSE BLD-MCNC: 377 MG/DL (ref 70–99)
GLUCOSE SERPL-MCNC: 390 MG/DL (ref 70–99)
HBA1C MFR BLD: 8.4 % (ref 4.8–5.9)
HCT VFR BLD AUTO: 43.5 % (ref 37–47)
HGB BLD-MCNC: 14.9 G/DL (ref 11.2–15.7)
IMM GRANULOCYTES # BLD: 0.2 K/UL
IMM GRANULOCYTES NFR BLD: 0.8 %
INR PPP: 4
LACTATE BLDV-SCNC: 1.4 MMOL/L (ref 0.5–2.2)
LYMPHOCYTES # BLD: 2.7 K/UL (ref 1.2–3.7)
LYMPHOCYTES NFR BLD: 14.8 %
MAGNESIUM SERPL-MCNC: 2 MG/DL (ref 1.7–2.4)
MCH RBC QN AUTO: 35.9 PG (ref 25.6–32.2)
MCHC RBC AUTO-ENTMCNC: 34.3 % (ref 32.2–35.5)
MCV RBC AUTO: 104.8 FL (ref 79.4–94.8)
MONOCYTES # BLD: 1.1 K/UL (ref 0.2–0.9)
MONOCYTES NFR BLD: 6.1 % (ref 4.7–12.5)
NEUTROPHILS # BLD: 14.3 K/UL (ref 1.6–6.1)
NEUTS SEG NFR BLD: 77.9 % (ref 34–71.1)
PERFORMED ON: ABNORMAL
PLATELET # BLD AUTO: 167 K/UL (ref 182–369)
POTASSIUM SERPL-SCNC: 3.8 MEQ/L (ref 3.4–4.9)
PROTHROMBIN TIME: 38.6 SEC (ref 12.3–14.9)
RBC # BLD AUTO: 4.15 M/UL (ref 3.93–5.22)
SODIUM SERPL-SCNC: 136 MEQ/L (ref 135–144)
WBC # BLD AUTO: 18.4 K/UL (ref 4–10)

## 2024-03-14 PROCEDURE — 2580000003 HC RX 258: Performed by: INTERNAL MEDICINE

## 2024-03-14 PROCEDURE — 6370000000 HC RX 637 (ALT 250 FOR IP): Performed by: INTERNAL MEDICINE

## 2024-03-14 PROCEDURE — 97530 THERAPEUTIC ACTIVITIES: CPT

## 2024-03-14 PROCEDURE — 83605 ASSAY OF LACTIC ACID: CPT

## 2024-03-14 PROCEDURE — 80048 BASIC METABOLIC PNL TOTAL CA: CPT

## 2024-03-14 PROCEDURE — C9113 INJ PANTOPRAZOLE SODIUM, VIA: HCPCS | Performed by: INTERNAL MEDICINE

## 2024-03-14 PROCEDURE — 36415 COLL VENOUS BLD VENIPUNCTURE: CPT

## 2024-03-14 PROCEDURE — 83036 HEMOGLOBIN GLYCOSYLATED A1C: CPT

## 2024-03-14 PROCEDURE — 85025 COMPLETE CBC W/AUTO DIFF WBC: CPT

## 2024-03-14 PROCEDURE — A4216 STERILE WATER/SALINE, 10 ML: HCPCS | Performed by: INTERNAL MEDICINE

## 2024-03-14 PROCEDURE — 6360000002 HC RX W HCPCS: Performed by: INTERNAL MEDICINE

## 2024-03-14 PROCEDURE — 1210000000 HC MED SURG R&B

## 2024-03-14 PROCEDURE — 97162 PT EVAL MOD COMPLEX 30 MIN: CPT

## 2024-03-14 PROCEDURE — 85610 PROTHROMBIN TIME: CPT

## 2024-03-14 PROCEDURE — 83735 ASSAY OF MAGNESIUM: CPT

## 2024-03-14 RX ORDER — INSULIN GLARGINE 100 [IU]/ML
20 INJECTION, SOLUTION SUBCUTANEOUS ONCE
Status: COMPLETED | OUTPATIENT
Start: 2024-03-14 | End: 2024-03-14

## 2024-03-14 RX ORDER — ARIPIPRAZOLE 2 MG/1
2 TABLET ORAL DAILY
Status: DISCONTINUED | OUTPATIENT
Start: 2024-03-14 | End: 2024-03-15 | Stop reason: HOSPADM

## 2024-03-14 RX ORDER — OXYBUTYNIN CHLORIDE 5 MG/1
10 TABLET, EXTENDED RELEASE ORAL DAILY
Status: DISCONTINUED | OUTPATIENT
Start: 2024-03-14 | End: 2024-03-15 | Stop reason: HOSPADM

## 2024-03-14 RX ORDER — BUPROPION HYDROCHLORIDE 100 MG/1
200 TABLET, EXTENDED RELEASE ORAL DAILY
Status: DISCONTINUED | OUTPATIENT
Start: 2024-03-14 | End: 2024-03-15 | Stop reason: HOSPADM

## 2024-03-14 RX ORDER — METOCLOPRAMIDE 5 MG/1
5 TABLET ORAL
Status: DISCONTINUED | OUTPATIENT
Start: 2024-03-14 | End: 2024-03-15 | Stop reason: HOSPADM

## 2024-03-14 RX ORDER — PANTOPRAZOLE SODIUM 40 MG/1
40 TABLET, DELAYED RELEASE ORAL
Status: DISCONTINUED | OUTPATIENT
Start: 2024-03-15 | End: 2024-03-14

## 2024-03-14 RX ORDER — INSULIN GLARGINE 100 [IU]/ML
45 INJECTION, SOLUTION SUBCUTANEOUS NIGHTLY
Status: DISCONTINUED | OUTPATIENT
Start: 2024-03-14 | End: 2024-03-15 | Stop reason: HOSPADM

## 2024-03-14 RX ORDER — INSULIN LISPRO 100 [IU]/ML
25 INJECTION, SOLUTION INTRAVENOUS; SUBCUTANEOUS ONCE
Status: COMPLETED | OUTPATIENT
Start: 2024-03-14 | End: 2024-03-14

## 2024-03-14 RX ORDER — TOPIRAMATE 100 MG/1
200 TABLET, FILM COATED ORAL 2 TIMES DAILY
Status: DISCONTINUED | OUTPATIENT
Start: 2024-03-14 | End: 2024-03-15 | Stop reason: HOSPADM

## 2024-03-14 RX ORDER — LEVOTHYROXINE SODIUM 88 UG/1
88 TABLET ORAL DAILY
Status: DISCONTINUED | OUTPATIENT
Start: 2024-03-14 | End: 2024-03-15 | Stop reason: HOSPADM

## 2024-03-14 RX ORDER — GUAIFENESIN 600 MG/1
600 TABLET, EXTENDED RELEASE ORAL 3 TIMES DAILY PRN
Status: DISCONTINUED | OUTPATIENT
Start: 2024-03-14 | End: 2024-03-15 | Stop reason: HOSPADM

## 2024-03-14 RX ORDER — CARVEDILOL 12.5 MG/1
12.5 TABLET ORAL 2 TIMES DAILY
Status: DISCONTINUED | OUTPATIENT
Start: 2024-03-14 | End: 2024-03-15 | Stop reason: HOSPADM

## 2024-03-14 RX ORDER — POTASSIUM CHLORIDE 20 MEQ/1
20 TABLET, EXTENDED RELEASE ORAL ONCE
Status: COMPLETED | OUTPATIENT
Start: 2024-03-14 | End: 2024-03-14

## 2024-03-14 RX ORDER — INSULIN LISPRO 100 [IU]/ML
20 INJECTION, SOLUTION INTRAVENOUS; SUBCUTANEOUS ONCE
Status: DISCONTINUED | OUTPATIENT
Start: 2024-03-14 | End: 2024-03-14

## 2024-03-14 RX ORDER — INSULIN LISPRO 100 [IU]/ML
8 INJECTION, SOLUTION INTRAVENOUS; SUBCUTANEOUS ONCE
Status: COMPLETED | OUTPATIENT
Start: 2024-03-14 | End: 2024-03-14

## 2024-03-14 RX ORDER — INSULIN LISPRO 100 [IU]/ML
12 INJECTION, SOLUTION INTRAVENOUS; SUBCUTANEOUS ONCE
Status: COMPLETED | OUTPATIENT
Start: 2024-03-14 | End: 2024-03-14

## 2024-03-14 RX ORDER — SODIUM CHLORIDE 9 MG/ML
INJECTION, SOLUTION INTRAVENOUS CONTINUOUS
Status: DISCONTINUED | OUTPATIENT
Start: 2024-03-14 | End: 2024-03-15

## 2024-03-14 RX ORDER — CYCLOBENZAPRINE HCL 10 MG
10 TABLET ORAL 3 TIMES DAILY PRN
Status: DISCONTINUED | OUTPATIENT
Start: 2024-03-14 | End: 2024-03-15 | Stop reason: HOSPADM

## 2024-03-14 RX ADMIN — ROPINIROLE HYDROCHLORIDE 2 MG: 1 TABLET, FILM COATED ORAL at 20:11

## 2024-03-14 RX ADMIN — INSULIN LISPRO 4 UNITS: 100 INJECTION, SOLUTION INTRAVENOUS; SUBCUTANEOUS at 00:08

## 2024-03-14 RX ADMIN — ROPINIROLE HYDROCHLORIDE 2 MG: 1 TABLET, FILM COATED ORAL at 00:00

## 2024-03-14 RX ADMIN — ONDANSETRON 4 MG: 2 INJECTION INTRAMUSCULAR; INTRAVENOUS at 08:48

## 2024-03-14 RX ADMIN — INSULIN LISPRO 25 UNITS: 100 INJECTION, SOLUTION INTRAVENOUS; SUBCUTANEOUS at 20:14

## 2024-03-14 RX ADMIN — TOPIRAMATE 200 MG: 100 TABLET, FILM COATED ORAL at 10:23

## 2024-03-14 RX ADMIN — ARIPIPRAZOLE 2 MG: 2 TABLET ORAL at 10:22

## 2024-03-14 RX ADMIN — GUAIFENESIN 600 MG: 600 TABLET, EXTENDED RELEASE ORAL at 20:12

## 2024-03-14 RX ADMIN — METOCLOPRAMIDE 5 MG: 5 TABLET ORAL at 16:39

## 2024-03-14 RX ADMIN — INSULIN LISPRO 12 UNITS: 100 INJECTION, SOLUTION INTRAVENOUS; SUBCUTANEOUS at 08:44

## 2024-03-14 RX ADMIN — CARVEDILOL 12.5 MG: 12.5 TABLET, FILM COATED ORAL at 10:23

## 2024-03-14 RX ADMIN — SODIUM CHLORIDE, PRESERVATIVE FREE 10 ML: 5 INJECTION INTRAVENOUS at 08:49

## 2024-03-14 RX ADMIN — SODIUM CHLORIDE, PRESERVATIVE FREE 10 ML: 5 INJECTION INTRAVENOUS at 00:00

## 2024-03-14 RX ADMIN — INSULIN GLARGINE 35 UNITS: 100 INJECTION, SOLUTION SUBCUTANEOUS at 00:08

## 2024-03-14 RX ADMIN — SODIUM CHLORIDE: 9 INJECTION, SOLUTION INTRAVENOUS at 20:18

## 2024-03-14 RX ADMIN — TOPIRAMATE 200 MG: 100 TABLET, FILM COATED ORAL at 20:11

## 2024-03-14 RX ADMIN — BUPROPION HYDROCHLORIDE 200 MG: 100 TABLET, EXTENDED RELEASE ORAL at 20:11

## 2024-03-14 RX ADMIN — CARVEDILOL 12.5 MG: 12.5 TABLET, FILM COATED ORAL at 20:11

## 2024-03-14 RX ADMIN — LEVOTHYROXINE SODIUM 88 MCG: 0.09 TABLET ORAL at 10:23

## 2024-03-14 RX ADMIN — INSULIN GLARGINE 20 UNITS: 100 INJECTION, SOLUTION SUBCUTANEOUS at 08:43

## 2024-03-14 RX ADMIN — INSULIN LISPRO 4 UNITS: 100 INJECTION, SOLUTION INTRAVENOUS; SUBCUTANEOUS at 12:07

## 2024-03-14 RX ADMIN — INSULIN LISPRO 8 UNITS: 100 INJECTION, SOLUTION INTRAVENOUS; SUBCUTANEOUS at 07:07

## 2024-03-14 RX ADMIN — SODIUM CHLORIDE: 9 INJECTION, SOLUTION INTRAVENOUS at 10:21

## 2024-03-14 RX ADMIN — BUPROPION HYDROCHLORIDE 200 MG: 100 TABLET, EXTENDED RELEASE ORAL at 10:23

## 2024-03-14 RX ADMIN — METOCLOPRAMIDE 5 MG: 5 TABLET ORAL at 10:23

## 2024-03-14 RX ADMIN — PANTOPRAZOLE SODIUM 40 MG: 40 INJECTION, POWDER, FOR SOLUTION INTRAVENOUS at 08:45

## 2024-03-14 RX ADMIN — OXYBUTYNIN CHLORIDE 10 MG: 5 TABLET, EXTENDED RELEASE ORAL at 10:23

## 2024-03-14 RX ADMIN — PANTOPRAZOLE SODIUM 40 MG: 40 TABLET, DELAYED RELEASE ORAL at 16:39

## 2024-03-14 RX ADMIN — INSULIN GLARGINE 45 UNITS: 100 INJECTION, SOLUTION SUBCUTANEOUS at 20:13

## 2024-03-14 RX ADMIN — POTASSIUM CHLORIDE 20 MEQ: 1500 TABLET, EXTENDED RELEASE ORAL at 10:23

## 2024-03-14 NOTE — PROGRESS NOTES
Pt alert and oriented. Pt denies any pain or needs at this time. Pt call light in reach. Pt independent in room. Pt oxygen was 89 on room air, place pt on 2L of oxygen and pt went up to 97. Pt has nausea and vomiting. Will continue to monitor pt.  Electronically signed by Cynthia Britton RN on 3/14/2024 at 12:17 AM

## 2024-03-14 NOTE — PROGRESS NOTES
Facility/Department: [facility] MED SURG UNIT  Occupational Therapy Initial Assessment- Attempted    Name: Ivana Brasher  : 1960  MRN: [id number]  Date of Service: 3/14/2024    OT eval received and chart review completed, OT introduced self and role, pt stating she is able to perform ADLs without assistance and does not have OT needs. Based on physical therapy eval and therapy manager consult, OT eval was deferred.     Therapy Time   Individual Concurrent Group Co-treatment   Time In  11:30am         Time Out  11:35am         Minutes  5                 Sierra Barbosa, [id number]

## 2024-03-14 NOTE — PROGRESS NOTES
Facility/Department: [facility]  Physical Therapy Initial Assessment    Name: Ivana Barsher  : 1960  MRN: [id number]  Date of Service: 3/14/2024    Discharge Recommendations:  Continue to assess pending progress (Probable D/C home Independently)          Patient Diagnosis(es): The primary encounter diagnosis was Dehydration. Diagnoses of Nausea vomiting and diarrhea, Hyperglycemia, and General weakness were also pertinent to this visit.  Past Medical History:  has a past medical history of Cerebral artery occlusion with cerebral infarction (HCC), CHF (congestive heart failure) (HCC), Depression, Diabetes mellitus (HCC), History of heart artery stent, Hyperlipidemia, Hypertension, Leukocytosis, MI (myocardial infarction) (HCC), and Pacemaker.  Past Surgical History:  has a past surgical history that includes Cardiac surgery; Hysterectomy; vascular surgery;  section; Upper gastrointestinal endoscopy (N/A, 2019); and Knee arthroscopy w/ lateral retinacular repair (Left).    Assessment   Body Structures, Functions, Activity Limitations Requiring Skilled Therapeutic Intervention: Decreased functional mobility ;Decreased strength;Decreased balance  Assessment: 64 year old female was brougjht to the ER with complaints of nausea weakness and diarrhea. Pt currently demonstrates impaired balance during gait, also exhibiting mild LE weakness and decreased endurance.. pt would benefit from skilled PT treatment during hospitalization to address impairments improve overall functional moiblity and allow safe D/C home  Treatment Diagnosis: difficulty walking weakness  Therapy Prognosis: Good  Requires PT Follow-Up: Yes     Plan   Physical Therapy Plan  General Plan: 5-7 times per week  Current Treatment Recommendations: Strengthening, Balance training, Transfer training, Neuromuscular re-education, Gait training, Stair training, Therapeutic activities, Functional mobility training  Safety Devices  Type of  by assistance  Ambulation  Surface: Level tile  Device: No Device  Assistance: Contact guard assistance;Stand by assistance; complaints of mild light headedness sitting at EOB that resolved and again in standing  Quality of Gait: mild unsteadiness veering R and L, short step length bilat wide ERIK difficulty maintaining straight pathway, c/o's mild lightheadedness during amb  Gait Deviations: Slow Vanesa;Increased ERIK;Decreased step length;Deviated path  Distance: 70 feet x 2 with seated rest break  Comments: O2 sats  96-97% post amb,  decreased to 130 after 1-2 min, 127 after 3 more minutes O 2 remained 94-97% on room air (O2 2 L off pt but nc in pt's bed- per nursing O2 PRN)  Complaints of fatigue post ambulation  Stairs/Curb  Stairs?: Yes  Stairs  Stairs Height: 6\"  Rails: Bilateral  Comment: Pt ascended and descended 3  4 inch steps and 2  6 inch steps bilat HR CGA reciprocal pattern     Balance  Sitting - Static: Good  Sitting - Dynamic: Good  Standing - Static: Fair;+  Standing - Dynamic: Fair            Goals  Short Term Goals  Time Frame for Short Term Goals: 3 days  Short Term Goal 1: Pt transfer safely and I  Short Term Goal 2: Pt amb 120 feet with out AD safely and I demonstrating good balance  Short Term Goal 3: Pt ascend and descend 5 steps wtih HRs to allow safe entrance into home with supervision  Short Term Goal 4: I with HEP to further improve strength and balance  Patient Goals   Patient Goals : to feel better       Education  Patient Education  Education Given To: Patient  Education Provided: Role of Therapy;Plan of Care      Therapy Time   Individual Concurrent Group Co-treatment   Time In  940         Time Out  1025         Minutes  45                 Juana Wayne, PT, DScPT, CMPT 1340

## 2024-03-14 NOTE — H&P
Hospital Medicine History & Physical      PCP: Reina Ratliff DO    Date of Admission: 3/13/2024    Date of Service: 3/14/24      Chief Complaint:  nausea/vomiting/diarrhea       History Of Present Illness:  64 y.o. female who presented to [facility] with above complains. Patient has history DM/gastroparesis, ongoing marijuana use, was doing well until 3 days ago when she noted her BS was \"high\". The same time developed nausea/abd pain, multiple episodes with watery diarrhea, vomiting. Since her condition wasn't improved, came to ER for further evaluation. Had similar presentation last December at [place]  After initial stabilization she was admitted for further management/treatment. Had no diarrhea since admission     Past Medical History:          Diagnosis Date    Cerebral artery occlusion with cerebral infarction (HCC)     CHF (congestive heart failure) (HCC)     Depression     Diabetes mellitus (HCC)     History of heart artery stent     Hyperlipidemia     Hypertension     Leukocytosis     Reported by Patient after testing at [facility]    MI (myocardial infarction) ([facility])     Pacemaker        Past Surgical History:          Procedure Laterality Date    CARDIAC SURGERY       SECTION      HYSTERECTOMY (CERVIX STATUS UNKNOWN)      KNEE ARTHROSCOPY W/ LATERAL RETINACULAR REPAIR Left     UPPER GASTROINTESTINAL ENDOSCOPY N/A 2019    EGD performed by Rusty Vu MD at [facility] OR    VASCULAR SURGERY      stents x2       Medications Prior to Admission:      Prior to Admission medications    Medication Sig Start Date End Date Taking? Authorizing Provider   Glucagon (BAQSIMI TWO PACK) 3 MG/DOSE POWD by Nasal route   Yes Gill Quiñones MD   vitamin D 25 MCG (1000 UT) CAPS Take by mouth   Yes Gill Quiñones MD   cyclobenzaprine (FLEXERIL) 10 MG tablet Take 1 tablet by mouth 3 times daily as needed for Muscle spasms   Yes Gill Quiñones MD   desvenlafaxine succinate (PRISTIQ) 100 MG TB24

## 2024-03-14 NOTE — ED NOTES
Delay in transfer to [facility] due to loss of IV access with multiple attempts to re-gain access.

## 2024-03-15 VITALS
HEIGHT: 62 IN | RESPIRATION RATE: 18 BRPM | DIASTOLIC BLOOD PRESSURE: 98 MMHG | WEIGHT: 162.1 LBS | BODY MASS INDEX: 29.83 KG/M2 | OXYGEN SATURATION: 98 % | TEMPERATURE: 97.2 F | HEART RATE: 78 BPM | SYSTOLIC BLOOD PRESSURE: 133 MMHG

## 2024-03-15 LAB
ANION GAP SERPL CALCULATED.3IONS-SCNC: 9 MEQ/L (ref 9–15)
BACTERIA BLD CULT: NORMAL
BASOPHILS # BLD: 0 K/UL (ref 0–0.1)
BASOPHILS NFR BLD: 0.3 % (ref 0.1–1.2)
BUN SERPL-MCNC: 32 MG/DL (ref 8–23)
CALCIUM SERPL-MCNC: 8.7 MG/DL (ref 8.5–9.9)
CHLORIDE SERPL-SCNC: 109 MEQ/L (ref 95–107)
CO2 SERPL-SCNC: 27 MEQ/L (ref 20–31)
CREAT SERPL-MCNC: 1.1 MG/DL (ref 0.5–0.9)
EOSINOPHIL # BLD: 0 K/UL (ref 0–0.4)
EOSINOPHIL NFR BLD: 0.2 % (ref 0.7–5.8)
ERYTHROCYTE [DISTWIDTH] IN BLOOD BY AUTOMATED COUNT: 13.2 % (ref 11.7–14.4)
GLUCOSE BLD-MCNC: 81 MG/DL (ref 70–99)
GLUCOSE SERPL-MCNC: 54 MG/DL (ref 70–99)
HCT VFR BLD AUTO: 43.7 % (ref 37–47)
HGB BLD-MCNC: 14.7 G/DL (ref 11.2–15.7)
IMM GRANULOCYTES # BLD: 0.1 K/UL
IMM GRANULOCYTES NFR BLD: 0.4 %
INR PPP: 2.6
LYMPHOCYTES # BLD: 3 K/UL (ref 1.2–3.7)
LYMPHOCYTES NFR BLD: 25.1 %
MCH RBC QN AUTO: 36.3 PG (ref 25.6–32.2)
MCHC RBC AUTO-ENTMCNC: 33.6 % (ref 32.2–35.5)
MCV RBC AUTO: 107.9 FL (ref 79.4–94.8)
MONOCYTES # BLD: 0.8 K/UL (ref 0.2–0.9)
MONOCYTES NFR BLD: 6.9 % (ref 4.7–12.5)
NEUTROPHILS # BLD: 7.9 K/UL (ref 1.6–6.1)
NEUTS SEG NFR BLD: 67.1 % (ref 34–71.1)
PERFORMED ON: NORMAL
PLATELET # BLD AUTO: 161 K/UL (ref 182–369)
POTASSIUM SERPL-SCNC: 3.9 MEQ/L (ref 3.4–4.9)
PROTHROMBIN TIME: 27.9 SEC (ref 12.3–14.9)
RBC # BLD AUTO: 4.05 M/UL (ref 3.93–5.22)
SODIUM SERPL-SCNC: 145 MEQ/L (ref 135–144)
WBC # BLD AUTO: 11.8 K/UL (ref 4–10)

## 2024-03-15 PROCEDURE — 6370000000 HC RX 637 (ALT 250 FOR IP): Performed by: INTERNAL MEDICINE

## 2024-03-15 PROCEDURE — 97116 GAIT TRAINING THERAPY: CPT

## 2024-03-15 PROCEDURE — 36415 COLL VENOUS BLD VENIPUNCTURE: CPT

## 2024-03-15 PROCEDURE — 85025 COMPLETE CBC W/AUTO DIFF WBC: CPT

## 2024-03-15 PROCEDURE — 97530 THERAPEUTIC ACTIVITIES: CPT

## 2024-03-15 PROCEDURE — 80048 BASIC METABOLIC PNL TOTAL CA: CPT

## 2024-03-15 PROCEDURE — 85610 PROTHROMBIN TIME: CPT

## 2024-03-15 PROCEDURE — 2580000003 HC RX 258: Performed by: INTERNAL MEDICINE

## 2024-03-15 PROCEDURE — 97110 THERAPEUTIC EXERCISES: CPT

## 2024-03-15 RX ORDER — INSULIN GLARGINE 100 [IU]/ML
40 INJECTION, SOLUTION SUBCUTANEOUS NIGHTLY
Qty: 10 ML | Refills: 0 | Status: SHIPPED | OUTPATIENT
Start: 2024-03-15

## 2024-03-15 RX ORDER — WARFARIN SODIUM 5 MG/1
5 TABLET ORAL DAILY
Status: DISCONTINUED | OUTPATIENT
Start: 2024-03-15 | End: 2024-03-15 | Stop reason: HOSPADM

## 2024-03-15 RX ADMIN — METOCLOPRAMIDE 5 MG: 5 TABLET ORAL at 06:03

## 2024-03-15 RX ADMIN — OXYBUTYNIN CHLORIDE 10 MG: 5 TABLET, EXTENDED RELEASE ORAL at 08:00

## 2024-03-15 RX ADMIN — PANTOPRAZOLE SODIUM 40 MG: 40 TABLET, DELAYED RELEASE ORAL at 06:03

## 2024-03-15 RX ADMIN — TOPIRAMATE 200 MG: 100 TABLET, FILM COATED ORAL at 08:00

## 2024-03-15 RX ADMIN — ARIPIPRAZOLE 2 MG: 2 TABLET ORAL at 08:00

## 2024-03-15 RX ADMIN — CARVEDILOL 12.5 MG: 12.5 TABLET, FILM COATED ORAL at 08:00

## 2024-03-15 RX ADMIN — LEVOTHYROXINE SODIUM 88 MCG: 0.09 TABLET ORAL at 06:04

## 2024-03-15 RX ADMIN — SODIUM CHLORIDE: 9 INJECTION, SOLUTION INTRAVENOUS at 06:11

## 2024-03-15 NOTE — DISCHARGE SUMMARY
Discharge Summary    Patient:  Ivana Brasher  YOB: 1960    MRN: [id number]   Acct: [id number]    Primary Care Physician: Reina Ratliff DO    Admit date:  3/13/2024    Discharge date:   03/15/24      Discharge Diagnoses:   VIKKI (acute kidney injury) (HCC)  Principal Problem:    VIKKI (acute kidney injury) (HCC)  Resolved Problems:    * No resolved hospital problems. *      Admitted for: (HPI)above    Hospital Course: patient was admitted with dehydration/VIKKI in the setting of vomiting/diarrhea due to combination elevated BS and use of marijuana. Was hydrated, lantus dose were increased to 40Units, VIKKI/nausea/diarrhea resolved. After completing her acute stay patient will be DC home with [facility] in a stable condition    Consultants:      Discharge Medications:       Medication List        CHANGE how you take these medications      insulin glargine 100 UNIT/ML injection vial  Commonly known as: LANTUS  Inject 40 Units into the skin nightly  What changed: how much to take            CONTINUE taking these medications      ARIPiprazole 2 MG tablet  Commonly known as: ABILIFY     Baqsimi Two Pack 3 MG/DOSE Powd  Generic drug: Glucagon     buPROPion 200 MG extended release tablet  Commonly known as: WELLBUTRIN SR     carvedilol 12.5 MG tablet  Commonly known as: COREG     cyclobenzaprine 10 MG tablet  Commonly known as: FLEXERIL     desvenlafaxine succinate 100 MG Tb24 extended release tablet  Commonly known as: PRISTIQ     docusate sodium 100 MG capsule  Commonly known as: COLACE     ferrous sulfate 325 (65 Fe) MG tablet  Commonly known as: IRON 325     furosemide 40 MG tablet  Commonly known as: LASIX     insulin aspart 100 UNIT/ML injection pen  Commonly known as: NovoLOG     insulin regular 100 UNIT/ML injection  Commonly known as: HUMULIN R;NOVOLIN R     levothyroxine 88 MCG tablet  Commonly known as: SYNTHROID     metoclopramide 5 MG tablet  Commonly known as: REGLAN     nitroGLYCERIN 0.4 MG SL

## 2024-03-15 NOTE — PROGRESS NOTES
Physical Therapy  Facility/Department: [facility] MED SURG UNIT  Daily Treatment Note  NAME: Ivana Brasher  : 1960  MRN: [id number]    Date of Service: 3/15/2024    Discharge Recommendations:  (P) Home independently        Patient Diagnosis(es): The primary encounter diagnosis was Dehydration. Diagnoses of Nausea vomiting and diarrhea, Hyperglycemia, and General weakness were also pertinent to this visit.    Assessment   Assessment: (P) Pt. tolerates well with ambulating in hallway and performing stair function independently. 0 LOB. Pt. performs and completes stand ther ex for B LE strength and endurance. Pt. indicates she steady; however, pt. appears limited by endurance. HEP handout educated and issued to benefit with strength and endurance.  Activity Tolerance: (P) Patient tolerated treatment well;Patient limited by endurance     Plan    Physical Therapy Plan  General Plan: (P) 5-7 times per week  Current Treatment Recommendations: (P) Strengthening;Balance training;Transfer training;Neuromuscular re-education;Gait training;Stair training;Therapeutic activities;Functional mobility training     Restrictions  Restrictions/Precautions  Restrictions/Precautions: Fall Risk (history of 2 falls in the last year)  Implants present? : Pacemaker     Subjective    Subjective  Subjective: I go home today at 10:00 am.     Objective   Vitals     Bed Mobility Training  Bed Mobility Training: (P) Yes  Overall Level of Assistance: (P) Independent  Supine to Sit: (P) Independent  Sit to Supine: (P) Independent  Transfer Training  Transfer Training: (P) Yes  Sit to Stand: (P) Independent  Stand to Sit: (P) Independent  Gait Training  Gait Training: (P) Yes  Gait  Gait Training: (P) Yes  Overall Level of Assistance: (P) Independent  Distance (ft): (P) 90 Feet x 2  Assistive Device: (P) Other (comment) (no device.)  Gait Abnormalities: (P)  (Slow julius with limited step length tolerance. Stride appears equal.)  Rail Use: (P)

## 2024-03-15 NOTE — PROGRESS NOTES
Pt discharge home via , discharge instructions discussed and given to pt. Meds sent to pharmacy of pt choice. IV removed, cathter intact.

## 2024-03-15 NOTE — CARE COORDINATION
Dr Okeefe ordered HHC services at discharge. I discussed the referral for HHC with patient, and she does not want HHC services.  PT/OT evaluated this patient yesterday, and she did not have any therapy needs.  She also does not desire to have HHC nurse.

## 2024-03-15 NOTE — DISCHARGE INSTR - COC
Continuity of Care Form    Patient Name: Ivana Brasher   :  1960  MRN:  [id number]    Admit date:  3/13/2024  Discharge date:  ***    Code Status Order: Full Code   Advance Directives:     Admitting Physician:  Nahun Okeefe MD  PCP: Reina Ratliff DO    Discharging Nurse: ***  Discharging Hospital Unit/Room#: 0206/0206-01  Discharging Unit Phone Number: ***    Emergency Contact:   Extended Emergency Contact Information  Primary Emergency Contact: Max Brasher  Address: [address] [place] [address]  Home Phone: [phone]  Relation: Spouse    Past Surgical History:  Past Surgical History:   Procedure Laterality Date    CARDIAC SURGERY       SECTION      HYSTERECTOMY (CERVIX STATUS UNKNOWN)      KNEE ARTHROSCOPY W/ LATERAL RETINACULAR REPAIR Left     UPPER GASTROINTESTINAL ENDOSCOPY N/A 2019    EGD performed by Rusty Vu MD at [facility] OR    VASCULAR SURGERY      stents x2       Immunization History:   Immunization History   Administered Date(s) Administered    COVID-19, PFIZER Bivalent, DO NOT Dilute, (age 12y+), IM, 30 mcg/0.3 mL 2023    COVID-19, PFIZER PURPLE top, DILUTE for use, (age 12 y+), 30mcg/0.3mL 2021, 2021, 2021       Active Problems:  Patient Active Problem List   Diagnosis Code    Acquired hypothyroidism E03.9    Coronary atherosclerosis I25.10    Chronic systolic CHF (congestive heart failure) ([facility]) I50.22    Essential hypertension, benign I10    Gastroparesis K31.84    Alcohol abuse F10.10    Long term (current) use of anticoagulants Z79.01    Lumbosacral spondylosis without myelopathy M47.817    Patella fracture S82.009A    S/P ORIF (open reduction internal fixation) fracture Z98.890, Z87.81    On bridging treatment with lovenox Z79.01    Vitamin D deficiency E55.9    Supratherapeutic INR R79.1    Acute metabolic encephalopathy G93.41    NSTEMI (non-ST elevated myocardial infarction) ([facility]) I21.4    Pacemaker Z95.0

## 2024-03-18 ENCOUNTER — APPOINTMENT (OUTPATIENT)
Dept: CARDIOLOGY | Facility: CLINIC | Age: 64
End: 2024-03-18
Payer: MEDICARE

## 2024-03-18 ENCOUNTER — APPOINTMENT (OUTPATIENT)
Dept: CARDIOLOGY | Facility: HOSPITAL | Age: 64
End: 2024-03-18
Payer: MEDICARE

## 2024-03-19 LAB — BACTERIA BLD CULT: NORMAL

## 2024-04-03 ENCOUNTER — APPOINTMENT (OUTPATIENT)
Dept: CARDIOLOGY | Facility: HOSPITAL | Age: 64
End: 2024-04-03
Payer: MEDICARE

## 2024-04-08 ENCOUNTER — HOSPITAL ENCOUNTER (OUTPATIENT)
Dept: CARDIOLOGY | Facility: HOSPITAL | Age: 64
Discharge: HOME | End: 2024-04-08
Payer: MEDICARE

## 2024-04-08 ENCOUNTER — OFFICE VISIT (OUTPATIENT)
Dept: CARDIOLOGY | Facility: CLINIC | Age: 64
End: 2024-04-08
Payer: MEDICARE

## 2024-04-08 VITALS
DIASTOLIC BLOOD PRESSURE: 60 MMHG | HEART RATE: 87 BPM | SYSTOLIC BLOOD PRESSURE: 102 MMHG | HEIGHT: 62 IN | WEIGHT: 168 LBS | BODY MASS INDEX: 30.91 KG/M2

## 2024-04-08 DIAGNOSIS — Z95.810 CARDIAC DEFIBRILLATOR IN SITU: Primary | ICD-10-CM

## 2024-04-08 DIAGNOSIS — E78.2 MIXED HYPERLIPIDEMIA: ICD-10-CM

## 2024-04-08 DIAGNOSIS — I25.10 CORONARY ARTERY DISEASE INVOLVING NATIVE CORONARY ARTERY OF NATIVE HEART WITHOUT ANGINA PECTORIS: ICD-10-CM

## 2024-04-08 DIAGNOSIS — I47.29 PAROXYSMAL VENTRICULAR TACHYCARDIA (MULTI): ICD-10-CM

## 2024-04-08 DIAGNOSIS — Z98.61 CAD S/P PERCUTANEOUS CORONARY ANGIOPLASTY: ICD-10-CM

## 2024-04-08 DIAGNOSIS — Z86.73 HISTORY OF CVA (CEREBROVASCULAR ACCIDENT): ICD-10-CM

## 2024-04-08 DIAGNOSIS — Z79.01 LONG TERM (CURRENT) USE OF ANTICOAGULANTS: ICD-10-CM

## 2024-04-08 DIAGNOSIS — I25.5 ISCHEMIC CARDIOMYOPATHY: ICD-10-CM

## 2024-04-08 DIAGNOSIS — I10 ESSENTIAL HYPERTENSION, BENIGN: ICD-10-CM

## 2024-04-08 DIAGNOSIS — I50.22 CHRONIC SYSTOLIC HEART FAILURE (MULTI): ICD-10-CM

## 2024-04-08 DIAGNOSIS — I25.10 CAD S/P PERCUTANEOUS CORONARY ANGIOPLASTY: ICD-10-CM

## 2024-04-08 DIAGNOSIS — I50.9 CHRONIC CONGESTIVE HEART FAILURE, UNSPECIFIED HEART FAILURE TYPE (MULTI): ICD-10-CM

## 2024-04-08 DIAGNOSIS — Z95.0 PACEMAKER: Chronic | ICD-10-CM

## 2024-04-08 DIAGNOSIS — I47.20 VENTRICULAR TACHYCARDIA (MULTI): ICD-10-CM

## 2024-04-08 DIAGNOSIS — E03.9 ACQUIRED HYPOTHYROIDISM: Chronic | ICD-10-CM

## 2024-04-08 DIAGNOSIS — I47.10 PSVT (PAROXYSMAL SUPRAVENTRICULAR TACHYCARDIA) (CMS-HCC): ICD-10-CM

## 2024-04-08 DIAGNOSIS — Z95.810 PRESENCE OF AUTOMATIC CARDIOVERTER/DEFIBRILLATOR (AICD): ICD-10-CM

## 2024-04-08 PROCEDURE — 3074F SYST BP LT 130 MM HG: CPT | Performed by: NURSE PRACTITIONER

## 2024-04-08 PROCEDURE — 99214 OFFICE O/P EST MOD 30 MIN: CPT | Performed by: NURSE PRACTITIONER

## 2024-04-08 PROCEDURE — 4010F ACE/ARB THERAPY RXD/TAKEN: CPT | Performed by: NURSE PRACTITIONER

## 2024-04-08 PROCEDURE — 93282 PRGRMG EVAL IMPLANTABLE DFB: CPT

## 2024-04-08 PROCEDURE — 3078F DIAST BP <80 MM HG: CPT | Performed by: NURSE PRACTITIONER

## 2024-04-08 PROCEDURE — 93282 PRGRMG EVAL IMPLANTABLE DFB: CPT | Performed by: INTERNAL MEDICINE

## 2024-04-08 NOTE — PROGRESS NOTES
"CARDIOLOGY OFFICE VISIT      CHIEF COMPLAINT  Chief Complaint   Patient presents with    Follow-up     6 month follow up with device check   Chief complaint: \"I was in the hospital with dehydration high blood pressure and a blood sugar greater than 600 in March, 2024.\"    HISTORY OF PRESENT ILLNESS  HPI  History: The patient is a 64-year-old  female who is followed for ischemic cardiomyopathy with a left ventricular ejection fraction of 25 to 30% per 2D echocardiogram dated September 29, 2021, New York Heart Association class II, stage B heart failure.  She underwent angioplasty with stent deployment to the mid LAD and placement of a single-chamber ICD on February 7, 2013 and generator change out on October 13, 2022 for MIRTA parameters.  She presents to the office today for follow-up evaluation of her ICD.  She states that she was hospitalized at [facility] in March, 2024 with dehydration, hypertension, and hyperglycemia with a blood sugar greater than 600.  She states that everything was starting to come down.  She still occasionally experiences transient dizziness and lightheadedness with position changes.  She denies chest pain, palpitations, shortness of breath, abdominal distention, or lower extremity edema.  She is accompanied by her  for today's office visit.  Past Medical History  Past Medical History:   Diagnosis Date    Acute myocardial infarction, unspecified (CMS/[facility]) 01/10/2022    Acute MI    Body mass index (BMI) 33.0-33.9, adult 02/01/2022    BMI 33.0-33.9,adult    Disorder of the skin and subcutaneous tissue, unspecified 07/01/2020    Back skin lesion    Encounter for preprocedural cardiovascular examination 01/10/2022    Pre-operative cardiovascular examination    Hypertensive heart disease with heart failure (CMS/[facility]) 05/21/2020    Hypertensive heart disease with chronic combined systolic and diastolic congestive heart failure    Intracranial and intraspinal phlebitis and " thrombophlebitis 01/10/2022    Cavernous sinus thrombosis    Nausea 01/10/2022    Chronic nausea    Obesity, unspecified 03/21/2022    Class 1 obesity with body mass index (BMI) of 33.0 to 33.9 in adult    Obesity, unspecified 08/01/2022    Class 1 obesity with body mass index (BMI) of 32.0 to 32.9 in adult    Old myocardial infarction     History of myocardial infarction    Other mechanical complication of other cardiac electronic device, initial encounter 02/01/2022    Mechanical complication of implantable cardioverter-defibrillator (ICD)    Other specified counseling 02/01/2022    Encounter for medication counseling    Other specified diseases of gallbladder 06/02/2020    Porcelain gallbladder    Overweight 01/10/2022    Overweight    Person consulting for explanation of examination or test findings 02/01/2022    Encounter to discuss test results    Personal history of other diseases of the digestive system 07/01/2020    History of gallbladder disease    Personal history of other venous thrombosis and embolism     History of deep venous thrombosis    Transient cerebral ischemic attack, unspecified     TIA (transient ischemic attack)       Social History  Social History     Tobacco Use    Smoking status: Every Day     Types: Cigars    Smokeless tobacco: Never   Substance Use Topics    Alcohol use: Yes     Comment: social    Drug use: Yes     Types: Marijuana       Family History     Family History   Problem Relation Name Age of Onset    Coronary artery disease Mother      Diabetes Mother      Other (Cardiac pacemaker) Father      Coronary artery disease Father      Other (Stroke syndrome) Other          Allergies:  Allergies   Allergen Reactions    Ropinirole Nausea And Vomiting        Outpatient Medications:  Current Outpatient Medications   Medication Instructions    albuterol 90 mcg/actuation inhaler 1-2 puffs, inhalation, Every 4 to 6 hours     aspirin 81 mg, oral, Daily    blood sugar diagnostic (True Metrix  Glucose Test Strip) strip  CHECK BLOOD SUGARS 4-6 TIMES DAILY    buPROPion SR (WELLBUTRIN SR) 200 mg, oral, 2 times daily    carvedilol (Coreg) 12.5 mg tablet 1 tablet, oral, 2 times daily    cholecalciferol (VITAMIN D-3) 3,000 Units, oral, Daily    clonazePAM (KlonoPIN) 2 mg tablet 1 tablet, oral, Daily    desvenlafaxine (PRISTIQ) 100 mg, oral, Daily RT    digoxin (Lanoxin) 250 MCG tab;et 0.5 tablets, oral, Daily    ezetimibe (Zetia) 10 mg tablet 1 tablet, oral, Nightly    ferrous sulfate 325 (65 Fe) MG tablet 1 tablet, oral, 2 times daily with meals    furosemide (LASIX) 40 mg, oral, Daily RT    glucagon (Baqsimi) 3 mg/actuation spray,non-aerosol 1 spray, nasal, As needed    glucagon 1 mg injection Use as directed for hypoglycemia     insulin aspart (NOVOLOG) 5-10 Units, subcutaneous, 3 times daily with meals, (BASED ON I:CHO RATIO OF 1.75 UNIT FOR 10G+SS), UP TO 60 UNITS DAILY    insulin glargine (LANTUS) 30 Units, subcutaneous, Nightly    levothyroxine (SYNTHROID, LEVOXYL) 88 mcg, oral, Daily RT    magnesium citrate solution Use as directed     metoclopramide (REGLAN) 5 mg, oral, 2 times daily    nitroglycerin (NITROSTAT) 0.4 mg, sublingual, Every 5 min PRN, F NO PAIN RELIEF, CALL 911<BR>    nortriptyline (Pamelor) 25 mg capsule 1 capsule, oral, Nightly    oxybutynin XL (DITROPAN-XL) 10 mg, oral, Daily RT    rOPINIRole (Requip) 2 mg tablet 1 tablet, oral, Nightly    rosuvastatin (Crestor) 40 mg tablet 1 tablet, oral, Daily    topiramate (Topamax) 200 mg tablet 1 tablet, oral, 2 times daily    valsartan (DIOVAN) 40 mg, oral, Daily RT    VITAMIN B COMPLEX ORAL 1 tablet, oral, Daily RT    vortioxetine hydrobromide (VORTIOXETINE ORAL) 1 tablet, oral, Daily    warfarin (Coumadin) 5 mg tablet oral, Take 2.5 tablets 6 days a week THEN<BR>1.5 tablets once a week     warfarin (Coumadin) 7.5 mg tablet oral, Take 15 mg on MonThur and 12.5 mg all other days of the week or as directed by Coumadin Clinic.<BR>          REVIEW  OF SYSTEMS  Review of Systems   All other systems reviewed and are negative.        VITALS  Vitals:    04/08/24 0936   BP: 102/60   Pulse: 87       PHYSICAL EXAM  Vitals and nursing note reviewed.   Constitutional:       Appearance: Normal appearance.   HENT:      Head: Normocephalic.   Neck:      Vascular: No JVD.   Cardiovascular:      Rate and Rhythm: Normal rate and regular rhythm.      Pulses: Normal pulses.      Heart sounds: Normal heart sounds.   Pulmonary:      Effort: Pulmonary effort is normal.      Breath sounds: Normal breath sounds.   Abdominal:      General: Bowel sounds are normal.      Palpations: Abdomen is soft.   Musculoskeletal:         General: Normal range of motion.      Cervical back: Normal range of motion.   Skin:     General: Skin is warm and dry. Left subclavian ICD pocket is well healed without redness or swelling.  Neurological:      General: No focal deficit present.      Mental Status: She is alert and oriented to person, place, and time.      Motor: Motor function is intact.   Psychiatric:         Attention and Perception: Attention and perception normal.         Mood and Affect: Mood and affect normal.         Speech: Speech normal.         Behavior: Behavior normal. Behavior is cooperative.         Thought Content: Thought content normal.         Cognition and Memory: Cognition and memory normal.      Labs and testing:  Twelve-lead EKG reveals sinus rhythm without ectopics no acute ischemic changes.  QRS duration 76 ms,  ms, QTc 464 ms.  No acute ischemic changes are noted.  ICD interrogation dated April 8, 2024 reveals ventricular pacing 0%.  4 nonsustained VT and 23 SVT detections were noted with heart rates between 150 and 155 beats per minute.  No therapies were elicited.  Good sensing and capture thresholds.  Estimated battery longevity is 9 years.  PT/INR as per [facility]madin clinic.    ASSESSMENT AND PLAN      Clinical impressions:  1. Ischemic cardiomyopathy with a  left ventricular ejection fraction of 25 to 30% per 2D echocardiogram dated September 29, 2021, New York Heart Association class II, stage B heart failure.  2. Coronary artery disease status post angioplasty with stent deployment to the mid LAD.  3. Single-chamber ICD implant ([employer] fortify XT VR) on February 7, 2013 for primary prevention of sudden cardiac death on field alert. At elective replacement indicator as of October 6, 2022.  4. History of noise on the right ventricular lead per device interrogation dated March 8, 2017 with normal sensing and capture thresholds.  5. SVT with heart rates in the 150s in the setting of sepsis.  6. Valvular heart disease consisting of mild aortic valve cusp calcification, mild mitral annular calcification, trace MR per 2D echocardiogram dated March 8, 2017.  7. Hypertension, controlled with a blood pressure today of 102/60.  8. Diabetes mellitus with a hemoglobin A1c of 8.3.  9. Dyslipidemia on statin.  10. Remote CVA with no ongoing neurological deficits.  11. Hypothyroidism on replacement therapy.  12. History of medical marijuana usage.  13. Class I obesity with a BMI today of 30.73.  14. Patient report of daily marijuana usage as an appetite stimulator.     Recommendations:    1.  Continue current medications as prescribed.  I did provide the patient and her  with a copy of the medication list and requested they verify the dosage of digoxin.  2.  Obtain a remote ICD check on July 15, 2024 and in clinic check in 6 months prior to the office visit with Dr. Jenkins.  3.  Follow-up in office with Dr. Jenkins in 6 months or sooner if needed.  4.  Follow-up in office with Dr. Barclay on April 10, 2024 at 7:30 AM as scheduled.    Evaluation and note by Jolly Campos CNP  **Please excuse any errors in grammar or translation related to this dictation.  Voice recognition software was utilized to prepare this document.**

## 2024-04-10 ENCOUNTER — OFFICE VISIT (OUTPATIENT)
Dept: CARDIOLOGY | Facility: CLINIC | Age: 64
End: 2024-04-10
Payer: MEDICARE

## 2024-04-10 VITALS
HEIGHT: 62 IN | SYSTOLIC BLOOD PRESSURE: 118 MMHG | DIASTOLIC BLOOD PRESSURE: 66 MMHG | WEIGHT: 170 LBS | HEART RATE: 60 BPM | BODY MASS INDEX: 31.28 KG/M2

## 2024-04-10 DIAGNOSIS — I50.22 CHRONIC SYSTOLIC HEART FAILURE (MULTI): ICD-10-CM

## 2024-04-10 DIAGNOSIS — Z95.810 CARDIAC DEFIBRILLATOR IN SITU: ICD-10-CM

## 2024-04-10 DIAGNOSIS — N18.31 STAGE 3A CHRONIC KIDNEY DISEASE (MULTI): ICD-10-CM

## 2024-04-10 DIAGNOSIS — I25.2 HISTORY OF MI (MYOCARDIAL INFARCTION): ICD-10-CM

## 2024-04-10 DIAGNOSIS — I25.5 ISCHEMIC CARDIOMYOPATHY: ICD-10-CM

## 2024-04-10 DIAGNOSIS — I10 ESSENTIAL HYPERTENSION, BENIGN: ICD-10-CM

## 2024-04-10 DIAGNOSIS — Z86.73 HISTORY OF CVA (CEREBROVASCULAR ACCIDENT): ICD-10-CM

## 2024-04-10 DIAGNOSIS — I25.10 CAD S/P PERCUTANEOUS CORONARY ANGIOPLASTY: ICD-10-CM

## 2024-04-10 DIAGNOSIS — E78.2 MIXED HYPERLIPIDEMIA: ICD-10-CM

## 2024-04-10 DIAGNOSIS — E11.9 TYPE 2 DIABETES MELLITUS WITHOUT COMPLICATION, UNSPECIFIED WHETHER LONG TERM INSULIN USE (MULTI): ICD-10-CM

## 2024-04-10 DIAGNOSIS — Z98.61 CAD S/P PERCUTANEOUS CORONARY ANGIOPLASTY: ICD-10-CM

## 2024-04-10 PROBLEM — Z95.5 PRESENCE OF CORONARY ANGIOPLASTY IMPLANT AND GRAFT: Status: RESOLVED | Noted: 2023-08-29 | Resolved: 2024-04-10

## 2024-04-10 PROCEDURE — 4010F ACE/ARB THERAPY RXD/TAKEN: CPT | Performed by: INTERNAL MEDICINE

## 2024-04-10 PROCEDURE — 99214 OFFICE O/P EST MOD 30 MIN: CPT | Performed by: INTERNAL MEDICINE

## 2024-04-10 PROCEDURE — 3008F BODY MASS INDEX DOCD: CPT | Performed by: INTERNAL MEDICINE

## 2024-04-10 PROCEDURE — 3074F SYST BP LT 130 MM HG: CPT | Performed by: INTERNAL MEDICINE

## 2024-04-10 PROCEDURE — 3078F DIAST BP <80 MM HG: CPT | Performed by: INTERNAL MEDICINE

## 2024-04-10 NOTE — PATIENT INSTRUCTIONS
Patient to follow up in 1 year with Dr. Jose Reveles MD      No changes today.   Encouraged to quit smoking- heart healthy education given today.      Continue same medications and treatments.   Patient educated on proper medication use.   Patient educated on risk factor modification.   Please bring any lab results from other providers / physicians to your next appointment.     Please bring all medicines, vitamins, and herbal supplements with you when you come to the office.     Prescriptions will not be filled unless you are compliant with your follow up appointments or have a follow up appointment scheduled as per instruction of your physician. Refills should be requested at the time of your visit.    IMorro RN am scribing for and in the presence of Dr. Jose Barclay MD

## 2024-04-10 NOTE — PROGRESS NOTES
CARDIOLOGY OFFICE VISIT      CHIEF COMPLAINT      HISTORY OF PRESENT ILLNESS  The patient states she has been feeling well recently.  She denies chest discomfort or symptoms of myocardial ischemia.  She has not taken any nitroglycerin.  She does have some dyspnea with over exertion but she states she does not really overexert herself.  She denies pretibial edema.  She denies palpitations and syncope.  She has not had any shocks from her ICD.  She denies any problem with her current medication.    IMPRESSION:   1. Obesity  2. Coronary artery disease, no angina  3. Remote anterior ST-segment elevation myocardial infarction.  4. Remote PCI.  5. Essential hypertension.  6. Mixed hyperlipidemia.  7. Diabetes mellitus type 2, requiring insulin control.  8.  History of ischemic cardiomyopathy. Left ventricular ejection fraction 50-55% on Echo from [facility] December 2023  9. Chronic systolic heart failure  10. AICD.  11. Remote cerebrovascular accident.  12. History of cerebral cavernous sinus thrombosis, on long-term oral anticoagulation.  13. Chronic kidney disease stage 3        Please excuse any errors in grammar or translation related to this dictation. Voice recognition software was utilized to prepare this document.       Past Medical History  Past Medical History:   Diagnosis Date    Acute myocardial infarction, unspecified (CMS/[facility]) 01/10/2022    Acute MI    Body mass index (BMI) 33.0-33.9, adult 02/01/2022    BMI 33.0-33.9,adult    Disorder of the skin and subcutaneous tissue, unspecified 07/01/2020    Back skin lesion    Encounter for preprocedural cardiovascular examination 01/10/2022    Pre-operative cardiovascular examination    Hypertensive heart disease with heart failure (CMS/[facility]) 05/21/2020    Hypertensive heart disease with chronic combined systolic and diastolic congestive heart failure    Intracranial and intraspinal phlebitis and thrombophlebitis 01/10/2022    Cavernous sinus thrombosis    Nausea  01/10/2022    Chronic nausea    Obesity, unspecified 03/21/2022    Class 1 obesity with body mass index (BMI) of 33.0 to 33.9 in adult    Obesity, unspecified 08/01/2022    Class 1 obesity with body mass index (BMI) of 32.0 to 32.9 in adult    Old myocardial infarction     History of myocardial infarction    Other mechanical complication of other cardiac electronic device, initial encounter 02/01/2022    Mechanical complication of implantable cardioverter-defibrillator (ICD)    Other specified counseling 02/01/2022    Encounter for medication counseling    Other specified diseases of gallbladder 06/02/2020    Porcelain gallbladder    Overweight 01/10/2022    Overweight    Person consulting for explanation of examination or test findings 02/01/2022    Encounter to discuss test results    Personal history of other diseases of the digestive system 07/01/2020    History of gallbladder disease    Personal history of other venous thrombosis and embolism     History of deep venous thrombosis    Transient cerebral ischemic attack, unspecified     TIA (transient ischemic attack)       Social History  Social History     Tobacco Use    Smoking status: Every Day     Types: Cigars    Smokeless tobacco: Never   Substance Use Topics    Alcohol use: Yes     Comment: social    Drug use: Yes     Types: Marijuana       Family History     Family History   Problem Relation Name Age of Onset    Coronary artery disease Mother      Diabetes Mother      Other (Cardiac pacemaker) Father      Coronary artery disease Father      Other (Stroke syndrome) Other          Allergies:  Allergies   Allergen Reactions    Ropinirole Nausea And Vomiting        Outpatient Medications:  Current Outpatient Medications   Medication Instructions    albuterol 90 mcg/actuation inhaler 1-2 puffs, inhalation, Every 4 to 6 hours     blood sugar diagnostic (True Metrix Glucose Test Strip) strip  CHECK BLOOD SUGARS 4-6 TIMES DAILY    buPROPion SR (WELLBUTRIN SR) 200  mg, oral, 2 times daily    carvedilol (Coreg) 12.5 mg tablet 1 tablet, oral, 2 times daily    cholecalciferol (VITAMIN D-3) 3,000 Units, oral, Daily    clonazePAM (KlonoPIN) 2 mg tablet 1 tablet, oral, Daily    desvenlafaxine (PRISTIQ) 100 mg, oral, Daily RT    ezetimibe (Zetia) 10 mg tablet 1 tablet, oral, Nightly    furosemide (LASIX) 40 mg, oral, Daily RT    glucagon (Baqsimi) 3 mg/actuation spray,non-aerosol 1 spray, nasal, As needed    glucagon 1 mg injection Use as directed for hypoglycemia     insulin aspart (NOVOLOG) 5-10 Units, subcutaneous, 3 times daily with meals, (BASED ON I:CHO RATIO OF 1.75 UNIT FOR 10G+SS), UP TO 60 UNITS DAILY    insulin glargine (LANTUS) 30 Units, subcutaneous, Nightly    levothyroxine (SYNTHROID, LEVOXYL) 88 mcg, oral, Daily RT    magnesium citrate solution Use as directed     metoclopramide (REGLAN) 5 mg, oral, 2 times daily    nitroglycerin (NITROSTAT) 0.4 mg, sublingual, Every 5 min PRN, F NO PAIN RELIEF, CALL 911<BR>    nortriptyline (Pamelor) 25 mg capsule 1 capsule, oral, Nightly    oxybutynin XL (DITROPAN-XL) 10 mg, oral, Daily RT    rOPINIRole (Requip) 2 mg tablet 1 tablet, oral, Nightly    rosuvastatin (Crestor) 40 mg tablet 1 tablet, oral, Daily    topiramate (Topamax) 200 mg tablet 1 tablet, oral, 2 times daily    valsartan (DIOVAN) 40 mg, oral, Daily RT    VITAMIN B COMPLEX ORAL 1 tablet, oral, Daily RT    warfarin (Coumadin) 5 mg tablet oral, Take 2.5 tablets 6 days a week THEN<BR>1.5 tablets once a week     warfarin (Coumadin) 7.5 mg tablet oral, Take 15 mg on MonThur and 12.5 mg all other days of the week or as directed by Coumadin Clinic.<BR>          REVIEW OF SYSTEMS  Review of Systems   All other systems reviewed and are negative.        VITALS  There were no vitals filed for this visit.    PHYSICAL EXAM  Vitals reviewed.   Constitutional:       Appearance: Normal and healthy appearance. Well-developed and not in distress.   Eyes:      Conjunctiva/sclera:  Conjunctivae normal.      Pupils: Pupils are equal, round, and reactive to light.   Neck:      Vascular: No JVR. JVD normal.   Pulmonary:      Effort: Pulmonary effort is normal.      Breath sounds: Normal breath sounds. No wheezing. No rhonchi. No rales.   Chest:      Chest wall: Not tender to palpatation.   Cardiovascular:      PMI at left midclavicular line. Normal rate. Regular rhythm. Normal S1. Normal S2.       Murmurs: There is no murmur.      No gallop.  No click. No rub.   Pulses:     Intact distal pulses.   Edema:     Peripheral edema absent.   Abdominal:      Tenderness: There is no abdominal tenderness.   Musculoskeletal: Normal range of motion.         General: No tenderness.      Cervical back: Normal range of motion. Skin:     General: Skin is warm and dry.   Neurological:      General: No focal deficit present.      Mental Status: Alert and oriented to person, place and time.   Psychiatric:         Behavior: Behavior is cooperative.           ASSESSMENT AND PLAN  Diagnoses and all orders for this visit:  CAD S/P percutaneous coronary angioplasty  Chronic systolic heart failure (CMS/HCC)  Mixed hyperlipidemia  Ischemic cardiomyopathy  Essential hypertension, benign  Cardiac defibrillator in situ  History of MI (myocardial infarction)  Type 2 diabetes mellitus without complication, unspecified whether long term insulin use (CMS/[facility])  Stage 3a chronic kidney disease (CMS/[facility])  History of CVA (cerebrovascular accident)  BMI 31.0-31.9,adult      [unfilled]

## 2024-04-22 ENCOUNTER — APPOINTMENT (OUTPATIENT)
Dept: CARDIOLOGY | Facility: HOSPITAL | Age: 64
DRG: 286 | End: 2024-04-22
Payer: MEDICARE

## 2024-04-22 ENCOUNTER — APPOINTMENT (OUTPATIENT)
Dept: RADIOLOGY | Facility: HOSPITAL | Age: 64
DRG: 286 | End: 2024-04-22
Payer: MEDICARE

## 2024-04-22 ENCOUNTER — HOSPITAL ENCOUNTER (INPATIENT)
Facility: HOSPITAL | Age: 64
LOS: 1 days | Discharge: HOME HEALTH | DRG: 286 | End: 2024-04-24
Attending: EMERGENCY MEDICINE | Admitting: STUDENT IN AN ORGANIZED HEALTH CARE EDUCATION/TRAINING PROGRAM
Payer: MEDICARE

## 2024-04-22 DIAGNOSIS — R09.02 HYPOXIA: ICD-10-CM

## 2024-04-22 DIAGNOSIS — Z98.61 CAD S/P PERCUTANEOUS CORONARY ANGIOPLASTY: ICD-10-CM

## 2024-04-22 DIAGNOSIS — I50.9 CONGESTIVE HEART FAILURE, UNSPECIFIED HF CHRONICITY, UNSPECIFIED HEART FAILURE TYPE (MULTI): Primary | ICD-10-CM

## 2024-04-22 DIAGNOSIS — I50.22 CHRONIC SYSTOLIC HEART FAILURE (MULTI): ICD-10-CM

## 2024-04-22 DIAGNOSIS — I25.10 CAD S/P PERCUTANEOUS CORONARY ANGIOPLASTY: ICD-10-CM

## 2024-04-22 DIAGNOSIS — Z86.73 HISTORY OF CVA (CEREBROVASCULAR ACCIDENT): ICD-10-CM

## 2024-04-22 DIAGNOSIS — I50.9 ACUTE CONGESTIVE HEART FAILURE, UNSPECIFIED HEART FAILURE TYPE (MULTI): ICD-10-CM

## 2024-04-22 DIAGNOSIS — I21.4 NON-ST ELEVATION (NSTEMI) MYOCARDIAL INFARCTION (MULTI): ICD-10-CM

## 2024-04-22 LAB
ALBUMIN SERPL BCP-MCNC: 4 G/DL (ref 3.4–5)
ALP SERPL-CCNC: 68 U/L (ref 33–136)
ALT SERPL W P-5'-P-CCNC: 19 U/L (ref 7–45)
ANION GAP SERPL CALC-SCNC: 16 MMOL/L (ref 10–20)
AST SERPL W P-5'-P-CCNC: 16 U/L (ref 9–39)
B-OH-BUTYR SERPL-SCNC: 0.46 MMOL/L (ref 0.02–0.27)
BASE EXCESS BLDV CALC-SCNC: -1.1 MMOL/L (ref -2–3)
BASOPHILS # BLD AUTO: 0.05 X10*3/UL (ref 0–0.1)
BASOPHILS NFR BLD AUTO: 0.3 %
BILIRUB SERPL-MCNC: 0.3 MG/DL (ref 0–1.2)
BNP SERPL-MCNC: 58 PG/ML (ref 0–99)
BODY TEMPERATURE: ABNORMAL
BUN SERPL-MCNC: 23 MG/DL (ref 6–23)
CALCIUM SERPL-MCNC: 9 MG/DL (ref 8.6–10.3)
CARDIAC TROPONIN I PNL SERPL HS: 155 NG/L (ref 0–13)
CARDIAC TROPONIN I PNL SERPL HS: 204 NG/L (ref 0–13)
CARDIAC TROPONIN I PNL SERPL HS: 39 NG/L (ref 0–13)
CHLORIDE SERPL-SCNC: 103 MMOL/L (ref 98–107)
CO2 SERPL-SCNC: 21 MMOL/L (ref 21–32)
CREAT SERPL-MCNC: 1.2 MG/DL (ref 0.5–1.05)
EGFRCR SERPLBLD CKD-EPI 2021: 51 ML/MIN/1.73M*2
EOSINOPHIL # BLD AUTO: 0.04 X10*3/UL (ref 0–0.7)
EOSINOPHIL NFR BLD AUTO: 0.2 %
ERYTHROCYTE [DISTWIDTH] IN BLOOD BY AUTOMATED COUNT: 13.2 % (ref 11.5–14.5)
FLUAV RNA RESP QL NAA+PROBE: NOT DETECTED
FLUBV RNA RESP QL NAA+PROBE: NOT DETECTED
GLUCOSE BLD MANUAL STRIP-MCNC: 345 MG/DL (ref 74–99)
GLUCOSE BLD MANUAL STRIP-MCNC: 359 MG/DL (ref 74–99)
GLUCOSE BLD MANUAL STRIP-MCNC: 402 MG/DL (ref 74–99)
GLUCOSE SERPL-MCNC: 229 MG/DL (ref 74–99)
HCO3 BLDV-SCNC: 24.8 MMOL/L (ref 22–26)
HCT VFR BLD AUTO: 42.9 % (ref 36–46)
HGB BLD-MCNC: 14.3 G/DL (ref 12–16)
IMM GRANULOCYTES # BLD AUTO: 0.26 X10*3/UL (ref 0–0.7)
IMM GRANULOCYTES NFR BLD AUTO: 1.6 % (ref 0–0.9)
INHALED O2 CONCENTRATION: 95 %
INR PPP: 1.3 (ref 0.9–1.1)
LACTATE SERPL-SCNC: 1.9 MMOL/L (ref 0.4–2)
LYMPHOCYTES # BLD AUTO: 2.21 X10*3/UL (ref 1.2–4.8)
LYMPHOCYTES NFR BLD AUTO: 13.5 %
MAGNESIUM SERPL-MCNC: 1.85 MG/DL (ref 1.6–2.4)
MCH RBC QN AUTO: 36.1 PG (ref 26–34)
MCHC RBC AUTO-ENTMCNC: 33.3 G/DL (ref 32–36)
MCV RBC AUTO: 108 FL (ref 80–100)
MONOCYTES # BLD AUTO: 0.75 X10*3/UL (ref 0.1–1)
MONOCYTES NFR BLD AUTO: 4.6 %
NEUTROPHILS # BLD AUTO: 13 X10*3/UL (ref 1.2–7.7)
NEUTROPHILS NFR BLD AUTO: 79.8 %
NRBC BLD-RTO: 0 /100 WBCS (ref 0–0)
OXYHGB MFR BLDV: 53.2 % (ref 45–75)
PCO2 BLDV: 45 MM HG (ref 41–51)
PH BLDV: 7.35 PH (ref 7.33–7.43)
PLATELET # BLD AUTO: 147 X10*3/UL (ref 150–450)
PO2 BLDV: 34 MM HG (ref 35–45)
POTASSIUM SERPL-SCNC: 4.1 MMOL/L (ref 3.5–5.3)
PROT SERPL-MCNC: 7.1 G/DL (ref 6.4–8.2)
PROTHROMBIN TIME: 14.3 SECONDS (ref 9.8–12.8)
RBC # BLD AUTO: 3.96 X10*6/UL (ref 4–5.2)
RSV RNA RESP QL NAA+PROBE: NOT DETECTED
SAO2 % BLDV: 60 % (ref 45–75)
SARS-COV-2 RNA RESP QL NAA+PROBE: NOT DETECTED
SODIUM SERPL-SCNC: 136 MMOL/L (ref 136–145)
WBC # BLD AUTO: 16.3 X10*3/UL (ref 4.4–11.3)

## 2024-04-22 PROCEDURE — 84484 ASSAY OF TROPONIN QUANT: CPT | Performed by: PHYSICIAN ASSISTANT

## 2024-04-22 PROCEDURE — 82805 BLOOD GASES W/O2 SATURATION: CPT | Performed by: PHYSICIAN ASSISTANT

## 2024-04-22 PROCEDURE — 94640 AIRWAY INHALATION TREATMENT: CPT

## 2024-04-22 PROCEDURE — 93005 ELECTROCARDIOGRAM TRACING: CPT

## 2024-04-22 PROCEDURE — 96365 THER/PROPH/DIAG IV INF INIT: CPT

## 2024-04-22 PROCEDURE — 85610 PROTHROMBIN TIME: CPT | Performed by: PHYSICIAN ASSISTANT

## 2024-04-22 PROCEDURE — 99291 CRITICAL CARE FIRST HOUR: CPT | Mod: 25 | Performed by: EMERGENCY MEDICINE

## 2024-04-22 PROCEDURE — 36415 COLL VENOUS BLD VENIPUNCTURE: CPT | Performed by: PHYSICIAN ASSISTANT

## 2024-04-22 PROCEDURE — 87634 RSV DNA/RNA AMP PROBE: CPT | Performed by: PHYSICIAN ASSISTANT

## 2024-04-22 PROCEDURE — G0378 HOSPITAL OBSERVATION PER HR: HCPCS

## 2024-04-22 PROCEDURE — 82947 ASSAY GLUCOSE BLOOD QUANT: CPT

## 2024-04-22 PROCEDURE — 2500000002 HC RX 250 W HCPCS SELF ADMINISTERED DRUGS (ALT 637 FOR MEDICARE OP, ALT 636 FOR OP/ED): Performed by: PHYSICIAN ASSISTANT

## 2024-04-22 PROCEDURE — 2550000001 HC RX 255 CONTRASTS: Performed by: PHYSICIAN ASSISTANT

## 2024-04-22 PROCEDURE — 71045 X-RAY EXAM CHEST 1 VIEW: CPT | Performed by: RADIOLOGY

## 2024-04-22 PROCEDURE — 87631 RESP VIRUS 3-5 TARGETS: CPT | Performed by: PHYSICIAN ASSISTANT

## 2024-04-22 PROCEDURE — 71045 X-RAY EXAM CHEST 1 VIEW: CPT

## 2024-04-22 PROCEDURE — 96375 TX/PRO/DX INJ NEW DRUG ADDON: CPT

## 2024-04-22 PROCEDURE — 99222 1ST HOSP IP/OBS MODERATE 55: CPT | Performed by: STUDENT IN AN ORGANIZED HEALTH CARE EDUCATION/TRAINING PROGRAM

## 2024-04-22 PROCEDURE — 83735 ASSAY OF MAGNESIUM: CPT | Performed by: PHYSICIAN ASSISTANT

## 2024-04-22 PROCEDURE — 85025 COMPLETE CBC W/AUTO DIFF WBC: CPT | Performed by: PHYSICIAN ASSISTANT

## 2024-04-22 PROCEDURE — 96367 TX/PROPH/DG ADDL SEQ IV INF: CPT

## 2024-04-22 PROCEDURE — 83605 ASSAY OF LACTIC ACID: CPT | Performed by: PHYSICIAN ASSISTANT

## 2024-04-22 PROCEDURE — 83880 ASSAY OF NATRIURETIC PEPTIDE: CPT | Performed by: PHYSICIAN ASSISTANT

## 2024-04-22 PROCEDURE — 84075 ASSAY ALKALINE PHOSPHATASE: CPT | Performed by: PHYSICIAN ASSISTANT

## 2024-04-22 PROCEDURE — 82010 KETONE BODYS QUAN: CPT | Performed by: PHYSICIAN ASSISTANT

## 2024-04-22 PROCEDURE — 71275 CT ANGIOGRAPHY CHEST: CPT

## 2024-04-22 PROCEDURE — 84484 ASSAY OF TROPONIN QUANT: CPT | Performed by: STUDENT IN AN ORGANIZED HEALTH CARE EDUCATION/TRAINING PROGRAM

## 2024-04-22 PROCEDURE — 2500000004 HC RX 250 GENERAL PHARMACY W/ HCPCS (ALT 636 FOR OP/ED): Performed by: PHYSICIAN ASSISTANT

## 2024-04-22 PROCEDURE — 71275 CT ANGIOGRAPHY CHEST: CPT | Performed by: RADIOLOGY

## 2024-04-22 RX ORDER — FUROSEMIDE 10 MG/ML
40 INJECTION INTRAMUSCULAR; INTRAVENOUS EVERY 12 HOURS
Status: DISCONTINUED | OUTPATIENT
Start: 2024-04-22 | End: 2024-04-23

## 2024-04-22 RX ORDER — ACETAMINOPHEN 325 MG/1
650 TABLET ORAL EVERY 4 HOURS PRN
Status: DISCONTINUED | OUTPATIENT
Start: 2024-04-22 | End: 2024-04-24 | Stop reason: HOSPADM

## 2024-04-22 RX ORDER — ZINC GLUCONATE 50 MG
1 TABLET ORAL NIGHTLY
COMMUNITY

## 2024-04-22 RX ORDER — ARIPIPRAZOLE 2 MG/1
4 TABLET ORAL NIGHTLY
COMMUNITY

## 2024-04-22 RX ORDER — TALC
3 POWDER (GRAM) TOPICAL DAILY
Status: DISCONTINUED | OUTPATIENT
Start: 2024-04-22 | End: 2024-04-24 | Stop reason: HOSPADM

## 2024-04-22 RX ORDER — DOCUSATE SODIUM 100 MG/1
100 CAPSULE, LIQUID FILLED ORAL 2 TIMES DAILY
Status: DISCONTINUED | OUTPATIENT
Start: 2024-04-22 | End: 2024-04-24 | Stop reason: HOSPADM

## 2024-04-22 RX ORDER — ACETAMINOPHEN 650 MG/1
650 SUPPOSITORY RECTAL EVERY 4 HOURS PRN
Status: DISCONTINUED | OUTPATIENT
Start: 2024-04-22 | End: 2024-04-24 | Stop reason: HOSPADM

## 2024-04-22 RX ORDER — LEVOTHYROXINE SODIUM 88 UG/1
88 TABLET ORAL DAILY
Status: DISCONTINUED | OUTPATIENT
Start: 2024-04-23 | End: 2024-04-24 | Stop reason: HOSPADM

## 2024-04-22 RX ORDER — DEXTROSE 50 % IN WATER (D50W) INTRAVENOUS SYRINGE
12.5
Status: DISCONTINUED | OUTPATIENT
Start: 2024-04-22 | End: 2024-04-24 | Stop reason: HOSPADM

## 2024-04-22 RX ORDER — CLONAZEPAM 1 MG/1
2 TABLET ORAL NIGHTLY
Status: DISCONTINUED | OUTPATIENT
Start: 2024-04-22 | End: 2024-04-24 | Stop reason: HOSPADM

## 2024-04-22 RX ORDER — ACETAMINOPHEN 160 MG/5ML
650 SOLUTION ORAL EVERY 4 HOURS PRN
Status: DISCONTINUED | OUTPATIENT
Start: 2024-04-22 | End: 2024-04-24 | Stop reason: HOSPADM

## 2024-04-22 RX ORDER — INSULIN GLARGINE 100 [IU]/ML
30 INJECTION, SOLUTION SUBCUTANEOUS EVERY 24 HOURS
Status: DISCONTINUED | OUTPATIENT
Start: 2024-04-23 | End: 2024-04-24 | Stop reason: HOSPADM

## 2024-04-22 RX ORDER — IPRATROPIUM BROMIDE AND ALBUTEROL SULFATE 2.5; .5 MG/3ML; MG/3ML
3 SOLUTION RESPIRATORY (INHALATION) ONCE
Status: COMPLETED | OUTPATIENT
Start: 2024-04-22 | End: 2024-04-22

## 2024-04-22 RX ORDER — GUAIFENESIN 600 MG/1
600 TABLET, EXTENDED RELEASE ORAL EVERY 12 HOURS PRN
Status: DISCONTINUED | OUTPATIENT
Start: 2024-04-22 | End: 2024-04-24 | Stop reason: HOSPADM

## 2024-04-22 RX ORDER — ROPINIROLE 1 MG/1
2 TABLET, FILM COATED ORAL NIGHTLY
Status: DISCONTINUED | OUTPATIENT
Start: 2024-04-22 | End: 2024-04-24 | Stop reason: HOSPADM

## 2024-04-22 RX ORDER — PANTOPRAZOLE SODIUM 40 MG/1
40 TABLET, DELAYED RELEASE ORAL
Status: DISCONTINUED | OUTPATIENT
Start: 2024-04-23 | End: 2024-04-24 | Stop reason: HOSPADM

## 2024-04-22 RX ORDER — PANTOPRAZOLE SODIUM 40 MG/10ML
40 INJECTION, POWDER, LYOPHILIZED, FOR SOLUTION INTRAVENOUS
Status: DISCONTINUED | OUTPATIENT
Start: 2024-04-23 | End: 2024-04-24 | Stop reason: HOSPADM

## 2024-04-22 RX ORDER — ONDANSETRON HYDROCHLORIDE 2 MG/ML
4 INJECTION, SOLUTION INTRAVENOUS EVERY 8 HOURS PRN
Status: DISCONTINUED | OUTPATIENT
Start: 2024-04-22 | End: 2024-04-24 | Stop reason: HOSPADM

## 2024-04-22 RX ORDER — ONDANSETRON 4 MG/1
4 TABLET, ORALLY DISINTEGRATING ORAL EVERY 8 HOURS PRN
Status: DISCONTINUED | OUTPATIENT
Start: 2024-04-22 | End: 2024-04-24 | Stop reason: HOSPADM

## 2024-04-22 RX ORDER — MAGNESIUM SULFATE HEPTAHYDRATE 40 MG/ML
2 INJECTION, SOLUTION INTRAVENOUS ONCE
Status: COMPLETED | OUTPATIENT
Start: 2024-04-22 | End: 2024-04-22

## 2024-04-22 RX ORDER — ARIPIPRAZOLE 2 MG/1
4 TABLET ORAL NIGHTLY
Status: DISCONTINUED | OUTPATIENT
Start: 2024-04-22 | End: 2024-04-24 | Stop reason: HOSPADM

## 2024-04-22 RX ORDER — CARVEDILOL 12.5 MG/1
12.5 TABLET ORAL 2 TIMES DAILY
Status: DISCONTINUED | OUTPATIENT
Start: 2024-04-22 | End: 2024-04-24 | Stop reason: HOSPADM

## 2024-04-22 RX ORDER — GUAIFENESIN/DEXTROMETHORPHAN 100-10MG/5
5 SYRUP ORAL EVERY 4 HOURS PRN
Status: DISCONTINUED | OUTPATIENT
Start: 2024-04-22 | End: 2024-04-24 | Stop reason: HOSPADM

## 2024-04-22 RX ORDER — WARFARIN SODIUM 5 MG/1
15 TABLET ORAL
Status: DISCONTINUED | OUTPATIENT
Start: 2024-04-23 | End: 2024-04-24 | Stop reason: HOSPADM

## 2024-04-22 RX ORDER — TOPIRAMATE 100 MG/1
200 TABLET, FILM COATED ORAL 2 TIMES DAILY
Status: DISCONTINUED | OUTPATIENT
Start: 2024-04-22 | End: 2024-04-24 | Stop reason: HOSPADM

## 2024-04-22 RX ORDER — ALBUTEROL SULFATE 0.83 MG/ML
2.5 SOLUTION RESPIRATORY (INHALATION) EVERY 20 MIN
Status: COMPLETED | OUTPATIENT
Start: 2024-04-22 | End: 2024-04-22

## 2024-04-22 RX ORDER — BUPROPION HYDROCHLORIDE 100 MG/1
200 TABLET, EXTENDED RELEASE ORAL 2 TIMES DAILY
Status: DISCONTINUED | OUTPATIENT
Start: 2024-04-22 | End: 2024-04-24 | Stop reason: HOSPADM

## 2024-04-22 RX ORDER — EZETIMIBE 10 MG/1
10 TABLET ORAL NIGHTLY
Status: DISCONTINUED | OUTPATIENT
Start: 2024-04-22 | End: 2024-04-24 | Stop reason: HOSPADM

## 2024-04-22 RX ORDER — FUROSEMIDE 10 MG/ML
20 INJECTION INTRAMUSCULAR; INTRAVENOUS ONCE
Status: COMPLETED | OUTPATIENT
Start: 2024-04-22 | End: 2024-04-22

## 2024-04-22 RX ORDER — ROSUVASTATIN CALCIUM 20 MG/1
40 TABLET, COATED ORAL DAILY
Status: DISCONTINUED | OUTPATIENT
Start: 2024-04-23 | End: 2024-04-24 | Stop reason: HOSPADM

## 2024-04-22 RX ADMIN — IPRATROPIUM BROMIDE AND ALBUTEROL SULFATE 3 ML: 2.5; .5 SOLUTION RESPIRATORY (INHALATION) at 12:02

## 2024-04-22 RX ADMIN — AZITHROMYCIN MONOHYDRATE 500 MG: 500 INJECTION, POWDER, LYOPHILIZED, FOR SOLUTION INTRAVENOUS at 14:27

## 2024-04-22 RX ADMIN — FUROSEMIDE 20 MG: 10 INJECTION, SOLUTION INTRAMUSCULAR; INTRAVENOUS at 14:04

## 2024-04-22 RX ADMIN — METHYLPREDNISOLONE SODIUM SUCCINATE 125 MG: 125 INJECTION, POWDER, FOR SOLUTION INTRAMUSCULAR; INTRAVENOUS at 12:03

## 2024-04-22 RX ADMIN — ALBUTEROL SULFATE 2.5 MG: 2.5 SOLUTION RESPIRATORY (INHALATION) at 12:03

## 2024-04-22 RX ADMIN — IOHEXOL 75 ML: 350 INJECTION, SOLUTION INTRAVENOUS at 12:14

## 2024-04-22 RX ADMIN — MAGNESIUM SULFATE HEPTAHYDRATE 2 G: 40 INJECTION, SOLUTION INTRAVENOUS at 12:06

## 2024-04-22 RX ADMIN — DEXTROSE MONOHYDRATE 2 G: 5 INJECTION INTRAVENOUS at 13:36

## 2024-04-22 RX ADMIN — ALBUTEROL SULFATE 2.5 MG: 2.5 SOLUTION RESPIRATORY (INHALATION) at 12:10

## 2024-04-22 SDOH — SOCIAL STABILITY: SOCIAL INSECURITY: WERE YOU ABLE TO COMPLETE ALL THE BEHAVIORAL HEALTH SCREENINGS?: YES

## 2024-04-22 SDOH — SOCIAL STABILITY: SOCIAL INSECURITY: HAVE YOU HAD THOUGHTS OF HARMING ANYONE ELSE?: NO

## 2024-04-22 SDOH — SOCIAL STABILITY: SOCIAL INSECURITY: ABUSE: ADULT

## 2024-04-22 SDOH — SOCIAL STABILITY: SOCIAL INSECURITY: ARE YOU OR HAVE YOU BEEN THREATENED OR ABUSED PHYSICALLY, EMOTIONALLY, OR SEXUALLY BY ANYONE?: NO

## 2024-04-22 SDOH — SOCIAL STABILITY: SOCIAL INSECURITY: DO YOU FEEL ANYONE HAS EXPLOITED OR TAKEN ADVANTAGE OF YOU FINANCIALLY OR OF YOUR PERSONAL PROPERTY?: NO

## 2024-04-22 SDOH — SOCIAL STABILITY: SOCIAL INSECURITY: ARE THERE ANY APPARENT SIGNS OF INJURIES/BEHAVIORS THAT COULD BE RELATED TO ABUSE/NEGLECT?: NO

## 2024-04-22 SDOH — SOCIAL STABILITY: SOCIAL INSECURITY: HAVE YOU HAD ANY THOUGHTS OF HARMING ANYONE ELSE?: NO

## 2024-04-22 SDOH — SOCIAL STABILITY: SOCIAL INSECURITY: DO YOU FEEL UNSAFE GOING BACK TO THE PLACE WHERE YOU ARE LIVING?: NO

## 2024-04-22 SDOH — SOCIAL STABILITY: SOCIAL INSECURITY: HAS ANYONE EVER THREATENED TO HURT YOUR FAMILY OR YOUR PETS?: NO

## 2024-04-22 SDOH — SOCIAL STABILITY: SOCIAL INSECURITY: DOES ANYONE TRY TO KEEP YOU FROM HAVING/CONTACTING OTHER FRIENDS OR DOING THINGS OUTSIDE YOUR HOME?: NO

## 2024-04-22 ASSESSMENT — ACTIVITIES OF DAILY LIVING (ADL)
GROOMING: INDEPENDENT
LACK_OF_TRANSPORTATION: PATIENT DECLINED
TOILETING: INDEPENDENT
PATIENT'S MEMORY ADEQUATE TO SAFELY COMPLETE DAILY ACTIVITIES?: YES
ADEQUATE_TO_COMPLETE_ADL: YES
WALKS IN HOME: INDEPENDENT
HEARING - RIGHT EAR: FUNCTIONAL
DRESSING YOURSELF: INDEPENDENT
BATHING: INDEPENDENT
FEEDING YOURSELF: INDEPENDENT
HEARING - LEFT EAR: FUNCTIONAL
JUDGMENT_ADEQUATE_SAFELY_COMPLETE_DAILY_ACTIVITIES: YES

## 2024-04-22 ASSESSMENT — PAIN SCALES - GENERAL
PAINLEVEL_OUTOF10: 0 - NO PAIN

## 2024-04-22 ASSESSMENT — COGNITIVE AND FUNCTIONAL STATUS - GENERAL
DAILY ACTIVITIY SCORE: 24
MOBILITY SCORE: 24
PATIENT BASELINE BEDBOUND: NO

## 2024-04-22 ASSESSMENT — LIFESTYLE VARIABLES
HOW MANY STANDARD DRINKS CONTAINING ALCOHOL DO YOU HAVE ON A TYPICAL DAY: PATIENT DOES NOT DRINK
EVER FELT BAD OR GUILTY ABOUT YOUR DRINKING: NO
AUDIT-C TOTAL SCORE: 0
EVER HAD A DRINK FIRST THING IN THE MORNING TO STEADY YOUR NERVES TO GET RID OF A HANGOVER: NO
HOW OFTEN DO YOU HAVE 6 OR MORE DRINKS ON ONE OCCASION: NEVER
HAVE PEOPLE ANNOYED YOU BY CRITICIZING YOUR DRINKING: NO
SKIP TO QUESTIONS 9-10: 1
HOW OFTEN DO YOU HAVE A DRINK CONTAINING ALCOHOL: NEVER
HAVE YOU EVER FELT YOU SHOULD CUT DOWN ON YOUR DRINKING: NO
AUDIT-C TOTAL SCORE: 0
TOTAL SCORE: 0

## 2024-04-22 ASSESSMENT — PAIN - FUNCTIONAL ASSESSMENT: PAIN_FUNCTIONAL_ASSESSMENT: 0-10

## 2024-04-22 ASSESSMENT — COLUMBIA-SUICIDE SEVERITY RATING SCALE - C-SSRS
2. HAVE YOU ACTUALLY HAD ANY THOUGHTS OF KILLING YOURSELF?: NO
1. IN THE PAST MONTH, HAVE YOU WISHED YOU WERE DEAD OR WISHED YOU COULD GO TO SLEEP AND NOT WAKE UP?: NO
6. HAVE YOU EVER DONE ANYTHING, STARTED TO DO ANYTHING, OR PREPARED TO DO ANYTHING TO END YOUR LIFE?: NO

## 2024-04-22 ASSESSMENT — PATIENT HEALTH QUESTIONNAIRE - PHQ9
SUM OF ALL RESPONSES TO PHQ9 QUESTIONS 1 & 2: 0
1. LITTLE INTEREST OR PLEASURE IN DOING THINGS: NOT AT ALL
2. FEELING DOWN, DEPRESSED OR HOPELESS: NOT AT ALL

## 2024-04-22 NOTE — Clinical Note
Closure device placed in the right femoral artery. Site closed by VASCADE. 5F Vascade and Quickclot right groin, deployed and held by GC

## 2024-04-22 NOTE — ED PROCEDURE NOTE
Procedure  Critical Care    Performed by: Sabina Haider MD  Authorized by: Sabina Haider MD    Critical care provider statement:     Critical care time (minutes):  32    Critical care time was exclusive of:  Separately billable procedures and treating other patients    Critical care was necessary to treat or prevent imminent or life-threatening deterioration of the following conditions:  Cardiac failure    Critical care was time spent personally by me on the following activities:  Ordering and performing treatments and interventions, ordering and review of laboratory studies, ordering and review of radiographic studies, pulse oximetry, re-evaluation of patient's condition, blood draw for specimens, development of treatment plan with patient or surrogate and discussions with consultants    Care discussed with: admitting provider                 Sabina Haider MD  04/22/24 4070

## 2024-04-22 NOTE — ED PROVIDER NOTES
HPI   Chief Complaint   Patient presents with    Shortness of Breath       A 64-year-old female patient with history of MI, asthma, diabetes comes into the emergency department today with complaints of shortness of breath.  She describes that yesterday she did not feel well she had a productive cough and generally felt sick.  States this morning she was taking a hot bath and when the steam entered the room suddenly made her feel short of breath.  States she could not seem to catch her breath so she took her inhaler which she has not used in a long time and that this did not bring any relief.  Therefore she called EMS to bring her to the emergency department for further evaluation.                          No data recorded                   Patient History   Past Medical History:   Diagnosis Date    Acute myocardial infarction, unspecified (Multi) 01/10/2022    Acute MI    Body mass index (BMI) 33.0-33.9, adult 02/01/2022    BMI 33.0-33.9,adult    Disorder of the skin and subcutaneous tissue, unspecified 07/01/2020    Back skin lesion    Encounter for preprocedural cardiovascular examination 01/10/2022    Pre-operative cardiovascular examination    Hypertensive heart disease with heart failure (Multi) 05/21/2020    Hypertensive heart disease with chronic combined systolic and diastolic congestive heart failure    Intracranial and intraspinal phlebitis and thrombophlebitis ([facility]) 01/10/2022    Cavernous sinus thrombosis    Nausea 01/10/2022    Chronic nausea    Obesity, unspecified 03/21/2022    Class 1 obesity with body mass index (BMI) of 33.0 to 33.9 in adult    Obesity, unspecified 08/01/2022    Class 1 obesity with body mass index (BMI) of 32.0 to 32.9 in adult    Old myocardial infarction     History of myocardial infarction    Other mechanical complication of other cardiac electronic device, initial encounter 02/01/2022    Mechanical complication of implantable cardioverter-defibrillator (ICD)    Other  specified counseling 2022    Encounter for medication counseling    Other specified diseases of gallbladder 2020    Porcelain gallbladder    Overweight 01/10/2022    Overweight    Person consulting for explanation of examination or test findings 2022    Encounter to discuss test results    Personal history of other diseases of the digestive system 2020    History of gallbladder disease    Personal history of other venous thrombosis and embolism     History of deep venous thrombosis    Transient cerebral ischemic attack, unspecified     TIA (transient ischemic attack)     Past Surgical History:   Procedure Laterality Date    CT ABDOMEN PELVIS ANGIOGRAM W AND/OR WO IV CONTRAST  2019    CT ABDOMEN PELVIS ANGIOGRAM W AND/OR WO IV CONTRAST 2019 [facility] CLINICAL LEGACY    OTHER SURGICAL HISTORY  2020     section    OTHER SURGICAL HISTORY  10/12/2022    Corneal lasik    OTHER SURGICAL HISTORY  10/12/2022    Skin lesion excision    OTHER SURGICAL HISTORY  01/10/2022    Colonoscopy    OTHER SURGICAL HISTORY  01/10/2022    Hysterectomy    OTHER SURGICAL HISTORY  01/10/2022    Surgery    OTHER SURGICAL HISTORY  01/10/2022    Median nerve neuroplasty    OTHER SURGICAL HISTORY  01/10/2022    Cardioverter defibrillator insertion    OTHER SURGICAL HISTORY  01/10/2022    Knee surgery    OTHER SURGICAL HISTORY  01/10/2022    Ankle surgery    OTHER SURGICAL HISTORY  01/10/2022    Tonsillectomy    OTHER SURGICAL HISTORY  01/10/2022    Hand surgery    OTHER SURGICAL HISTORY  2022    Knee fracture repair    OTHER SURGICAL HISTORY  2022    Cholecystectomy     Family History   Problem Relation Name Age of Onset    Coronary artery disease Mother      Diabetes Mother      Other (Cardiac pacemaker) Father      Coronary artery disease Father      Other (Stroke syndrome) Other       Social History     Tobacco Use    Smoking status: Every Day     Types: Cigars    Smokeless tobacco: Never    Substance Use Topics    Alcohol use: Yes     Comment: social    Drug use: Yes     Types: Marijuana       Physical Exam   ED Triage Vitals [04/22/24 1100]   Temperature Heart Rate Respirations BP   36.1 °C (97 °F) (!) 113 (!) 21 103/66      Pulse Ox Temp Source Heart Rate Source Patient Position   (!) 91 % Temporal -- --      BP Location FiO2 (%)     -- --       Physical Exam  Constitutional:       General: She is in acute distress (Mild).      Appearance: Normal appearance. She is ill-appearing.   HENT:      Head: Normocephalic and atraumatic.      Nose: Nose normal.   Eyes:      Extraocular Movements: Extraocular movements intact.      Conjunctiva/sclera: Conjunctivae normal.   Cardiovascular:      Rate and Rhythm: Normal rate and regular rhythm.   Pulmonary:      Effort: Pulmonary effort is normal. Tachypnea present. No respiratory distress.      Breath sounds: Normal breath sounds. No stridor. No wheezing.   Musculoskeletal:         General: Normal range of motion.      Cervical back: Normal range of motion.   Skin:     General: Skin is warm and dry.   Neurological:      General: No focal deficit present.      Mental Status: She is alert and oriented to person, place, and time. Mental status is at baseline.   Psychiatric:         Mood and Affect: Mood normal.         ED Course & MDM   Diagnoses as of 04/22/24 1345   Congestive heart failure, unspecified HF chronicity, unspecified heart failure type (Multi)   Hypoxia       Medical Decision Making  A 64-year-old female patient with history of MI, asthma, diabetes comes into the emergency department today with complaints of shortness of breath.  She describes that yesterday she did not feel well she had a productive cough and generally felt sick.  States this morning she was taking a hot bath and when the steam entered the room suddenly made her feel short of breath.  States she could not seem to catch her breath so she took her inhaler which she has not used in a  long time and that this did not bring any relief.  Therefore she called EMS to bring her to the emergency department for further evaluation.    EKG, chest x-ray, CT PE study, laboratory studies, testing for COVID-19 influenza are ordered.  Rule ACS, arrhythmias, electrolyte abnormalities, leukocytosis or left shift.  Rule out acute kidney injury.  Rule out pneumonia, pneumothorax, pulmonary congestion, PE.    Patient's lungs sounded clear on exam and did not initially order breathing treatments patient states she felt things are getting more severe ordered breathing treatments for the patient as well as IV Solu-Medrol IV magnesium.    EKG: My EKG interpretation, 121 bpm, sinus tachycardia, no ST abnormalities or T wave abnormalities    Patient's initial troponin at 39 BNP 58 white count 16.3 absolute neutrophil count 13.  Beta-hydroxybutyrate 0.46 glucose 229 creatinine 1.2 GFR 51.  Patient's pH is 7.35.    Patient's chest x-ray is negative CT PE study shows cardiomegaly with groundglass infiltrates and interstitial infiltrates consistent with pulmonary edema or CHF.    Secondary to CHF finding IV Lasix ordered.  Concerned as patient has elevated white blood cell count with associated tachycardia IV antibiotics ordered for the patient as well including IV Rocephin and IV Zithromax.    I discussed the case with Dr. Duke the hospitalist who agrees admit the patient under his service.    Historian is the patient    Diagnosis: CHF exacerbation, hypoxia with oxygen requirement      Labs Reviewed   CBC WITH AUTO DIFFERENTIAL - Abnormal       Result Value    WBC 16.3 (*)     nRBC 0.0      RBC 3.96 (*)     Hemoglobin 14.3      Hematocrit 42.9       (*)     MCH 36.1 (*)     MCHC 33.3      RDW 13.2      Platelets 147 (*)     Neutrophils % 79.8      Immature Granulocytes %, Automated 1.6 (*)     Lymphocytes % 13.5      Monocytes % 4.6      Eosinophils % 0.2      Basophils % 0.3      Neutrophils Absolute 13.00 (*)      Immature Granulocytes Absolute, Automated 0.26      Lymphocytes Absolute 2.21      Monocytes Absolute 0.75      Eosinophils Absolute 0.04      Basophils Absolute 0.05     COMPREHENSIVE METABOLIC PANEL - Abnormal    Glucose 229 (*)     Sodium 136      Potassium 4.1      Chloride 103      Bicarbonate 21      Anion Gap 16      Urea Nitrogen 23      Creatinine 1.20 (*)     eGFR 51 (*)     Calcium 9.0      Albumin 4.0      Alkaline Phosphatase 68      Total Protein 7.1      AST 16      Bilirubin, Total 0.3      ALT 19     PROTIME-INR - Abnormal    Protime 14.3 (*)     INR 1.3 (*)    SERIAL TROPONIN-INITIAL - Abnormal    Troponin I, High Sensitivity 39 (*)     Narrative:     Less than 99th percentile of normal range cutoff-  Female and children under 18 years old <14 ng/L; Male <21 ng/L: Negative  Repeat testing should be performed if clinically indicated.     Female and children under 18 years old 14-50 ng/L; Male 21-50 ng/L:  Consistent with possible cardiac damage and possible increased clinical   risk. Serial measurements may help to assess extent of myocardial damage.     >50 ng/L: Consistent with cardiac damage, increased clinical risk and  myocardial infarction. Serial measurements may help assess extent of   myocardial damage.      NOTE: Children less than 1 year old may have higher baseline troponin   levels and results should be interpreted in conjunction with the overall   clinical context.     NOTE: Troponin I testing is performed using a different   testing methodology at [facility] than at other   [facility]. Direct result comparisons should only   be made within the same method.   BETA HYDROXYBUTYRATE - Abnormal    Beta-Hydroxybutyrate 0.46 (*)     Narrative:     The beta-hydroxybutyrate test performance characteristics have been validated by  [facility]. This test has not been approved by the FDA; however,such approval is not necessary.      BLOOD GAS VENOUS - Abnormal    POCT pH, Venous 7.35      POCT pCO2, Venous 45      POCT pO2, Venous 34 (*)     POCT SO2, Venous 60      POCT Oxy Hemoglobin, Venous 53.2      POCT Base Excess, Venous -1.1      POCT HCO3 Calculated, Venous 24.8      Patient Temperature        FiO2 95     MAGNESIUM - Normal    Magnesium 1.85     LACTATE - Normal    Lactate 1.9      Narrative:     Venipuncture immediately after or during the administration of Metamizole may lead to falsely low results. Testing should be performed immediately  prior to Metamizole dosing.   B-TYPE NATRIURETIC PEPTIDE - Normal    BNP 58      Narrative:        <100 pg/mL - Heart failure unlikely  100-299 pg/mL - Intermediate probability of acute heart                  failure exacerbation. Correlate with clinical                  context and patient history.    >=300 pg/mL - Heart Failure likely. Correlate with clinical                  context and patient history.    BNP testing is performed using different testing methodology at [facility] than at other [facility]. Direct result comparisons should only be made within the same method.      TROPONIN SERIES- (INITIAL, 1 HR)    Narrative:     The following orders were created for panel order Troponin I Series, High Sensitivity (0, 1 HR).  Procedure                               Abnormality         Status                     ---------                               -----------         ------                     Troponin I, High Sensiti...[[id number]]  Abnormal            Final result               Troponin, High Sensitivi...[[id number]]                                                   Please view results for these tests on the individual orders.   SERIAL TROPONIN, 1 HOUR   SARS-COV-2 PCR   INFLUENZA A AND B PCR   RSV PCR        CT angio chest for pulmonary embolism   Final Result   1. Cardiomegaly with ground-glass infiltrates and interstitial   infiltrates in a pattern most consistent with  pulmonary edema and   congestive heart failure.   2. Abnormal appearance of the left ventricular configuration   suggesting left ventricular aneurysm likely secondary to remote   apical infarction.   3. Unipolar cardiac pacemaker/defibrillator.        MACRO:   none        Signed by: Yung Murphy 4/22/2024 12:44 PM   Dictation workstation:   [id number]      XR chest 1 view   Final Result   No acute cardiopulmonary process.        MACRO:   None        Signed by: Althea Arora 4/22/2024 11:22 AM   Dictation workstation:   [id number]            Procedure  Procedures     Michael Monge PA-C  04/22/24 1345

## 2024-04-23 ENCOUNTER — APPOINTMENT (OUTPATIENT)
Dept: CARDIOLOGY | Facility: HOSPITAL | Age: 64
DRG: 286 | End: 2024-04-23
Payer: MEDICARE

## 2024-04-23 LAB
ALBUMIN SERPL BCP-MCNC: 3.6 G/DL (ref 3.4–5)
ALP SERPL-CCNC: 55 U/L (ref 33–136)
ALT SERPL W P-5'-P-CCNC: 17 U/L (ref 7–45)
ANION GAP SERPL CALC-SCNC: 10 MMOL/L (ref 10–20)
AST SERPL W P-5'-P-CCNC: 13 U/L (ref 9–39)
BILIRUB SERPL-MCNC: 0.4 MG/DL (ref 0–1.2)
BUN SERPL-MCNC: 32 MG/DL (ref 6–23)
CALCIUM SERPL-MCNC: 8.2 MG/DL (ref 8.6–10.3)
CHLORIDE SERPL-SCNC: 107 MMOL/L (ref 98–107)
CO2 SERPL-SCNC: 25 MMOL/L (ref 21–32)
CREAT SERPL-MCNC: 1.34 MG/DL (ref 0.5–1.05)
EGFRCR SERPLBLD CKD-EPI 2021: 45 ML/MIN/1.73M*2
ERYTHROCYTE [DISTWIDTH] IN BLOOD BY AUTOMATED COUNT: 13 % (ref 11.5–14.5)
GLUCOSE BLD MANUAL STRIP-MCNC: 146 MG/DL (ref 74–99)
GLUCOSE BLD MANUAL STRIP-MCNC: 226 MG/DL (ref 74–99)
GLUCOSE BLD MANUAL STRIP-MCNC: 362 MG/DL (ref 74–99)
GLUCOSE BLD MANUAL STRIP-MCNC: 69 MG/DL (ref 74–99)
GLUCOSE BLD MANUAL STRIP-MCNC: 95 MG/DL (ref 74–99)
GLUCOSE SERPL-MCNC: 294 MG/DL (ref 74–99)
HCT VFR BLD AUTO: 38.5 % (ref 36–46)
HGB BLD-MCNC: 13.1 G/DL (ref 12–16)
HOLD SPECIMEN: NORMAL
INR PPP: 1.2 (ref 0.9–1.1)
MCH RBC QN AUTO: 36.3 PG (ref 26–34)
MCHC RBC AUTO-ENTMCNC: 34 G/DL (ref 32–36)
MCV RBC AUTO: 107 FL (ref 80–100)
NRBC BLD-RTO: 0 /100 WBCS (ref 0–0)
PLATELET # BLD AUTO: 159 X10*3/UL (ref 150–450)
POTASSIUM SERPL-SCNC: 3.9 MMOL/L (ref 3.5–5.3)
PROT SERPL-MCNC: 6.2 G/DL (ref 6.4–8.2)
PROTHROMBIN TIME: 13.9 SECONDS (ref 9.8–12.8)
RBC # BLD AUTO: 3.61 X10*6/UL (ref 4–5.2)
SODIUM SERPL-SCNC: 138 MMOL/L (ref 136–145)
WBC # BLD AUTO: 13.1 X10*3/UL (ref 4.4–11.3)

## 2024-04-23 PROCEDURE — 2500000001 HC RX 250 WO HCPCS SELF ADMINISTERED DRUGS (ALT 637 FOR MEDICARE OP): Performed by: INTERNAL MEDICINE

## 2024-04-23 PROCEDURE — 99223 1ST HOSP IP/OBS HIGH 75: CPT | Performed by: INTERNAL MEDICINE

## 2024-04-23 PROCEDURE — 99152 MOD SED SAME PHYS/QHP 5/>YRS: CPT | Performed by: INTERNAL MEDICINE

## 2024-04-23 PROCEDURE — B2151ZZ FLUOROSCOPY OF LEFT HEART USING LOW OSMOLAR CONTRAST: ICD-10-PCS | Performed by: INTERNAL MEDICINE

## 2024-04-23 PROCEDURE — 85610 PROTHROMBIN TIME: CPT | Performed by: NURSE PRACTITIONER

## 2024-04-23 PROCEDURE — 2780000003 HC OR 278 NO HCPCS: Performed by: INTERNAL MEDICINE

## 2024-04-23 PROCEDURE — C1760 CLOSURE DEV, VASC: HCPCS | Performed by: INTERNAL MEDICINE

## 2024-04-23 PROCEDURE — 93458 L HRT ARTERY/VENTRICLE ANGIO: CPT | Performed by: INTERNAL MEDICINE

## 2024-04-23 PROCEDURE — 7100000009 HC PHASE TWO TIME - INITIAL BASE CHARGE: Performed by: INTERNAL MEDICINE

## 2024-04-23 PROCEDURE — G0378 HOSPITAL OBSERVATION PER HR: HCPCS

## 2024-04-23 PROCEDURE — 4B02XTZ MEASUREMENT OF CARDIAC DEFIBRILLATOR, EXTERNAL APPROACH: ICD-10-PCS | Performed by: INTERNAL MEDICINE

## 2024-04-23 PROCEDURE — B2111ZZ FLUOROSCOPY OF MULTIPLE CORONARY ARTERIES USING LOW OSMOLAR CONTRAST: ICD-10-PCS | Performed by: INTERNAL MEDICINE

## 2024-04-23 PROCEDURE — 2500000001 HC RX 250 WO HCPCS SELF ADMINISTERED DRUGS (ALT 637 FOR MEDICARE OP): Performed by: STUDENT IN AN ORGANIZED HEALTH CARE EDUCATION/TRAINING PROGRAM

## 2024-04-23 PROCEDURE — 82947 ASSAY GLUCOSE BLOOD QUANT: CPT

## 2024-04-23 PROCEDURE — 36415 COLL VENOUS BLD VENIPUNCTURE: CPT | Performed by: STUDENT IN AN ORGANIZED HEALTH CARE EDUCATION/TRAINING PROGRAM

## 2024-04-23 PROCEDURE — 2500000004 HC RX 250 GENERAL PHARMACY W/ HCPCS (ALT 636 FOR OP/ED): Performed by: INTERNAL MEDICINE

## 2024-04-23 PROCEDURE — G0269 OCCLUSIVE DEVICE IN VEIN ART: HCPCS | Mod: TC,59 | Performed by: INTERNAL MEDICINE

## 2024-04-23 PROCEDURE — 2500000002 HC RX 250 W HCPCS SELF ADMINISTERED DRUGS (ALT 637 FOR MEDICARE OP, ALT 636 FOR OP/ED): Performed by: NURSE PRACTITIONER

## 2024-04-23 PROCEDURE — 2500000002 HC RX 250 W HCPCS SELF ADMINISTERED DRUGS (ALT 637 FOR MEDICARE OP, ALT 636 FOR OP/ED): Performed by: STUDENT IN AN ORGANIZED HEALTH CARE EDUCATION/TRAINING PROGRAM

## 2024-04-23 PROCEDURE — 93282 PRGRMG EVAL IMPLANTABLE DFB: CPT | Performed by: INTERNAL MEDICINE

## 2024-04-23 PROCEDURE — 2550000001 HC RX 255 CONTRASTS: Performed by: INTERNAL MEDICINE

## 2024-04-23 PROCEDURE — 85027 COMPLETE CBC AUTOMATED: CPT | Performed by: STUDENT IN AN ORGANIZED HEALTH CARE EDUCATION/TRAINING PROGRAM

## 2024-04-23 PROCEDURE — 7100000010 HC PHASE TWO TIME - EACH INCREMENTAL 1 MINUTE: Performed by: INTERNAL MEDICINE

## 2024-04-23 PROCEDURE — 80053 COMPREHEN METABOLIC PANEL: CPT | Performed by: STUDENT IN AN ORGANIZED HEALTH CARE EDUCATION/TRAINING PROGRAM

## 2024-04-23 PROCEDURE — 2720000007 HC OR 272 NO HCPCS: Performed by: INTERNAL MEDICINE

## 2024-04-23 PROCEDURE — 2500000005 HC RX 250 GENERAL PHARMACY W/O HCPCS: Performed by: INTERNAL MEDICINE

## 2024-04-23 PROCEDURE — 93010 ELECTROCARDIOGRAM REPORT: CPT | Performed by: INTERNAL MEDICINE

## 2024-04-23 PROCEDURE — 93005 ELECTROCARDIOGRAM TRACING: CPT

## 2024-04-23 PROCEDURE — 2500000006 HC RX 250 W HCPCS SELF ADMINISTERED DRUGS (ALT 637 FOR ALL PAYERS): Performed by: NURSE PRACTITIONER

## 2024-04-23 PROCEDURE — 4A023N7 MEASUREMENT OF CARDIAC SAMPLING AND PRESSURE, LEFT HEART, PERCUTANEOUS APPROACH: ICD-10-PCS | Performed by: INTERNAL MEDICINE

## 2024-04-23 PROCEDURE — 99232 SBSQ HOSP IP/OBS MODERATE 35: CPT | Performed by: STUDENT IN AN ORGANIZED HEALTH CARE EDUCATION/TRAINING PROGRAM

## 2024-04-23 PROCEDURE — 2500000006 HC RX 250 W HCPCS SELF ADMINISTERED DRUGS (ALT 637 FOR ALL PAYERS): Mod: MUE | Performed by: STUDENT IN AN ORGANIZED HEALTH CARE EDUCATION/TRAINING PROGRAM

## 2024-04-23 PROCEDURE — 2500000004 HC RX 250 GENERAL PHARMACY W/ HCPCS (ALT 636 FOR OP/ED): Performed by: STUDENT IN AN ORGANIZED HEALTH CARE EDUCATION/TRAINING PROGRAM

## 2024-04-23 PROCEDURE — 93282 PRGRMG EVAL IMPLANTABLE DFB: CPT

## 2024-04-23 PROCEDURE — 36415 COLL VENOUS BLD VENIPUNCTURE: CPT | Performed by: NURSE PRACTITIONER

## 2024-04-23 RX ORDER — FENTANYL CITRATE 50 UG/ML
INJECTION, SOLUTION INTRAMUSCULAR; INTRAVENOUS AS NEEDED
Status: DISCONTINUED | OUTPATIENT
Start: 2024-04-23 | End: 2024-04-23 | Stop reason: HOSPADM

## 2024-04-23 RX ORDER — SODIUM CHLORIDE 9 MG/ML
1.5 INJECTION, SOLUTION INTRAVENOUS CONTINUOUS
Status: ACTIVE | OUTPATIENT
Start: 2024-04-23 | End: 2024-04-23

## 2024-04-23 RX ORDER — MIDAZOLAM HYDROCHLORIDE 1 MG/ML
INJECTION INTRAMUSCULAR; INTRAVENOUS AS NEEDED
Status: DISCONTINUED | OUTPATIENT
Start: 2024-04-23 | End: 2024-04-23 | Stop reason: HOSPADM

## 2024-04-23 RX ORDER — FUROSEMIDE 10 MG/ML
40 INJECTION INTRAMUSCULAR; INTRAVENOUS EVERY 24 HOURS
Status: DISCONTINUED | OUTPATIENT
Start: 2024-04-24 | End: 2024-04-24 | Stop reason: HOSPADM

## 2024-04-23 RX ORDER — INSULIN LISPRO 100 [IU]/ML
0-10 INJECTION, SOLUTION INTRAVENOUS; SUBCUTANEOUS
Status: DISCONTINUED | OUTPATIENT
Start: 2024-04-23 | End: 2024-04-24 | Stop reason: HOSPADM

## 2024-04-23 RX ORDER — ASPIRIN 325 MG
TABLET ORAL AS NEEDED
Status: DISCONTINUED | OUTPATIENT
Start: 2024-04-23 | End: 2024-04-23 | Stop reason: HOSPADM

## 2024-04-23 RX ORDER — LIDOCAINE HYDROCHLORIDE 20 MG/ML
INJECTION, SOLUTION INFILTRATION; PERINEURAL AS NEEDED
Status: DISCONTINUED | OUTPATIENT
Start: 2024-04-23 | End: 2024-04-23 | Stop reason: HOSPADM

## 2024-04-23 RX ORDER — SODIUM CHLORIDE 9 MG/ML
100 INJECTION, SOLUTION INTRAVENOUS CONTINUOUS
Status: CANCELLED | OUTPATIENT
Start: 2024-04-23

## 2024-04-23 RX ADMIN — TOPIRAMATE 200 MG: 100 TABLET ORAL at 20:52

## 2024-04-23 RX ADMIN — WARFARIN SODIUM 15 MG: 5 TABLET ORAL at 18:09

## 2024-04-23 RX ADMIN — CLONAZEPAM 2 MG: 1 TABLET ORAL at 20:52

## 2024-04-23 RX ADMIN — BUPROPION HYDROCHLORIDE 200 MG: 100 TABLET, EXTENDED RELEASE ORAL at 20:58

## 2024-04-23 RX ADMIN — ARIPIPRAZOLE 4 MG: 2 TABLET ORAL at 20:58

## 2024-04-23 RX ADMIN — LEVOTHYROXINE SODIUM 88 MCG: 0.09 TABLET ORAL at 06:55

## 2024-04-23 RX ADMIN — PANTOPRAZOLE SODIUM 40 MG: 40 TABLET, DELAYED RELEASE ORAL at 06:55

## 2024-04-23 RX ADMIN — ROSUVASTATIN CALCIUM 40 MG: 20 TABLET, FILM COATED ORAL at 08:30

## 2024-04-23 RX ADMIN — CLONAZEPAM 2 MG: 1 TABLET ORAL at 00:16

## 2024-04-23 RX ADMIN — Medication 3 MG: at 20:52

## 2024-04-23 RX ADMIN — EZETIMIBE 10 MG: 10 TABLET ORAL at 20:52

## 2024-04-23 RX ADMIN — CARVEDILOL 12.5 MG: 12.5 TABLET, FILM COATED ORAL at 08:30

## 2024-04-23 RX ADMIN — FUROSEMIDE 40 MG: 10 INJECTION, SOLUTION INTRAMUSCULAR; INTRAVENOUS at 06:55

## 2024-04-23 RX ADMIN — DOCUSATE SODIUM 100 MG: 100 CAPSULE, LIQUID FILLED ORAL at 20:52

## 2024-04-23 RX ADMIN — BUPROPION HYDROCHLORIDE 200 MG: 100 TABLET, EXTENDED RELEASE ORAL at 00:16

## 2024-04-23 RX ADMIN — ROPINIROLE 2 MG: 1 TABLET, FILM COATED ORAL at 20:52

## 2024-04-23 RX ADMIN — Medication 3 MG: at 00:16

## 2024-04-23 RX ADMIN — EZETIMIBE 10 MG: 10 TABLET ORAL at 00:16

## 2024-04-23 RX ADMIN — ROPINIROLE 2 MG: 1 TABLET, FILM COATED ORAL at 00:16

## 2024-04-23 RX ADMIN — TOPIRAMATE 200 MG: 100 TABLET ORAL at 00:16

## 2024-04-23 RX ADMIN — CARVEDILOL 12.5 MG: 12.5 TABLET, FILM COATED ORAL at 20:52

## 2024-04-23 RX ADMIN — BUPROPION HYDROCHLORIDE 200 MG: 100 TABLET, EXTENDED RELEASE ORAL at 08:30

## 2024-04-23 RX ADMIN — ARIPIPRAZOLE 4 MG: 2 TABLET ORAL at 00:17

## 2024-04-23 RX ADMIN — TOPIRAMATE 200 MG: 100 TABLET ORAL at 08:30

## 2024-04-23 RX ADMIN — INSULIN GLARGINE 30 UNITS: 100 INJECTION, SOLUTION SUBCUTANEOUS at 00:23

## 2024-04-23 RX ADMIN — WARFARIN SODIUM 12.5 MG: 2.5 TABLET ORAL at 00:16

## 2024-04-23 RX ADMIN — CARVEDILOL 12.5 MG: 12.5 TABLET, FILM COATED ORAL at 00:16

## 2024-04-23 RX ADMIN — INSULIN LISPRO 10 UNITS: 100 INJECTION, SOLUTION INTRAVENOUS; SUBCUTANEOUS at 06:55

## 2024-04-23 ASSESSMENT — COGNITIVE AND FUNCTIONAL STATUS - GENERAL
DAILY ACTIVITIY SCORE: 24
MOBILITY SCORE: 24

## 2024-04-23 ASSESSMENT — PAIN SCALES - GENERAL
PAINLEVEL_OUTOF10: 0 - NO PAIN

## 2024-04-23 ASSESSMENT — PAIN - FUNCTIONAL ASSESSMENT
PAIN_FUNCTIONAL_ASSESSMENT: 0-10

## 2024-04-23 NOTE — POST-PROCEDURE NOTE
Physician Transition of Care Summary  Invasive Cardiovascular Lab    Procedure Date: 4/23/2024  Attending:    * Jose Barclay - Primary  Resident/Fellow/Other Assistant: Surgeons and Role:  * No surgeons found with a matching role *    Pre Procedure Diagnosis:   CAD, remote anteroapical myocardial infarction, NSTEMI    Post Procedure Diagnosis:   Single-vessel CAD, 100% mid LAD with good distal collateral filling, Lovinger ejection fraction 25 to 30%, medical management    Complications:   None    Stents/Implants:   None    Anticoagulation/Antiplatelet Plan:   As before    Estimated Blood Loss:   0 mL    Electronically signed by: Jose Barclay MD, 4/23/2024 2:25 PM    Anesthesia: Moderate                            anesthesia Staff: None

## 2024-04-23 NOTE — PROGRESS NOTES
I spoke at length with patient regarding the heart catheterization results along with her  at the bedside.  Single-vessel CAD, 100% mild LAD with good distal collateral filling, lovinger ejection fraction 25 to 30% medical management.  I did speak with Dr. Duke we are going to keep the patient tonight.  Will resume the Coumadin tonight.  Answered all their questions at this time medical management only.

## 2024-04-23 NOTE — NURSING NOTE
Patient recovered Post Cath Lab procedure. Right groin WNL with no complaints of pain. VSS, up steady. Report called to 9 gabrielle RN. Patient transferred back to her 9th floor room with family accompanying her.

## 2024-04-23 NOTE — PROGRESS NOTES
Medical Group Progress Note  ASSESSMENT & PLAN:      #acute on chronic CHF   #COPD exacerbation  versus community-acquired pneumonia   #elevated troponins  - we will continue to diurese patient with Lasix 40 mg twice daily  - Will continue DuoNebs as well as ceftriaxone and azithromycin for now  - will trend out troponins given significant jump from first read to second read  - cardiology consulted for evaluation of patient  - will continue with patient's Coumadin for now.  INR is subtherapeutic will continue to monitor      #hypertension  #Hyperlipidemia  #Type 2 diabetes  -Start patient on sliding scale insulin  - continue home statin and antihypertensives      VTE prophylaxis: Patient already on Coumadin will continue to monitor  Diet: Cardiac with fluid restrictions  CODE STATUS: Full code    04/23  - cardiology evaluated patient.  Patient had cardiac cath roughly 5 years ago which showed chronically occluded LAD.  There was concern for possible NSTEMI and repeat cardiac catheterization was planned.  -  Repeat cardiac cath showed 100% LAD  complete occlusion with good distal collateral filling.  Left ventricle ejection fraction was 25 to 30%.  Medical management was recommended.  -  Coumadin was held temporarily will restart.  - will discharge patient home tomorrow once cleared by cardiology.    Total time >35 minutes; > 50% spent counseling/coordinating care        Chelsie Duke MD    SUBJECTIVE       Patient seen and assessed.  Patient in no acute distress.  Patient reports improved shortness of breath and chest tightness.  Denies any fevers chills nausea or vomiting.  Denies any chest pain.    OBJECTIVE:     Last Recorded Vitals:  Vitals:    04/23/24 1351 04/23/24 1418 04/23/24 1430 04/23/24 1726   BP:  114/64 102/68 110/59   BP Location:       Patient Position:       Pulse:  83 83 78   Resp:  16 16    Temp:    36.8 °C (98.2 °F)   TempSrc:       SpO2: 100% 93%  94%   Weight:       Height:         Last  I/O:  I/O last 3 completed shifts:  In: 50 (0.7 mL/kg) [I.V.:50 (0.7 mL/kg)]  Out: - (0 mL/kg)   Weight: 75.5 kg     Physical Exam    Constitutional:       General: She is not in acute distress.     Appearance: She is ill-appearing.   HENT:      Head: Normocephalic.      Nose: Nose normal.      Mouth/Throat:      Mouth: Mucous membranes are moist.   Eyes:      Extraocular Movements: Extraocular movements intact.      Pupils: Pupils are equal, round, and reactive to light.   Cardiovascular:      Rate and Rhythm: Normal rate and regular rhythm.      Pulses: Normal pulses.      Heart sounds: Normal heart sounds.   Pulmonary:      Effort: Pulmonary effort is normal.      Breath sounds: Wheezing present.   Abdominal:      General: Bowel sounds are normal. There is no distension.      Palpations: Abdomen is soft.   Musculoskeletal:         General: No swelling or tenderness. Normal range of motion.      Cervical back: Normal range of motion.   Skin:     General: Skin is warm.      Capillary Refill: Capillary refill takes less than 2 seconds.      Coloration: Skin is pale.   Neurological:      General: No focal deficit present.      Mental Status: She is alert.      Motor: Weakness present.   Psychiatric:         Mood and Affect: Mood normal.     Inpatient Medications:  ARIPiprazole, 4 mg, oral, Nightly  buPROPion SR, 200 mg, oral, BID  carvedilol, 12.5 mg, oral, BID  clonazePAM, 2 mg, oral, Nightly  docusate sodium, 100 mg, oral, BID  ezetimibe, 10 mg, oral, Nightly  [START ON 4/24/2024] furosemide, 40 mg, intravenous, q24h  insulin glargine, 30 Units, subcutaneous, q24h  insulin lispro, 0-10 Units, subcutaneous, TID with meals  levothyroxine, 88 mcg, oral, Daily  melatonin, 3 mg, oral, Daily  pantoprazole, 40 mg, oral, Daily before breakfast   Or  pantoprazole, 40 mg, intravenous, Daily before breakfast  rOPINIRole, 2 mg, oral, Nightly  rosuvastatin, 40 mg, oral, Daily  topiramate, 200 mg, oral, BID  warfarin, 12.5 mg,  oral, Once per day on Sunday Monday Wednesday Friday Saturday  warfarin, 15 mg, oral, Once per day on Tuesday Thursday    PRN Medications  PRN medications: acetaminophen **OR** acetaminophen **OR** acetaminophen, acetaminophen **OR** acetaminophen **OR** acetaminophen, benzocaine-menthol, dextromethorphan-guaifenesin, dextrose, glucagon, guaiFENesin, ondansetron ODT **OR** ondansetron  Continuous Medications:  sodium chloride 0.9%, 1.5 mL/kg/hr      LABS AND IMAGING:     Labs:  Results from last 7 days   Lab Units 04/23/24  0637 04/22/24  1113   WBC AUTO x10*3/uL 13.1* 16.3*   RBC AUTO x10*6/uL 3.61* 3.96*   HEMOGLOBIN g/dL 13.1 14.3   HEMATOCRIT % 38.5 42.9   MCV fL 107* 108*   MCH pg 36.3* 36.1*   MCHC g/dL 34.0 33.3   RDW % 13.0 13.2   PLATELETS AUTO x10*3/uL 159 147*     Results from last 7 days   Lab Units 04/23/24  0637 04/22/24  1113   SODIUM mmol/L 138 136   POTASSIUM mmol/L 3.9 4.1   CHLORIDE mmol/L 107 103   CO2 mmol/L 25 21   BUN mg/dL 32* 23   CREATININE mg/dL 1.34* 1.20*   GLUCOSE mg/dL 294* 229*   PROTEIN TOTAL g/dL 6.2* 7.1   CALCIUM mg/dL 8.2* 9.0   BILIRUBIN TOTAL mg/dL 0.4 0.3   ALK PHOS U/L 55 68   AST U/L 13 16   ALT U/L 17 19     Results from last 7 days   Lab Units 04/22/24  1113   MAGNESIUM mg/dL 1.85     Results from last 7 days   Lab Units 04/22/24  1739 04/22/24  1405 04/22/24  1113   TROPHS ng/L 204* 155* 39*     Imaging:  Reviewed

## 2024-04-23 NOTE — CONSULTS
Inpatient consult to Cardiology  Consult performed by: ARMANDO Coronel-CNP  Consult ordered by: Chelsie Duke MD  Reason for consult: SOB      Cardiology Consult Note      Date:   4/23/2024  Patient name:  Ne Richardson  Date of admission:  4/22/2024 10:50 AM  MRN:   [id number]  YOB: 1960  Time of Consult:  8:57 AM    Consulting Cardiologist: ARMANDO Sheikh, CNP  Primary Cardiologist:  Dr. Jose Barclay    Referring Provider: Dr Duke      Admission Diagnosis:     Acute congestive heart failure, unspecified heart failure type (Multi)      History of Present Illness:      Ne Richardson is a 64 y.o.  female patient who is being at the request of Dr. Duke for inpatient consultation of SOB. She was admitted on 4/22/2024.  Previous [facility] and [facility] records have been reviewed in detail.    Patient with a history of V. tach and an AICD, hypertension, CAD, dyslipidemia, diabetes  Patient states that yesterday morning around 9 AM she went to get into the bathtub she felt extremely short of breath she did not feel like herself she states that it felt like an elephant was actually sitting on her chest she called 911 by the time the ambulance got there put her on oxygen she said it slowly went away before she got to the hospital.  She states she never had chest pain, fever, chills, nausea, vomiting, PND, orthopnea, claudications.  She states that it came on all of a sudden she thought it was an asthma attack she kept hitting her inhaler but was not getting any better notes when she called 911  EKG is sinus tachycardia no acute changes  Troponin 39, 155, 204  BNP 58  [place] ICD    Cardiac history  12/23    Left Ventricle: Normal left ventricular systolic function with a   visually estimated EF of 50 - 55%. Left ventricle size is normal.   Increased wall thickness. Findings consistent with mild asymmetric   hypertrophy. Normal wall motion. Abnormal diastolic  function.     Tricuspid Valve: Normal RVSP. The estimated RVSP is 26 mmHg.     Image quality is good.             Allergies:     Allergies   Allergen Reactions    Ropinirole Nausea And Vomiting         Past Medical History:     Past Medical History:   Diagnosis Date    Acute myocardial infarction, unspecified (Multi) 01/10/2022    Acute MI    Body mass index (BMI) 33.0-33.9, adult 02/01/2022    BMI 33.0-33.9,adult    Disorder of the skin and subcutaneous tissue, unspecified 07/01/2020    Back skin lesion    Encounter for preprocedural cardiovascular examination 01/10/2022    Pre-operative cardiovascular examination    Hypertensive heart disease with heart failure (Multi) 05/21/2020    Hypertensive heart disease with chronic combined systolic and diastolic congestive heart failure    Intracranial and intraspinal phlebitis and thrombophlebitis ([facility]) 01/10/2022    Cavernous sinus thrombosis    Nausea 01/10/2022    Chronic nausea    Obesity, unspecified 03/21/2022    Class 1 obesity with body mass index (BMI) of 33.0 to 33.9 in adult    Obesity, unspecified 08/01/2022    Class 1 obesity with body mass index (BMI) of 32.0 to 32.9 in adult    Old myocardial infarction     History of myocardial infarction    Other mechanical complication of other cardiac electronic device, initial encounter 02/01/2022    Mechanical complication of implantable cardioverter-defibrillator (ICD)    Other specified counseling 02/01/2022    Encounter for medication counseling    Other specified diseases of gallbladder 06/02/2020    Porcelain gallbladder    Overweight 01/10/2022    Overweight    Person consulting for explanation of examination or test findings 02/01/2022    Encounter to discuss test results    Personal history of other diseases of the digestive system 07/01/2020    History of gallbladder disease    Personal history of other venous thrombosis and embolism     History of deep venous thrombosis    Transient cerebral ischemic  attack, unspecified     TIA (transient ischemic attack)       Past Surgical History:     Past Surgical History:   Procedure Laterality Date    CT ABDOMEN PELVIS ANGIOGRAM W AND/OR WO IV CONTRAST  2019    CT ABDOMEN PELVIS ANGIOGRAM W AND/OR WO IV CONTRAST 2019 [facility] CLINICAL LEGACY    OTHER SURGICAL HISTORY  2020     section    OTHER SURGICAL HISTORY  10/12/2022    Corneal lasik    OTHER SURGICAL HISTORY  10/12/2022    Skin lesion excision    OTHER SURGICAL HISTORY  01/10/2022    Colonoscopy    OTHER SURGICAL HISTORY  01/10/2022    Hysterectomy    OTHER SURGICAL HISTORY  01/10/2022    Surgery    OTHER SURGICAL HISTORY  01/10/2022    Median nerve neuroplasty    OTHER SURGICAL HISTORY  01/10/2022    Cardioverter defibrillator insertion    OTHER SURGICAL HISTORY  01/10/2022    Knee surgery    OTHER SURGICAL HISTORY  01/10/2022    Ankle surgery    OTHER SURGICAL HISTORY  01/10/2022    Tonsillectomy    OTHER SURGICAL HISTORY  01/10/2022    Hand surgery    OTHER SURGICAL HISTORY  2022    Knee fracture repair    OTHER SURGICAL HISTORY  2022    Cholecystectomy       Family History:     Family History   Problem Relation Name Age of Onset    Coronary artery disease Mother      Diabetes Mother      Other (Cardiac pacemaker) Father      Coronary artery disease Father      Other (Stroke syndrome) Other         Social History:     Social History     Tobacco Use    Smoking status: Every Day     Types: Cigars    Smokeless tobacco: Never   Substance Use Topics    Alcohol use: Yes     Comment: social    Drug use: Yes     Types: Marijuana       CURRENT INPATIENT MEDICATIONS    ARIPiprazole, 4 mg, oral, Nightly  buPROPion SR, 200 mg, oral, BID  carvedilol, 12.5 mg, oral, BID  clonazePAM, 2 mg, oral, Nightly  docusate sodium, 100 mg, oral, BID  ezetimibe, 10 mg, oral, Nightly  [START ON 2024] furosemide, 40 mg, intravenous, q24h  insulin glargine, 30 Units, subcutaneous, q24h  insulin lispro, 0-10  Units, subcutaneous, TID with meals  levothyroxine, 88 mcg, oral, Daily  melatonin, 3 mg, oral, Daily  pantoprazole, 40 mg, oral, Daily before breakfast   Or  pantoprazole, 40 mg, intravenous, Daily before breakfast  rOPINIRole, 2 mg, oral, Nightly  rosuvastatin, 40 mg, oral, Daily  topiramate, 200 mg, oral, BID  warfarin, 12.5 mg, oral, Once per day on Sunday Monday Wednesday Friday Saturday  warfarin, 15 mg, oral, Once per day on Tuesday Thursday         Current Outpatient Medications   Medication Instructions    albuterol 90 mcg/actuation inhaler 2 puffs, inhalation, Every 6 hours PRN, Every 4 to 6 hours     ARIPiprazole (ABILIFY) 4 mg, oral, Nightly    buPROPion SR (WELLBUTRIN SR) 200 mg, oral, 2 times daily    carvedilol (Coreg) 12.5 mg tablet 1 tablet, oral, 2 times daily    cholecalciferol (VITAMIN D-3) 3,000 Units, oral, Daily    clonazePAM (KlonoPIN) 2 mg tablet 1 tablet, oral, Nightly    desvenlafaxine (PRISTIQ) 100 mg, oral, Nightly    ezetimibe (Zetia) 10 mg tablet 1 tablet, oral, Nightly    furosemide (LASIX) 40 mg, oral, Daily RT    glucagon (Baqsimi) 3 mg/actuation spray,non-aerosol 1 spray, nasal, As needed    glucagon 1 mg injection Use as directed for hypoglycemia     insulin aspart (NovoLOG) 100 unit/mL (3 mL) pen subcutaneous, 3 times daily with meals, Per Sliding Scale    insulin glargine (LANTUS) 30 Units, subcutaneous, Nightly    levothyroxine (SYNTHROID, LEVOXYL) 88 mcg, oral, Daily RT    magnesium oxide 500 mg capsule 1 capsule, oral, Nightly    metoclopramide (REGLAN) 5 mg, oral, 2 times daily before meals    nitroglycerin (NITROSTAT) 0.4 mg, sublingual, Every 5 min PRN, Up to 3 doses  <BR>    oxybutynin XL (DITROPAN-XL) 10 mg, oral, Nightly    rOPINIRole (Requip) 2 mg tablet 1 tablet, oral, Nightly    rosuvastatin (Crestor) 40 mg tablet 1 tablet, oral, Daily    topiramate (Topamax) 200 mg tablet 1 tablet, oral, 2 times daily    valsartan (DIOVAN) 40 mg, oral, Daily RT    warfarin  (Coumadin) 5 mg tablet 2.5 tablets, oral, 5 times weekly, Monday, Wednesday, Friday, Saturday & Sunday, in the evening.    warfarin (Coumadin) 7.5 mg tablet 2 tablets, oral, 2 times weekly, Tuesday & Thursday in the evening        Review of Systems:      12 point review of systems was obtained in detail and is negative other than that detailed above.    Vital Signs:     Vitals:    04/22/24 2330 04/23/24 0347 04/23/24 0500 04/23/24 0705   BP: 111/59 98/61  107/66   BP Location:    Right arm   Patient Position:    Lying   Pulse: 98 87  90   Resp: 18 15  16   Temp: 37 °C (98.6 °F) 36.3 °C (97.3 °F)  36.5 °C (97.7 °F)   TempSrc:    Temporal   SpO2: 96% 91%  93%   Weight:   75.5 kg (166 lb 7.2 oz)    Height:           Intake/Output Summary (Last 24 hours) at 4/23/2024 0857  Last data filed at 4/23/2024 0412  Gross per 24 hour   Intake 50 ml   Output --   Net 50 ml       Wt Readings from Last 4 Encounters:   04/23/24 75.5 kg (166 lb 7.2 oz)   04/10/24 77.1 kg (170 lb)   04/08/24 76.2 kg (168 lb)   04/12/23 80.8 kg (178 lb 1 oz)       Physical Examination:     GENERAL APPEARANCE: Well developed, well nourished, in no acute distress.  CHEST: Symmetric and non-tender.  INTEGUMENT: Skin warm and dry, without gross excoriationis or lesions.  HEENT: No gross abnormalities of conjunctiva, teeth, gums, oral mucosa  NECK: Supple, no JVD, no bruit. Thyroid not palpable. Carotid upstrokes normal.  NEURO/PSHCY: Alert and oriented x3; appropriate behavior and responses and responses, grossly normal cerebellar function with normal balance and coordination  LUNGS: Clear to auscultation bilaterally; normal respiratory effort.  HEART: Rate and rhythm regular with no evident murmur; no gallop appreciated. There are no rubs, clicks or heaves. PMI nondisplaced.  ABDOMEN: Soft, nontender, no palpable hepatosplenomegaly, no mases, no bruits. Abdominal aorta not noted to be enlarged.  MUSCULOSKELETAL: Ambulatory with normal tandem  gait.  EXTREMITIES: Warm with good color, no clubbing or cyanois. There is no edema noted.  PERIPHERAL VASCULAR: Pulses present and equally palpable; 2+ throughout. No femoral bruits.      Lab:     CBC:   Results from last 7 days   Lab Units 04/23/24  0637 04/22/24  1113   WBC AUTO x10*3/uL 13.1* 16.3*   RBC AUTO x10*6/uL 3.61* 3.96*   HEMOGLOBIN g/dL 13.1 14.3   HEMATOCRIT % 38.5 42.9   MCV fL 107* 108*   MCH pg 36.3* 36.1*   MCHC g/dL 34.0 33.3   RDW % 13.0 13.2   PLATELETS AUTO x10*3/uL 159 147*     CMP:    Results from last 7 days   Lab Units 04/23/24  0637 04/22/24  1113   SODIUM mmol/L 138 136   POTASSIUM mmol/L 3.9 4.1   CHLORIDE mmol/L 107 103   CO2 mmol/L 25 21   BUN mg/dL 32* 23   CREATININE mg/dL 1.34* 1.20*   GLUCOSE mg/dL 294* 229*   PROTEIN TOTAL g/dL 6.2* 7.1   CALCIUM mg/dL 8.2* 9.0   BILIRUBIN TOTAL mg/dL 0.4 0.3   ALK PHOS U/L 55 68   AST U/L 13 16   ALT U/L 17 19     BMP:    Results from last 7 days   Lab Units 04/23/24  0637 04/22/24  1113   SODIUM mmol/L 138 136   POTASSIUM mmol/L 3.9 4.1   CHLORIDE mmol/L 107 103   CO2 mmol/L 25 21   BUN mg/dL 32* 23   CREATININE mg/dL 1.34* 1.20*   CALCIUM mg/dL 8.2* 9.0   GLUCOSE mg/dL 294* 229*     Magnesium:  Results from last 7 days   Lab Units 04/22/24  1113   MAGNESIUM mg/dL 1.85     Troponin:    Results from last 7 days   Lab Units 04/22/24  1739 04/22/24  1405 04/22/24  1113   TROPHS ng/L 204* 155* 39*     BNP:   Results from last 7 days   Lab Units 04/22/24  1113   BNP pg/mL 58     Lipid Panel:         Diagnostic Studies:   @No results found for this or any previous visit.    ECG 12 lead    Result Date: 4/23/2024   Suspect arm lead reversal, interpretation assumes no reversal Sinus tachycardia Possible Left atrial enlargement Anterolateral infarct (cited on or before 22-APR-2024) Abnormal ECG When compared with ECG of 21-MAR-2023 15:52, Previous ECG has undetermined rhythm, needs review Nonspecific T wave abnormality has replaced inverted T waves in  Anterior leads See ED provider note for full interpretation and clinical correlation    CT angio chest for pulmonary embolism    Result Date: 4/22/2024  Interpreted By:  Yung Murphy, STUDY: CT ANGIO CHEST FOR PULMONARY EMBOLISM; 4/22/2024 12:16 pm   INDICATION: Signs/Symptoms:Sudden shortness of breath.   COMPARISON: 10/26/2022   ACCESSION NUMBER(S): [id number]   ORDERING CLINICIAN: RUTH DELACRUZ   TECHNIQUE: 75 ml of non-ionic iodinated contrast was injected intravenously.   CT angiography (non-coronary) of the chest was performed with Intravenous contrast material along with 3D (maximum intensity projection - MIP) image post processing.   One or more of the following dose reduction techniques were used: Automated exposure control Adjustment of the mA and/or kV according to patient size, and/or use of iterative reconstruction technique.   FINDINGS: There is no evidence for aortic dissection or pericardial effusion. Unipolar cardiac pacemaker/defibrillator is present. There is no evidence for pulmonary embolism. 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   Chest Wall: No chest wall abnormalities are identified.   Upper Abdominal Findings: No pathologic findings are identified involving the visualized portions of the upper abdomen.   Skeletal System: No acute fractures or destructive lesions are identified.       1. Cardiomegaly with ground-glass infiltrates and interstitial infiltrates in a pattern most consistent with pulmonary edema and congestive heart failure. 2. 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 3. Unipolar cardiac pacemaker/defibrillator.   MACRO: none   Signed by: Yung Murphy 4/22/2024 12:44 PM Dictation workstation:   [id number]    XR chest 1 view    Result Date: 4/22/2024  Interpreted By:  Althea rAora, STUDY: XR CHEST 1 VIEW;  4/22/2024 11:20 am   INDICATION: Signs/Symptoms:Shortness of.   COMPARISON: 10/26/2022   ACCESSION NUMBER(S): [id number]   ORDERING CLINICIAN: RUTH DELACRUZ   FINDINGS: CARDIOMEDIASTINAL SILHOUETTE: Cardiomediastinal silhouette is normal in size and configuration. There is a left subclavian pacemaker-AICD in unchanged position with the tip in the right ventricle.   LUNGS: Lungs are clear.   ABDOMEN: No remarkable upper abdominal findings.     BONES: No acute osseous changes.       No acute cardiopulmonary process.   MACRO: None   Signed by: Althea Arora 4/22/2024 11:22 AM Dictation workstation:   [id number]      No echocardiogram results found for the past 14 days    Radiology:     CT angio chest for pulmonary embolism   Final Result   1. Cardiomegaly with ground-glass infiltrates and interstitial   infiltrates in a pattern most consistent with pulmonary edema and   congestive heart failure.   2. Abnormal appearance of the left ventricular configuration   suggesting left ventricular aneurysm likely secondary to remote   apical infarction.   3. Unipolar cardiac pacemaker/defibrillator.        MACRO:   none        Signed by: Yung Murphy 4/22/2024 12:44 PM   Dictation workstation:   [id number]      XR chest 1 view   Final Result   No acute cardiopulmonary process.        MACRO:   None        Signed by: lAthea Arora 4/22/2024 11:22 AM   Dictation workstation:   [id number]      Cardiac Device Check - Inpatient    (Results Pending)       Problem List:     Patient Active Problem List   Diagnosis    CAD S/P percutaneous coronary angioplasty    Cardiac defibrillator in situ     Chronic systolic heart failure (Multi)    Class 1 obesity with body mass index (BMI) of 32.0 to 32.9 in adult    Diabetes mellitus with hypoglycemia without coma (Multi)    Elective replacement indicated for implantable cardioverter-defibrillator (ICD)    Essential hypertension, benign    Fatigue    History of CVA (cerebrovascular accident)    History of MI (myocardial infarction)    Mixed hyperlipidemia    Hypotension    Acquired hypothyroidism    Goiter    Ischemic cardiomyopathy    Long term (current) use of anticoagulants    Major depressive disorder    Old MI (myocardial infarction)    PSVT (paroxysmal supraventricular tachycardia) (CMS-HCC)    Restless legs syndrome (RLS)    Stage 3a chronic kidney disease (Multi)    Type 2 diabetes mellitus without complication (Multi)    Ventricular tachycardia (Multi)    Chronic congestive heart failure (Multi)    Abnormal weight gain    MICHELLE (acute kidney injury) (CMS-HCC)    Nephrolithiasis    Alcohol abuse    Cerebral venous sinus thrombosis ([facility])    Gastroparesis    Frequency-urgency syndrome    Hypovolemia dehydration    Lumbosacral spondylosis without myelopathy    NSTEMI (non-ST elevated myocardial infarction) (Multi)    History of vitrectomy    Pseudophakia of both eyes    Supratherapeutic INR    Syncope and collapse    Vitamin D deficiency    BMI 31.0-31.9,adult    Acute congestive heart failure, unspecified heart failure type (Multi)       Assessment:   SOB  Elevated biomarkers hx CAD  HX AICD  HTN  Hx CVA on coumadin  HTN  Dyslipidemia  DM  CKD stage 3      Plan:   Tele monitoring  Serial enzymes elevated  Echo done 12/17/23 Ef 50%  Daily EKG's  AICD interrogation  Coreg 12.5 mg p.o. twice daily  Zetia 10 mg daily  Lasix 40 mg IV push every 24  Crestor 40 mg daily  Currently on Coumadin we will check an INR    Further recommendations per Dr Chaim Rivers [facility]      Of note, this  documentation is completed using the Dragon Dictation system (voice recognition software). There may be spelling and/or grammatical errors that were not corrected prior to final submission.      Electronically signed by ARMANDO Coronel-CNP, on 4/23/2024 at 8:57 AM   Patient seen and examined in conjunction with ARMANDO Banks and agree with the evaluation as noted above.  64-year-old lady with a history of ventricular tachycardia s/p AICD implantation, hypertension, CAD who presents with increased shortness of breath and chest pressure that felt like an elephant sitting on her chest.  She tried taking her inhalers with no significant improvement.  She was noted to have increased troponin that peaked at 200 range and EKG shows sinus tachycardia with no acute ST or T wave changes.  She had cardiac catheterization about 5 years ago which showed chronically occluded LAD that was filling with collaterals.  She still complains of some intermittent chest pain.  Agree with examination as noted above.  Cardiac exam reveals regular first and second heart sound, no murmurs are heard.  Chest is clear to auscultation bilaterally.    ASSESSMENT AND PLAN:  1.  Chest pain: Chest pain is concerning for possible unstable angina, in a patient with known history of CAD as noted above.  In view of persistent chest pain and elevated troponins, suggestive of possible non-STEMI, will recommend repeat cardiac catheterization once INR is subtherapeutic.  In the meantime, we will continue with current cardiac medications.  2.  History of CVA: With no significant residuals.  Will resume Coumadin postcardiac catheterization.  3.  Hypertension: Continue to optimize blood pressure control.  AKA

## 2024-04-24 VITALS
DIASTOLIC BLOOD PRESSURE: 66 MMHG | SYSTOLIC BLOOD PRESSURE: 124 MMHG | WEIGHT: 166.45 LBS | OXYGEN SATURATION: 95 % | TEMPERATURE: 95.9 F | HEART RATE: 82 BPM | RESPIRATION RATE: 18 BRPM | HEIGHT: 62 IN | BODY MASS INDEX: 30.63 KG/M2

## 2024-04-24 PROBLEM — I50.9 CONGESTIVE HEART FAILURE, UNSPECIFIED HF CHRONICITY, UNSPECIFIED HEART FAILURE TYPE (MULTI): Status: ACTIVE | Noted: 2024-04-24

## 2024-04-24 LAB
ALBUMIN SERPL BCP-MCNC: 3.4 G/DL (ref 3.4–5)
ALP SERPL-CCNC: 52 U/L (ref 33–136)
ALT SERPL W P-5'-P-CCNC: 23 U/L (ref 7–45)
ANION GAP SERPL CALC-SCNC: 10 MMOL/L (ref 10–20)
AST SERPL W P-5'-P-CCNC: 21 U/L (ref 9–39)
ATRIAL RATE: 121 BPM
BILIRUB SERPL-MCNC: 0.3 MG/DL (ref 0–1.2)
BUN SERPL-MCNC: 32 MG/DL (ref 6–23)
CALCIUM SERPL-MCNC: 8.4 MG/DL (ref 8.6–10.3)
CHLORIDE SERPL-SCNC: 109 MMOL/L (ref 98–107)
CHOLEST SERPL-MCNC: 103 MG/DL (ref 0–199)
CHOLESTEROL/HDL RATIO: 3.3
CO2 SERPL-SCNC: 25 MMOL/L (ref 21–32)
CREAT SERPL-MCNC: 1.18 MG/DL (ref 0.5–1.05)
EGFRCR SERPLBLD CKD-EPI 2021: 52 ML/MIN/1.73M*2
ERYTHROCYTE [DISTWIDTH] IN BLOOD BY AUTOMATED COUNT: 13.2 % (ref 11.5–14.5)
GLUCOSE BLD MANUAL STRIP-MCNC: 119 MG/DL (ref 74–99)
GLUCOSE BLD MANUAL STRIP-MCNC: 285 MG/DL (ref 74–99)
GLUCOSE SERPL-MCNC: 103 MG/DL (ref 74–99)
HCT VFR BLD AUTO: 36.6 % (ref 36–46)
HDLC SERPL-MCNC: 30.8 MG/DL
HGB BLD-MCNC: 12.3 G/DL (ref 12–16)
HOLD SPECIMEN: NORMAL
LDLC SERPL CALC-MCNC: 37 MG/DL
MCH RBC QN AUTO: 36.4 PG (ref 26–34)
MCHC RBC AUTO-ENTMCNC: 33.6 G/DL (ref 32–36)
MCV RBC AUTO: 108 FL (ref 80–100)
NON HDL CHOLESTEROL: 72 MG/DL (ref 0–149)
NRBC BLD-RTO: 0 /100 WBCS (ref 0–0)
P AXIS: 92 DEGREES
P OFFSET: 201 MS
P ONSET: 147 MS
PLATELET # BLD AUTO: 124 X10*3/UL (ref 150–450)
POTASSIUM SERPL-SCNC: 3.4 MMOL/L (ref 3.5–5.3)
PR INTERVAL: 138 MS
PROT SERPL-MCNC: 5.9 G/DL (ref 6.4–8.2)
Q ONSET: 216 MS
QRS COUNT: 20 BEATS
QRS DURATION: 80 MS
QT INTERVAL: 326 MS
QTC CALCULATION(BAZETT): 462 MS
QTC FREDERICIA: 411 MS
R AXIS: 101 DEGREES
RBC # BLD AUTO: 3.38 X10*6/UL (ref 4–5.2)
SODIUM SERPL-SCNC: 141 MMOL/L (ref 136–145)
T AXIS: 92 DEGREES
T OFFSET: 379 MS
TRIGL SERPL-MCNC: 177 MG/DL (ref 0–149)
VENTRICULAR RATE: 121 BPM
VLDL: 35 MG/DL (ref 0–40)
WBC # BLD AUTO: 9.1 X10*3/UL (ref 4.4–11.3)

## 2024-04-24 PROCEDURE — 2500000006 HC RX 250 W HCPCS SELF ADMINISTERED DRUGS (ALT 637 FOR ALL PAYERS): Mod: MUE | Performed by: STUDENT IN AN ORGANIZED HEALTH CARE EDUCATION/TRAINING PROGRAM

## 2024-04-24 PROCEDURE — 1200000002 HC GENERAL ROOM WITH TELEMETRY DAILY

## 2024-04-24 PROCEDURE — 2500000001 HC RX 250 WO HCPCS SELF ADMINISTERED DRUGS (ALT 637 FOR MEDICARE OP): Performed by: STUDENT IN AN ORGANIZED HEALTH CARE EDUCATION/TRAINING PROGRAM

## 2024-04-24 PROCEDURE — 84075 ASSAY ALKALINE PHOSPHATASE: CPT | Performed by: STUDENT IN AN ORGANIZED HEALTH CARE EDUCATION/TRAINING PROGRAM

## 2024-04-24 PROCEDURE — 36415 COLL VENOUS BLD VENIPUNCTURE: CPT | Performed by: STUDENT IN AN ORGANIZED HEALTH CARE EDUCATION/TRAINING PROGRAM

## 2024-04-24 PROCEDURE — 80061 LIPID PANEL: CPT | Performed by: NURSE PRACTITIONER

## 2024-04-24 PROCEDURE — 99232 SBSQ HOSP IP/OBS MODERATE 35: CPT | Performed by: NURSE PRACTITIONER

## 2024-04-24 PROCEDURE — 82947 ASSAY GLUCOSE BLOOD QUANT: CPT

## 2024-04-24 PROCEDURE — 2500000006 HC RX 250 W HCPCS SELF ADMINISTERED DRUGS (ALT 637 FOR ALL PAYERS): Performed by: NURSE PRACTITIONER

## 2024-04-24 PROCEDURE — 2500000004 HC RX 250 GENERAL PHARMACY W/ HCPCS (ALT 636 FOR OP/ED): Performed by: NURSE PRACTITIONER

## 2024-04-24 PROCEDURE — 2500000002 HC RX 250 W HCPCS SELF ADMINISTERED DRUGS (ALT 637 FOR MEDICARE OP, ALT 636 FOR OP/ED): Performed by: NURSE PRACTITIONER

## 2024-04-24 PROCEDURE — 99239 HOSP IP/OBS DSCHRG MGMT >30: CPT | Performed by: STUDENT IN AN ORGANIZED HEALTH CARE EDUCATION/TRAINING PROGRAM

## 2024-04-24 PROCEDURE — 85027 COMPLETE CBC AUTOMATED: CPT | Performed by: STUDENT IN AN ORGANIZED HEALTH CARE EDUCATION/TRAINING PROGRAM

## 2024-04-24 RX ORDER — POTASSIUM CHLORIDE 20 MEQ/1
40 TABLET, EXTENDED RELEASE ORAL ONCE
Status: COMPLETED | OUTPATIENT
Start: 2024-04-24 | End: 2024-04-24

## 2024-04-24 RX ADMIN — PANTOPRAZOLE SODIUM 40 MG: 40 TABLET, DELAYED RELEASE ORAL at 06:33

## 2024-04-24 RX ADMIN — INSULIN LISPRO 6 UNITS: 100 INJECTION, SOLUTION INTRAVENOUS; SUBCUTANEOUS at 11:37

## 2024-04-24 RX ADMIN — CARVEDILOL 12.5 MG: 12.5 TABLET, FILM COATED ORAL at 08:54

## 2024-04-24 RX ADMIN — POTASSIUM CHLORIDE 40 MEQ: 1500 TABLET, EXTENDED RELEASE ORAL at 10:50

## 2024-04-24 RX ADMIN — LEVOTHYROXINE SODIUM 88 MCG: 0.09 TABLET ORAL at 06:32

## 2024-04-24 RX ADMIN — ROSUVASTATIN CALCIUM 40 MG: 20 TABLET, FILM COATED ORAL at 08:54

## 2024-04-24 RX ADMIN — FUROSEMIDE 40 MG: 10 INJECTION, SOLUTION INTRAMUSCULAR; INTRAVENOUS at 06:33

## 2024-04-24 RX ADMIN — BUPROPION HYDROCHLORIDE 200 MG: 100 TABLET, EXTENDED RELEASE ORAL at 08:54

## 2024-04-24 RX ADMIN — TOPIRAMATE 200 MG: 100 TABLET ORAL at 08:54

## 2024-04-24 RX ADMIN — INSULIN GLARGINE 30 UNITS: 100 INJECTION, SOLUTION SUBCUTANEOUS at 00:04

## 2024-04-24 RX ADMIN — DOCUSATE SODIUM 100 MG: 100 CAPSULE, LIQUID FILLED ORAL at 08:54

## 2024-04-24 ASSESSMENT — COGNITIVE AND FUNCTIONAL STATUS - GENERAL
MOVING TO AND FROM BED TO CHAIR: A LITTLE
DAILY ACTIVITIY SCORE: 24
MOBILITY SCORE: 21
WALKING IN HOSPITAL ROOM: A LITTLE
CLIMB 3 TO 5 STEPS WITH RAILING: A LITTLE

## 2024-04-24 ASSESSMENT — PAIN SCALES - GENERAL: PAINLEVEL_OUTOF10: 0 - NO PAIN

## 2024-04-24 ASSESSMENT — PAIN - FUNCTIONAL ASSESSMENT: PAIN_FUNCTIONAL_ASSESSMENT: 0-10

## 2024-04-24 NOTE — NURSING NOTE
CHF Clinical Nurse Navigator Documentation  Congestive Heart Failure disease education was performed by the Clinical Nurse Navigator with a good understanding: yes  CHF signs and symptoms discussed and when to call cardiologist?  yes  Living With Heart Failure Education booklet?  no  Controlling Heart Failure at Home Education? yes  CHF Education Teaching Tool? yes  MICHELLE education modules assigned?  no  Home medication usage?  yes  Nutrition Education? Yes-low sodium   Fluid Restriction Education? yes  Daily Weight Education? Yes-daily weight log provided   Cardiovascular Rehab Referral ordered?  no  Follow-up with Cardiologist after discharge education? yes  Comments: Met with patient and daughter at bedside for heart failure education. Both verbalized understanding. Patient lives at home with her . Her cardiologist is Dr. Hernandez. She is compliant with al her medications and follow up appointments. Explained to patient the Healthy at Home program and she is agreeable. Referral placed. No questions or concerns at this time. Provided my contact information should any questions or concerns arise.

## 2024-04-24 NOTE — DISCHARGE INSTRUCTIONS
HEART FAILURE EDUCATION:  1. Weigh yourself daily and record on your weight log.  2. If you gain more than 2 or 3 pounds overnight, call your cardiologist.  3. Follow a low sodium diet. No more than 2000 mg in one day, or more than 650 mg per meal.  4. Limit total fluids to no more than 8 cups (or 2 liters) per day - this includes all fluids (water, coffee, juice, milk, tea, etc.)  5. Monitor your blood pressure daily and record on your weight log.  6. Call to schedule your follow-up appointments when you get home if they were not already scheduled for you.  7. Keep your follow-up appointments! Bring your weight log with you so the doctors can see your weight trend and blood pressure readings.  8. Be sure to  any new prescriptions and take them as directed. If unsure of the medications, be sure to call your cardiologist.  9. Stay as active as you can tolerate.   10. If you notice subtle change of symptoms (slight increase in swelling, slight shortness of breath, a new intolerance to laying flat, a new cough), be sure to call your cardiologist.  11. If you have any questions or concerns or you have not heard back from the cardiologist, feel free to call Monet Ortiz heart failure navigator at [phone].

## 2024-04-24 NOTE — DISCHARGE SUMMARY
Discharge Diagnosis  Acute congestive heart failure, unspecified heart failure type (Multi)    Issues Requiring Follow-Up   acute on chronic congestive heart failure,  medication optimization    Discharge Meds     Your medication list        CONTINUE taking these medications        Instructions Last Dose Given Next Dose Due   albuterol 90 mcg/actuation inhaler           ARIPiprazole 2 mg tablet  Commonly known as: Abilify           glucagon 1 mg injection  Commonly known as: Glucagen           Baqsimi 3 mg/actuation spray,non-aerosol  Generic drug: glucagon           buPROPion  mg 12 hr tablet  Commonly known as: Wellbutrin SR           carvedilol 12.5 mg tablet  Commonly known as: Coreg           cholecalciferol 25 MCG (1000 UT) capsule  Commonly known as: Vitamin D-3           clonazePAM 2 mg tablet  Commonly known as: KlonoPIN           desvenlafaxine 100 mg 24 hr tablet  Commonly known as: Pristiq           ezetimibe 10 mg tablet  Commonly known as: Zetia           furosemide 40 mg tablet  Commonly known as: Lasix           insulin aspart 100 unit/mL (3 mL) pen  Commonly known as: NovoLOG           insulin glargine 100 unit/mL injection  Commonly known as: Lantus           levothyroxine 88 mcg tablet  Commonly known as: Synthroid, Levoxyl           magnesium oxide 500 mg capsule           nitroglycerin 0.4 mg SL tablet  Commonly known as: Nitrostat           oxybutynin XL 10 mg 24 hr tablet  Commonly known as: Ditropan-XL           rOPINIRole 2 mg tablet  Commonly known as: Requip           rosuvastatin 40 mg tablet  Commonly known as: Crestor           Topamax 200 mg tablet  Generic drug: topiramate           valsartan 40 mg tablet  Commonly known as: Diovan           warfarin 7.5 mg tablet  Commonly known as: Coumadin           warfarin 5 mg tablet  Commonly known as: Coumadin                  STOP taking these medications      metoclopramide 5 mg tablet  Commonly known as: Reglan                 Test  Results Pending At Discharge  Pending Labs       No current pending labs.            Hospital Course       Pertinent Physical Exam At Time of Discharge  Physical Exam    Outpatient Follow-Up  Future Appointments   Date Time Provider Department Center   5/2/2024  1:30 PM JON Coronel [id number] [place]   10/8/2024  9:00 AM [facility] 1 [id number] [facility]   10/8/2024 10:00 AM Elodia Jenkins MD [id number] [place]   4/9/2025  7:45 AM Jose Barclay MD [id number] [place]         Chelsie Duke MD

## 2024-04-24 NOTE — PROGRESS NOTES
04/24/24 1401   Discharge Planning   Living Arrangements Spouse/significant other   Support Systems Spouse/significant other   Assistance Needed medications, cooking, transportation- spouse prforms these tasks   Type of Residence Private residence   Who is requesting discharge planning? Provider   Home or Post Acute Services Community services;Other (Comment)  ([facility])   Patient expects to be discharged to: home w [facility]   Does the patient need discharge transport arranged? No   Patient Choice   Patient / Family choosing to utilize agency / facility established prior to hospitalization No  ([facility])     Met w/ pt. She confirms ins reji's and pcpJm Duke. Pt lives w/ spouse who assists. Pt reports hse is forgetful citing her recent attempt to drive alone ot her sister's home. Pt got lost and had to call her sister for assistance. Pt does not cook nor mange her own medications d/t her cognitive limitations.. She reports this has been a residual of the cva she had, her pcp is aware. Reviewed w/ pt the [facility] program. Given handout. Pt is in agreement. Referral placed. Pd today.

## 2024-04-24 NOTE — CARE PLAN
The patient's goals for the shift include safety     The clinical goals for the shift include safety/free from falls

## 2024-04-24 NOTE — PROGRESS NOTES
[facility] Heart Progress Note           Rounding JAE/Cardiologist:  Denver Rivers, APRN-CNP,     Primary Cardiologist: Dr. Jose Barclay    Date:  4/24/2024  Patient:  Ne Richardson  YOB: 1960  MRN:  [id number]   Admit Date:  4/22/2024      SUBJECTIVE:    4/24/24  Patient states feeling much better today denies having any chest pain.  Well-informed on plan of care  EKG is normal sinus rhythm no acute changes  Telemetry is normal sinus rhythm no ectopy      VITALS:     Vitals:    04/23/24 1726 04/23/24 1920 04/24/24 0001 04/24/24 0732   BP: 110/59 99/56 113/62 110/69   BP Location:    Right arm   Patient Position:  Sitting  Lying   Pulse: 78 78 85 81   Resp:  17 16 18   Temp: 36.8 °C (98.2 °F) 36.8 °C (98.2 °F) 36.7 °C (98.1 °F) 36.3 °C (97.3 °F)   TempSrc:    Temporal   SpO2: 94% 95% 93% 99%   Weight:       Height:           Intake/Output Summary (Last 24 hours) at 4/24/2024 1000  Last data filed at 4/23/2024 1418  Gross per 24 hour   Intake --   Output 0 ml   Net 0 ml       Wt Readings from Last 4 Encounters:   04/23/24 75.5 kg (166 lb 7.2 oz)   04/10/24 77.1 kg (170 lb)   04/08/24 76.2 kg (168 lb)   04/12/23 80.8 kg (178 lb 1 oz)       CURRENT HOSPITAL MEDICATIONS:   ARIPiprazole, 4 mg, oral, Nightly  buPROPion SR, 200 mg, oral, BID  carvedilol, 12.5 mg, oral, BID  clonazePAM, 2 mg, oral, Nightly  docusate sodium, 100 mg, oral, BID  ezetimibe, 10 mg, oral, Nightly  furosemide, 40 mg, intravenous, q24h  insulin glargine, 30 Units, subcutaneous, q24h  insulin lispro, 0-10 Units, subcutaneous, TID with meals  levothyroxine, 88 mcg, oral, Daily  melatonin, 3 mg, oral, Daily  pantoprazole, 40 mg, oral, Daily before breakfast   Or  pantoprazole, 40 mg, intravenous, Daily before breakfast  rOPINIRole, 2 mg, oral, Nightly  rosuvastatin, 40 mg, oral, Daily  topiramate, 200 mg, oral, BID  warfarin, 12.5 mg, oral, Once per day on Sunday Monday Wednesday Friday Saturday  warfarin, 15 mg, oral,  Once per day on Tuesday Thursday         Current Outpatient Medications   Medication Instructions    albuterol 90 mcg/actuation inhaler 2 puffs, inhalation, Every 6 hours PRN, Every 4 to 6 hours     ARIPiprazole (ABILIFY) 4 mg, oral, Nightly    buPROPion SR (WELLBUTRIN SR) 200 mg, oral, 2 times daily    carvedilol (Coreg) 12.5 mg tablet 1 tablet, oral, 2 times daily    cholecalciferol (VITAMIN D-3) 3,000 Units, oral, Daily    clonazePAM (KlonoPIN) 2 mg tablet 1 tablet, oral, Nightly    desvenlafaxine (PRISTIQ) 100 mg, oral, Nightly    ezetimibe (Zetia) 10 mg tablet 1 tablet, oral, Nightly    furosemide (LASIX) 40 mg, oral, Daily RT    glucagon (Baqsimi) 3 mg/actuation spray,non-aerosol 1 spray, nasal, As needed    glucagon 1 mg injection Use as directed for hypoglycemia     insulin aspart (NovoLOG) 100 unit/mL (3 mL) pen subcutaneous, 3 times daily with meals, Per Sliding Scale    insulin glargine (LANTUS) 30 Units, subcutaneous, Nightly    levothyroxine (SYNTHROID, LEVOXYL) 88 mcg, oral, Daily RT    magnesium oxide 500 mg capsule 1 capsule, oral, Nightly    metoclopramide (REGLAN) 5 mg, oral, 2 times daily before meals    nitroglycerin (NITROSTAT) 0.4 mg, sublingual, Every 5 min PRN, Up to 3 doses  <BR>    oxybutynin XL (DITROPAN-XL) 10 mg, oral, Nightly    rOPINIRole (Requip) 2 mg tablet 1 tablet, oral, Nightly    rosuvastatin (Crestor) 40 mg tablet 1 tablet, oral, Daily    topiramate (Topamax) 200 mg tablet 1 tablet, oral, 2 times daily    valsartan (DIOVAN) 40 mg, oral, Daily RT    warfarin (Coumadin) 5 mg tablet 2.5 tablets, oral, 5 times weekly, Monday, Wednesday, Friday, Saturday & Sunday, in the evening.    warfarin (Coumadin) 7.5 mg tablet 2 tablets, oral, 2 times weekly, Tuesday & Thursday in the evening        PHYSICAL EXAMINATION:   GENERAL:  Well developed, well nourished, in no acute distress.  CHEST:  Symmetric and nontender.  NEURO/PSYCH:  Alert and oriented times three with approppriate behavior and  responses.  NECK:  Supple, no JVD, no bruit.  LUNGS:  Clear to auscultation bilaterally, normal respiratory effort.  HEART:  Rate and rhythm regular with no evident murmur, no gallop appreciated.        There are no rubs, clicks or heaves.  EXTREMITIES:  Warm with good color, no clubbing or cyanosis.  There is no edema noted.  Groin soft [place] or hematomas or bruit  PERIPHERAL VASCULAR:  Pulses present and equally palpable; 2+ throughout.      LAB DATA:     CBC:   Results from last 7 days   Lab Units 04/24/24  0618 04/23/24  0637 04/22/24  1113   WBC AUTO x10*3/uL 9.1 13.1* 16.3*   RBC AUTO x10*6/uL 3.38* 3.61* 3.96*   HEMOGLOBIN g/dL 12.3 13.1 14.3   HEMATOCRIT % 36.6 38.5 42.9   MCV fL 108* 107* 108*   MCH pg 36.4* 36.3* 36.1*   MCHC g/dL 33.6 34.0 33.3   RDW % 13.2 13.0 13.2   PLATELETS AUTO x10*3/uL 124* 159 147*     CMP:    Results from last 7 days   Lab Units 04/24/24  0618 04/23/24  0637 04/22/24  1113   SODIUM mmol/L 141 138 136   POTASSIUM mmol/L 3.4* 3.9 4.1   CHLORIDE mmol/L 109* 107 103   CO2 mmol/L 25 25 21   BUN mg/dL 32* 32* 23   CREATININE mg/dL 1.18* 1.34* 1.20*   GLUCOSE mg/dL 103* 294* 229*   PROTEIN TOTAL g/dL 5.9* 6.2* 7.1   CALCIUM mg/dL 8.4* 8.2* 9.0   BILIRUBIN TOTAL mg/dL 0.3 0.4 0.3   ALK PHOS U/L 52 55 68   AST U/L 21 13 16   ALT U/L 23 17 19     BMP:    Results from last 7 days   Lab Units 04/24/24  0618 04/23/24  0637 04/22/24  1113   SODIUM mmol/L 141 138 136   POTASSIUM mmol/L 3.4* 3.9 4.1   CHLORIDE mmol/L 109* 107 103   CO2 mmol/L 25 25 21   BUN mg/dL 32* 32* 23   CREATININE mg/dL 1.18* 1.34* 1.20*   CALCIUM mg/dL 8.4* 8.2* 9.0   GLUCOSE mg/dL 103* 294* 229*     Magnesium:  Results from last 7 days   Lab Units 04/22/24  1113   MAGNESIUM mg/dL 1.85     Troponin:    Results from last 7 days   Lab Units 04/22/24  1739 04/22/24  1405 04/22/24  1113   TROPHS ng/L 204* 155* 39*     BNP:   Results from last 7 days   Lab Units 04/22/24  1113   BNP pg/mL 58     Lipid Panel:  Results from  last 7 days   Lab Units 04/24/24  0618   HDL mg/dL 30.8   CHOLESTEROL/HDL RATIO  3.3   VLDL mg/dL 35   TRIGLYCERIDES mg/dL 177*   NON HDL CHOL. mg/dL 72        DIAGNOSTIC TESTING:   @No results found for this or any previous visit.    Electrocardiogram 12 Lead    Result Date: 4/23/2024  Normal sinus rhythm Anterolateral infarct (cited on or before 22-APR-2024) Prolonged QT Abnormal ECG When compared with ECG of 23-APR-2024 16:23, (unconfirmed) No significant change was found    Cardiac Catheterization Procedure    Result Date: 4/23/2024   [facility], Cath Lab        [address] [place] [address] Cardiovascular Catheterization Report Patient Name:      JONY RAJPUT        Performing Physician:  Tatiana Reveles MD Study Date:        4/23/2024            Verifying Physician:   Tatiana Reveles MD MRN/PID:           [id number]             Cardiologist/Co-Scrub: Accession#:        [id number]         Ordering Provider:     [id number] KERRY BRADFORD Date of Birth/Age: 1960 / 64 years Cardiologist: Gender:            F                    Fellow: Encounter#:        [id number]           Surgeon:  Study:            Coronary Arteriogram Additional Study: Left Heart Cath Additional Study: Left Ventriculogram  Indications: JONY RAJPUT is a 64 year old female who presents with dyslipidemia, hypertension, diabetes, current smoker, chronic pulmonary disease, coronary artery disease, prior myocardial infarction, prior percutaneous coronary intervention and a chest pain assessment of typical angina. NSTE - ACS.  Procedure Description: After infiltration with 2% Lidocaine, the right femoral artery was cannulated with a modified Seldinger technique. Subsequently a 5 Spanish sheath was placed in the  right femoral artery. Selective coronary catheterization was performed using a 5 Fr catheter(s) exchanged over a guide wire to cannulate the coronary arteries. A JL 4 tip catheter was used for left coronary injections. A 3DRC tip catheter was used for right coronary injections. Multiple injections of contrast were made into the left and right coronary arteries with angiograms recorded in multiple projections. Retrograde left heart catheterizion was accomplished with a 5 Fr. pigtail catheter. A single plane left ventriculogram was recorded in the 30 degree MONTES projection. The contrast dose was 20 ml injected at 10 ml/sec. The catheter was then withdrawn across the aortic valve under continuous pressure monitoring and removed. After completion of the procedure, femoral artery angiography was performed. This demonstrated a common femoral artery puncture appropriate for closure. A Vascade 5F vascular closure Device was placed per protocol the arterial sheath was pulled and a Hemostatic patch was applied to the site.  Coronary Angiography: The coronary circulation is right dominant.  Left Main Coronary Artery: There is 0% stenosis in the entire left main coronary artery.  Left Anterior Descending Coronary Artery Distribution: There is 100% stenosis in the mid left anterior descending artery. Fills via collaterals. There is evidence of instent restenosis in this segment.  Circumflex Coronary Artery Distribution: There is <10% stenosis in the the entire Circumflex artery.  Right Coronary Artery Distribution: There is 30% stenosis in the the proximal and the mid Right Coronary Artery.  Left Ventriculography: The estimated left ventricular ejection fraction is abnormal at 25%. There is apical dyskinesis noted. There is mild mitral regurgitation.  Hemo Personnel: +---------------------------+---------+ Name                       Duty      +---------------------------+---------+ Jose Barclay MD 1  +---------------------------+---------+  Hemodynamic Pressures:  +----+-------------------+---------+------------+-------------+------+---------+ Site     Date Time       Phase    Systolic    Diastolic    ED  Mean mmHg                          Name       mmHg        mmHg      mmHg           +----+-------------------+---------+------------+-------------+------+---------+   AO  4/23/2024 2:06:59 AIR REST         119           62             82                      PM                                                  +----+-------------------+---------+------------+-------------+------+---------+   AO  4/23/2024 2:12:23 AIR REST         101           66             82                      PM                                                  +----+-------------------+---------+------------+-------------+------+---------+   LV  4/23/2024 2:13:43 AIR REST         103            6    13                               PM                                                  +----+-------------------+---------+------------+-------------+------+---------+  LVp  4/23/2024 2:14:11 AIR REST          95           19    22                               PM                                                  +----+-------------------+---------+------------+-------------+------+---------+  Cardiac Cath Post Procedure Notes: Post Procedure Diagnosis: Single vessel disease. Blood Loss:               Estimated blood loss during the procedure was 0 mls. Specimens Removed:        Number of specimen(s) removed: none. ____________________________________________________________________________________ CONCLUSIONS:  1. The entire Left Main: 0% stenosis.  2. Left Anterior Descending Artery: fills via collaterals.  3. Mid LAD Lesion: The percent stenosis is 100%.  4. Mid LAD Lesion: Instent Restenosis.  5. Entire CX Lesion: The percent stenosis is <10%.  6. Proximal and mid  RCA Lesion: The percent stenosis is 30%.  7. The Left Ventricular Ejection Fraction is 25%.  8. LV: apical dyskinesis.  9. Mitral Regurgitation: Mild. ICD 10 Codes: Non ST elevation (NSTEMI) myocardial infarction-I21.4  CPT Codes: Left Heart Cath (visualization of coronaries) and [id number]; Moderate Sedation Services initial 15 minutes patient >5 [id number]  [id number] Jose Reveles MD Performing Physician Electronically signed by [id number] Jose Reveles MD on 4/23/2024 at 2:33:10 PM  ** Final **     ECG 12 lead    Result Date: 4/23/2024   Suspect arm lead reversal, interpretation assumes no reversal Sinus tachycardia Possible Left atrial enlargement Anterolateral infarct (cited on or before 22-APR-2024) Abnormal ECG When compared with ECG of 21-MAR-2023 15:52, Previous ECG has undetermined rhythm, needs review Nonspecific T wave abnormality has replaced inverted T waves in Anterior leads See ED provider note for full interpretation and clinical correlation    CT angio chest for pulmonary embolism    Result Date: 4/22/2024  Interpreted By:  Yung Murphy, STUDY: CT ANGIO CHEST FOR PULMONARY EMBOLISM; 4/22/2024 12:16 pm   INDICATION: Signs/Symptoms:Sudden shortness of breath.   COMPARISON: 10/26/2022   ACCESSION NUMBER(S): [id number]   ORDERING CLINICIAN: RUTH DELACRUZ   TECHNIQUE: 75 ml of non-ionic iodinated contrast was injected intravenously.   CT angiography (non-coronary) of the chest was performed with Intravenous contrast material along with 3D (maximum intensity projection - MIP) image post processing.   One or more of the following dose reduction techniques were used: Automated exposure control Adjustment of the mA and/or kV according to patient size, and/or use of iterative reconstruction technique.   FINDINGS: There is no evidence for aortic dissection or pericardial effusion. Unipolar cardiac pacemaker/defibrillator is present. There is no evidence for pulmonary embolism. 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   Chest Wall: No chest wall abnormalities are identified.   Upper Abdominal Findings: No pathologic findings are identified involving the visualized portions of the upper abdomen.   Skeletal System: No acute fractures or destructive lesions are identified.       1. Cardiomegaly with ground-glass infiltrates and interstitial infiltrates in a pattern most consistent with pulmonary edema and congestive heart failure. 2. 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 3. Unipolar cardiac pacemaker/defibrillator.   MACRO: none   Signed by: Yung Murphy 4/22/2024 12:44 PM Dictation workstation:   [id number]    XR chest 1 view    Result Date: 4/22/2024  Interpreted By:  Althea Arora, STUDY: XR CHEST 1 VIEW;  4/22/2024 11:20 am   INDICATION: Signs/Symptoms:Shortness of.   COMPARISON: 10/26/2022   ACCESSION NUMBER(S): [id number]   ORDERING CLINICIAN: RUTH DELACRUZ   FINDINGS: CARDIOMEDIASTINAL SILHOUETTE: Cardiomediastinal silhouette is normal in size and configuration. There is a left subclavian pacemaker-AICD in unchanged position with the tip in the right ventricle.   LUNGS: Lungs are clear.   ABDOMEN: No remarkable upper abdominal findings.     BONES: No acute osseous changes.       No acute cardiopulmonary process.   MACRO: None   Signed by: Althea Arora 4/22/2024 11:22 AM Dictation workstation:   [id number]       Cardiac Catheterization Procedure   Final Result      Cardiac Device Check -  Inpatient   Final Result      CT angio chest for pulmonary embolism   Final Result   1. Cardiomegaly with ground-glass infiltrates and interstitial   infiltrates in a pattern most consistent with pulmonary edema and   congestive heart failure.   2. Abnormal appearance of the left ventricular configuration   suggesting left ventricular aneurysm likely secondary to remote   apical infarction.   3. Unipolar cardiac pacemaker/defibrillator.        MACRO:   none        Signed by: Yung Murphy 4/22/2024 12:44 PM   Dictation workstation:   [id number]      XR chest 1 view   Final Result   No acute cardiopulmonary process.        MACRO:   None        Signed by: Althea Arora 4/22/2024 11:22 AM   Dictation workstation:   [id number]          No echocardiogram results found for the past 14 days    RADIOLOGY:     Cardiac Catheterization Procedure   Final Result      Cardiac Device Check - Inpatient   Final Result      CT angio chest for pulmonary embolism   Final Result   1. Cardiomegaly with ground-glass infiltrates and interstitial   infiltrates in a pattern most consistent with pulmonary edema and   congestive heart failure.   2. Abnormal appearance of the left ventricular configuration   suggesting left ventricular aneurysm likely secondary to remote   apical infarction.   3. Unipolar cardiac pacemaker/defibrillator.        MACRO:   none        Signed by: Yung Murphy 4/22/2024 12:44 PM   Dictation workstation:   [id number]      XR chest 1 view   Final Result   No acute cardiopulmonary process.        MACRO:   None        Signed by: Althea Arora 4/22/2024 11:22 AM   Dictation workstation:   [id number]          PROBLEM LIST     Patient Active Problem List   Diagnosis    CAD S/P percutaneous coronary angioplasty    Cardiac defibrillator in situ    Chronic systolic heart failure (Multi)    Class 1 obesity with body mass index (BMI) of 32.0 to 32.9 in adult    Diabetes mellitus with hypoglycemia without coma (Multi)     Elective replacement indicated for implantable cardioverter-defibrillator (ICD)    Essential hypertension, benign    Fatigue    History of CVA (cerebrovascular accident)    History of MI (myocardial infarction)    Mixed hyperlipidemia    Hypotension    Acquired hypothyroidism    Goiter    Ischemic cardiomyopathy    Long term (current) use of anticoagulants    Major depressive disorder    Old MI (myocardial infarction)    PSVT (paroxysmal supraventricular tachycardia) (CMS-HCC)    Restless legs syndrome (RLS)    Stage 3a chronic kidney disease (Multi)    Type 2 diabetes mellitus without complication (Multi)    Ventricular tachycardia (Multi)    Chronic congestive heart failure (Multi)    Abnormal weight gain    MICHELLE (acute kidney injury) (CMS-HCC)    Nephrolithiasis    Alcohol abuse    Cerebral venous sinus thrombosis ([facility])    Gastroparesis    Frequency-urgency syndrome    Hypovolemia dehydration    Lumbosacral spondylosis without myelopathy    NSTEMI (non-ST elevated myocardial infarction) (Multi)    History of vitrectomy    Pseudophakia of both eyes    Supratherapeutic INR    Syncope and collapse    Vitamin D deficiency    BMI 31.0-31.9,adult    Acute congestive heart failure, unspecified heart failure type (Multi)       ASSESSMENT:   Acute on chronic diastolic heart failure NYHA class II  SOB  Elevated biomarkers hx CAD  HX AICD  HTN  Hx CVA on coumadin  HTN  Dyslipidemia  DM  CKD stage 3      PLAN:   Okay  to discharge from cardiology  Follow with  NP 1  week  Check BMP 1 week  Coreg 12.5 mg p.o. twice daily  640 mg p.o. daily  Diovan 40 mg daily  Crestor 40 mg daily  Coumadin as directed    Obi Rivers [facility]      Of note, this documentation is completed using the Dragon Dictation system (voice recognition software). There may be spelling and/or grammatical errors that were not corrected prior to final submission.    Please do not hesitate to  call with questions.  Electronically signed by JON Coronel, on 4/24/2024 at 10:00 AM

## 2024-04-25 ENCOUNTER — PATIENT OUTREACH (OUTPATIENT)
Dept: CARDIOLOGY | Facility: CLINIC | Age: 64
End: 2024-04-25
Payer: MEDICARE

## 2024-04-25 ENCOUNTER — PATIENT OUTREACH (OUTPATIENT)
Dept: HOME HEALTH SERVICES | Age: 64
End: 2024-04-25
Payer: MEDICARE

## 2024-04-25 LAB
ATRIAL RATE: 74 BPM
P AXIS: 68 DEGREES
P OFFSET: 199 MS
P ONSET: 146 MS
PR INTERVAL: 142 MS
Q ONSET: 217 MS
QRS COUNT: 12 BEATS
QRS DURATION: 84 MS
QT INTERVAL: 472 MS
QTC CALCULATION(BAZETT): 523 MS
QTC FREDERICIA: 506 MS
R AXIS: 96 DEGREES
T AXIS: 74 DEGREES
T OFFSET: 453 MS
VENTRICULAR RATE: 74 BPM

## 2024-04-25 SDOH — ECONOMIC STABILITY: INCOME INSECURITY: IN THE PAST 12 MONTHS, HAS THE ELECTRIC, GAS, OIL, OR WATER COMPANY THREATENED TO SHUT OFF SERVICE IN YOUR HOME?: NO

## 2024-04-25 SDOH — ECONOMIC STABILITY: FOOD INSECURITY: WITHIN THE PAST 12 MONTHS, YOU WORRIED THAT YOUR FOOD WOULD RUN OUT BEFORE YOU GOT MONEY TO BUY MORE.: NEVER TRUE

## 2024-04-25 SDOH — SOCIAL STABILITY: SOCIAL NETWORK: HOW OFTEN DO YOU ATTENT MEETINGS OF THE CLUB OR ORGANIZATION YOU BELONG TO?: NEVER

## 2024-04-25 SDOH — HEALTH STABILITY: MENTAL HEALTH: HOW OFTEN DO YOU HAVE 6 OR MORE DRINKS ON ONE OCCASION?: NEVER

## 2024-04-25 SDOH — SOCIAL STABILITY: SOCIAL NETWORK: HOW OFTEN DO YOU GET TOGETHER WITH FRIENDS OR RELATIVES?: ONCE A WEEK

## 2024-04-25 SDOH — ECONOMIC STABILITY: HOUSING INSECURITY
IN THE LAST 12 MONTHS, WAS THERE A TIME WHEN YOU DID NOT HAVE A STEADY PLACE TO SLEEP OR SLEPT IN A SHELTER (INCLUDING NOW)?: NO

## 2024-04-25 SDOH — SOCIAL STABILITY: SOCIAL NETWORK: ARE YOU MARRIED, WIDOWED, DIVORCED, SEPARATED, NEVER MARRIED, OR LIVING WITH A PARTNER?: MARRIED

## 2024-04-25 SDOH — HEALTH STABILITY: MENTAL HEALTH: HOW OFTEN DO YOU HAVE A DRINK CONTAINING ALCOHOL?: NEVER

## 2024-04-25 SDOH — ECONOMIC STABILITY: INCOME INSECURITY: IN THE LAST 12 MONTHS, WAS THERE A TIME WHEN YOU WERE NOT ABLE TO PAY THE MORTGAGE OR RENT ON TIME?: NO

## 2024-04-25 SDOH — SOCIAL STABILITY: SOCIAL INSECURITY
WITHIN THE LAST YEAR, HAVE YOU BEEN KICKED, HIT, SLAPPED, OR OTHERWISE PHYSICALLY HURT BY YOUR PARTNER OR EX-PARTNER?: NO

## 2024-04-25 SDOH — ECONOMIC STABILITY: INCOME INSECURITY: HOW HARD IS IT FOR YOU TO PAY FOR THE VERY BASICS LIKE FOOD, HOUSING, MEDICAL CARE, AND HEATING?: NOT HARD AT ALL

## 2024-04-25 SDOH — SOCIAL STABILITY: SOCIAL INSECURITY
WITHIN THE LAST YEAR, HAVE TO BEEN RAPED OR FORCED TO HAVE ANY KIND OF SEXUAL ACTIVITY BY YOUR PARTNER OR EX-PARTNER?: NO

## 2024-04-25 SDOH — ECONOMIC STABILITY: FOOD INSECURITY: WITHIN THE PAST 12 MONTHS, THE FOOD YOU BOUGHT JUST DIDN'T LAST AND YOU DIDN'T HAVE MONEY TO GET MORE.: NEVER TRUE

## 2024-04-25 SDOH — ECONOMIC STABILITY: HOUSING INSECURITY: IN THE LAST 12 MONTHS, HOW MANY PLACES HAVE YOU LIVED?: 1

## 2024-04-25 SDOH — HEALTH STABILITY: MENTAL HEALTH
STRESS IS WHEN SOMEONE FEELS TENSE, NERVOUS, ANXIOUS, OR CAN'T SLEEP AT NIGHT BECAUSE THEIR MIND IS TROUBLED. HOW STRESSED ARE YOU?: NOT AT ALL

## 2024-04-25 SDOH — SOCIAL STABILITY: SOCIAL NETWORK: HOW OFTEN DO YOU ATTEND CHURCH OR RELIGIOUS SERVICES?: NEVER

## 2024-04-25 SDOH — SOCIAL STABILITY: SOCIAL INSECURITY: WITHIN THE LAST YEAR, HAVE YOU BEEN HUMILIATED OR EMOTIONALLY ABUSED IN OTHER WAYS BY YOUR PARTNER OR EX-PARTNER?: NO

## 2024-04-25 SDOH — ECONOMIC STABILITY: TRANSPORTATION INSECURITY
IN THE PAST 12 MONTHS, HAS LACK OF TRANSPORTATION KEPT YOU FROM MEETINGS, WORK, OR FROM GETTING THINGS NEEDED FOR DAILY LIVING?: NO

## 2024-04-25 SDOH — SOCIAL STABILITY: SOCIAL NETWORK
DO YOU BELONG TO ANY CLUBS OR ORGANIZATIONS SUCH AS CHURCH GROUPS UNIONS, FRATERNAL OR ATHLETIC GROUPS, OR SCHOOL GROUPS?: NO

## 2024-04-25 SDOH — ECONOMIC STABILITY: TRANSPORTATION INSECURITY
IN THE PAST 12 MONTHS, HAS THE LACK OF TRANSPORTATION KEPT YOU FROM MEDICAL APPOINTMENTS OR FROM GETTING MEDICATIONS?: NO

## 2024-04-25 SDOH — SOCIAL STABILITY: SOCIAL INSECURITY: WITHIN THE LAST YEAR, HAVE YOU BEEN AFRAID OF YOUR PARTNER OR EX-PARTNER?: NO

## 2024-04-25 SDOH — HEALTH STABILITY: PHYSICAL HEALTH: ON AVERAGE, HOW MANY DAYS PER WEEK DO YOU ENGAGE IN MODERATE TO STRENUOUS EXERCISE (LIKE A BRISK WALK)?: 0 DAYS

## 2024-04-25 SDOH — HEALTH STABILITY: PHYSICAL HEALTH: ON AVERAGE, HOW MANY MINUTES DO YOU ENGAGE IN EXERCISE AT THIS LEVEL?: 0 MIN

## 2024-04-25 SDOH — HEALTH STABILITY: MENTAL HEALTH
HOW OFTEN DO YOU NEED TO HAVE SOMEONE HELP YOU WHEN YOU READ INSTRUCTIONS, PAMPHLETS, OR OTHER WRITTEN MATERIAL FROM YOUR DOCTOR OR PHARMACY?: NEVER

## 2024-04-25 SDOH — HEALTH STABILITY: MENTAL HEALTH: HOW MANY STANDARD DRINKS CONTAINING ALCOHOL DO YOU HAVE ON A TYPICAL DAY?: PATIENT DOES NOT DRINK

## 2024-04-25 SDOH — SOCIAL STABILITY: SOCIAL NETWORK
IN A TYPICAL WEEK, HOW MANY TIMES DO YOU TALK ON THE PHONE WITH FAMILY, FRIENDS, OR NEIGHBORS?: MORE THAN THREE TIMES A WEEK

## 2024-04-25 ASSESSMENT — LIFESTYLE VARIABLES
AUDIT-C TOTAL SCORE: 0
SKIP TO QUESTIONS 9-10: 1

## 2024-04-25 NOTE — PROGRESS NOTES
Enrollment call completed. Pt with hx chf, dm, MI. Pt has equipment at home to check vs, bgt & daily weights. PCP appt discussed with pt, she states she spoke with PCP and does not need a FUV at this time. Pt has defibrillator. Denies hx of any issues with it. Would like to talk to Av about cost of insulin. Aware of daily calls, preferred time 1100 or after. Initial [facility] set for 4/26 at 1700.    Patient's Address:   [address] [place] [address] [address]  [facility] [address]  **  If this is not the address patient will receive services - alert team and address in EMR**       Patient Contacts:  Extended Emergency Contact Information  Primary Emergency Contact: Lane Richardson  Home Phone: [phone]  Relation: Spouse                                Patient's Preferred Phone: [phone]  Patient's E-mail: [email or web address]

## 2024-04-25 NOTE — PROGRESS NOTES
Discharge Facility: [facility]  Discharge Diagnosis: CHF  Admission Date: 4/22/24  Discharge Date:   4/24/24    PCP Appointment Date: Patient encouraged to make an appt with PCP  Specialist Appointment Date: Denver Rivers 5/2/24  Hospital Encounter and Summary: Linked   See discharge assessment below for further details     Engagement  Call Start Time: 1012 (4/25/2024 10:18 AM)    Medications  Medications reviewed with patient/caregiver?: Yes (4/25/2024 10:18 AM)  Prescription Comments: Patient states she is going to continue taking her Reglan even if discharge instructions state to stop it-- This CM did encourage her to call her PCP and make an appt (4/25/2024 10:18 AM)    Appointments  Does the patient have a primary care provider?: Yes (4/25/2024 10:18 AM)  Care Management Interventions: Advised patient to make appointment (4/25/2024 10:18 AM)  Care Management Interventions: Advised patient to keep appointment (4/25/2024 10:18 AM)    Self Management  What is the home health agency[facility] (4/25/2024 10:18 AM)    Patient Teaching  Does the patient have access to their discharge instructions?: Yes (4/25/2024 10:18 AM)  Care Management Interventions: Reviewed instructions with patient (4/25/2024 10:18 AM)  What is the patient's perception of their health status since discharge?: Improving (4/25/2024 10:18 AM)  Is the patient/caregiver able to teach back the hierarchy of who to call/visit for symptoms/problems? PCP, Specialist, Home Health nurse, Urgent Care, ED, 911: Yes (4/25/2024 10:18 AM)  Patient/Caregiver Education Comments: Educated patient on daily weights, fluid intake, sodium and BPs. reviewed HHC programs. Reviewed monitoring Right groin site (4/25/2024 10:18 AM)    Wrap Up  Wrap Up Additional Comments: Patient denies any CP or SOB at this time. Patient encouraged to make an appt with PCP. Patient is aware of follow up with Denver Rivers on 5/2 and that BMP is ordered on 5/1/24.  Patient states her RIght groin site looks good and that she removed bandage today and the area is without drainage, increased redness, increased pain or increased swelling. Patient states she did not get weight today that she will bring her scale to a more convenient area. Patient does endorse that she is mindful of her fluid intake. Patient does state that she plans to continue her Reglan and this CM encouraged her to call her PCP (4/25/2024 10:18 AM)

## 2024-04-27 ENCOUNTER — PATIENT OUTREACH (OUTPATIENT)
Dept: HOME HEALTH SERVICES | Age: 64
End: 2024-04-27
Payer: MEDICARE

## 2024-04-27 VITALS
WEIGHT: 167 LBS | DIASTOLIC BLOOD PRESSURE: 80 MMHG | SYSTOLIC BLOOD PRESSURE: 123 MMHG | HEART RATE: 81 BPM | BODY MASS INDEX: 30.54 KG/M2

## 2024-04-27 NOTE — PROGRESS NOTES
"Daily call completed. Pt states she is doing well. Pt has gained 1 pound. Pt states she is struggling with only being able to drink 32 ounces daily. Pt states she only drinks water and coffee. Pt educated on decreasing sodium intake, pt states her and her  are eating a no salt diet, watching for sodium and preservatives in all of their foods. Pt states she is taking her 40mg lasix, doesn't believe it is working as well. Pt states she is urinating \"way way way less\". Pt states in the past she would have to wear a pad due to urination, now doesn't need to and states she is barely peeing. Pt denies any SOB, states there might be some minor swelling in her hands. Denies in her lower extremities. Pt is wearing compression socks. Pt does not have a pulse ox. Pt denies any further needs/questions/concerns at this time. Aware of upcoming appts. [facility] rescheduled for 4/30 at 1430.    167lb  123/80  HR 81    Pt Education: fluid restriction, low sodium diet, urination habits and fluid retention  Barriers: na  Topics for Daily Review: daily needs, vs, dw, swelling, urination  Pt demonstrates clear understanding: Yes    Daily Weight:  There were no vitals filed for this visit.   Last 3 Weights:  Wt Readings from Last 7 Encounters:   04/23/24 75.5 kg (166 lb 7.2 oz)   04/10/24 77.1 kg (170 lb)   04/08/24 76.2 kg (168 lb)   04/12/23 80.8 kg (178 lb 1 oz)   10/13/22 83.6 kg (184 lb 4.9 oz)   10/12/22 83.9 kg (185 lb)   08/01/22 81.2 kg (179 lb)       Masimo Device: No   Masimo Clinical Impression: na    Virtual Visits--Scheduled (Most Recent Date at Top)  Follow up Appointments  Recent Visits  No visits were found meeting these conditions.  Showing recent visits within past 30 days and meeting all other requirements  Future Appointments  No visits were found meeting these conditions.  Showing future appointments within next 90 days and meeting all other requirements       Frequency of RN Calls & Virtual Visits per Team " Agreement: [facility] Frequency: Daily    Medication issues Addressed (what was done): nevin    Follow up appointments scheduled by [facility] Staff: nevin  Referrals made by [facility] staff: nevin      [facility] Assessment    Patient's Address:   [address] [place] [address] [address]  [facility] [address]  **  If this is not the address patient will receive services - alert team and address in EMR**       Patient Contacts:  Extended Emergency Contact Information  Primary Emergency Contact: Lane Richardson  [place] Phone: [phone]  Relation: Spouse                                Patient's Preferred Phone: [phone]  Patient's E-mail: [email or web address]

## 2024-04-29 ENCOUNTER — PATIENT OUTREACH (OUTPATIENT)
Dept: HOME HEALTH SERVICES | Age: 64
End: 2024-04-29
Payer: MEDICARE

## 2024-04-29 VITALS
WEIGHT: 170 LBS | BODY MASS INDEX: 31.09 KG/M2 | HEART RATE: 88 BPM | SYSTOLIC BLOOD PRESSURE: 101 MMHG | DIASTOLIC BLOOD PRESSURE: 61 MMHG

## 2024-04-29 DIAGNOSIS — E11.9 TYPE 2 DIABETES MELLITUS WITHOUT COMPLICATION, UNSPECIFIED WHETHER LONG TERM INSULIN USE (MULTI): ICD-10-CM

## 2024-04-29 DIAGNOSIS — I50.22 CHRONIC SYSTOLIC HEART FAILURE (MULTI): Primary | ICD-10-CM

## 2024-04-29 RX ORDER — POTASSIUM CHLORIDE 20 MEQ/1
20 TABLET, EXTENDED RELEASE ORAL DAILY
COMMUNITY
Start: 2024-04-29

## 2024-04-29 NOTE — PROGRESS NOTES
"Daily call completed. Pt states she is doing okay today. States she has gained 3 pounds and her BP has been running low. Per pt BP has been around 84/67. Pt denies any new onset symptoms related to HoTN. Pt states she canceled appt that was set for the HoTN with cardiology due to having an issue with the provider. Pt does not want assistance setting up new appt, states she has one with [facility] cardiology. Pt has PCP appt 5/1. Pt states her urination has slightly increased. Pt endorses having a hard time sticking to her fluid restriction and has occasionally gone around 16 ounces over her 32 ounce limit. Pt educated on accounting for all foods/drinks counting toward a fluid restriction limit, verbalizes understanding and states she has been accounting for all of it. RN to reach out to provider regarding weight gain. Pt denies increased SOB. Denies any further needs/questions/concerns at this time. Aware of upcoming appts. [facility] 4/30 at 1430.    Per Dr. Blank: \"with her Bps so low I am hesitant to increase her lasix, would continue with the fluid restriction and watch her Bps closely\"    BGT am 101  101/61  HR 88  Weight 170 lb    Pt Education: fluid restriction, symtpoms of HoTN  Barriers: na  Topics for Daily Review: daily needs, bgt, vs, dw, weight gain*  Pt demonstrates clear understanding: Yes    Daily Weight:  There were no vitals filed for this visit.   Last 3 Weights:  Wt Readings from Last 7 Encounters:   04/27/24 75.8 kg (167 lb)   04/23/24 75.5 kg (166 lb 7.2 oz)   04/10/24 77.1 kg (170 lb)   04/08/24 76.2 kg (168 lb)   04/12/23 80.8 kg (178 lb 1 oz)   10/13/22 83.6 kg (184 lb 4.9 oz)   10/12/22 83.9 kg (185 lb)       Masimo Device: No   Masimo Clinical Impression: na    Virtual Visits--Scheduled (Most Recent Date at Top)  Follow up Appointments  Recent Visits  No visits were found meeting these conditions.  Showing recent visits within past 30 days and meeting all other requirements  Future " Appointments  No visits were found meeting these conditions.  Showing future appointments within next 90 days and meeting all other requirements       Frequency of RN Calls & Virtual Visits per Team Agreement: [facility] Frequency: Daily    Medication issues Addressed (what was done): na    Follow up appointments scheduled by [facility] Staff: nevin  Referrals made by [facility] staff: nevin      [facility] Care Transitions Assessment    Patient's Address:   [address] [place] [address] [address]  [facility] [address]  **  If this is not the address patient will receive services - alert team and address in EMR**       Patient Contacts:  Extended Emergency Contact Information  Primary Emergency Contact: Lane Richardson  [place] Phone: [phone]  Relation: Spouse                                Patient's Preferred Phone: [phone]  Patient's E-mail: [email or web address]

## 2024-04-29 NOTE — NURSING NOTE
Follow up phone call made by CHF Clinical Nurse Navigator. Spoke with patient who says she is doing well. She has all her medications and is taking everything as prescribed. She is active with [facility]. She did have an appointment with cardiology on 5/2 that she cancelled. She states that her cardiologist did not need to see her for a year and that she is seeing Dr. Jenkins in 6 months. She says she is adhering to a low sodium diet but is struggling with the fluid restriction. Reinforced the importance of the fluid restriction. No questions or concerns at this time. Reminded patient of my contact information should any questions or concerns arise.

## 2024-04-30 ENCOUNTER — APPOINTMENT (OUTPATIENT)
Dept: PHARMACY | Facility: HOSPITAL | Age: 64
End: 2024-04-30
Payer: MEDICARE

## 2024-05-02 ENCOUNTER — APPOINTMENT (OUTPATIENT)
Dept: CARDIOLOGY | Facility: CLINIC | Age: 64
End: 2024-05-02
Payer: MEDICARE

## 2024-05-03 ENCOUNTER — PATIENT OUTREACH (OUTPATIENT)
Dept: HOME HEALTH SERVICES | Age: 64
End: 2024-05-03
Payer: MEDICARE

## 2024-05-03 NOTE — PROGRESS NOTES
Daily Call Note:   Spoke to patient, she states she is doing very well. She saw her PCP and they doubled her Lasix and she reports all her 'water weight' is gone. Denies swelling, sob and reports weight at 165lb.  States bp has been running steady and BG have been fine, this AM . She states she is urinating fine and watching fluid intake to 32oz/day. Doing frozen grapes as snack.  Offered to reschedule [facility], patient declined seeing our providers. She has appointments all pretty much set. Explained as part of our program and the daily calls, we have providers that oversee our nursing care available to her for weekly calls. She declined needing further services.   Patient disenrolled from [facility], she was thankful for services.   Pt Education: POC  Barriers: na  Topics for Daily Review: POC  Pt demonstrates clear understanding: Yes    Daily Weight:  There were no vitals filed for this visit.   Last 3 Weights:  Wt Readings from Last 7 Encounters:   04/29/24 77.1 kg (170 lb)   04/27/24 75.8 kg (167 lb)   04/23/24 75.5 kg (166 lb 7.2 oz)   04/10/24 77.1 kg (170 lb)   04/08/24 76.2 kg (168 lb)   04/12/23 80.8 kg (178 lb 1 oz)   10/13/22 83.6 kg (184 lb 4.9 oz)       Masimo Device: No   Masimo Clinical Impression: na    Virtual Visits--Scheduled (Most Recent Date at Top)  Follow up Appointments  Recent Visits  No visits were found meeting these conditions.  Showing recent visits within past 30 days and meeting all other requirements  Future Appointments  No visits were found meeting these conditions.  Showing future appointments within next 90 days and meeting all other requirements       Frequency of RN Calls & Virtual Visits per Team Agreement: [facility] Frequency: Daily    Medication issues Addressed (what was done): na    Follow up appointments scheduled by [facility] Staff: nvein  Referrals made by [facility] staff: nevin

## 2024-05-30 NOTE — DISCHARGE SUMMARY
Discharge Diagnosis  Acute congestive heart failure, unspecified heart failure type (Multi)    Issues Requiring Follow-Up    Cardiology for optimization of medical management, coumadin clinic for adjustment in Coumadin dosing.      Discharge Meds     Your medication list        CONTINUE taking these medications        Instructions Last Dose Given Next Dose Due   albuterol 90 mcg/actuation inhaler           ARIPiprazole 2 mg tablet  Commonly known as: Abilify           glucagon 1 mg injection  Commonly known as: Glucagen           Baqsimi 3 mg/actuation spray,non-aerosol  Generic drug: glucagon           buPROPion  mg 12 hr tablet  Commonly known as: Wellbutrin SR           carvedilol 12.5 mg tablet  Commonly known as: Coreg           cholecalciferol 25 MCG (1000 UT) capsule  Commonly known as: Vitamin D-3           clonazePAM 2 mg tablet  Commonly known as: KlonoPIN           desvenlafaxine 100 mg 24 hr tablet  Commonly known as: Pristiq           ezetimibe 10 mg tablet  Commonly known as: Zetia           furosemide 40 mg tablet  Commonly known as: Lasix           insulin aspart 100 unit/mL (3 mL) pen  Commonly known as: NovoLOG           insulin glargine 100 unit/mL injection  Commonly known as: Lantus           levothyroxine 88 mcg tablet  Commonly known as: Synthroid, Levoxyl           magnesium oxide 500 mg capsule           nitroglycerin 0.4 mg SL tablet  Commonly known as: Nitrostat           oxybutynin XL 10 mg 24 hr tablet  Commonly known as: Ditropan-XL           rOPINIRole 2 mg tablet  Commonly known as: Requip           rosuvastatin 40 mg tablet  Commonly known as: Crestor           Topamax 200 mg tablet  Generic drug: topiramate           valsartan 40 mg tablet  Commonly known as: Diovan           warfarin 7.5 mg tablet  Commonly known as: Coumadin           warfarin 5 mg tablet  Commonly known as: Coumadin                  STOP taking these medications      metoclopramide 5 mg tablet  Commonly  known as: Reglan                 Test Results Pending At Discharge  Pending Labs       No current pending labs.            Hospital Course    64 y.o. female with a significant past medical history of  HFrEF status post pacemaker,  A-fib on Coumadin, acute MI  status post PCI with JENNIFER, CVA, hypertension, hyperlipidemia, DM type II coming in with shortness of breath.  Findings suspicious for pneumonia vs CHF exacerbation. Patient treated with lasix as well as antibiotics and cardiology consulted for evaluation given elevated troponins. Cardiac cath showed 100% LAD complete occlusion with good distal collateral filling. Left ventricle ejection fraction was 25 to 30%. Medical management was recommended.  Patient was discharged home with plan to continue with diuresis and follow up with Cardiology and EP for further evaluation and treatment        Pertinent Physical Exam At Time of Discharge  Physical Exam      Constitutional:       General: She is not in acute distress.     Appearance: She is ill-appearing.   HENT:      Head: Normocephalic.      Nose: Nose normal.      Mouth/Throat:      Mouth: Mucous membranes are moist.   Eyes:      Extraocular Movements: Extraocular movements intact.      Pupils: Pupils are equal, round, and reactive to light.   Cardiovascular:      Rate and Rhythm: Normal rate and regular rhythm.      Pulses: Normal pulses.      Heart sounds: Normal heart sounds.   Pulmonary:      Effort: Pulmonary effort is normal.      Breath sounds: Wheezing present but improved   Abdominal:      General: Bowel sounds are normal. There is no distension.      Palpations: Abdomen is soft.   Musculoskeletal:         General: No swelling or tenderness. Normal range of motion.      Cervical back: Normal range of motion.   Skin:     General: Skin is warm.      Capillary Refill: Capillary refill takes less than 2 seconds.      Coloration: Skin is pale.   Neurological:      General: No focal deficit present.      Mental  Status: She is alert.      Motor: Weakness present.   Psychiatric:         Mood and Affect: Mood normal.       Outpatient Follow-Up  Future Appointments   Date Time Provider Department Center   10/8/2024  9:00 AM [facility]    10/8/2024 10:00 AM Elodia Jenkins MD [id number] [place]   4/9/2025  7:45 AM Jose Barclay MD [id number] [facility]         Chelsie Duke MD

## 2024-06-05 ENCOUNTER — PATIENT OUTREACH (OUTPATIENT)
Dept: CARDIOLOGY | Facility: CLINIC | Age: 64
End: 2024-06-05
Payer: MEDICARE

## 2024-06-05 NOTE — PROGRESS NOTES
Successful outreach to patient regarding hospitalization as patient continues TCM program.   At time of outreach call the patient feels as if their condition has improved since initial visit with PCP or specialist.  Questions or concerns addressed at this time with patient.   Provided contact information to patient if any further non-emergent needs arise.      This is CM encouraged patient to follow with cardiology and PCP and patient states she has had multiple follow up since she been home. Patient fluid restriction clarified to 64 oz and patient feels relieved because she has been drinking about that, but thought it had to be further restricted

## 2024-07-02 ENCOUNTER — PATIENT OUTREACH (OUTPATIENT)
Dept: CARDIOLOGY | Facility: CLINIC | Age: 64
End: 2024-07-02
Payer: MEDICARE

## 2024-07-02 NOTE — PROGRESS NOTES
90 Day Call  This CM called and spoke with patient to address any final questions or concerns regarding hospitalization. Patient reports doing well and has no concerns     Patient encouraged to follow up with cardiology

## 2024-07-08 ENCOUNTER — APPOINTMENT (OUTPATIENT)
Dept: RADIOLOGY | Facility: HOSPITAL | Age: 64
End: 2024-07-08
Payer: MEDICARE

## 2024-07-08 ENCOUNTER — HOSPITAL ENCOUNTER (INPATIENT)
Facility: HOSPITAL | Age: 64
LOS: 2 days | Discharge: HOME | End: 2024-07-10
Attending: EMERGENCY MEDICINE | Admitting: STUDENT IN AN ORGANIZED HEALTH CARE EDUCATION/TRAINING PROGRAM
Payer: MEDICARE

## 2024-07-08 ENCOUNTER — APPOINTMENT (OUTPATIENT)
Dept: CARDIOLOGY | Facility: HOSPITAL | Age: 64
End: 2024-07-08
Payer: MEDICARE

## 2024-07-08 DIAGNOSIS — I25.10 CAD S/P PERCUTANEOUS CORONARY ANGIOPLASTY: ICD-10-CM

## 2024-07-08 DIAGNOSIS — R07.89 ATYPICAL CHEST PAIN: ICD-10-CM

## 2024-07-08 DIAGNOSIS — I50.9 ACUTE CONGESTIVE HEART FAILURE, UNSPECIFIED HEART FAILURE TYPE (MULTI): ICD-10-CM

## 2024-07-08 DIAGNOSIS — E11.65 HYPERGLYCEMIA DUE TO DIABETES MELLITUS (MULTI): Primary | ICD-10-CM

## 2024-07-08 DIAGNOSIS — I50.9 CONGESTIVE HEART FAILURE, UNSPECIFIED HF CHRONICITY, UNSPECIFIED HEART FAILURE TYPE (MULTI): ICD-10-CM

## 2024-07-08 DIAGNOSIS — R11.2 NAUSEA AND VOMITING, UNSPECIFIED VOMITING TYPE: ICD-10-CM

## 2024-07-08 DIAGNOSIS — Z98.61 CAD S/P PERCUTANEOUS CORONARY ANGIOPLASTY: ICD-10-CM

## 2024-07-08 DIAGNOSIS — I25.5 ISCHEMIC CARDIOMYOPATHY: ICD-10-CM

## 2024-07-08 LAB
ALBUMIN SERPL BCP-MCNC: 4.1 G/DL (ref 3.4–5)
ALP SERPL-CCNC: 84 U/L (ref 33–136)
ALT SERPL W P-5'-P-CCNC: 18 U/L (ref 7–45)
ANION GAP SERPL CALC-SCNC: 21 MMOL/L (ref 10–20)
ANION GAP SERPL CALC-SCNC: 24 MMOL/L (ref 10–20)
APPEARANCE UR: ABNORMAL
AST SERPL W P-5'-P-CCNC: 14 U/L (ref 9–39)
B-OH-BUTYR SERPL-SCNC: 3.1 MMOL/L (ref 0.02–0.27)
BACTERIA #/AREA URNS AUTO: ABNORMAL /HPF
BASOPHILS # BLD MANUAL: 0 X10*3/UL (ref 0–0.1)
BASOPHILS NFR BLD MANUAL: 0 %
BILIRUB SERPL-MCNC: 0.6 MG/DL (ref 0–1.2)
BILIRUB UR STRIP.AUTO-MCNC: NEGATIVE MG/DL
BNP SERPL-MCNC: 390 PG/ML (ref 0–99)
BUN SERPL-MCNC: 53 MG/DL (ref 6–23)
BUN SERPL-MCNC: 56 MG/DL (ref 6–23)
CALCIUM SERPL-MCNC: 8.3 MG/DL (ref 8.6–10.3)
CALCIUM SERPL-MCNC: 9.1 MG/DL (ref 8.6–10.3)
CARDIAC TROPONIN I PNL SERPL HS: 88 NG/L (ref 0–13)
CARDIAC TROPONIN I PNL SERPL HS: 89 NG/L (ref 0–13)
CHLORIDE SERPL-SCNC: 90 MMOL/L (ref 98–107)
CHLORIDE SERPL-SCNC: 95 MMOL/L (ref 98–107)
CO2 SERPL-SCNC: 21 MMOL/L (ref 21–32)
CO2 SERPL-SCNC: 21 MMOL/L (ref 21–32)
COLOR UR: ABNORMAL
CREAT SERPL-MCNC: 1.51 MG/DL (ref 0.5–1.05)
CREAT SERPL-MCNC: 1.75 MG/DL (ref 0.5–1.05)
EGFRCR SERPLBLD CKD-EPI 2021: 32 ML/MIN/1.73M*2
EGFRCR SERPLBLD CKD-EPI 2021: 39 ML/MIN/1.73M*2
EOSINOPHIL # BLD MANUAL: 0 X10*3/UL (ref 0–0.7)
EOSINOPHIL NFR BLD MANUAL: 0 %
ERYTHROCYTE [DISTWIDTH] IN BLOOD BY AUTOMATED COUNT: 12.5 % (ref 11.5–14.5)
GLUCOSE BLD MANUAL STRIP-MCNC: 365 MG/DL (ref 74–99)
GLUCOSE BLD MANUAL STRIP-MCNC: 367 MG/DL (ref 74–99)
GLUCOSE BLD MANUAL STRIP-MCNC: 374 MG/DL (ref 74–99)
GLUCOSE BLD MANUAL STRIP-MCNC: 379 MG/DL (ref 74–99)
GLUCOSE SERPL-MCNC: 406 MG/DL (ref 74–99)
GLUCOSE SERPL-MCNC: 432 MG/DL (ref 74–99)
GLUCOSE UR STRIP.AUTO-MCNC: ABNORMAL MG/DL
HCT VFR BLD AUTO: 46.1 % (ref 36–46)
HGB BLD-MCNC: 16.1 G/DL (ref 12–16)
HOLD SPECIMEN: NORMAL
HYALINE CASTS #/AREA URNS AUTO: ABNORMAL /LPF
IMM GRANULOCYTES # BLD AUTO: 0.26 X10*3/UL (ref 0–0.7)
IMM GRANULOCYTES NFR BLD AUTO: 1.1 % (ref 0–0.9)
INR PPP: 2.2 (ref 0.9–1.1)
KETONES UR STRIP.AUTO-MCNC: ABNORMAL MG/DL
LACTATE SERPL-SCNC: 2 MMOL/L (ref 0.4–2)
LEUKOCYTE ESTERASE UR QL STRIP.AUTO: NEGATIVE
LIPASE SERPL-CCNC: 12 U/L (ref 9–82)
LYMPHOCYTES # BLD MANUAL: 1.65 X10*3/UL (ref 1.2–4.8)
LYMPHOCYTES NFR BLD MANUAL: 7 %
MAGNESIUM SERPL-MCNC: 2.12 MG/DL (ref 1.6–2.4)
MCH RBC QN AUTO: 36.5 PG (ref 26–34)
MCHC RBC AUTO-ENTMCNC: 34.9 G/DL (ref 32–36)
MCV RBC AUTO: 105 FL (ref 80–100)
MIXED CELL CASTS #/AREA UR COMP ASSIST: ABNORMAL /LPF
MONOCYTES # BLD MANUAL: 1.65 X10*3/UL (ref 0.1–1)
MONOCYTES NFR BLD MANUAL: 7 %
MUCOUS THREADS #/AREA URNS AUTO: ABNORMAL /LPF
NEUTROPHILS # BLD MANUAL: 18.1 X10*3/UL (ref 1.2–7.7)
NEUTS BAND # BLD MANUAL: 0.24 X10*3/UL (ref 0–0.7)
NEUTS BAND NFR BLD MANUAL: 1 %
NEUTS SEG # BLD MANUAL: 17.86 X10*3/UL (ref 1.2–7)
NEUTS SEG NFR BLD MANUAL: 76 %
NITRITE UR QL STRIP.AUTO: NEGATIVE
NRBC BLD-RTO: 0 /100 WBCS (ref 0–0)
PH UR STRIP.AUTO: 6 [PH]
PLATELET # BLD AUTO: 142 X10*3/UL (ref 150–450)
POTASSIUM SERPL-SCNC: 3.6 MMOL/L (ref 3.5–5.3)
POTASSIUM SERPL-SCNC: 3.7 MMOL/L (ref 3.5–5.3)
PROT SERPL-MCNC: 7 G/DL (ref 6.4–8.2)
PROT UR STRIP.AUTO-MCNC: ABNORMAL MG/DL
PROTHROMBIN TIME: 25.5 SECONDS (ref 9.8–12.8)
RBC # BLD AUTO: 4.41 X10*6/UL (ref 4–5.2)
RBC # UR STRIP.AUTO: ABNORMAL /UL
RBC #/AREA URNS AUTO: ABNORMAL /HPF
RBC MORPH BLD: ABNORMAL
SODIUM SERPL-SCNC: 131 MMOL/L (ref 136–145)
SODIUM SERPL-SCNC: 133 MMOL/L (ref 136–145)
SP GR UR STRIP.AUTO: 1.02
SQUAMOUS #/AREA URNS AUTO: ABNORMAL /HPF
TOTAL CELLS COUNTED BLD: 100
UROBILINOGEN UR STRIP.AUTO-MCNC: NORMAL MG/DL
VARIANT LYMPHS # BLD MANUAL: 2.12 X10*3/UL (ref 0–0.5)
VARIANT LYMPHS NFR BLD: 9 %
WBC # BLD AUTO: 23.5 X10*3/UL (ref 4.4–11.3)
WBC #/AREA URNS AUTO: ABNORMAL /HPF

## 2024-07-08 PROCEDURE — 81001 URINALYSIS AUTO W/SCOPE: CPT | Performed by: EMERGENCY MEDICINE

## 2024-07-08 PROCEDURE — 87086 URINE CULTURE/COLONY COUNT: CPT | Mod: SAMLAB | Performed by: EMERGENCY MEDICINE

## 2024-07-08 PROCEDURE — 83735 ASSAY OF MAGNESIUM: CPT | Performed by: EMERGENCY MEDICINE

## 2024-07-08 PROCEDURE — 99223 1ST HOSP IP/OBS HIGH 75: CPT | Performed by: NURSE PRACTITIONER

## 2024-07-08 PROCEDURE — 85027 COMPLETE CBC AUTOMATED: CPT | Performed by: EMERGENCY MEDICINE

## 2024-07-08 PROCEDURE — 83880 ASSAY OF NATRIURETIC PEPTIDE: CPT | Performed by: NURSE PRACTITIONER

## 2024-07-08 PROCEDURE — 74022 RADEX COMPL AQT ABD SERIES: CPT

## 2024-07-08 PROCEDURE — 2500000004 HC RX 250 GENERAL PHARMACY W/ HCPCS (ALT 636 FOR OP/ED): Performed by: EMERGENCY MEDICINE

## 2024-07-08 PROCEDURE — 82010 KETONE BODYS QUAN: CPT | Performed by: EMERGENCY MEDICINE

## 2024-07-08 PROCEDURE — 80048 BASIC METABOLIC PNL TOTAL CA: CPT | Mod: CCI

## 2024-07-08 PROCEDURE — 1100000001 HC PRIVATE ROOM DAILY

## 2024-07-08 PROCEDURE — 84075 ASSAY ALKALINE PHOSPHATASE: CPT | Performed by: EMERGENCY MEDICINE

## 2024-07-08 PROCEDURE — 36415 COLL VENOUS BLD VENIPUNCTURE: CPT | Performed by: EMERGENCY MEDICINE

## 2024-07-08 PROCEDURE — 96374 THER/PROPH/DIAG INJ IV PUSH: CPT

## 2024-07-08 PROCEDURE — 2500000002 HC RX 250 W HCPCS SELF ADMINISTERED DRUGS (ALT 637 FOR MEDICARE OP, ALT 636 FOR OP/ED)

## 2024-07-08 PROCEDURE — 2500000002 HC RX 250 W HCPCS SELF ADMINISTERED DRUGS (ALT 637 FOR MEDICARE OP, ALT 636 FOR OP/ED): Performed by: EMERGENCY MEDICINE

## 2024-07-08 PROCEDURE — 82947 ASSAY GLUCOSE BLOOD QUANT: CPT

## 2024-07-08 PROCEDURE — 74176 CT ABD & PELVIS W/O CONTRAST: CPT | Performed by: RADIOLOGY

## 2024-07-08 PROCEDURE — 2500000001 HC RX 250 WO HCPCS SELF ADMINISTERED DRUGS (ALT 637 FOR MEDICARE OP): Performed by: NURSE PRACTITIONER

## 2024-07-08 PROCEDURE — 2500000002 HC RX 250 W HCPCS SELF ADMINISTERED DRUGS (ALT 637 FOR MEDICARE OP, ALT 636 FOR OP/ED): Performed by: NURSE PRACTITIONER

## 2024-07-08 PROCEDURE — 83605 ASSAY OF LACTIC ACID: CPT | Performed by: EMERGENCY MEDICINE

## 2024-07-08 PROCEDURE — 84484 ASSAY OF TROPONIN QUANT: CPT | Performed by: EMERGENCY MEDICINE

## 2024-07-08 PROCEDURE — 85610 PROTHROMBIN TIME: CPT | Performed by: EMERGENCY MEDICINE

## 2024-07-08 PROCEDURE — 96375 TX/PRO/DX INJ NEW DRUG ADDON: CPT

## 2024-07-08 PROCEDURE — 80048 BASIC METABOLIC PNL TOTAL CA: CPT | Mod: CCI | Performed by: EMERGENCY MEDICINE

## 2024-07-08 PROCEDURE — 93005 ELECTROCARDIOGRAM TRACING: CPT

## 2024-07-08 PROCEDURE — 74022 RADEX COMPL AQT ABD SERIES: CPT | Performed by: STUDENT IN AN ORGANIZED HEALTH CARE EDUCATION/TRAINING PROGRAM

## 2024-07-08 PROCEDURE — 99285 EMERGENCY DEPT VISIT HI MDM: CPT | Mod: 25

## 2024-07-08 PROCEDURE — 74176 CT ABD & PELVIS W/O CONTRAST: CPT

## 2024-07-08 PROCEDURE — 96361 HYDRATE IV INFUSION ADD-ON: CPT

## 2024-07-08 PROCEDURE — 83690 ASSAY OF LIPASE: CPT | Performed by: EMERGENCY MEDICINE

## 2024-07-08 PROCEDURE — 85007 BL SMEAR W/DIFF WBC COUNT: CPT | Performed by: EMERGENCY MEDICINE

## 2024-07-08 RX ORDER — WARFARIN SODIUM 5 MG/1
5 TABLET ORAL DAILY
Status: DISCONTINUED | OUTPATIENT
Start: 2024-07-08 | End: 2024-07-08 | Stop reason: ALTCHOICE

## 2024-07-08 RX ORDER — CARVEDILOL 12.5 MG/1
12.5 TABLET ORAL 2 TIMES DAILY
Status: DISCONTINUED | OUTPATIENT
Start: 2024-07-08 | End: 2024-07-10

## 2024-07-08 RX ORDER — ROPINIROLE 2 MG/1
2 TABLET, FILM COATED ORAL NIGHTLY
Status: DISCONTINUED | OUTPATIENT
Start: 2024-07-08 | End: 2024-07-10 | Stop reason: HOSPADM

## 2024-07-08 RX ORDER — ACETAMINOPHEN 325 MG/1
650 TABLET ORAL EVERY 4 HOURS PRN
Status: DISCONTINUED | OUTPATIENT
Start: 2024-07-08 | End: 2024-07-10 | Stop reason: HOSPADM

## 2024-07-08 RX ORDER — ARIPIPRAZOLE 2 MG/1
4 TABLET ORAL NIGHTLY
Status: DISCONTINUED | OUTPATIENT
Start: 2024-07-08 | End: 2024-07-10 | Stop reason: HOSPADM

## 2024-07-08 RX ORDER — ROSUVASTATIN CALCIUM 20 MG/1
40 TABLET, COATED ORAL DAILY
Status: DISCONTINUED | OUTPATIENT
Start: 2024-07-09 | End: 2024-07-10 | Stop reason: HOSPADM

## 2024-07-08 RX ORDER — METOCLOPRAMIDE 10 MG/1
5 TABLET ORAL 2 TIMES DAILY
Status: DISCONTINUED | OUTPATIENT
Start: 2024-07-08 | End: 2024-07-08

## 2024-07-08 RX ORDER — WARFARIN 7.5 MG/1
7.5 TABLET ORAL DAILY
Status: DISCONTINUED | OUTPATIENT
Start: 2024-07-08 | End: 2024-07-08 | Stop reason: ALTCHOICE

## 2024-07-08 RX ORDER — INSULIN LISPRO 100 [IU]/ML
0-10 INJECTION, SOLUTION INTRAVENOUS; SUBCUTANEOUS
Status: DISCONTINUED | OUTPATIENT
Start: 2024-07-08 | End: 2024-07-10 | Stop reason: HOSPADM

## 2024-07-08 RX ORDER — WARFARIN 7.5 MG/1
15 TABLET ORAL
Status: DISCONTINUED | OUTPATIENT
Start: 2024-07-09 | End: 2024-07-10 | Stop reason: HOSPADM

## 2024-07-08 RX ORDER — ONDANSETRON 4 MG/1
4 TABLET, ORALLY DISINTEGRATING ORAL EVERY 8 HOURS PRN
Status: DISCONTINUED | OUTPATIENT
Start: 2024-07-08 | End: 2024-07-10 | Stop reason: HOSPADM

## 2024-07-08 RX ORDER — INSULIN GLARGINE 100 [IU]/ML
28 INJECTION, SOLUTION SUBCUTANEOUS NIGHTLY
Status: DISCONTINUED | OUTPATIENT
Start: 2024-07-08 | End: 2024-07-10 | Stop reason: HOSPADM

## 2024-07-08 RX ORDER — DEXTROSE 50 % IN WATER (D50W) INTRAVENOUS SYRINGE
12.5
Status: DISCONTINUED | OUTPATIENT
Start: 2024-07-08 | End: 2024-07-10 | Stop reason: HOSPADM

## 2024-07-08 RX ORDER — ONDANSETRON HYDROCHLORIDE 2 MG/ML
4 INJECTION, SOLUTION INTRAVENOUS EVERY 8 HOURS PRN
Status: DISCONTINUED | OUTPATIENT
Start: 2024-07-08 | End: 2024-07-10 | Stop reason: HOSPADM

## 2024-07-08 RX ORDER — DEXTROSE 50 % IN WATER (D50W) INTRAVENOUS SYRINGE
25
Status: DISCONTINUED | OUTPATIENT
Start: 2024-07-08 | End: 2024-07-10 | Stop reason: HOSPADM

## 2024-07-08 RX ORDER — METOCLOPRAMIDE 5 MG/1
5 TABLET ORAL 2 TIMES DAILY
COMMUNITY

## 2024-07-08 RX ORDER — FUROSEMIDE 40 MG/1
40 TABLET ORAL
Status: DISCONTINUED | OUTPATIENT
Start: 2024-07-08 | End: 2024-07-10

## 2024-07-08 RX ORDER — EZETIMIBE 10 MG/1
10 TABLET ORAL NIGHTLY
Status: DISCONTINUED | OUTPATIENT
Start: 2024-07-08 | End: 2024-07-10 | Stop reason: HOSPADM

## 2024-07-08 RX ORDER — CLONAZEPAM 0.5 MG/1
2 TABLET ORAL NIGHTLY
Status: DISCONTINUED | OUTPATIENT
Start: 2024-07-08 | End: 2024-07-10 | Stop reason: HOSPADM

## 2024-07-08 RX ORDER — LANOLIN ALCOHOL/MO/W.PET/CERES
400 CREAM (GRAM) TOPICAL DAILY
Status: DISCONTINUED | OUTPATIENT
Start: 2024-07-09 | End: 2024-07-10 | Stop reason: HOSPADM

## 2024-07-08 RX ORDER — CHOLECALCIFEROL (VITAMIN D3) 25 MCG
3000 TABLET ORAL DAILY
Status: DISCONTINUED | OUTPATIENT
Start: 2024-07-09 | End: 2024-07-10 | Stop reason: HOSPADM

## 2024-07-08 RX ORDER — LEVOTHYROXINE SODIUM 88 UG/1
88 TABLET ORAL
Status: DISCONTINUED | OUTPATIENT
Start: 2024-07-09 | End: 2024-07-10 | Stop reason: HOSPADM

## 2024-07-08 RX ORDER — VALSARTAN 40 MG/1
40 TABLET ORAL
Status: DISCONTINUED | OUTPATIENT
Start: 2024-07-09 | End: 2024-07-10 | Stop reason: HOSPADM

## 2024-07-08 RX ORDER — NITROGLYCERIN 0.4 MG/1
0.4 TABLET SUBLINGUAL EVERY 5 MIN PRN
Status: DISCONTINUED | OUTPATIENT
Start: 2024-07-08 | End: 2024-07-10 | Stop reason: HOSPADM

## 2024-07-08 RX ORDER — ONDANSETRON 4 MG/1
4 TABLET, FILM COATED ORAL EVERY 8 HOURS PRN
Status: DISCONTINUED | OUTPATIENT
Start: 2024-07-08 | End: 2024-07-08 | Stop reason: CLARIF

## 2024-07-08 RX ORDER — ONDANSETRON HYDROCHLORIDE 2 MG/ML
4 INJECTION, SOLUTION INTRAVENOUS ONCE
Status: COMPLETED | OUTPATIENT
Start: 2024-07-08 | End: 2024-07-08

## 2024-07-08 RX ORDER — POLYETHYLENE GLYCOL 3350 17 G/17G
17 POWDER, FOR SOLUTION ORAL DAILY
Status: DISCONTINUED | OUTPATIENT
Start: 2024-07-09 | End: 2024-07-10 | Stop reason: HOSPADM

## 2024-07-08 RX ORDER — DESVENLAFAXINE 50 MG/1
100 TABLET, EXTENDED RELEASE ORAL NIGHTLY
Status: DISCONTINUED | OUTPATIENT
Start: 2024-07-08 | End: 2024-07-10

## 2024-07-08 RX ORDER — BUPROPION HYDROCHLORIDE 100 MG/1
200 TABLET, EXTENDED RELEASE ORAL 2 TIMES DAILY
Status: DISCONTINUED | OUTPATIENT
Start: 2024-07-08 | End: 2024-07-10 | Stop reason: HOSPADM

## 2024-07-08 RX ORDER — TOPIRAMATE 100 MG/1
200 TABLET, FILM COATED ORAL 2 TIMES DAILY
Status: DISCONTINUED | OUTPATIENT
Start: 2024-07-08 | End: 2024-07-10 | Stop reason: HOSPADM

## 2024-07-08 RX ORDER — INSULIN LISPRO 100 [IU]/ML
0-5 INJECTION, SOLUTION INTRAVENOUS; SUBCUTANEOUS
Status: DISCONTINUED | OUTPATIENT
Start: 2024-07-08 | End: 2024-07-08

## 2024-07-08 SDOH — SOCIAL STABILITY: SOCIAL INSECURITY: WERE YOU ABLE TO COMPLETE ALL THE BEHAVIORAL HEALTH SCREENINGS?: YES

## 2024-07-08 SDOH — SOCIAL STABILITY: SOCIAL INSECURITY: HAVE YOU HAD THOUGHTS OF HARMING ANYONE ELSE?: NO

## 2024-07-08 SDOH — SOCIAL STABILITY: SOCIAL INSECURITY: DOES ANYONE TRY TO KEEP YOU FROM HAVING/CONTACTING OTHER FRIENDS OR DOING THINGS OUTSIDE YOUR HOME?: NO

## 2024-07-08 SDOH — SOCIAL STABILITY: SOCIAL INSECURITY: ARE YOU OR HAVE YOU BEEN THREATENED OR ABUSED PHYSICALLY, EMOTIONALLY, OR SEXUALLY BY ANYONE?: NO

## 2024-07-08 SDOH — SOCIAL STABILITY: SOCIAL INSECURITY: HAS ANYONE EVER THREATENED TO HURT YOUR FAMILY OR YOUR PETS?: NO

## 2024-07-08 SDOH — SOCIAL STABILITY: SOCIAL INSECURITY: HAVE YOU HAD ANY THOUGHTS OF HARMING ANYONE ELSE?: NO

## 2024-07-08 SDOH — SOCIAL STABILITY: SOCIAL INSECURITY: ABUSE: ADULT

## 2024-07-08 SDOH — SOCIAL STABILITY: SOCIAL INSECURITY: DO YOU FEEL ANYONE HAS EXPLOITED OR TAKEN ADVANTAGE OF YOU FINANCIALLY OR OF YOUR PERSONAL PROPERTY?: NO

## 2024-07-08 SDOH — SOCIAL STABILITY: SOCIAL INSECURITY: DO YOU FEEL UNSAFE GOING BACK TO THE PLACE WHERE YOU ARE LIVING?: NO

## 2024-07-08 SDOH — SOCIAL STABILITY: SOCIAL INSECURITY: ARE THERE ANY APPARENT SIGNS OF INJURIES/BEHAVIORS THAT COULD BE RELATED TO ABUSE/NEGLECT?: NO

## 2024-07-08 ASSESSMENT — COGNITIVE AND FUNCTIONAL STATUS - GENERAL
DAILY ACTIVITIY SCORE: 24
CLIMB 3 TO 5 STEPS WITH RAILING: A LITTLE
MOBILITY SCORE: 23
PATIENT BASELINE BEDBOUND: NO

## 2024-07-08 ASSESSMENT — PATIENT HEALTH QUESTIONNAIRE - PHQ9
1. LITTLE INTEREST OR PLEASURE IN DOING THINGS: NOT AT ALL
SUM OF ALL RESPONSES TO PHQ9 QUESTIONS 1 & 2: 0
1. LITTLE INTEREST OR PLEASURE IN DOING THINGS: NOT AT ALL
2. FEELING DOWN, DEPRESSED OR HOPELESS: NOT AT ALL
SUM OF ALL RESPONSES TO PHQ9 QUESTIONS 1 & 2: 0
2. FEELING DOWN, DEPRESSED OR HOPELESS: NOT AT ALL

## 2024-07-08 ASSESSMENT — ENCOUNTER SYMPTOMS
STRIDOR: 0
COUGH: 0
CONSTIPATION: 0
SHORTNESS OF BREATH: 0
APNEA: 0
HEMATOLOGIC/LYMPHATIC NEGATIVE: 1
NEUROLOGICAL NEGATIVE: 1
PSYCHIATRIC NEGATIVE: 1
CHEST TIGHTNESS: 1
VOMITING: 1
WHEEZING: 0
CONSTITUTIONAL NEGATIVE: 1
ALLERGIC/IMMUNOLOGIC NEGATIVE: 1
ABDOMINAL PAIN: 0
CHOKING: 0
ABDOMINAL DISTENTION: 0
NAUSEA: 1
MUSCULOSKELETAL NEGATIVE: 1
EYES NEGATIVE: 1
DIARRHEA: 0
BLOOD IN STOOL: 0
PALPITATIONS: 0

## 2024-07-08 ASSESSMENT — LIFESTYLE VARIABLES
SUBSTANCE_ABUSE_PAST_12_MONTHS: YES
HOW MANY STANDARD DRINKS CONTAINING ALCOHOL DO YOU HAVE ON A TYPICAL DAY: PATIENT DOES NOT DRINK
PRESCIPTION_ABUSE_PAST_12_MONTHS: NO
AUDIT-C TOTAL SCORE: 0
AUDIT-C TOTAL SCORE: 0
SKIP TO QUESTIONS 9-10: 1
HOW OFTEN DO YOU HAVE A DRINK CONTAINING ALCOHOL: NEVER
HOW OFTEN DO YOU HAVE 6 OR MORE DRINKS ON ONE OCCASION: NEVER
HOW MANY STANDARD DRINKS CONTAINING ALCOHOL DO YOU HAVE ON A TYPICAL DAY: PATIENT DOES NOT DRINK
AUDIT-C TOTAL SCORE: 0
AUDIT-C TOTAL SCORE: 0
SKIP TO QUESTIONS 9-10: 1
HOW OFTEN DO YOU HAVE A DRINK CONTAINING ALCOHOL: NEVER
HOW OFTEN DO YOU HAVE 6 OR MORE DRINKS ON ONE OCCASION: NEVER

## 2024-07-08 ASSESSMENT — PAIN SCALES - GENERAL
PAINLEVEL_OUTOF10: 4
PAINLEVEL_OUTOF10: 4
PAINLEVEL_OUTOF10: 0 - NO PAIN

## 2024-07-08 ASSESSMENT — ACTIVITIES OF DAILY LIVING (ADL)
LACK_OF_TRANSPORTATION: NO
TOILETING: INDEPENDENT
HEARING - RIGHT EAR: FUNCTIONAL
PATIENT'S MEMORY ADEQUATE TO SAFELY COMPLETE DAILY ACTIVITIES?: YES
JUDGMENT_ADEQUATE_SAFELY_COMPLETE_DAILY_ACTIVITIES: YES
HEARING - LEFT EAR: FUNCTIONAL
WALKS IN HOME: INDEPENDENT
ADEQUATE_TO_COMPLETE_ADL: YES
GROOMING: INDEPENDENT
FEEDING YOURSELF: INDEPENDENT
BATHING: INDEPENDENT
DRESSING YOURSELF: INDEPENDENT

## 2024-07-08 ASSESSMENT — PAIN DESCRIPTION - LOCATION: LOCATION: CHEST

## 2024-07-08 ASSESSMENT — PAIN - FUNCTIONAL ASSESSMENT
PAIN_FUNCTIONAL_ASSESSMENT: 0-10
PAIN_FUNCTIONAL_ASSESSMENT: 0-10

## 2024-07-08 NOTE — H&P
History Of Present Illness  Ne Richardson is a 64 y.o. female with past medical history of coronary artery disease status post angioplasty, cardiac defibrillator, CHF, IDDM, gastroparesis, CKD 3A, history of alcohol abuse, daily marijuana use and hypertension scented to the ED with a chief complaint of vomiting.  She also states that her chest has been hurting for the last 3 days mainly when she has vomiting.  EKG in the ED revealed sinus tach with no acute ischemic changes.  Troponin was 88 repeat was 89.  Patient's blood sugar was 432.  She was given IV insulin.  Her repeat was 403.  Her creatinine on admission was 1.75 GFR was 32.  After 2 L fluid bolus it improved to 1.51 and 39.  Anion gap initially 24 and improved to 21.  Beta hydroxybutyrate was 3.10.  Her urine was negative for infection but positive for ketones.  CT abdomen and pelvis negative for acute pathologies.  Her white blood cell count was 23.5.  She will be admitted for further evaluation and treatment of her hyperglycemia, MICHELLE/CKD, chest pain, nausea and vomiting.    Upon evaluation patient is resting comfortably.  She reports she has not vomited in over 2 hours.  Her  is at the bedside.  She reports she has not eaten anything in 2 to 3 days.  She has skipped many of her medications.  She reports she is still having chest heaviness and burning.  She does not have palpitations.  Dizziness.  Or diaphoresis.  She does not have any abdominal or lower extremity swelling.  She does not have cough or shortness of breath.  She does not have any abdominal pain, fever, CVA a tenderness or fever.     Past Medical History  Past Medical History:   Diagnosis Date    Acute myocardial infarction, unspecified (Multi) 01/10/2022    Acute MI    Body mass index (BMI) 33.0-33.9, adult 02/01/2022    BMI 33.0-33.9,adult    Disorder of the skin and subcutaneous tissue, unspecified 07/01/2020    Back skin lesion    Encounter for preprocedural cardiovascular  examination 01/10/2022    Pre-operative cardiovascular examination    Hypertensive heart disease with heart failure (Multi) 2020    Hypertensive heart disease with chronic combined systolic and diastolic congestive heart failure    Intracranial and intraspinal phlebitis and thrombophlebitis ([facility]) 01/10/2022    Cavernous sinus thrombosis    Nausea 01/10/2022    Chronic nausea    Obesity, unspecified 2022    Class 1 obesity with body mass index (BMI) of 33.0 to 33.9 in adult    Obesity, unspecified 2022    Class 1 obesity with body mass index (BMI) of 32.0 to 32.9 in adult    Old myocardial infarction     History of myocardial infarction    Other mechanical complication of other cardiac electronic device, initial encounter 2022    Mechanical complication of implantable cardioverter-defibrillator (ICD)    Other specified counseling 2022    Encounter for medication counseling    Other specified diseases of gallbladder 2020    Porcelain gallbladder    Overweight 01/10/2022    Overweight    Person consulting for explanation of examination or test findings 2022    Encounter to discuss test results    Personal history of other diseases of the digestive system 2020    History of gallbladder disease    Personal history of other venous thrombosis and embolism     History of deep venous thrombosis    Transient cerebral ischemic attack, unspecified     TIA (transient ischemic attack)       Surgical History  Past Surgical History:   Procedure Laterality Date    CARDIAC CATHETERIZATION N/A 2024    Procedure: Left Heart Cath, With LV;  Surgeon: Jose Barclay MD;  Location: [facility];  Service: Cardiovascular;  Laterality: N/A;    CT ABDOMEN PELVIS ANGIOGRAM W AND/OR WO IV CONTRAST  2019    CT ABDOMEN PELVIS ANGIOGRAM W AND/OR WO IV CONTRAST 2019 [facility] CLINICAL LEGACY    OTHER SURGICAL HISTORY  2020     section    OTHER SURGICAL  HISTORY  10/12/2022    Corneal lasik    OTHER SURGICAL HISTORY  10/12/2022    Skin lesion excision    OTHER SURGICAL HISTORY  01/10/2022    Colonoscopy    OTHER SURGICAL HISTORY  01/10/2022    Hysterectomy    OTHER SURGICAL HISTORY  01/10/2022    Surgery    OTHER SURGICAL HISTORY  01/10/2022    Median nerve neuroplasty    OTHER SURGICAL HISTORY  01/10/2022    Cardioverter defibrillator insertion    OTHER SURGICAL HISTORY  01/10/2022    Knee surgery    OTHER SURGICAL HISTORY  01/10/2022    Ankle surgery    OTHER SURGICAL HISTORY  01/10/2022    Tonsillectomy    OTHER SURGICAL HISTORY  01/10/2022    Hand surgery    OTHER SURGICAL HISTORY  02/01/2022    Knee fracture repair    OTHER SURGICAL HISTORY  02/01/2022    Cholecystectomy        Social History  She reports that she has been smoking cigars. She has never used smokeless tobacco. She reports current alcohol use. She reports current drug use. Drug: Marijuana.    Family History  Family History   Problem Relation Name Age of Onset    Coronary artery disease Mother      Diabetes Mother      Other (Cardiac pacemaker) Father      Coronary artery disease Father      Other (Stroke syndrome) Other          Allergies  Ropinirole    Review of Systems   Constitutional: Negative.    HENT: Negative.     Eyes: Negative.    Respiratory:  Positive for chest tightness. Negative for apnea, cough, choking, shortness of breath, wheezing and stridor.    Cardiovascular:  Positive for chest pain. Negative for palpitations and leg swelling.   Gastrointestinal:  Positive for nausea and vomiting. Negative for abdominal distention, abdominal pain, blood in stool, constipation and diarrhea.   Genitourinary: Negative.    Musculoskeletal: Negative.    Skin: Negative.    Allergic/Immunologic: Negative.    Neurological: Negative.    Hematological: Negative.    Psychiatric/Behavioral: Negative.          Physical Exam  Constitutional:       Appearance: Normal appearance.   HENT:      Head:  "Normocephalic and atraumatic.      Nose: Nose normal.      Mouth/Throat:      Mouth: Mucous membranes are moist.   Eyes:      Extraocular Movements: Extraocular movements intact.      Pupils: Pupils are equal, round, and reactive to light.   Cardiovascular:      Rate and Rhythm: Regular rhythm. Tachycardia present.      Pulses: Normal pulses.      Heart sounds: Normal heart sounds.   Pulmonary:      Effort: Pulmonary effort is normal.      Breath sounds: Normal breath sounds.   Abdominal:      General: Abdomen is flat.      Palpations: Abdomen is soft.   Musculoskeletal:         General: Normal range of motion.      Cervical back: Normal range of motion and neck supple.   Skin:     General: Skin is warm and dry.      Capillary Refill: Capillary refill takes less than 2 seconds.   Neurological:      General: No focal deficit present.      Mental Status: She is alert.   Psychiatric:         Mood and Affect: Mood normal.         Behavior: Behavior normal.         Thought Content: Thought content normal.         Judgment: Judgment normal.          Last Recorded Vitals  Blood pressure 143/84, pulse 101, temperature 36.5 °C (97.7 °F), temperature source Temporal, resp. rate 16, height 1.575 m (5' 2\"), weight 71.4 kg (157 lb 6.5 oz), SpO2 96%.    Relevant Results      Results for orders placed or performed during the hospital encounter of 07/08/24 (from the past 24 hour(s))   CBC and Auto Differential   Result Value Ref Range    WBC 23.5 (H) 4.4 - 11.3 x10*3/uL    nRBC 0.0 0.0 - 0.0 /100 WBCs    RBC 4.41 4.00 - 5.20 x10*6/uL    Hemoglobin 16.1 (H) 12.0 - 16.0 g/dL    Hematocrit 46.1 (H) 36.0 - 46.0 %     (H) 80 - 100 fL    MCH 36.5 (H) 26.0 - 34.0 pg    MCHC 34.9 32.0 - 36.0 g/dL    RDW 12.5 11.5 - 14.5 %    Platelets 142 (L) 150 - 450 x10*3/uL    Immature Granulocytes %, Automated 1.1 (H) 0.0 - 0.9 %    Immature Granulocytes Absolute, Automated 0.26 0.00 - 0.70 x10*3/uL   Comprehensive metabolic panel   Result " Value Ref Range    Glucose 432 (H) 74 - 99 mg/dL    Sodium 131 (L) 136 - 145 mmol/L    Potassium 3.7 3.5 - 5.3 mmol/L    Chloride 90 (L) 98 - 107 mmol/L    Bicarbonate 21 21 - 32 mmol/L    Anion Gap 24 (H) 10 - 20 mmol/L    Urea Nitrogen 56 (H) 6 - 23 mg/dL    Creatinine 1.75 (H) 0.50 - 1.05 mg/dL    eGFR 32 (L) >60 mL/min/1.73m*2    Calcium 9.1 8.6 - 10.3 mg/dL    Albumin 4.1 3.4 - 5.0 g/dL    Alkaline Phosphatase 84 33 - 136 U/L    Total Protein 7.0 6.4 - 8.2 g/dL    AST 14 9 - 39 U/L    Bilirubin, Total 0.6 0.0 - 1.2 mg/dL    ALT 18 7 - 45 U/L   Lipase   Result Value Ref Range    Lipase 12 9 - 82 U/L   Lactate   Result Value Ref Range    Lactate 2.0 0.4 - 2.0 mmol/L   Magnesium   Result Value Ref Range    Magnesium 2.12 1.60 - 2.40 mg/dL   Beta Hydroxybutyrate   Result Value Ref Range    Beta-Hydroxybutyrate 3.10 (H) 0.02 - 0.27 mmol/L   Troponin I, High Sensitivity, Initial   Result Value Ref Range    Troponin I, High Sensitivity 88 (HH) 0 - 13 ng/L   Manual Differential   Result Value Ref Range    Neutrophils %, Manual 76.0 40.0 - 80.0 %    Bands %, Manual 1.0 0.0 - 5.0 %    Lymphocytes %, Manual 7.0 13.0 - 44.0 %    Monocytes %, Manual 7.0 2.0 - 10.0 %    Eosinophils %, Manual 0.0 0.0 - 6.0 %    Basophils %, Manual 0.0 0.0 - 2.0 %    Atypical Lymphocytes %, Manual 9.0 0.0 - 2.0 %    Seg Neutrophils Absolute, Manual 17.86 (H) 1.20 - 7.00 x10*3/uL    Bands Absolute, Manual 0.24 0.00 - 0.70 x10*3/uL    Lymphocytes Absolute, Manual 1.65 1.20 - 4.80 x10*3/uL    Monocytes Absolute, Manual 1.65 (H) 0.10 - 1.00 x10*3/uL    Eosinophils Absolute, Manual 0.00 0.00 - 0.70 x10*3/uL    Basophils Absolute, Manual 0.00 0.00 - 0.10 x10*3/uL    Atypical Lymphs Absolute, Manual 2.12 (H) 0.00 - 0.50 x10*3/uL    Total Cells Counted 100     Neutrophils Absolute, Manual 18.10 (H) 1.20 - 7.70 x10*3/uL    RBC Morphology No significant RBC morphology present    Troponin I, High Sensitivity, Initial   Result Value Ref Range    Troponin  I, High Sensitivity 89 (HH) 0 - 13 ng/L   Basic metabolic panel   Result Value Ref Range    Glucose 406 (H) 74 - 99 mg/dL    Sodium 133 (L) 136 - 145 mmol/L    Potassium 3.6 3.5 - 5.3 mmol/L    Chloride 95 (L) 98 - 107 mmol/L    Bicarbonate 21 21 - 32 mmol/L    Anion Gap 21 (H) 10 - 20 mmol/L    Urea Nitrogen 53 (H) 6 - 23 mg/dL    Creatinine 1.51 (H) 0.50 - 1.05 mg/dL    eGFR 39 (L) >60 mL/min/1.73m*2    Calcium 8.3 (L) 8.6 - 10.3 mg/dL   Urinalysis with Reflex Culture and Microscopic   Result Value Ref Range    Color, Urine Light-Yellow Light-Yellow, Yellow, Dark-Yellow    Appearance, Urine Turbid (N) Clear    Specific Gravity, Urine 1.019 1.005 - 1.035    pH, Urine 6.0 5.0, 5.5, 6.0, 6.5, 7.0, 7.5, 8.0    Protein, Urine 30 (1+) (A) NEGATIVE, 10 (TRACE), 20 (TRACE) mg/dL    Glucose, Urine OVER (4+) (A) Normal mg/dL    Blood, Urine 0.03 (TRACE) (A) NEGATIVE    Ketones, Urine 40 (2+) (A) NEGATIVE mg/dL    Bilirubin, Urine NEGATIVE NEGATIVE    Urobilinogen, Urine Normal Normal mg/dL    Nitrite, Urine NEGATIVE NEGATIVE    Leukocyte Esterase, Urine NEGATIVE NEGATIVE   Urinalysis Microscopic   Result Value Ref Range    WBC, Urine 11-20 (A) 1-5, NONE /HPF    RBC, Urine 11-20 (A) NONE, 1-2, 3-5 /HPF    Squamous Epithelial Cells, Urine 1-9 (SPARSE) Reference range not established. /HPF    Bacteria, Urine 1+ (A) NONE SEEN /HPF    Mucus, Urine FEW Reference range not established. /LPF    Hyaline Casts, Urine 3+ (A) NONE /LPF    Mixed Cell Casts, Urine 2+ (A) NONE /LPF   POCT GLUCOSE   Result Value Ref Range    POCT Glucose 365 (H) 74 - 99 mg/dL   Protime-INR   Result Value Ref Range    Protime 25.5 (H) 9.8 - 12.8 seconds    INR 2.2 (H) 0.9 - 1.1   POCT GLUCOSE   Result Value Ref Range    POCT Glucose 379 (H) 74 - 99 mg/dL          Assessment/Plan   Principal Problem:    Hyperglycemia due to diabetes mellitus (Multi)  -Carb controlled diet  -Sliding scale insulin  -Lantus 28 units nightly  -A1c  3/14/24 8.4   -Sees  endocrinology as an outpatient  -Check sugar ACHS    Nausea and vomiting with history of gastroparesis  -History of being on Reglan unsure if patient is still using routinely  -PRN zofran  -Question if daily marijuana use is contributing to the nausea and vomiting  -IV fluids provided in ED  -Will advance diet as tolerated  -Per notes patient did not follow-up with GI and does not plan to.      Chest pressure  -add BNP  -Consult Cardiology    Central venous sinus thrombus  -INR therapeutic 2.2    History of CHF/defibrillator/CAD status post PCI  -Echo 12/17/2023 EF 50 to 55%  -Patient on Coumadin pharmacy to dose  -Furosemide 40 mg twice daily held due to MICHELLE will resume when patient able to tolerate.  -Continue Coreg.    Leukocytosis  -UA negative, Chest x-ray revealed no evidence of acute cardiopulmonary process, CT abdomen and pelvis no acute abnormalities.  -Repeat CBC tomorrow patient afebrile   -Lactate 2.0    Restless leg syndrome  -On Requip and clonazepam as needed at bedtime.    Recurrent major depressive disorder  -Patient follows with psychiatry continue home meds    DVT prophylaxis patient on Coumadin SCDs    Disposition Home 24 to 48 hours medically stable.            Milena Parker, APRN-CNP

## 2024-07-08 NOTE — ED PROVIDER NOTES
"HPI   Chief Complaint   Patient presents with    Vomiting     To ED per wheelchair c/o N/V x 3 days. She also reports that her chest has been hurting for 3 days as well. EKG done at [facility]. Pt states that she is Type I diabetic and her blood sugar read \"high\" this am.        Patient presents to the emergency department secondary to vomiting.  Symptoms have been present for 3 days and states her chest hurts when she vomits.  Further history reveals the patient has a history of gastroparesis with recurrent symptoms.  She is also diabetic and her  states she has been noncompliant with medications recently and her glucometer for the past 2 days has read \"high\".      History provided by:  Patient and spouse   used: No                        [place] Coma Scale Score: 15                     Patient History   Past Medical History:   Diagnosis Date    Acute myocardial infarction, unspecified (Multi) 01/10/2022    Acute MI    Body mass index (BMI) 33.0-33.9, adult 02/01/2022    BMI 33.0-33.9,adult    Disorder of the skin and subcutaneous tissue, unspecified 07/01/2020    Back skin lesion    Encounter for preprocedural cardiovascular examination 01/10/2022    Pre-operative cardiovascular examination    Hypertensive heart disease with heart failure (Multi) 05/21/2020    Hypertensive heart disease with chronic combined systolic and diastolic congestive heart failure    Intracranial and intraspinal phlebitis and thrombophlebitis ([facility]) 01/10/2022    Cavernous sinus thrombosis    Nausea 01/10/2022    Chronic nausea    Obesity, unspecified 03/21/2022    Class 1 obesity with body mass index (BMI) of 33.0 to 33.9 in adult    Obesity, unspecified 08/01/2022    Class 1 obesity with body mass index (BMI) of 32.0 to 32.9 in adult    Old myocardial infarction     History of myocardial infarction    Other mechanical complication of other cardiac electronic device, initial encounter 02/01/2022    Mechanical " complication of implantable cardioverter-defibrillator (ICD)    Other specified counseling 2022    Encounter for medication counseling    Other specified diseases of gallbladder 2020    Porcelain gallbladder    Overweight 01/10/2022    Overweight    Person consulting for explanation of examination or test findings 2022    Encounter to discuss test results    Personal history of other diseases of the digestive system 2020    History of gallbladder disease    Personal history of other venous thrombosis and embolism     History of deep venous thrombosis    Transient cerebral ischemic attack, unspecified     TIA (transient ischemic attack)     Past Surgical History:   Procedure Laterality Date    CARDIAC CATHETERIZATION N/A 2024    Procedure: Left Heart Cath, With LV;  Surgeon: Jose Barclay MD;  Location: [facility];  Service: Cardiovascular;  Laterality: N/A;    CT ABDOMEN PELVIS ANGIOGRAM W AND/OR WO IV CONTRAST  2019    CT ABDOMEN PELVIS ANGIOGRAM W AND/OR WO IV CONTRAST 2019 [facility] CLINICAL LEGACY    OTHER SURGICAL HISTORY  2020     section    OTHER SURGICAL HISTORY  10/12/2022    Corneal lasik    OTHER SURGICAL HISTORY  10/12/2022    Skin lesion excision    OTHER SURGICAL HISTORY  01/10/2022    Colonoscopy    OTHER SURGICAL HISTORY  01/10/2022    Hysterectomy    OTHER SURGICAL HISTORY  01/10/2022    Surgery    OTHER SURGICAL HISTORY  01/10/2022    Median nerve neuroplasty    OTHER SURGICAL HISTORY  01/10/2022    Cardioverter defibrillator insertion    OTHER SURGICAL HISTORY  01/10/2022    Knee surgery    OTHER SURGICAL HISTORY  01/10/2022    Ankle surgery    OTHER SURGICAL HISTORY  01/10/2022    Tonsillectomy    OTHER SURGICAL HISTORY  01/10/2022    Hand surgery    OTHER SURGICAL HISTORY  2022    Knee fracture repair    OTHER SURGICAL HISTORY  2022    Cholecystectomy     Family History   Problem Relation Name Age of Onset     Coronary artery disease Mother      Diabetes Mother      Other (Cardiac pacemaker) Father      Coronary artery disease Father      Other (Stroke syndrome) Other       Social History     Tobacco Use    Smoking status: Every Day     Types: Cigars    Smokeless tobacco: Never   Substance Use Topics    Alcohol use: Yes     Comment: social    Drug use: Yes     Types: Marijuana       Physical Exam   ED Triage Vitals [07/08/24 0939]   Temperature Heart Rate Respirations BP   35.7 °C (96.3 °F) (!) 118 16 (!) 121/105      Pulse Ox Temp src Heart Rate Source Patient Position   98 % -- -- --      BP Location FiO2 (%)     -- --       Physical Exam  Vitals and nursing note reviewed.   Constitutional:       General: She is not in acute distress.     Appearance: Normal appearance. She is normal weight. She is not ill-appearing, toxic-appearing or diaphoretic.   HENT:      Head: Normocephalic and atraumatic.      Nose: Nose normal. No rhinorrhea.   Neck:      Comments: Trachea is midline  Cardiovascular:      Rate and Rhythm: Regular rhythm. Tachycardia present.      Heart sounds: No murmur heard.  Pulmonary:      Effort: Pulmonary effort is normal.      Breath sounds: Normal breath sounds. No wheezing.   Abdominal:      General: Abdomen is flat. Bowel sounds are normal. There is no distension.      Palpations: Abdomen is soft. There is no mass.      Tenderness: There is abdominal tenderness. There is no right CVA tenderness, left CVA tenderness, guarding or rebound.      Hernia: No hernia is present.      Comments: Mild generalized discomfort throughout without point tenderness.  No rebound or guarding.  Tolerates deep palpation throughout.  Bowel sounds are normal.   Musculoskeletal:         General: Normal range of motion.      Cervical back: Normal range of motion.   Skin:     General: Skin is warm and dry.      Findings: No rash.   Neurological:      General: No focal deficit present.      Mental Status: She is alert and  oriented to person, place, and time. Mental status is at baseline.   Psychiatric:         Mood and Affect: Mood normal.         Behavior: Behavior normal.         Thought Content: Thought content normal.         Judgment: Judgment normal.         ED Course & MDM   Diagnoses as of 07/08/24 1313   Hyperglycemia due to diabetes mellitus (Multi)   Nausea and vomiting, unspecified vomiting type       Medical Decision Making  Twelve-lead EKG was interpreted by myself and this was noted to contribute directly to patient care.  Study reveals a sinus tachycardia at 120 bpm, rightward axis, normal R wave progression, no acute ischemic changes.    Anion gap was notably improved and almost normalized with fluid boluses and IV insulin here in the ER.  Current anion gap is 21 with high normal being 20 therefore a insulin drip was not initiated here in the ER.  Patient's symptoms were noted to improve with medication here and she had no vomiting while here in the department.  I did review the medical record it appears she had a cardiac catheterization 3 months ago and her troponin is noted to be stable with an elevated creatinine.  I feel the patient would be appropriate for medical admission with continued IV fluids, as needed insulin, and monitoring of her laboratory panels.  She is amendable to this and the hospitalist accepted the patient to their service.        Procedure  Procedures     Tee Bautista, DO  07/08/24 1312

## 2024-07-08 NOTE — NURSING NOTE
RT spoke with patient about albuterol inhaler on home medication list. Patient does not take albuterol at home.   Corrina Coyle, RRT

## 2024-07-09 ENCOUNTER — APPOINTMENT (OUTPATIENT)
Dept: CARDIOLOGY | Facility: HOSPITAL | Age: 64
End: 2024-07-09
Payer: MEDICARE

## 2024-07-09 LAB
ANION GAP SERPL CALC-SCNC: 16 MMOL/L (ref 10–20)
AORTIC VALVE MEAN GRADIENT: 6 MMHG
AORTIC VALVE PEAK VELOCITY: 1.54 M/S
AV PEAK GRADIENT: 9.5 MMHG
AVA (PEAK VEL): 1.08 CM2
AVA (VTI): 1.08 CM2
BACTERIA UR CULT: ABNORMAL
BUN SERPL-MCNC: 42 MG/DL (ref 6–23)
CALCIUM SERPL-MCNC: 8 MG/DL (ref 8.6–10.3)
CHLORIDE SERPL-SCNC: 97 MMOL/L (ref 98–107)
CO2 SERPL-SCNC: 25 MMOL/L (ref 21–32)
CREAT SERPL-MCNC: 1.26 MG/DL (ref 0.5–1.05)
EGFRCR SERPLBLD CKD-EPI 2021: 48 ML/MIN/1.73M*2
EJECTION FRACTION APICAL 4 CHAMBER: 42.6
EJECTION FRACTION: 28 %
ERYTHROCYTE [DISTWIDTH] IN BLOOD BY AUTOMATED COUNT: 12.3 % (ref 11.5–14.5)
GLUCOSE BLD MANUAL STRIP-MCNC: 178 MG/DL (ref 74–99)
GLUCOSE BLD MANUAL STRIP-MCNC: 198 MG/DL (ref 74–99)
GLUCOSE BLD MANUAL STRIP-MCNC: 314 MG/DL (ref 74–99)
GLUCOSE BLD MANUAL STRIP-MCNC: 324 MG/DL (ref 74–99)
GLUCOSE SERPL-MCNC: 321 MG/DL (ref 74–99)
HCT VFR BLD AUTO: 43 % (ref 36–46)
HGB BLD-MCNC: 14.9 G/DL (ref 12–16)
INR PPP: 4.3 (ref 0.9–1.1)
LEFT ATRIUM VOLUME AREA LENGTH INDEX BSA: 8.4 ML/M2
LEFT VENTRICLE INTERNAL DIMENSION DIASTOLE: 3.8 CM (ref 3.5–6)
LEFT VENTRICULAR OUTFLOW TRACT DIAMETER: 1.5 CM
LV EJECTION FRACTION BIPLANE: 40 %
MCH RBC QN AUTO: 36.5 PG (ref 26–34)
MCHC RBC AUTO-ENTMCNC: 34.7 G/DL (ref 32–36)
MCV RBC AUTO: 105 FL (ref 80–100)
MITRAL VALVE E/A RATIO: 0.49
NRBC BLD-RTO: 0 /100 WBCS (ref 0–0)
PLATELET # BLD AUTO: 140 X10*3/UL (ref 150–450)
POTASSIUM SERPL-SCNC: 3.5 MMOL/L (ref 3.5–5.3)
PROTHROMBIN TIME: 51.1 SECONDS (ref 9.8–12.8)
RBC # BLD AUTO: 4.08 X10*6/UL (ref 4–5.2)
RIGHT VENTRICLE FREE WALL PEAK S': 12.5 CM/S
RIGHT VENTRICLE PEAK SYSTOLIC PRESSURE: 31.5 MMHG
SODIUM SERPL-SCNC: 134 MMOL/L (ref 136–145)
WBC # BLD AUTO: 12.4 X10*3/UL (ref 4.4–11.3)

## 2024-07-09 PROCEDURE — 82947 ASSAY GLUCOSE BLOOD QUANT: CPT

## 2024-07-09 PROCEDURE — 2500000004 HC RX 250 GENERAL PHARMACY W/ HCPCS (ALT 636 FOR OP/ED)

## 2024-07-09 PROCEDURE — 99221 1ST HOSP IP/OBS SF/LOW 40: CPT

## 2024-07-09 PROCEDURE — 97165 OT EVAL LOW COMPLEX 30 MIN: CPT | Mod: GO

## 2024-07-09 PROCEDURE — 2500000001 HC RX 250 WO HCPCS SELF ADMINISTERED DRUGS (ALT 637 FOR MEDICARE OP): Performed by: NURSE PRACTITIONER

## 2024-07-09 PROCEDURE — 2500000004 HC RX 250 GENERAL PHARMACY W/ HCPCS (ALT 636 FOR OP/ED): Performed by: NURSE PRACTITIONER

## 2024-07-09 PROCEDURE — 2500000002 HC RX 250 W HCPCS SELF ADMINISTERED DRUGS (ALT 637 FOR MEDICARE OP, ALT 636 FOR OP/ED): Performed by: NURSE PRACTITIONER

## 2024-07-09 PROCEDURE — 1100000001 HC PRIVATE ROOM DAILY

## 2024-07-09 PROCEDURE — 80048 BASIC METABOLIC PNL TOTAL CA: CPT | Performed by: NURSE PRACTITIONER

## 2024-07-09 PROCEDURE — 99232 SBSQ HOSP IP/OBS MODERATE 35: CPT | Performed by: NURSE PRACTITIONER

## 2024-07-09 PROCEDURE — 85610 PROTHROMBIN TIME: CPT

## 2024-07-09 PROCEDURE — 36415 COLL VENOUS BLD VENIPUNCTURE: CPT | Performed by: NURSE PRACTITIONER

## 2024-07-09 PROCEDURE — 97161 PT EVAL LOW COMPLEX 20 MIN: CPT | Mod: GP

## 2024-07-09 PROCEDURE — 93306 TTE W/DOPPLER COMPLETE: CPT

## 2024-07-09 PROCEDURE — 93306 TTE W/DOPPLER COMPLETE: CPT | Performed by: STUDENT IN AN ORGANIZED HEALTH CARE EDUCATION/TRAINING PROGRAM

## 2024-07-09 PROCEDURE — 85027 COMPLETE CBC AUTOMATED: CPT | Performed by: NURSE PRACTITIONER

## 2024-07-09 RX ORDER — PANTOPRAZOLE SODIUM 40 MG/1
40 TABLET, DELAYED RELEASE ORAL NIGHTLY
Status: DISCONTINUED | OUTPATIENT
Start: 2024-07-09 | End: 2024-07-10 | Stop reason: HOSPADM

## 2024-07-09 ASSESSMENT — COGNITIVE AND FUNCTIONAL STATUS - GENERAL
DAILY ACTIVITIY SCORE: 23
CLIMB 3 TO 5 STEPS WITH RAILING: A LITTLE
HELP NEEDED FOR BATHING: A LITTLE
MOBILITY SCORE: 23

## 2024-07-09 ASSESSMENT — ACTIVITIES OF DAILY LIVING (ADL)
BATHING_ASSISTANCE: STAND BY
LACK_OF_TRANSPORTATION: NO

## 2024-07-09 ASSESSMENT — PAIN - FUNCTIONAL ASSESSMENT
PAIN_FUNCTIONAL_ASSESSMENT: 0-10

## 2024-07-09 ASSESSMENT — PAIN SCALES - GENERAL
PAINLEVEL_OUTOF10: 0 - NO PAIN

## 2024-07-09 NOTE — PROGRESS NOTES
Ne Richardson is a 64 y.o. female on day 1 of admission presenting with Hyperglycemia due to diabetes mellitus (Multi).      Subjective   Patient sitting up in bed eating breakfast.  Patient reports she is nauseated.  Patient reports she wakes up daily feeling nauseated.  She states she has had a weak stomach since she was a kid.  She reports that she has not vomited since yesterday.  She no longer has chest pain or discomfort.  Patient has a flat affect and slow to respond to questions giving vague brief responses.       Objective     Last Recorded Vitals  BP (!) 152/92   Pulse 104   Temp 36.4 °C (97.5 °F) (Temporal)   Resp 16   Wt 71.4 kg (157 lb 6.5 oz)   SpO2 95%   Intake/Output last 3 Shifts:    Intake/Output Summary (Last 24 hours) at 7/9/2024 1051  Last data filed at 7/8/2024 1805  Gross per 24 hour   Intake 3271.3 ml   Output --   Net 3271.3 ml       Admission Weight  Weight: 72.6 kg (160 lb) (07/08/24 0939)    Daily Weight  07/08/24 : 71.4 kg (157 lb 6.5 oz)    Image Results  ECG 12 lead  Sinus tachycardia  Right atrial enlargement  Cannot rule out Inferior infarct (cited on or before 08-JUL-2024)  Anterolateral infarct , age undetermined  Abnormal ECG  When compared with ECG of 08-JUL-2024 09:18, (unconfirmed)  Anterior infarct is now Present  Anterolateral infarct is now Present  ST elevation has replaced ST depression in Lateral leads      Physical Exam  Constitutional:       Appearance: Normal appearance.   HENT:      Head: Normocephalic and atraumatic.        Mouth: Mucous membranes are moist.   Eyes:      Extraocular Movements: Extraocular movements intact.      Pupils: Pupils are equal, round, and reactive to light.   Cardiovascular:      Rate and Rhythm: Regular rhythm. Tachycardia present.      Pulses: Normal pulses.      Heart sounds: Normal heart sounds.   Pulmonary:      Effort: Pulmonary effort is normal.      Breath sounds: Normal breath sounds.   Abdominal:      General: Abdomen is flat.       Palpations: Abdomen is soft.   Musculoskeletal:         General: Normal range of motion.      Cervical back: Normal range of motion and neck supple.   Skin:     General: Skin is warm and dry.      Capillary Refill: Capillary refill takes less than 2 seconds.   Neurological:      General: No focal deficit present.      Mental Status: She is alert.   Psychiatric:         Mood and Affect: Mood normal.         Behavior: Behavior normal.         Thought Content: Thought content normal.         Judgment: Judgment normal.   Relevant Results     Results for orders placed or performed during the hospital encounter of 07/08/24 (from the past 24 hour(s))   Troponin I, High Sensitivity, Initial   Result Value Ref Range    Troponin I, High Sensitivity 89 (HH) 0 - 13 ng/L   Basic metabolic panel   Result Value Ref Range    Glucose 406 (H) 74 - 99 mg/dL    Sodium 133 (L) 136 - 145 mmol/L    Potassium 3.6 3.5 - 5.3 mmol/L    Chloride 95 (L) 98 - 107 mmol/L    Bicarbonate 21 21 - 32 mmol/L    Anion Gap 21 (H) 10 - 20 mmol/L    Urea Nitrogen 53 (H) 6 - 23 mg/dL    Creatinine 1.51 (H) 0.50 - 1.05 mg/dL    eGFR 39 (L) >60 mL/min/1.73m*2    Calcium 8.3 (L) 8.6 - 10.3 mg/dL   Urinalysis with Reflex Culture and Microscopic   Result Value Ref Range    Color, Urine Light-Yellow Light-Yellow, Yellow, Dark-Yellow    Appearance, Urine Turbid (N) Clear    Specific Gravity, Urine 1.019 1.005 - 1.035    pH, Urine 6.0 5.0, 5.5, 6.0, 6.5, 7.0, 7.5, 8.0    Protein, Urine 30 (1+) (A) NEGATIVE, 10 (TRACE), 20 (TRACE) mg/dL    Glucose, Urine OVER (4+) (A) Normal mg/dL    Blood, Urine 0.03 (TRACE) (A) NEGATIVE    Ketones, Urine 40 (2+) (A) NEGATIVE mg/dL    Bilirubin, Urine NEGATIVE NEGATIVE    Urobilinogen, Urine Normal Normal mg/dL    Nitrite, Urine NEGATIVE NEGATIVE    Leukocyte Esterase, Urine NEGATIVE NEGATIVE   Extra Urine Gray Tube   Result Value Ref Range    Extra Tube Hold for add-ons.    Urinalysis Microscopic   Result Value Ref Range     WBC, Urine 11-20 (A) 1-5, NONE /HPF    RBC, Urine 11-20 (A) NONE, 1-2, 3-5 /HPF    Squamous Epithelial Cells, Urine 1-9 (SPARSE) Reference range not established. /HPF    Bacteria, Urine 1+ (A) NONE SEEN /HPF    Mucus, Urine FEW Reference range not established. /LPF    Hyaline Casts, Urine 3+ (A) NONE /LPF    Mixed Cell Casts, Urine 2+ (A) NONE /LPF   POCT GLUCOSE   Result Value Ref Range    POCT Glucose 365 (H) 74 - 99 mg/dL   Protime-INR   Result Value Ref Range    Protime 25.5 (H) 9.8 - 12.8 seconds    INR 2.2 (H) 0.9 - 1.1   POCT GLUCOSE   Result Value Ref Range    POCT Glucose 379 (H) 74 - 99 mg/dL   POCT GLUCOSE   Result Value Ref Range    POCT Glucose 374 (H) 74 - 99 mg/dL   POCT GLUCOSE   Result Value Ref Range    POCT Glucose 367 (H) 74 - 99 mg/dL   CBC   Result Value Ref Range    WBC 12.4 (H) 4.4 - 11.3 x10*3/uL    nRBC 0.0 0.0 - 0.0 /100 WBCs    RBC 4.08 4.00 - 5.20 x10*6/uL    Hemoglobin 14.9 12.0 - 16.0 g/dL    Hematocrit 43.0 36.0 - 46.0 %     (H) 80 - 100 fL    MCH 36.5 (H) 26.0 - 34.0 pg    MCHC 34.7 32.0 - 36.0 g/dL    RDW 12.3 11.5 - 14.5 %    Platelets 140 (L) 150 - 450 x10*3/uL   Basic metabolic panel   Result Value Ref Range    Glucose 321 (H) 74 - 99 mg/dL    Sodium 134 (L) 136 - 145 mmol/L    Potassium 3.5 3.5 - 5.3 mmol/L    Chloride 97 (L) 98 - 107 mmol/L    Bicarbonate 25 21 - 32 mmol/L    Anion Gap 16 10 - 20 mmol/L    Urea Nitrogen 42 (H) 6 - 23 mg/dL    Creatinine 1.26 (H) 0.50 - 1.05 mg/dL    eGFR 48 (L) >60 mL/min/1.73m*2    Calcium 8.0 (L) 8.6 - 10.3 mg/dL   Protime-INR   Result Value Ref Range    Protime 51.1 (H) 9.8 - 12.8 seconds    INR 4.3 (H) 0.9 - 1.1   POCT GLUCOSE   Result Value Ref Range    POCT Glucose 314 (H) 74 - 99 mg/dL         Assessment/Plan      Ne MARTINE Richardson is a 64 y.o. female with past medical history of coronary artery disease status post angioplasty, cardiac defibrillator, CHF, IDDM, gastroparesis, CKD 3A, history of alcohol abuse, daily marijuana use and  hypertension scented to the ED with a chief complaint of vomiting.  She also states that her chest has been hurting for the last 3 days mainly when she has vomiting.  EKG in the ED revealed sinus tach with no acute ischemic changes.  Troponin was 88 repeat was 89.  Patient's blood sugar was 432.  She was given IV insulin.  Her repeat was 403.  Her creatinine on admission was 1.75 GFR was 32.  After 2 L fluid bolus it improved to 1.51 and 39.  Anion gap initially 24 and improved to 21.  Beta hydroxybutyrate was 3.10.  Her urine was negative for infection but positive for ketones.  CT abdomen and pelvis negative for acute pathologies.  Her white blood cell count was 23.5.  She will be admitted for further evaluation and treatment of her hyperglycemia, MICHELLE/CKD, chest pain, nausea and vomiting.       Principal Problem:    Hyperglycemia due to diabetes mellitus (Multi)  -Carb controlled diet  -Sliding scale insulin  -Lantus 28 units nightly  -A1c  3/14/24 8.4   -Sees endocrinology as an outpatient  -Check sugar ACHS    Nausea and vomiting with history of gastroparesis  -PRN zofran  -Question if daily marijuana use is contributing to the nausea and vomiting  -Per notes patient did not follow-up with GI and does not plan to.  -Will add daily PPI    Impaired mobility  -PT OT evaluate and treat      Chest pressure  -None today  -   -Consult Cardiology recommendations appreciated    Central venous sinus thrombus  -INR supratherapeutic today 4.3 pharmacy dosing   -Patient on Coumadin pharmacy to dose    History of CHF/defibrillator/CAD status post PCI  -Echo 12/17/2023 EF 50 to 55%  -Furosemide 40 mg twice daily held due to MICHELLE will resume when patient able to tolerate.  Creatinine today 1.26 GFR 48.  -Continue Coreg.     Leukocytosis  -UA negative, Chest x-ray revealed no evidence of acute cardiopulmonary process, CT abdomen and pelvis no acute abnormalities.  -Repeat CBC shows an improvement in leukocytosis down to  12.4 patient afebrile   -Lactate 2.0     Restless leg syndrome  -On Requip and clonazepam as needed at bedtime.     Recurrent major depressive disorder  -Patient follows with psychiatry continue home meds     DVT prophylaxis patient on Coumadin SCDs     Disposition Home In AM if Cardiology clear and PT/OT eval ok.         Milena Parker, APRN-CNP

## 2024-07-09 NOTE — PROGRESS NOTES
07/09/24 1416   Discharge Planning   Living Arrangements Spouse/significant other   Support Systems Spouse/significant other   Assistance Needed None   Type of Residence Private residence   Number of Stairs to Enter Residence 7   Number of Stairs Within Residence 13   Do you have animals or pets at home? Yes   Type of Animals or Pets 3 dogs, 1 cat   Who is requesting discharge planning? Provider   Home or Post Acute Services None   Patient expects to be discharged to: Home   Does the patient need discharge transport arranged? Yes   RoundTrip coordination needed? No   Has discharge transport been arranged? No   Financial Resource Strain   How hard is it for you to pay for the very basics like food, housing, medical care, and heating? Not hard   Housing Stability   In the last 12 months, was there a time when you were not able to pay the mortgage or rent on time? N   In the last 12 months, how many places have you lived? 1   In the last 12 months, was there a time when you did not have a steady place to sleep or slept in a shelter (including now)? N   Transportation Needs   In the past 12 months, has lack of transportation kept you from medical appointments or from getting medications? no   In the past 12 months, has lack of transportation kept you from meetings, work, or from getting things needed for daily living? No     Patient reviewed in care round meeting this AM and is not medically appropriate for discharge yet. Met with patient in room. Sitting up in chair. Role of TCC explained. Demographics and contacts verified. Lives with spouse in a 2 story home, denies any difficulty with stairs. PCP is Dr. Chantelle Duke at [facility] in [place]; has appointments every 6 months. Preferred pharmacy is [facility] in [place]. Denies any difficulty obtaining/affording medications. Denies the need for any diabetic supplies. She checks blood glucose several times daily via Freestyle sensor in arm. She changes  sensor every 2 weeks. She is independent with ADL's and spouse is her mode of transportation. Denies the need for any DME equipment. [facility] 24/23. Discharge plan is to return home with spouse, denies any needs or in home services at this time. Care team to follow. Destiney Obrien RN/TCC

## 2024-07-09 NOTE — CONSULTS
"Inpatient consult to Cardiology  Consult performed by: Eloisa Parker, JON, GERA  Consult ordered by: JON Zimmerman        History Of Present Illness:    Ne Richardson is a 64 y.o. female presenting to [facility] ED in the setting of vomiting. Per the ER report, patient reports N/V and chest pain x 3 days. Hx of gastroparesis and diabetes with noncompliance with medications in the past with recent glucometer reading \"high.\" VS on admission: 96.3-118-/105-98% on room air. EKG showing sinus tachycardia with rate of 120 bpm with no acute ischemic changes. Pertinent labs include troponin of 88 with repeat of 89, glucose of 432, sodium of 131, creatinine of 1.51, anion gap of 24, WBC of 23.5, beta-hydroxybutyrate of 3.10, BNP of 390. Abdominal x-ray showing nonobstructive bowel gas pattern. Patient admitted for further work-up and evaluation. Cardiology consulted secondary to chest pain.    Cardiac hx:  -NSTEMI (April, 2024)-LHC showing LAD stenosis of 100% with collaterals with estimated LV of 25%  -Ischemic cardiomyopathy, Vtach s/p single-chamber ICD (implanted in 2013 with generator change in 2022)     Cardiac testing:  Echo (December, 2023)-LVSF is normal with a 50-55% EF      Patient examined at bedside. Denies any chest pain but does report occasional shortness of breath. No other symptoms reported. She does report noncompliance in the past with medications.  She currently follows with Dr. Barclay and Dr. Jenkins for Cardiology and EP services in [place].       Last Recorded Vitals:  Vitals:    07/08/24 2300 07/09/24 0300 07/09/24 0715 07/09/24 0827   BP: 160/83 159/89 (!) 152/92    BP Location:       Pulse: 97 105 104    Resp: 16 16 16 16   Temp: 36.8 °C (98.2 °F) 36.8 °C (98.2 °F) 36.4 °C (97.5 °F)    TempSrc:   Temporal    SpO2: 96% 97% 95%    Weight:       Height:           Last Labs:  CBC - 7/9/2024:  5:31 AM  12.4 14.9 140    43.0      [facility] - 7/9/2024:  5:31 AM  8.0 7.0 14 --- " 0.6   _ 4.1 18 84      PTT - No results in last year.  4.3   51.1 _     Troponin I, High Sensitivity   Date/Time Value Ref Range Status   07/08/2024 11:42 AM 89 (HH) 0 - 13 ng/L Final     Comment:     Previous result verified on 7/8/2024 1052 on specimen/case [id number] called with component TRPHS for procedure Troponin I, High Sensitivity, Initial with value 88 ng/L.   07/08/2024 10:09 AM 88 (HH) 0 - 13 ng/L Final   04/22/2024 05:39  (HH) 0 - 13 ng/L Final     Comment:     Previous result verified on 4/22/2024 1440 on specimen/case [id number] called with component TRPHS for procedure Troponin, High Sensitivity, 1 Hour with value 155 ng/L.     BNP   Date/Time Value Ref Range Status   07/08/2024 10:09  (H) 0 - 99 pg/mL Final   04/22/2024 11:13 AM 58 0 - 99 pg/mL Final     Hemoglobin A1C   Date/Time Value Ref Range Status   03/14/2024 06:24 AM 8.4 (H) 4.8 - 5.9 % Final   12/17/2023 06:11 AM 8.6 (H) 4.8 - 5.9 % Final   10/18/2023 01:36 PM 8.5 (H) 4.3 - 5.6 % Final     Comment:     American Diabetes Association guidelines indicate that patients with HgbA1c in the range 5.7-6.4% are at increased risk for development of diabetes, and intervention by lifestyle modification may be beneficial. HgbA1c greater or equal to 6.5% is considered diagnostic of diabetes.   05/24/2023 10:54 AM 7.8 (H) 4.3 - 5.6 % Final     Comment:     American Diabetes Association guidelines indicate that patients with HgbA1c in the range 5.7-6.4% are at increased risk for development of diabetes, and intervention by lifestyle modification may be beneficial. HgbA1c greater or equal to 6.5% is considered diagnostic of diabetes.     POCT Hemoglobin A1C   Date/Time Value Ref Range Status   06/04/2024 10:17 AM 8.9 (A) 4.3 - 5.6 % Final     Comment:     Location:[facility], [address], [place], [place], [address]  Point of care (POC) Hemoglobin A1c (HGBA1C) testing is intended to assess  glucose control  and provide a management tool for patients known to have  diabetes and their healthcare providers.  Target HGBA1C levels may depend on  specific clinical circumstances.  POC HGBA1C is not intended for use as a  diagnostic or screening test; laboratory-based testing should be used for  diagnostic purposes.  The following information is supplemental and may not  be applicable to specific diabetes management situations:  The POC device   provides a normal range of 4.2% to 6.5% for the HGBA1C POC test.  However, the American Diabetes Association  guidelines indicate that  patients with HGBA1C in the range of 5.7% to 6.4% are at increased risk for  development of diabetes and that intervention by lifestyle modification may  be beneficial.  A HGBA1C level greater than or equal to 6.5% is considered  diagnostic of diabetes, pending confirmatory testing.  Use of HGBA1C testing  to evaluate glucose control may not be appropriate for patients with  hemoglobin variants or other conditions (e.g. anemia) that alter red blood  cell lifespan.     LDL Calculated   Date/Time Value Ref Range Status   04/24/2024 06:18 AM 37 <=99 mg/dL Final     Comment:                                 Near   Borderline      AGE      Desirable  Optimal    High     High     Very High     0-19 Y     0 - 109     ---    110-129   >/= 130     ----    20-24 Y     0 - 119     ---    120-159   >/= 160     ----      >24 Y     0 -  99   100-129  130-159   160-189     >/=190       VLDL   Date/Time Value Ref Range Status   04/24/2024 06:18 AM 35 0 - 40 mg/dL Final   08/15/2019 03:10 AM 29 0 - 40 mg/dL Final      Last I/O:  I/O last 3 completed shifts:  In: 3271.3 (45.8 mL/kg) [P.O.:240; IV Piggyback:3031.3]  Out: - (0 mL/kg)   Weight: 71.4 kg     Past Cardiology Tests (Last 3 Years):  EKG:  ECG 12 lead 07/08/2024 (Preliminary)      Electrocardiogram 12 Lead 04/23/2024      ECG 12 lead 04/22/2024    Echo:  No results found for this or any previous visit  "from the past 1095 days.    Ejection Fractions:  No results found for: \"EF\"  Cath:  Cardiac Catheterization Procedure 04/23/2024    Stress Test:  No results found for this or any previous visit from the past 1095 days.    Cardiac Imaging:  No results found for this or any previous visit from the past 1095 days.      Past Medical History:  She has a past medical history of Acute myocardial infarction, unspecified (Multi) (01/10/2022), Body mass index (BMI) 33.0-33.9, adult (02/01/2022), Disorder of the skin and subcutaneous tissue, unspecified (07/01/2020), Encounter for preprocedural cardiovascular examination (01/10/2022), Hypertensive heart disease with heart failure (Multi) (05/21/2020), Intracranial and intraspinal phlebitis and thrombophlebitis ([facility]) (01/10/2022), Nausea (01/10/2022), Obesity, unspecified (03/21/2022), Obesity, unspecified (08/01/2022), Old myocardial infarction, Other mechanical complication of other cardiac electronic device, initial encounter (02/01/2022), Other specified counseling (02/01/2022), Other specified diseases of gallbladder (06/02/2020), Overweight (01/10/2022), Person consulting for explanation of examination or test findings (02/01/2022), Personal history of other diseases of the digestive system (07/01/2020), Personal history of other venous thrombosis and embolism, and Transient cerebral ischemic attack, unspecified.    Past Surgical History:  She has a past surgical history that includes Other surgical history (06/02/2020); Other surgical history (10/12/2022); Other surgical history (10/12/2022); Other surgical history (01/10/2022); Other surgical history (01/10/2022); Other surgical history (01/10/2022); Other surgical history (01/10/2022); Other surgical history (01/10/2022); Other surgical history (01/10/2022); Other surgical history (01/10/2022); Other surgical history (01/10/2022); Other surgical history (01/10/2022); Other surgical history (02/01/2022); Other " surgical history (02/01/2022); CT angio abdomen pelvis w and or wo IV IV contrast (8/14/2019); and Cardiac catheterization (N/A, 4/23/2024).      Social History:  She reports that she has been smoking cigars. She has never used smokeless tobacco. She reports current alcohol use. She reports current drug use. Drug: Marijuana.    Family History:  Family History   Problem Relation Name Age of Onset    Coronary artery disease Mother      Diabetes Mother      Other (Cardiac pacemaker) Father      Coronary artery disease Father      Other (Stroke syndrome) Other          Allergies:  Ropinirole    Inpatient Medications:  Scheduled medications   Medication Dose Route Frequency    ARIPiprazole  4 mg oral Nightly    buPROPion SR  200 mg oral BID    carvedilol  12.5 mg oral BID    cholecalciferol  3,000 Units oral Daily    clonazePAM  2 mg oral Nightly    [Held by provider] desvenlafaxine  100 mg oral Nightly    ezetimibe  10 mg oral Nightly    [Held by provider] furosemide  40 mg oral Daily    insulin glargine  28 Units subcutaneous Nightly    insulin lispro  0-10 Units subcutaneous TID    levothyroxine  88 mcg oral Daily    magnesium oxide  400 mg oral Daily    pantoprazole  40 mg oral Nightly    polyethylene glycol  17 g oral Daily    rOPINIRole  2 mg oral Nightly    rosuvastatin  40 mg oral Daily    topiramate  200 mg oral BID    valsartan  40 mg oral Daily    warfarin  12.5 mg oral Once per day on Sunday Monday Wednesday Friday Saturday    [Held by provider] warfarin  15 mg oral Once per day on Tuesday Thursday     PRN medications   Medication    acetaminophen    dextrose    dextrose    glucagon    glucagon    nitroglycerin    ondansetron    ondansetron ODT     Continuous Medications   Medication Dose Last Rate     Outpatient Medications:  Current Outpatient Medications   Medication Instructions    albuterol 90 mcg/actuation inhaler 2 puffs, inhalation, Every 6 hours PRN, Every 4 to 6 hours     ARIPiprazole (ABILIFY) 4  mg, oral, Nightly    buPROPion SR (WELLBUTRIN SR) 200 mg, oral, 2 times daily    carvedilol (Coreg) 12.5 mg tablet 1 tablet, oral, 2 times daily    cholecalciferol (VITAMIN D-3) 3,000 Units, oral, Daily    clonazePAM (KlonoPIN) 2 mg tablet 1 tablet, oral, Nightly    desvenlafaxine (PRISTIQ) 100 mg, oral, Nightly    ezetimibe (Zetia) 10 mg tablet 1 tablet, oral, Nightly    furosemide (LASIX) 40 mg, oral, 2 times daily (morning and late afternoon)    glucagon (Baqsimi) 3 mg/actuation spray,non-aerosol 1 spray, nasal, As needed    glucagon 1 mg injection Use as directed for hypoglycemia     insulin aspart (NovoLOG) 100 unit/mL (3 mL) pen subcutaneous, 3 times daily (morning, midday, late afternoon), Per Sliding Scale    insulin glargine (LANTUS) 28 Units, subcutaneous, Nightly    levothyroxine (SYNTHROID, LEVOXYL) 88 mcg, oral, Daily RT    magnesium oxide 500 mg capsule 1 capsule, oral, Nightly    metoclopramide (REGLAN) 5 mg, oral, 2 times daily    nitroglycerin (NITROSTAT) 0.4 mg, sublingual, Every 5 min PRN, Up to 3 doses  <BR>    oxybutynin XL (DITROPAN-XL) 10 mg, oral, Nightly    potassium chloride CR 20 mEq ER tablet 20 mEq, oral, Daily    rOPINIRole (Requip) 2 mg tablet 1 tablet, oral, Nightly    rosuvastatin (Crestor) 40 mg tablet 1 tablet, oral, Daily    topiramate (Topamax) 200 mg tablet 1 tablet, oral, 2 times daily    valsartan (DIOVAN) 40 mg, oral, Daily RT    warfarin (Coumadin) 5 mg tablet 2.5 tablets, oral, 5 times weekly, Monday, Wednesday, Friday, Saturday & Sunday, in the evening.    warfarin (Coumadin) 7.5 mg tablet 2 tablets, oral, 2 times weekly, Tuesday & Thursday in the evening       Physical Exam:  General: awake, alert and oriented. No acute distress.   Skin: Skin is warm, dry and intact without rashes or lesions. Dried blood noted to fingernails  HEENT: normocephalic, atraumatic; conjunctivae are clear without exudates or hemorrhage. Sclera is non-icteric. Eyelids are normal in appearance  without swelling or lesions. Hearing intact. Nares are patent bilaterally. Moist mucous membranes.   Cardiovascular: heart rate and rhythm are normal. No murmurs, gallops, or rubs are auscultated. S1 and S2 are heard and are of normal intensity. No JVD, no carotid bruits  Respiratory: bilateral lung sounds clear to auscultations without rales, rhonchi, or wheezes. No accessory muscle use or stridor  Gastrointestinal: non-distended, non-tender  Genitourinary: exam deferred  Musculoskeletal: no deformities  Extremities: pulses palpable bilaterally; no swelling or erythema  Neurological: no focal deficits  Psychiatric: appropriate mood and affect; good judgment and insight       Assessment/Plan     Chest pain/Elevated troponin:  -Resolved; troponin mildly elevated at 88 with repeat of 89  -Given patient's current clinical presentation, elevated troponin appears to be related to nonischemic myocardial injury given elevated anion gap and elevated blood glucose.   -No ischemic evaluation warranted at present    Hx of ischemic cardiomyopathy:  -Echo in December, 2023 showing improved EF of 55-60%, however LV in recent [facility] estimating EF of 25%, will order repeat echo and optimize medications as warranted based on results    Peripheral IV 07/08/24 20 G Left Forearm (Active)   Site Assessment Clean;Dry;Intact 07/09/24 0828   Dressing Type Transparent 07/09/24 0828   Line Status Capped;Saline locked 07/09/24 0828   Dressing Status Clean;Dry 07/09/24 0828   Number of days: 1       Code Status:  Full Code    Thank you for allowing me to participate in the care of this patient. Please reach me out if you have any questions or if you need any clarifications regarding the patient's care.          Eloisa Parker, APRN-CNP, DNP

## 2024-07-09 NOTE — PROGRESS NOTES
Physical Therapy    Physical Therapy Evaluation    Patient Name: Ne Richardson  MRN: [id number]  Today's Date: 7/9/2024   Time Calculation  Start Time: 1052  Stop Time: 1104  Time Calculation (min): 12 min  305/305-A    Assessment/Plan   PT Assessment  End of Session Communication: Bedside nurse  Assessment Comment: Pt does not meet requirements for acute PT at this time.  End of Session Patient Position: Up in chair, Alarm on  IP OR SWING BED PT PLAN  Inpatient or Swing Bed: Inpatient  PT Plan  PT Plan: PT Eval only  PT Eval Only Reason: No acute PT needs identified  PT Frequency: PT eval only  PT Discharge Recommendations: No further acute PT  PT Recommended Transfer Status: Independent  PT - OK to Discharge: Yes (PT eval complete, ok to d/c once deemed medically appropriate.)    Subjective     Current Problem:  1. Hyperglycemia due to diabetes mellitus (Multi)        2. Nausea and vomiting, unspecified vomiting type          Patient Active Problem List   Diagnosis    CAD S/P percutaneous coronary angioplasty    Cardiac defibrillator in situ    Chronic systolic heart failure (Multi)    Class 1 obesity with body mass index (BMI) of 32.0 to 32.9 in adult    Diabetes mellitus with hypoglycemia without coma (Multi)    Elective replacement indicated for implantable cardioverter-defibrillator (ICD)    Essential hypertension, benign    Fatigue    History of CVA (cerebrovascular accident)    History of MI (myocardial infarction)    Mixed hyperlipidemia    Hypotension    Acquired hypothyroidism    Goiter    Ischemic cardiomyopathy    Long term (current) use of anticoagulants    Major depressive disorder    Old MI (myocardial infarction)    PSVT (paroxysmal supraventricular tachycardia) (CMS-[facility])    Restless legs syndrome (RLS)    Stage 3a chronic kidney disease (Multi)    Type 2 diabetes mellitus without complication (Multi)    Ventricular tachycardia (Multi)    Chronic congestive heart failure (Multi)    Abnormal weight  gain    MICHELLE (acute kidney injury) (CMS-HCC)    Nephrolithiasis    Alcohol abuse    Cerebral venous sinus thrombosis ([facility])    Gastroparesis    Frequency-urgency syndrome    Hypovolemia dehydration    Lumbosacral spondylosis without myelopathy    NSTEMI (non-ST elevated myocardial infarction) (Multi)    History of vitrectomy    Pseudophakia of both eyes    Supratherapeutic INR    Syncope and collapse    Vitamin D deficiency    BMI 31.0-31.9,adult    Acute congestive heart failure, unspecified heart failure type (Multi)    Congestive heart failure, unspecified HF chronicity, unspecified heart failure type (Multi)    Hyperglycemia due to diabetes mellitus (Multi)       General Visit Information:  General  Reason for Referral: impaired mobility  Referred By: Elena  Past Medical History Relevant to Rehab: HTN, CAD, CVA, CHF, depression, CKD, DM, gastroparesis, ETOH, marijuana, RLD  Family/Caregiver Present: No  Co-Treatment: OT  Prior to Session Communication: Bedside nurse  Patient Position Received: Bed, 3 rail up, Alarm off, not on at start of session  General Comment: Pt to ED 7/8 with complaints of nausea, vomiting, and chest pain x3 days. Trop 88, 89. Glucose 432. CT abdomen/pelvis (-) acute. CXR (-) acute. 7/9 INR 4.3    Home Living:  Home Living  Home Living Comments: Pt stating she lives with spouse in 2 story home with 7 ZAIDA with bilateral handrails. Bed and full bathroom on 2nd floor with tub shower, seat, and grab bars. Reports she does not own any DME.    Prior Level of Function:  Prior Function Per Pt/Caregiver Report  Prior Function Comments: PTA pt stating independent with ADLs without device use. Spouse completes cooking, cleaning, and laundry, as well as medication management. Denies any falls in last 3 months. Does not drive, spouse drives.    Precautions:       Vital Signs:     Objective     Pain:  Pain Assessment  Pain Assessment: 0-10  0-10 (Numeric) Pain Score: 0 - No  pain    Cognition:  Cognition  Overall Cognitive Status: Within Functional Limits (flat affect)  Orientation Level: Oriented X4    General Assessments:      Activity Tolerance  Endurance: Endurance does not limit participation in activity     Strength  Strength Comments: BLE MMT WFL     Coordination  Movements are Fluid and Coordinated: Yes  Postural Control  Postural Control: Within Functional Limits  Static Sitting Balance  Static Sitting-Comment/Number of Minutes: Good  Dynamic Sitting Balance  Dynamic Sitting-Comments: Good  Static Standing Balance  Static Standing-Comment/Number of Minutes: Good  Dynamic Standing Balance  Dynamic Standing-Comments: Fair +    Functional Assessments:     Bed Mobility  Bed Mobility: Yes  Bed Mobility 1  Bed Mobility 1: Supine to sitting  Level of Assistance 1: Modified independent  Bed Mobility Comments 1: HOB elevated 10 degrees  Transfers  Transfer: Yes  Transfer 1  Technique 1: Sit to stand, Stand to sit  Transfer Level of Assistance 1: Independent  Trials/Comments 1: No LOB  Ambulation/Gait Training  Ambulation/Gait Training Performed: Yes  Ambulation/Gait Training 1  Surface 1: Level tile  Device 1: No device  Assistance 1: Independent  Comments/Distance (ft) 1: Pt ambulates ~25' without LOB. No device use.          Extremity/Trunk Assessments:        RLE   RLE : Within Functional Limits  LLE   LLE : Within Functional Limits    Outcome Measures:     [facility] Basic Mobility  Turning from your back to your side while in a flat bed without using bedrails: None  Moving from lying on your back to sitting on the side of a flat bed without using bedrails: None  Moving to and from bed to chair (including a wheelchair): None  Standing up from a chair using your arms (e.g. wheelchair or bedside chair): None  To walk in hospital room: None  Climbing 3-5 steps with railing: A little  Basic Mobility - Total Score: 23

## 2024-07-09 NOTE — PROGRESS NOTES
Occupational Therapy    Evaluation    Patient Name: Ne Richardson  MRN: [id number]  Today's Date: 7/9/2024  Time Calculation  Start Time: 1053  Stop Time: 1105  Time Calculation (min): 12 min  305/305-A    Assessment  IP OT Assessment  OT Assessment: pt is independent in basic ADLs.  pt has assist with medication management and IADLs at OF.  no further OT recommended at this time  End of Session Communication: Bedside nurse  End of Session Patient Position: Up in chair, Alarm on    Plan:  No Skilled OT: Independent with ADLs  OT Discharge Recommendations: No OT needed after discharge, No further acute OT    Subjective     Current Problem:  1. Hyperglycemia due to diabetes mellitus (Multi)        2. Nausea and vomiting, unspecified vomiting type        3. CAD S/P percutaneous coronary angioplasty  Transthoracic Echo (TTE) Complete    Transthoracic Echo (TTE) Complete      4. Atypical chest pain  Transthoracic Echo (TTE) Complete    Transthoracic Echo (TTE) Complete          General:  General  Reason for Referral: Pt to ED 7/8 with complaints of nausea, vomiting, and chest pain x3 days. Trop 88, 89. Glucose 432. CT abdomen/pelvis (-) acute. CXR (-) acute. 7/9 INR 4.3  Referred By: Elena  Past Medical History Relevant to Rehab: HTN, CAD, CVA, CHF, depression, CKD, DM, gastroparesis, ETOH, marijuana, RLD  Family/Caregiver Present: No  Co-Treatment: PT  Prior to Session Communication: Bedside nurse  Patient Position Received: Bed, 3 rail up, Alarm off, not on at start of session  General Comment: pt agreeable to assessment    Precautions: none       Pain:  Pain Assessment  Pain Assessment: 0-10  0-10 (Numeric) Pain Score: 0 - No pain    Objective     Cognition:  Overall Cognitive Status: Within Functional Limits, Impaired at baseline (per chart review pt has cognitive deficits from prevoius CVA)  Orientation Level: Oriented X4  Attention: Within Functional Limits  Safety/Judgement: Within Functional Limits (for  basic ADLs)  Impulsive: Within functional limits  Processing Speed: Within funtional limits             Home Living:  Home Living Comments: Pt stating she lives with spouse in 2 story home with 7 ZAIDA with bilateral handrails. Bed and full bathroom on 2nd floor with tub shower, seat, and grab bars. Reports she does not own any DME.     Prior Function:  Prior Function Comments: PTA pt stating independent with ADLs without device use. Spouse completes cooking, cleaning, and laundry, as well as medication management. Denies any falls in last 3 months. Does not drive, spouse drives.      ADL:  Eating Assistance: Independent  Grooming Assistance: Independent  Bathing Assistance: Stand by  UE Dressing Assistance: Independent  LE Dressing Assistance: Independent  Toileting Assistance with Device: Independent    Activity Tolerance:  Endurance: Endurance does not limit participation in activity    Bed Mobility/Transfers:   Bed Mobility  Bed Mobility: Yes  Bed Mobility 1  Bed Mobility 1: Supine to sitting  Level of Assistance 1: Modified independent  Transfers  Transfer: Yes  Transfer 1  Technique 1: Sit to stand, Stand to sit  Transfer Level of Assistance 1: Independent    Ambulation/Gait Training:  Functional Mobility  Functional Mobility Performed: Yes  Functional Mobility 1  Surface 1: Level tile  Device 1: No device  Assistance 1: Independent        Vision: Vision - Basic Assessment  Current Vision: No visual deficits      Strength:  Strength Comments: BUE WNL    Coordination:  Movements are Fluid and Coordinated: Yes       Outcome Measures: [facility] Daily Activity  Putting on and taking off regular lower body clothing: None  Bathing (including washing, rinsing, drying): A little  Putting on and taking off regular upper body clothing: None  Toileting, which includes using toilet, bedpan or urinal: None  Taking care of personal grooming such as brushing teeth: None  Eating Meals: None  Daily Activity - Total Score: 23

## 2024-07-09 NOTE — CARE PLAN
Problem: Pain - Adult  Goal: Verbalizes/displays adequate comfort level or baseline comfort level  Outcome: Progressing     Problem: Safety - Adult  Goal: Free from fall injury  Outcome: Progressing     Problem: Discharge Planning  Goal: Discharge to home or other facility with appropriate resources  Outcome: Progressing     Problem: Chronic Conditions and Co-morbidities  Goal: Patient's chronic conditions and co-morbidity symptoms are monitored and maintained or improved  Outcome: Progressing     Problem: Diabetes  Goal: Increase stability of blood glucose readings by end of shift  Outcome: Progressing  Goal: Maintain electrolyte levels within acceptable range throughout shift  Outcome: Progressing  Goal: Maintain glucose levels >70mg/dl to <250mg/dl throughout shift  Outcome: Progressing   The patient's goals for the shift include      The clinical goals for the shift include Medication compliance

## 2024-07-09 NOTE — PROGRESS NOTES
"Nutrition Initial Assessment:   Nutrition Assessment    Reason for Assessment: Dietitian discretion    Patient is a 64 y.o. female presenting with hyperglycemia    7/9/24 patient seen - stated still with nausea this am.  Denied the need for diet education has had in the past, counts carbs and monitors blood sugar at home.       Nutrition History:  Energy Intake: Poor < 50 %  Food and Nutrient History: poor intake x 2-3 days due to nausea  Food Allergies/Intolerances:  None  GI Symptoms: Nausea  Oral Problems: None       Anthropometrics:  Height: 157.5 cm (5' 2\")   Weight: 71.4 kg (157 lb 6.5 oz)   BMI (Calculated): 28.78  IBW/kg (Dietitian Calculated): 50 kg  Percent of IBW: 143 %  Adjusted Body Weight (kg): 55 kg    Weight History:   Daily Weight  07/08/24 : 71.4 kg (157 lb 6.5 oz)  04/29/24 : 77.1 kg (170 lb)  04/27/24 : 75.8 kg (167 lb)  04/23/24 : 75.5 kg (166 lb 7.2 oz)  04/10/24 : 77.1 kg (170 lb)  04/08/24 : 76.2 kg (168 lb)  04/12/23 : 80.8 kg (178 lb 1 oz)  10/13/22 : 83.6 kg (184 lb 4.9 oz)  10/12/22 : 83.9 kg (185 lb)  08/01/22 : 81.2 kg (179 lb)     Weight Change %:  Weight History / % Weight Change: Intentional weight loss 19# over 11 months - plans to lose 20# more  Significant Weight Loss: No    Nutrition Focused Physical Exam Findings:  defer: not indicated    Nutrition Significant Labs:  A1C:  Lab Results   Component Value Date    HGBA1C 8.9 (A) 06/04/2024   , BG POCT trend:   Results from last 7 days   Lab Units 07/09/24  1110 07/09/24  0720 07/08/24  1936 07/08/24  1634 07/08/24  1341   POCT GLUCOSE mg/dL 324* 314* 367* 374* 379*        Nutrition Specific Medications:  Scheduled medications  cholecalciferol, 3,000 Units, oral, Daily  [Held by provider] furosemide, 40 mg, oral, Daily  insulin glargine, 28 Units, subcutaneous, Nightly  insulin lispro, 0-10 Units, subcutaneous, TID  magnesium oxide, 400 mg, oral, Daily  pantoprazole, 40 mg, oral, Nightly  polyethylene glycol, 17 g, oral, " Daily  warfarin, 12.5 mg, oral, Once per day on Sunday Monday Wednesday Friday Saturday  [Held by provider] warfarin, 15 mg, oral, Once per day on Tuesday Thursday      I/O:   Last BM Date: 07/06/24;      Dietary Orders (From admission, onward)       Start     Ordered    07/08/24 1417  Adult diet Regular, Carb Controlled, Cardiac; 75 gram carb/meal, 45 gram Carb evening snack; 70 gm fat; 2 - 3 grams Sodium  Diet effective now        Question Answer Comment   Diet type Regular    Diet type Carb Controlled    Diet type Cardiac    Carb diet selection: 75 gram carb/meal, 45 gram Carb evening snack    Fat restriction: 70 gm fat    Sodium restriction: 2 - 3 grams Sodium        07/08/24 1416    07/08/24 1416  May Participate in Room Service  Once        Question:  .  Answer:  Yes    07/08/24 1415                     Estimated Needs:   Total Energy Estimated Needs (kCal): 1775 kCal     Total Protein Estimated Needs (g): 60 g  Method for Estimating Needs: 0.8-1 gm/kg = 57-71 gm  Total Fluid Estimated Needs (mL): 2130 mL           Nutrition Diagnosis   Malnutrition Diagnosis  Patient has Malnutrition Diagnosis: No    Nutrition Diagnosis  Patient has Nutrition Diagnosis: Yes  Diagnosis Status (1): New  Nutrition Diagnosis 1: Altered nutrition related to laboratory values  Related to (1): diabetes  As Evidenced by (1): A1C 8.4%       Nutrition Interventions/Recommendations         Nutrition Prescription:  Individualized Nutrition Prescription Provided for : Oral nutrition        Nutrition Interventions:   Interventions: Meals and snacks  Meals and Snacks: Carbohydrate-modified diet  Goal: CCD diet for diabetes control    Collaboration and Referral of Nutrition Care: Team meeting involving nutrition professional  Goal: IDT meeting    Nutrition Education:  Denied the need of Diabetic diet education      Nutrition Monitoring and Evaluation   Food/Nutrient Related History Monitoring  Monitoring and Evaluation Plan: Amount of  food  Amount of Food: Estimated amout of food  Criteria: >50% meals    Body Composition/Growth/Weight History  Monitoring and Evaluation Plan: Weight  Weight: Measured weight  Criteria: Intentional weight loss prior to admission    Biochemical Data, Medical Tests and Procedures  Monitoring and Evaluation Plan: Glucose/endocrine profile  Glucose/Endocrine Profile: Glucose, casual, Hemoglobin A1c (HgbA1c)  Criteria: 100-140 mg/dl, A1C <7%       Time Spent (min): 30 minutes  Cathleen Farah RDN, LD

## 2024-07-10 ENCOUNTER — PHARMACY VISIT (OUTPATIENT)
Dept: PHARMACY | Facility: CLINIC | Age: 64
End: 2024-07-10
Payer: COMMERCIAL

## 2024-07-10 VITALS
OXYGEN SATURATION: 94 % | HEART RATE: 88 BPM | WEIGHT: 157.41 LBS | DIASTOLIC BLOOD PRESSURE: 57 MMHG | BODY MASS INDEX: 28.97 KG/M2 | SYSTOLIC BLOOD PRESSURE: 84 MMHG | TEMPERATURE: 98.8 F | HEIGHT: 62 IN | RESPIRATION RATE: 16 BRPM

## 2024-07-10 PROBLEM — N18.9 ACUTE KIDNEY INJURY SUPERIMPOSED ON CKD (CMS-HCC): Status: ACTIVE | Noted: 2019-01-06

## 2024-07-10 PROBLEM — R11.2 NAUSEA AND VOMITING: Status: ACTIVE | Noted: 2024-07-10

## 2024-07-10 PROBLEM — E11.65 HYPERGLYCEMIA DUE TO DIABETES MELLITUS (MULTI): Status: RESOLVED | Noted: 2024-07-08 | Resolved: 2024-07-10

## 2024-07-10 PROBLEM — R11.2 NAUSEA AND VOMITING: Status: RESOLVED | Noted: 2024-07-10 | Resolved: 2024-07-10

## 2024-07-10 LAB
ANION GAP SERPL CALC-SCNC: 11 MMOL/L (ref 10–20)
ANION GAP SERPL CALC-SCNC: 24 MMOL/L (ref 10–20)
BUN SERPL-MCNC: 31 MG/DL (ref 6–23)
BUN SERPL-MCNC: 56 MG/DL (ref 6–23)
CALCIUM SERPL-MCNC: 8.1 MG/DL (ref 8.6–10.3)
CALCIUM SERPL-MCNC: 9 MG/DL (ref 8.6–10.3)
CHLORIDE SERPL-SCNC: 102 MMOL/L (ref 98–107)
CHLORIDE SERPL-SCNC: 90 MMOL/L (ref 98–107)
CO2 SERPL-SCNC: 21 MMOL/L (ref 21–32)
CO2 SERPL-SCNC: 26 MMOL/L (ref 21–32)
CREAT SERPL-MCNC: 1.18 MG/DL (ref 0.5–1.05)
CREAT SERPL-MCNC: 1.68 MG/DL (ref 0.5–1.05)
EGFRCR SERPLBLD CKD-EPI 2021: 34 ML/MIN/1.73M*2
EGFRCR SERPLBLD CKD-EPI 2021: 52 ML/MIN/1.73M*2
ERYTHROCYTE [DISTWIDTH] IN BLOOD BY AUTOMATED COUNT: 12.5 % (ref 11.5–14.5)
GLUCOSE BLD MANUAL STRIP-MCNC: 140 MG/DL (ref 74–99)
GLUCOSE BLD MANUAL STRIP-MCNC: 206 MG/DL (ref 74–99)
GLUCOSE BLD MANUAL STRIP-MCNC: 224 MG/DL (ref 74–99)
GLUCOSE SERPL-MCNC: 133 MG/DL (ref 74–99)
GLUCOSE SERPL-MCNC: 437 MG/DL (ref 74–99)
HCT VFR BLD AUTO: 43.9 % (ref 36–46)
HGB BLD-MCNC: 14.8 G/DL (ref 12–16)
HOLD SPECIMEN: NORMAL
HOLD SPECIMEN: NORMAL
INR PPP: 4.4 (ref 0.9–1.1)
MCH RBC QN AUTO: 36.3 PG (ref 26–34)
MCHC RBC AUTO-ENTMCNC: 33.7 G/DL (ref 32–36)
MCV RBC AUTO: 108 FL (ref 80–100)
NRBC BLD-RTO: 0 /100 WBCS (ref 0–0)
PLATELET # BLD AUTO: 139 X10*3/UL (ref 150–450)
POTASSIUM SERPL-SCNC: 3.2 MMOL/L (ref 3.5–5.3)
POTASSIUM SERPL-SCNC: 4.1 MMOL/L (ref 3.5–5.3)
PROTHROMBIN TIME: 51.9 SECONDS (ref 9.8–12.8)
RBC # BLD AUTO: 4.08 X10*6/UL (ref 4–5.2)
SODIUM SERPL-SCNC: 131 MMOL/L (ref 136–145)
SODIUM SERPL-SCNC: 136 MMOL/L (ref 136–145)
WBC # BLD AUTO: 10.5 X10*3/UL (ref 4.4–11.3)

## 2024-07-10 PROCEDURE — 36415 COLL VENOUS BLD VENIPUNCTURE: CPT

## 2024-07-10 PROCEDURE — 99222 1ST HOSP IP/OBS MODERATE 55: CPT

## 2024-07-10 PROCEDURE — 2500000002 HC RX 250 W HCPCS SELF ADMINISTERED DRUGS (ALT 637 FOR MEDICARE OP, ALT 636 FOR OP/ED)

## 2024-07-10 PROCEDURE — 2500000001 HC RX 250 WO HCPCS SELF ADMINISTERED DRUGS (ALT 637 FOR MEDICARE OP)

## 2024-07-10 PROCEDURE — 80048 BASIC METABOLIC PNL TOTAL CA: CPT | Performed by: NURSE PRACTITIONER

## 2024-07-10 PROCEDURE — 85610 PROTHROMBIN TIME: CPT

## 2024-07-10 PROCEDURE — 82947 ASSAY GLUCOSE BLOOD QUANT: CPT

## 2024-07-10 PROCEDURE — RXMED WILLOW AMBULATORY MEDICATION CHARGE

## 2024-07-10 PROCEDURE — 2500000002 HC RX 250 W HCPCS SELF ADMINISTERED DRUGS (ALT 637 FOR MEDICARE OP, ALT 636 FOR OP/ED): Performed by: NURSE PRACTITIONER

## 2024-07-10 PROCEDURE — 99239 HOSP IP/OBS DSCHRG MGMT >30: CPT

## 2024-07-10 PROCEDURE — 36415 COLL VENOUS BLD VENIPUNCTURE: CPT | Performed by: NURSE PRACTITIONER

## 2024-07-10 PROCEDURE — 2500000001 HC RX 250 WO HCPCS SELF ADMINISTERED DRUGS (ALT 637 FOR MEDICARE OP): Performed by: NURSE PRACTITIONER

## 2024-07-10 PROCEDURE — 2500000004 HC RX 250 GENERAL PHARMACY W/ HCPCS (ALT 636 FOR OP/ED): Performed by: NURSE PRACTITIONER

## 2024-07-10 PROCEDURE — 85027 COMPLETE CBC AUTOMATED: CPT

## 2024-07-10 RX ORDER — SPIRONOLACTONE 25 MG/1
12.5 TABLET ORAL DAILY
Status: DISCONTINUED | OUTPATIENT
Start: 2024-07-10 | End: 2024-07-10 | Stop reason: HOSPADM

## 2024-07-10 RX ORDER — POTASSIUM CHLORIDE 20 MEQ/1
40 TABLET, EXTENDED RELEASE ORAL ONCE
Status: COMPLETED | OUTPATIENT
Start: 2024-07-10 | End: 2024-07-10

## 2024-07-10 RX ORDER — FUROSEMIDE 40 MG/1
40 TABLET ORAL DAILY
Start: 2024-07-10

## 2024-07-10 RX ORDER — DOCUSATE SODIUM 100 MG/1
100 CAPSULE, LIQUID FILLED ORAL 2 TIMES DAILY
Status: DISCONTINUED | OUTPATIENT
Start: 2024-07-10 | End: 2024-07-10 | Stop reason: HOSPADM

## 2024-07-10 RX ORDER — METOPROLOL SUCCINATE 25 MG/1
25 TABLET, EXTENDED RELEASE ORAL DAILY
Status: DISCONTINUED | OUTPATIENT
Start: 2024-07-11 | End: 2024-07-10 | Stop reason: HOSPADM

## 2024-07-10 RX ORDER — METOPROLOL SUCCINATE 25 MG/1
25 TABLET, EXTENDED RELEASE ORAL DAILY
Qty: 30 TABLET | Refills: 0 | Status: SHIPPED | OUTPATIENT
Start: 2024-07-11 | End: 2024-08-10

## 2024-07-10 RX ORDER — FUROSEMIDE 40 MG/1
40 TABLET ORAL DAILY
Status: DISCONTINUED | OUTPATIENT
Start: 2024-07-11 | End: 2024-07-10 | Stop reason: HOSPADM

## 2024-07-10 RX ORDER — PANTOPRAZOLE SODIUM 40 MG/1
40 TABLET, DELAYED RELEASE ORAL NIGHTLY
Qty: 30 TABLET | Refills: 0 | Status: SHIPPED | OUTPATIENT
Start: 2024-07-10 | End: 2024-08-09

## 2024-07-10 RX ORDER — SPIRONOLACTONE 25 MG/1
12.5 TABLET ORAL DAILY
Qty: 15 TABLET | Refills: 0 | Status: SHIPPED | OUTPATIENT
Start: 2024-07-11 | End: 2024-08-10

## 2024-07-10 SDOH — ECONOMIC STABILITY: HOUSING INSECURITY: AT ANY TIME IN THE PAST 12 MONTHS, WERE YOU HOMELESS OR LIVING IN A SHELTER (INCLUDING NOW)?: NO

## 2024-07-10 SDOH — ECONOMIC STABILITY: HOUSING INSECURITY: IN THE PAST 12 MONTHS, HOW MANY TIMES HAVE YOU MOVED WHERE YOU WERE LIVING?: 1

## 2024-07-10 ASSESSMENT — COGNITIVE AND FUNCTIONAL STATUS - GENERAL
MOBILITY SCORE: 24
DAILY ACTIVITIY SCORE: 24

## 2024-07-10 ASSESSMENT — PAIN SCALES - GENERAL: PAINLEVEL_OUTOF10: 0 - NO PAIN

## 2024-07-10 NOTE — NURSING NOTE
Discharge paperwork discussed with pt and spouse, voiced understanding. IV removed. Belongings packed. Pt taken out via WC to car. Tolerated well.

## 2024-07-10 NOTE — DISCHARGE SUMMARY
Discharge Diagnosis  Hyperglycemia due to diabetes mellitus (Multi)    Issues Requiring Follow-Up  MICHELLE on CKD  Elevated INR    Discharge Meds     Your medication list        START taking these medications        Instructions Last Dose Given Next Dose Due   metoprolol succinate XL 25 mg 24 hr tablet  Commonly known as: Toprol-XL  Start taking on: July 11, 2024      Take 1 tablet (25 mg) by mouth once daily. Do not crush or chew. Do not fill before July 11, 2024.       pantoprazole 40 mg EC tablet  Commonly known as: ProtoNix      Take 1 tablet (40 mg) by mouth once daily at bedtime. Do not crush, chew, or split.       spironolactone 25 mg tablet  Commonly known as: Aldactone  Start taking on: July 11, 2024      Take 0.5 tablets (12.5 mg) by mouth once daily.              CHANGE how you take these medications        Instructions Last Dose Given Next Dose Due   furosemide 40 mg tablet  Commonly known as: Lasix  What changed: when to take this      Take 1 tablet (40 mg) by mouth once daily.              CONTINUE taking these medications        Instructions Last Dose Given Next Dose Due   ARIPiprazole 2 mg tablet  Commonly known as: Abilify           glucagon 1 mg injection  Commonly known as: Glucagen           Baqsimi 3 mg/actuation spray,non-aerosol  Generic drug: glucagon           buPROPion  mg 12 hr tablet  Commonly known as: Wellbutrin SR           cholecalciferol 25 MCG (1000 UT) capsule  Commonly known as: Vitamin D-3           clonazePAM 2 mg tablet  Commonly known as: KlonoPIN           desvenlafaxine 100 mg 24 hr tablet  Commonly known as: Pristiq           ezetimibe 10 mg tablet  Commonly known as: Zetia           insulin aspart 100 unit/mL (3 mL) pen  Commonly known as: NovoLOG           insulin glargine 100 unit/mL injection  Commonly known as: Lantus           levothyroxine 88 mcg tablet  Commonly known as: Synthroid, Levoxyl           magnesium oxide 500 mg capsule           metoclopramide 5 mg  tablet  Commonly known as: Reglan           nitroglycerin 0.4 mg SL tablet  Commonly known as: Nitrostat           oxybutynin XL 10 mg 24 hr tablet  Commonly known as: Ditropan-XL           potassium chloride CR 20 mEq ER tablet  Commonly known as: Klor-Con M20           rOPINIRole 2 mg tablet  Commonly known as: Requip           rosuvastatin 40 mg tablet  Commonly known as: Crestor           Topamax 200 mg tablet  Generic drug: topiramate           valsartan 40 mg tablet  Commonly known as: Diovan           warfarin 7.5 mg tablet  Commonly known as: Coumadin           warfarin 5 mg tablet  Commonly known as: Coumadin                  STOP taking these medications      carvedilol 12.5 mg tablet  Commonly known as: Coreg                  Where to Get Your Medications        These medications were sent to [facility]  [address] [place] [address]      Hours: 8 AM to 5:30 Mon-Fri, 8 AM to 4 PM Saturday and Sunday Phone: [phone]   metoprolol succinate XL 25 mg 24 hr tablet  pantoprazole 40 mg EC tablet  spironolactone 25 mg tablet       Information about where to get these medications is not yet available    Ask your nurse or doctor about these medications  furosemide 40 mg tablet         Test Results Pending At Discharge  Pending Labs       No current pending labs.            Hospital Course   Ne Richardson is a 64 y.o. female with past medical history of coronary artery disease status post angioplasty, cardiac defibrillator, CHF, IDDM, gastroparesis, CKD 3A, history of alcohol abuse, daily marijuana use and hypertension scented to the ED with a chief complaint of vomiting.  She also states that her chest has been hurting for the last 3 days mainly when she has vomiting.  EKG in the ED revealed sinus tach with no acute ischemic changes.  Troponin was 88 repeat was 89.  Patient's blood sugar was 432.  She was given IV insulin.  Her repeat was 403.  Her creatinine on admission was 1.75 GFR was  32.  After 2 L fluid bolus it improved to 1.51 and 39.  Anion gap initially 24 and improved to 21.  Beta hydroxybutyrate was 3.10.  Her urine was negative for infection but positive for ketones.  CT abdomen and pelvis negative for acute pathologies.  Her white blood cell count was 23.5.  She will be admitted for further evaluation and treatment of her hyperglycemia, MICHELLE/CKD, chest pain, nausea and vomiting.     Upon evaluation patient is resting comfortably.  She reports she has not vomited in over 2 hours.  Her  is at the bedside.  She reports she has not eaten anything in 2 to 3 days.  She has skipped many of her medications.  She reports she is still having chest heaviness and burning.  She does not have palpitations.  Dizziness.  Or diaphoresis.  She does not have any abdominal or lower extremity swelling.  She does not have cough or shortness of breath.  She does not have any abdominal pain, fever, CVA a tenderness or fever.    Patient was seen by cardiology.  Echocardiogram done and shows EF of 25 to 30% and LV apex large and aneurysmal.  Carvedilol was discontinued for soft blood pressures with systolic blood pressures in the 90s.  Started on metoprolol and spironolactone.  Lasix DC'd to 40 mg daily.  INR elevated at 4.4.  Patient follows with Coumadin clinic through [facility].  Spoke with pharmacist at [facility] Coumadin clinic regarding elevated INR and when patient could be reseen to have INR rechecked.  Pharmacist wants patient to resume Coumadin 12.5 mg starting tomorrow and follow-up in Coumadin clinic on Monday at 10 AM.  Patient is in fair condition to discharge home with resolution of nausea and vomiting.  MICHELLE is improving.  Potassium 3.2 at discharge and patient received 40 mill equivalents of replacement.  Blood sugar was 140 in 206-day of discharge.  Patient to follow-up with PCP in 1 week.  Patient to follow-up with cardiology in 3 weeks.  Resume home meds except for change  in furosemide dose to daily instead of twice daily and discontinuing Coreg. Escribed to [facility] pharmacy Protonix 40 mg daily, spironolactone 12.5 mg daily, and metoprolol 25 mg daily.          Pertinent Physical Exam At Time of Discharge  Physical Exam  General Appearance: AAO x 3, not in acute distress  Skin: skin color pink, warm, and dry; no suspicious rashes or lesions  Eyes : PERRL, EOM's intact  ENT: mucous membranes pink and moist  Neck: normocephalic  Respiratory: lungs clear to auscultation anteriorly; no wheezing, rhonchi, or crackles.   Heart: regular rate and rhythm. telemetry shows sinus rhythm  Abdomen: Nondistended, positive bowel sounds x4, soft,  nontender  Extremities: no edema   Peripheral pulses: normal x4 extremities  Neuro: alert, coherent and conversant, no focal motor deficits  Outpatient Follow-Up  Future Appointments   Date Time Provider Department Center   7/26/2024  1:30 PM JON Andrews, DNP [id number] [facility]   10/8/2024  9:00 AM [facility] [address] [place] [address]   10/8/2024 10:00 AM Elodia Jenkins MD [id number] [place]   4/9/2025  7:45 AM Jose Barclay MD [id number] [place]         JON Torres

## 2024-07-10 NOTE — PROGRESS NOTES
07/10/24 1321   Discharge Planning   Patient expects to be discharged to: home- no needs     [employer]- called into pt room, role of TCC explained. Pt confirms her plan is home and she is without CT needs. [facility] 23/23. ADOD today. CT will follow.

## 2024-07-10 NOTE — PROGRESS NOTES
Medication Education     Medication education for Ne Richardson was provided to the patient and family for the following medication(s):  Warfarin, pantoprazole, spironolactone, metoprolol, furosemide      Medication education provided by a Pharmacist:  ADR Counseling Medication interactions Dose, frequency, storage How to take and what to do if a dose is missed Proper dose, indication, possible ADRs Refilling the medication  How the medication works and benefits of taking it Benefits of taking the medication  Importance of compliance Necessary labs and/or other monitoring Any drug interactions (including OTCs and herbvals) and importance of notifying a healthcare provider of any medication changes Potential duration of therapy Proper storage of the medication(s)    Identified potential barriers to education:  None    Method(s) of Education:  Verbal Written materials provided and reviewed    An opportunity to ask questions and receive answers was provided.     Assessment of understanding the patient and family:  2= meets goals/outcomes    Additional Notes (if applicable): Explained new GDMT meds for HFrEF diagonsis - spironolactone, metoprolol (discontinue carvedilol), change in furosemide frequency.  Her INR was still supratherapeutic today 4.4 so she will hold another day and then start back on warfarin 12.5 mg daily starting tomorrow and continue until she visits her coag clinic on Monday.  Meds to beds performed at discharge.    Charly Bonilla [facility]

## 2024-07-10 NOTE — PROGRESS NOTES
Cardiology Inpatient Progress Note  [facility] [place]    ASSESSMENT AND PLAN  Chest pain/Elevated troponin:  -Resolved; troponin mildly elevated at 88 with repeat of 89  -Coronary angiography April 23, 2024 showed mid LAD  with collaterals and other nonobstructive coronary disease.  -Given patient's current clinical presentation, elevated troponin appears to be related to nonischemic myocardial injury given elevated anion gap and elevated blood glucose.   -No ischemic evaluation warranted at present     Heart failure with reduced ejection fraction  Ischemic cardiomyopathy  Chronic kidney disease  -Status post ICD placement for secondary prevention of ventricular arrhythmias secondary to dilated cardiomyopathy.  -Echo in December, 2023 showing improved EF of 55-60%, however LV in recent C estimating EF of 25%.  -Repeat echocardiogram yesterday shows severely decreased LV systolic function with estimated LVEF of 25 to 30% with a large and aneurysmal LV apex with thin and akinetic anterior apical and inferior apical walls.  Normal RV systolic function.  -Serum creatinine 1.26 and estimated GFR 48, at baseline.   -Current GDMT includes carvedilol 12.5 mg twice daily, valsartan 40 mg daily.  Add spironolactone.  Consider adding SGLT2i at discharge.  Blood pressures have been soft in the 90s systolic so I will stop her carvedilol and add metoprolol succinate 25 mg daily.  -She has some crackles in her lung bases.  I will restart her furosemide 40 mg but daily instead of twice a day.   -She has expressed a desire to follow-up with cardiology in [place] I will message the office for her follow-up with us.   -I believe she is stable from a cardiac standpoint to discharge and can titrate her GDMT in the outpatient setting.     Subjective  She denies chest pain, shortness of breath, palpitations, leg edema, fever, chills, orthopnea, paroxysmal nocturnal dyspnea or syncope.   Endorses wet cough.  No acute events overnight reported.    Objective:  Notable Studies:   Cardiac Testing:  See above    Imaging personally reviewed   -Chest CTA showing groundglass infiltrates and interstitial infiltrates in a pattern that is consistent with pulmonary edema and shows of heart failure.    Telemetry Reviewed  sinus    Intake & Output  Net IO Since Admission: 3,271.3 mL [07/10/24 0911]    Today's Weight:  Vitals:    07/08/24 1411   Weight: 71.4 kg (157 lb 6.5 oz)       PHYSICAL EXAM  Physical Exam  Vitals and nursing note reviewed.   Constitutional:       General: She is not in acute distress.  HENT:      Head: Normocephalic and atraumatic.      Mouth/Throat:      Mouth: Mucous membranes are moist.      Pharynx: Oropharynx is clear.   Eyes:      General: No scleral icterus.     Pupils: Pupils are equal, round, and reactive to light.   Cardiovascular:      Rate and Rhythm: Normal rate and regular rhythm.      Pulses: Normal pulses.      Heart sounds: Normal heart sounds, S1 normal and S2 normal. No murmur heard.     No friction rub.   Pulmonary:      Effort: Pulmonary effort is normal.      Breath sounds: Normal breath sounds.   Abdominal:      General: Bowel sounds are normal. There is no distension.      Palpations: Abdomen is soft.      Tenderness: There is no abdominal tenderness.   Musculoskeletal:         General: No swelling. Normal range of motion.      Cervical back: Normal range of motion and neck supple.      Right lower leg: No edema.      Left lower leg: No edema.   Skin:     General: Skin is warm and dry.      Capillary Refill: Capillary refill takes less than 2 seconds.      Findings: No rash.   Neurological:      General: No focal deficit present.      Mental Status: She is alert.   Psychiatric:         Mood and Affect: Mood normal.         Behavior: Behavior normal.        Labs:     CMP:  Recent Labs     07/09/24  0531 07/08/24  1142 07/08/24  1009 04/24/24  0618 04/23/24  0637  "04/22/24  1113 03/21/23  1916 03/21/23  1500 11/02/22  0650 11/01/22  0010   * 133* 131* 141 138 136   < > 131* 136 138   K 3.5 3.6 3.7 3.4* 3.9 4.1   < > 3.7 3.5 3.7   CL 97* 95* 90* 109* 107 103   < > 90* 99 97*   CO2 25 21 21 25 25 21   < > 25 28 29   ANIONGAP 16 21* 24* 10 10 16   < > 20 13 16   BUN 42* 53* 56* 32* 32* 23   < > 57* 24* 22   CREATININE 1.26* 1.51* 1.75* 1.18* 1.34* 1.20*   < > 1.82* 1.38* 1.47*   EGFR 48* 39* 32* 52* 45* 51*   < >  --   --   --    MG  --   --  2.12  --   --  1.85  --  1.96 2.00 2.10    < > = values in this interval not displayed.     Recent Labs     07/08/24  1009 04/24/24  0618 04/23/24  0637 04/22/24  1113 03/21/23  1500 11/01/22  0010 10/26/22  1337 05/30/22  1350   ALBUMIN 4.1 3.4 3.6 4.0 4.1 3.6   < > 3.9   ALKPHOS 84 52 55 68 83 60   < > 64   ALT 18 23 17 19 19 24   < > 54*   AST 14 21 13 16 14 18   < > 61*   BILITOT 0.6 0.3 0.4 0.3 0.4 0.5   < > 0.4   LIPASE 12  --   --   --   --  15  --  12    < > = values in this interval not displayed.     CBC:  Recent Labs     07/10/24  0518 07/09/24  0531 07/08/24  1009 04/24/24  0618 04/23/24  0637   WBC 10.5 12.4* 23.5* 9.1 13.1*   HGB 14.8 14.9 16.1* 12.3 13.1   HCT 43.9 43.0 46.1* 36.6 38.5   * 140* 142* 124* 159   * 105* 105* 108* 107*     COAG:   Recent Labs     07/10/24  0459 07/09/24  0531 07/08/24  1253 06/04/22  0639 06/03/22  0755 06/03/22  0048 06/02/22  2040   INR 4.4* 4.3* 2.2*   < > 2.0*  --   --    HAUF  --   --   --   --  0.2 0.4 0.5    < > = values in this interval not displayed.     ABO: No results for input(s): \"ABO\" in the last 91029 hours.  HEME/ENDO:  Recent Labs     06/04/24  1017 03/14/24  0624 12/17/23  0611 10/18/23  1336 09/01/21  1447 05/28/21  0503 06/16/20  0638 05/14/20  0414   TSH  --   --   --   --   --  2.68  --  1.42   HGBA1C 8.9* 8.4* 8.6* 8.5*   < >  --    < >  --     < > = values in this interval not displayed.      CARDIAC:   Recent Labs     07/08/24  1142 07/08/24  1009 " 04/22/24  1739 04/22/24  1405 04/22/24  1113 10/26/22  1430 10/26/22  1337 05/30/22  1447 05/30/22  1350 09/28/21  1320   TROPHS 89* 88* 204* 155* 39*   < > 88*   < > 173*  --    BNP  --  390*  --   --  58  --  997*  --  462* 634*    < > = values in this interval not displayed.     Recent Labs     04/24/24  0618 08/15/19  0310   CHOL 103 123   LDLF  --  63   HDL 30.8 31.0*   TRIG 177* 146       Inpatient Medications:    Current Facility-Administered Medications:     acetaminophen (Tylenol) tablet 650 mg, 650 mg, oral, q4h PRN, Milena Parker APRN-CNP    ARIPiprazole (Abilify) tablet 4 mg, 4 mg, oral, Nightly, Milena Parker, APRN-CNP, 4 mg at 07/09/24 2017    buPROPion SR (Wellbutrin SR) 12 hr tablet 200 mg, 200 mg, oral, BID, Milena Parker, APRN-CNP, 200 mg at 07/10/24 0908    carvedilol (Coreg) tablet 12.5 mg, 12.5 mg, oral, BID, Milena Cardoso-Myron, APRN-CNP, 12.5 mg at 07/10/24 0908    cholecalciferol (Vitamin D-3) tablet 3,000 Units, 3,000 Units, oral, Daily, Milena Parker, APRN-CNP, 3,000 Units at 07/10/24 0908    clonazePAM (KlonoPIN) tablet 2 mg, 2 mg, oral, Nightly, Milena Cardoso-Myron, APRN-CNP, 2 mg at 07/09/24 2016    [Held by provider] desvenlafaxine (Pristiq) 24 hr tablet 100 mg, 100 mg, oral, Nightly, Milena Parker, APRN-CNP    dextrose 50 % injection 12.5 g, 12.5 g, intravenous, q15 min PRN, Milena Parker, APRN-CNP    dextrose 50 % injection 25 g, 25 g, intravenous, q15 min PRN, ARMANDO Zimmerman-CNP    ezetimibe (Zetia) tablet 10 mg, 10 mg, oral, Nightly, JON Zimmerman, 10 mg at 07/09/24 2016    [Held by provider] furosemide (Lasix) tablet 40 mg, 40 mg, oral, Daily, JON Zimmerman    glucagon (Glucagen) injection 1 mg, 1 mg, intramuscular, q15 min PRN, ARMANDO Zimmerman-CNP    glucagon (Glucagen) injection 1 mg, 1 mg, intramuscular, q15 min PRN, ARMANDO Zimmerman-MARTHA    insulin  glargine (Lantus) injection 28 Units, 28 Units, subcutaneous, Nightly, ARMANDO Zimmerman-CNP, 28 Units at 07/09/24 2017    insulin lispro (HumaLOG) injection 0-10 Units, 0-10 Units, subcutaneous, TID, ARMANDO Zimmerman-CNP, 2 Units at 07/09/24 1725    levothyroxine (Synthroid, Levoxyl) tablet 88 mcg, 88 mcg, oral, Daily, ARMANDO Zimmerman-CNP, 88 mcg at 07/10/24 0633    magnesium oxide (Mag-Ox) tablet 400 mg, 400 mg, oral, Daily, ARMANDO Zimmerman-CNP, 400 mg at 07/10/24 0908    nitroglycerin (Nitrostat) SL tablet 0.4 mg, 0.4 mg, sublingual, q5 min PRN, ARMANDO Zimmerman-CNP    [DISCONTINUED] ondansetron (Zofran) tablet 4 mg, 4 mg, oral, q8h PRN **OR** ondansetron (Zofran) injection 4 mg, 4 mg, intravenous, q8h PRN, ARMANDO Zimmerman-CNP, 4 mg at 07/09/24 0830    ondansetron ODT (Zofran-ODT) disintegrating tablet 4 mg, 4 mg, oral, q8h PRN, ARMANDO Zimmerman-CNP    pantoprazole (ProtoNix) EC tablet 40 mg, 40 mg, oral, Nightly, ARMANDO Zimmerman-CNP, 40 mg at 07/09/24 2016    polyethylene glycol (Glycolax, Miralax) packet 17 g, 17 g, oral, Daily, ARMANDO Zimmerman-CNP, 17 g at 07/09/24 0932    rOPINIRole (Requip) tablet 2 mg, 2 mg, oral, Nightly, ARMANDO Zimmerman-CNP, 2 mg at 07/09/24 2017    rosuvastatin (Crestor) tablet 40 mg, 40 mg, oral, Daily, ARMANDO Zimmerman-CNP, 40 mg at 07/10/24 0908    topiramate (Topamax) tablet 200 mg, 200 mg, oral, BID, ARMANDO Zimmerman-CNP, 200 mg at 07/10/24 0908    valsartan (Diovan) tablet 40 mg, 40 mg, oral, Daily, JON Zimmerman, 40 mg at 07/10/24 0633    [Held by provider] warfarin (Coumadin) tablet 12.5 mg, 12.5 mg, oral, Once per day on Sunday Monday Wednesday Friday Saturday, Garett Wilburn, 12.5 mg at 07/08/24 1728    [Held by provider] warfarin (Coumadin) tablet 15 mg, 15 mg, oral, Once per day on Tuesday Thursday, Garett Wilburn      VITALS  Vitals:    07/10/24 0734   BP: 92/61   Pulse:    Resp:    Temp:    SpO2:        Cardiology will continue to follow.     Thank you for this interesting clinical case and allowing me to participate in the care of this patient.  Please reach me out if you have any questions or if you need any clarifications regarding the patient's care.    **Disclaimer: This note was dictated by speech recognition, and every effort has been made to prevent any error in transcription, however minor errors may be present**  _________________________________________________________  Nader Cameron, MSN, APRN-CNP, ACNPC-AG, CCRN  Division of Cardiovascular Medicine  [facility] [place]  [facility]

## 2024-07-10 NOTE — DISCHARGE INSTRUCTIONS
Discharge:    Discharge home  Resume home meds. Lasix changed to 40 mg daily. Stop taking coreg. Take coumadin 12.5 mg starting tomorrow until follow-up with coumadin clinic  Escribed to  Nicole  protonix 40 mg daily, spironolactone 12.5 mg daily, and metoprolol 25 mg daily  Follow-up with PCP in 1 week  Follow-up [facility] coumadin clinic on Monday July 15 at 10 AM    Thank you for allowing  Nicole to participate in your care. Return to the ER  if symptoms worsen        High Blood Sugar, Adult ED    General Information  During your visit to the Emergency Department (ED), the doctors found your blood sugar level was high. Many things can cause high blood sugar. Some are serious things like problems with your pancreas or an infection. Less serious things like stress or certain medicines can also cause high blood sugar.  The doctors feel there is no immediate danger from your high blood sugar problem. It is important that you follow up with your doctor. You may be waiting on some test results. The staff will contact you if there are concerning results.  What care is needed at home?  Call your regular doctor to let them know you were in the ED. Make a follow-up appointment if you were told to.  Take all your medicines as ordered.  Drink water whenever you are thirsty. Water helps remove the extra sugar from your blood. Avoid sugary drinks like fruit juice, sports drinks, and regular soda.  Exercise can help lower your blood sugar. Check with your regular doctor to find out what types of physical activity are best for you.  If you have been given an eating plan, try to follow it more carefully. Ask for help from a dietitian.  If you are feeling worried or anxious, try to find ways to manage your stress. Some people find methods like meditation, deep breathing, and relaxation helpful. Activities like yoga, exercise, and katie chi can also help with stress.  If you were told to check your blood sugar at  home, make sure you know how and when to check it.  When do I need to get emergency help?  Call for an ambulance right away if:  You have a seizure.  You are having so much trouble breathing that you can only say one or two words at a time.  You need to sit upright at all times to be able to breathe and or cannot lie down.  You are very tired from working to catch your breath or you are sweating from trying to breathe.  Return to the ED if:  You have belly pain, an upset stomach, or are throwing up.  You become very confused or lethargic, or cannot be awakened.  You are urinating much more than usual and eating much more than usual but losing weight.  You have signs of severe fluid loss, such as:  No urine for more than 8 hours.  You feel very light-headed or like you are going to pass out.  You feel weak like you are going to fall.  When do I need to call the doctor?  You have a fever of 100.4°F (38°C) or higher or chills for more than 1 day.  You develop early signs of fluid loss, such as:  Your urine is very dark-colored.  Your mouth is dry.  You have muscle cramps.  You have a lack of energy.  You feel light-headed when you get up.  You lose weight without trying to.  You have new or worsening symptoms.  Last Reviewed Date  2021-04-26  Consumer Information Use and Disclaimer  This generalized information is a limited summary of diagnosis, treatment, and/or medication information. It is not meant to be comprehensive and should be used as a tool to help the user understand and/or assess potential diagnostic and treatment options. It does NOT include all information about conditions, treatments, medications, side effects, or risks that may apply to a specific patient. It is not intended to be medical advice or a substitute for the medical advice, diagnosis, or treatment of a health care provider based on the health care provider's examination and assessment of a patient’s specific and unique circumstances. Patients  must speak with a health care provider for complete information about their health, medical questions, and treatment options, including any risks or benefits regarding use of medications. This information does not endorse any treatments or medications as safe, effective, or approved for treating a specific patient. Up[employer], [employer] and its affiliates disclaim any warranty or liability relating to this information or the use thereof. The use of this information is governed by the Terms of Use, available at https://www.woltersSIPphoneuwer.com/en/know/clinical-effectiveness-terms  Copyright © 2024 [employer], [employer] and its affiliates and/or licensors. All rights reserved.

## 2024-07-10 NOTE — PROGRESS NOTES
Care Transitions: Patient reviewed in care round meeting this AM and may be ready for discharge later today. Met with patient at bedside. IMM reviewed, patient signed, copy provided, original placed in chart. Voiced understanding. No further needs anticipated from care transitions. Care team available upon request. Destiney Obrien RN/TCC

## 2024-07-12 ENCOUNTER — TELEPHONE (OUTPATIENT)
Dept: CARDIOLOGY | Facility: CLINIC | Age: 64
End: 2024-07-12
Payer: MEDICARE

## 2024-07-12 NOTE — TELEPHONE ENCOUNTER
----- Message from Diana DAVIES sent at 7/12/2024  9:20 AM EDT -----  Regarding: RE: Hospital Follow Up  Pt has been notified.  ----- Message -----  From: Diana Charles CMA  Sent: 7/10/2024  10:34 AM EDT  To: Diana Charles CMA  Subject: RE: Hospital Follow Up                           Pt is scheduled for 7/26/24 at 1:30pm with Eloisa.  ----- Message -----  From: ARMANDO Carreno-CNP  Sent: 7/10/2024  10:24 AM EDT  To: Do [id number] Card[facility] Clinical Support Staff  Subject: Hospital Follow Up                               Can we please schedule this patient for a hospital follow-up appointment in 2 weeks with Eloisa mccurdy? Thank You!

## 2024-07-14 LAB
ATRIAL RATE: 120 BPM
P AXIS: 86 DEGREES
P OFFSET: 205 MS
P ONSET: 158 MS
PR INTERVAL: 122 MS
Q ONSET: 219 MS
QRS COUNT: 19 BEATS
QRS DURATION: 82 MS
QT INTERVAL: 316 MS
QTC CALCULATION(BAZETT): 446 MS
QTC FREDERICIA: 398 MS
R AXIS: 93 DEGREES
T AXIS: 83 DEGREES
T OFFSET: 377 MS
VENTRICULAR RATE: 120 BPM

## 2024-07-26 ENCOUNTER — APPOINTMENT (OUTPATIENT)
Dept: CARDIOLOGY | Facility: CLINIC | Age: 64
End: 2024-07-26
Payer: MEDICARE

## 2024-10-08 ENCOUNTER — APPOINTMENT (OUTPATIENT)
Dept: CARDIOLOGY | Facility: CLINIC | Age: 64
End: 2024-10-08
Payer: MEDICARE

## 2024-10-08 ENCOUNTER — APPOINTMENT (OUTPATIENT)
Dept: CARDIOLOGY | Facility: HOSPITAL | Age: 64
End: 2024-10-08
Payer: MEDICARE

## 2024-10-13 ENCOUNTER — APPOINTMENT (OUTPATIENT)
Dept: RADIOLOGY | Facility: HOSPITAL | Age: 64
DRG: 638 | End: 2024-10-13
Payer: MEDICARE

## 2024-10-13 ENCOUNTER — APPOINTMENT (OUTPATIENT)
Dept: CARDIOLOGY | Facility: HOSPITAL | Age: 64
DRG: 638 | End: 2024-10-13
Payer: MEDICARE

## 2024-10-13 ENCOUNTER — HOSPITAL ENCOUNTER (INPATIENT)
Facility: HOSPITAL | Age: 64
LOS: 2 days | Discharge: HOME | DRG: 638 | End: 2024-10-16
Attending: EMERGENCY MEDICINE | Admitting: HOSPITALIST
Payer: MEDICARE

## 2024-10-13 DIAGNOSIS — I50.22 CHRONIC SYSTOLIC HEART FAILURE: ICD-10-CM

## 2024-10-13 DIAGNOSIS — R79.89 ELEVATED TROPONIN: ICD-10-CM

## 2024-10-13 DIAGNOSIS — E08.10 DIABETIC KETOACIDOSIS WITHOUT COMA ASSOCIATED WITH DIABETES MELLITUS DUE TO UNDERLYING CONDITION (MULTI): ICD-10-CM

## 2024-10-13 DIAGNOSIS — Z95.810 CARDIAC DEFIBRILLATOR IN SITU: ICD-10-CM

## 2024-10-13 DIAGNOSIS — I50.9 CONGESTIVE HEART FAILURE, UNSPECIFIED HF CHRONICITY, UNSPECIFIED HEART FAILURE TYPE: ICD-10-CM

## 2024-10-13 DIAGNOSIS — R55 SYNCOPE, UNSPECIFIED SYNCOPE TYPE: Primary | ICD-10-CM

## 2024-10-13 DIAGNOSIS — N17.9 ACUTE KIDNEY INJURY (CMS-HCC): ICD-10-CM

## 2024-10-13 DIAGNOSIS — R09.89 OTHER SPECIFIED SYMPTOMS AND SIGNS INVOLVING THE CIRCULATORY AND RESPIRATORY SYSTEMS: ICD-10-CM

## 2024-10-13 DIAGNOSIS — R79.1 SUPRATHERAPEUTIC INR: ICD-10-CM

## 2024-10-13 LAB
ALBUMIN SERPL BCP-MCNC: 4.6 G/DL (ref 3.4–5)
ALP SERPL-CCNC: 90 U/L (ref 33–136)
ALT SERPL W P-5'-P-CCNC: 18 U/L (ref 7–45)
ANION GAP SERPL CALC-SCNC: 14 MMOL/L (ref 10–20)
ANION GAP SERPL CALC-SCNC: 17 MMOL/L (ref 10–20)
ANION GAP SERPL CALC-SCNC: 19 MMOL/L (ref 10–20)
ANION GAP SERPL CALC-SCNC: 23 MMOL/L (ref 10–20)
APPEARANCE UR: CLEAR
AST SERPL W P-5'-P-CCNC: 15 U/L (ref 9–39)
B-OH-BUTYR SERPL-SCNC: 0.55 MMOL/L (ref 0.02–0.27)
B-OH-BUTYR SERPL-SCNC: 3.1 MMOL/L (ref 0.02–0.27)
B-OH-BUTYR SERPL-SCNC: 3.38 MMOL/L (ref 0.02–0.27)
BACTERIA #/AREA URNS AUTO: ABNORMAL /HPF
BASOPHILS # BLD AUTO: 0.05 X10*3/UL (ref 0–0.1)
BASOPHILS NFR BLD AUTO: 0.2 %
BILIRUB SERPL-MCNC: 0.5 MG/DL (ref 0–1.2)
BILIRUB UR STRIP.AUTO-MCNC: NEGATIVE MG/DL
BUN SERPL-MCNC: 41 MG/DL (ref 6–23)
BUN SERPL-MCNC: 43 MG/DL (ref 6–23)
BUN SERPL-MCNC: 45 MG/DL (ref 6–23)
BUN SERPL-MCNC: 47 MG/DL (ref 6–23)
CALCIUM SERPL-MCNC: 7.9 MG/DL (ref 8.6–10.3)
CALCIUM SERPL-MCNC: 8.3 MG/DL (ref 8.6–10.3)
CALCIUM SERPL-MCNC: 8.3 MG/DL (ref 8.6–10.3)
CALCIUM SERPL-MCNC: 9.7 MG/DL (ref 8.6–10.3)
CARDIAC TROPONIN I PNL SERPL HS: 57 NG/L (ref 0–13)
CARDIAC TROPONIN I PNL SERPL HS: 78 NG/L (ref 0–13)
CHLORIDE SERPL-SCNC: 89 MMOL/L (ref 98–107)
CHLORIDE SERPL-SCNC: 95 MMOL/L (ref 98–107)
CHLORIDE SERPL-SCNC: 96 MMOL/L (ref 98–107)
CHLORIDE SERPL-SCNC: 99 MMOL/L (ref 98–107)
CO2 SERPL-SCNC: 22 MMOL/L (ref 21–32)
CO2 SERPL-SCNC: 25 MMOL/L (ref 21–32)
CO2 SERPL-SCNC: 25 MMOL/L (ref 21–32)
CO2 SERPL-SCNC: 28 MMOL/L (ref 21–32)
COLOR UR: ABNORMAL
CREAT SERPL-MCNC: 1.26 MG/DL (ref 0.5–1.05)
CREAT SERPL-MCNC: 1.29 MG/DL (ref 0.5–1.05)
CREAT SERPL-MCNC: 1.36 MG/DL (ref 0.5–1.05)
CREAT SERPL-MCNC: 1.67 MG/DL (ref 0.5–1.05)
EGFRCR SERPLBLD CKD-EPI 2021: 34 ML/MIN/1.73M*2
EGFRCR SERPLBLD CKD-EPI 2021: 44 ML/MIN/1.73M*2
EGFRCR SERPLBLD CKD-EPI 2021: 47 ML/MIN/1.73M*2
EGFRCR SERPLBLD CKD-EPI 2021: 48 ML/MIN/1.73M*2
EOSINOPHIL # BLD AUTO: 0.01 X10*3/UL (ref 0–0.7)
EOSINOPHIL NFR BLD AUTO: 0 %
ERYTHROCYTE [DISTWIDTH] IN BLOOD BY AUTOMATED COUNT: 13.3 % (ref 11.5–14.5)
EST. AVERAGE GLUCOSE BLD GHB EST-MCNC: 186 MG/DL
GLUCOSE BLD MANUAL STRIP-MCNC: 377 MG/DL (ref 74–99)
GLUCOSE BLD MANUAL STRIP-MCNC: 391 MG/DL (ref 74–99)
GLUCOSE BLD MANUAL STRIP-MCNC: 437 MG/DL (ref 74–99)
GLUCOSE SERPL-MCNC: 389 MG/DL (ref 74–99)
GLUCOSE SERPL-MCNC: 404 MG/DL (ref 74–99)
GLUCOSE SERPL-MCNC: 407 MG/DL (ref 74–99)
GLUCOSE SERPL-MCNC: 469 MG/DL (ref 74–99)
GLUCOSE UR STRIP.AUTO-MCNC: ABNORMAL MG/DL
HBA1C MFR BLD: 8.1 %
HCT VFR BLD AUTO: 47.9 % (ref 36–46)
HGB BLD-MCNC: 16.5 G/DL (ref 12–16)
HOLD SPECIMEN: NORMAL
HOLD SPECIMEN: NORMAL
IMM GRANULOCYTES # BLD AUTO: 0.15 X10*3/UL (ref 0–0.7)
IMM GRANULOCYTES NFR BLD AUTO: 0.7 % (ref 0–0.9)
INR PPP: 5.5 (ref 0.9–1.1)
KETONES UR STRIP.AUTO-MCNC: ABNORMAL MG/DL
LACTATE SERPL-SCNC: 1.7 MMOL/L (ref 0.4–2)
LACTATE SERPL-SCNC: 2.1 MMOL/L (ref 0.4–2)
LEUKOCYTE ESTERASE UR QL STRIP.AUTO: NEGATIVE
LIPASE SERPL-CCNC: 15 U/L (ref 9–82)
LYMPHOCYTES # BLD AUTO: 2.84 X10*3/UL (ref 1.2–4.8)
LYMPHOCYTES NFR BLD AUTO: 13.4 %
MCH RBC QN AUTO: 36.9 PG (ref 26–34)
MCHC RBC AUTO-ENTMCNC: 34.4 G/DL (ref 32–36)
MCV RBC AUTO: 107 FL (ref 80–100)
MONOCYTES # BLD AUTO: 1.31 X10*3/UL (ref 0.1–1)
MONOCYTES NFR BLD AUTO: 6.2 %
MUCOUS THREADS #/AREA URNS AUTO: ABNORMAL /LPF
NEUTROPHILS # BLD AUTO: 16.89 X10*3/UL (ref 1.2–7.7)
NEUTROPHILS NFR BLD AUTO: 79.5 %
NITRITE UR QL STRIP.AUTO: NEGATIVE
NRBC BLD-RTO: 0 /100 WBCS (ref 0–0)
PH UR STRIP.AUTO: 6 [PH]
PLATELET # BLD AUTO: 168 X10*3/UL (ref 150–450)
POTASSIUM SERPL-SCNC: 3.7 MMOL/L (ref 3.5–5.3)
POTASSIUM SERPL-SCNC: 4.2 MMOL/L (ref 3.5–5.3)
POTASSIUM SERPL-SCNC: 4.4 MMOL/L (ref 3.5–5.3)
POTASSIUM SERPL-SCNC: 4.7 MMOL/L (ref 3.5–5.3)
PROT SERPL-MCNC: 8.1 G/DL (ref 6.4–8.2)
PROT UR STRIP.AUTO-MCNC: ABNORMAL MG/DL
PROTHROMBIN TIME: 65.3 SECONDS (ref 9.8–12.8)
RBC # BLD AUTO: 4.47 X10*6/UL (ref 4–5.2)
RBC # UR STRIP.AUTO: ABNORMAL /UL
RBC #/AREA URNS AUTO: ABNORMAL /HPF
SODIUM SERPL-SCNC: 133 MMOL/L (ref 136–145)
SODIUM SERPL-SCNC: 133 MMOL/L (ref 136–145)
SODIUM SERPL-SCNC: 134 MMOL/L (ref 136–145)
SODIUM SERPL-SCNC: 135 MMOL/L (ref 136–145)
SP GR UR STRIP.AUTO: 1.05
SQUAMOUS #/AREA URNS AUTO: ABNORMAL /HPF
UROBILINOGEN UR STRIP.AUTO-MCNC: NORMAL MG/DL
WBC # BLD AUTO: 21.3 X10*3/UL (ref 4.4–11.3)
WBC #/AREA URNS AUTO: ABNORMAL /HPF

## 2024-10-13 PROCEDURE — 2500000005 HC RX 250 GENERAL PHARMACY W/O HCPCS: Performed by: NURSE PRACTITIONER

## 2024-10-13 PROCEDURE — 2500000001 HC RX 250 WO HCPCS SELF ADMINISTERED DRUGS (ALT 637 FOR MEDICARE OP): Performed by: NURSE PRACTITIONER

## 2024-10-13 PROCEDURE — 82947 ASSAY GLUCOSE BLOOD QUANT: CPT

## 2024-10-13 PROCEDURE — 36415 COLL VENOUS BLD VENIPUNCTURE: CPT | Performed by: EMERGENCY MEDICINE

## 2024-10-13 PROCEDURE — 2500000005 HC RX 250 GENERAL PHARMACY W/O HCPCS: Performed by: EMERGENCY MEDICINE

## 2024-10-13 PROCEDURE — 94762 N-INVAS EAR/PLS OXIMTRY CONT: CPT

## 2024-10-13 PROCEDURE — 80048 BASIC METABOLIC PNL TOTAL CA: CPT | Mod: CCI | Performed by: EMERGENCY MEDICINE

## 2024-10-13 PROCEDURE — 71275 CT ANGIOGRAPHY CHEST: CPT

## 2024-10-13 PROCEDURE — 72125 CT NECK SPINE W/O DYE: CPT

## 2024-10-13 PROCEDURE — G0378 HOSPITAL OBSERVATION PER HR: HCPCS

## 2024-10-13 PROCEDURE — 2500000002 HC RX 250 W HCPCS SELF ADMINISTERED DRUGS (ALT 637 FOR MEDICARE OP, ALT 636 FOR OP/ED): Performed by: NURSE PRACTITIONER

## 2024-10-13 PROCEDURE — 96375 TX/PRO/DX INJ NEW DRUG ADDON: CPT

## 2024-10-13 PROCEDURE — 82010 KETONE BODYS QUAN: CPT | Performed by: EMERGENCY MEDICINE

## 2024-10-13 PROCEDURE — 96374 THER/PROPH/DIAG INJ IV PUSH: CPT

## 2024-10-13 PROCEDURE — 83605 ASSAY OF LACTIC ACID: CPT | Performed by: EMERGENCY MEDICINE

## 2024-10-13 PROCEDURE — 85610 PROTHROMBIN TIME: CPT | Performed by: EMERGENCY MEDICINE

## 2024-10-13 PROCEDURE — 2500000002 HC RX 250 W HCPCS SELF ADMINISTERED DRUGS (ALT 637 FOR MEDICARE OP, ALT 636 FOR OP/ED): Performed by: EMERGENCY MEDICINE

## 2024-10-13 PROCEDURE — 96361 HYDRATE IV INFUSION ADD-ON: CPT

## 2024-10-13 PROCEDURE — 84484 ASSAY OF TROPONIN QUANT: CPT | Performed by: EMERGENCY MEDICINE

## 2024-10-13 PROCEDURE — 83036 HEMOGLOBIN GLYCOSYLATED A1C: CPT | Mod: SAMLAB | Performed by: NURSE PRACTITIONER

## 2024-10-13 PROCEDURE — 82010 KETONE BODYS QUAN: CPT | Performed by: NURSE PRACTITIONER

## 2024-10-13 PROCEDURE — 80053 COMPREHEN METABOLIC PANEL: CPT | Performed by: EMERGENCY MEDICINE

## 2024-10-13 PROCEDURE — 72125 CT NECK SPINE W/O DYE: CPT | Performed by: RADIOLOGY

## 2024-10-13 PROCEDURE — 85025 COMPLETE CBC W/AUTO DIFF WBC: CPT | Performed by: EMERGENCY MEDICINE

## 2024-10-13 PROCEDURE — 9420000001 HC RT PATIENT EDUCATION 5 MIN

## 2024-10-13 PROCEDURE — 2500000004 HC RX 250 GENERAL PHARMACY W/ HCPCS (ALT 636 FOR OP/ED): Performed by: EMERGENCY MEDICINE

## 2024-10-13 PROCEDURE — 81001 URINALYSIS AUTO W/SCOPE: CPT | Performed by: EMERGENCY MEDICINE

## 2024-10-13 PROCEDURE — 94664 DEMO&/EVAL PT USE INHALER: CPT

## 2024-10-13 PROCEDURE — 70450 CT HEAD/BRAIN W/O DYE: CPT | Performed by: RADIOLOGY

## 2024-10-13 PROCEDURE — 2500000002 HC RX 250 W HCPCS SELF ADMINISTERED DRUGS (ALT 637 FOR MEDICARE OP, ALT 636 FOR OP/ED)

## 2024-10-13 PROCEDURE — 2500000004 HC RX 250 GENERAL PHARMACY W/ HCPCS (ALT 636 FOR OP/ED): Performed by: NURSE PRACTITIONER

## 2024-10-13 PROCEDURE — 93005 ELECTROCARDIOGRAM TRACING: CPT

## 2024-10-13 PROCEDURE — 70450 CT HEAD/BRAIN W/O DYE: CPT

## 2024-10-13 PROCEDURE — 83690 ASSAY OF LIPASE: CPT | Performed by: EMERGENCY MEDICINE

## 2024-10-13 PROCEDURE — 71275 CT ANGIOGRAPHY CHEST: CPT | Performed by: RADIOLOGY

## 2024-10-13 PROCEDURE — 2550000001 HC RX 255 CONTRASTS: Performed by: EMERGENCY MEDICINE

## 2024-10-13 PROCEDURE — 80048 BASIC METABOLIC PNL TOTAL CA: CPT | Mod: CCI | Performed by: NURSE PRACTITIONER

## 2024-10-13 PROCEDURE — 99291 CRITICAL CARE FIRST HOUR: CPT | Mod: 25

## 2024-10-13 RX ORDER — WARFARIN 7.5 MG/1
15 TABLET ORAL DAILY
Status: DISCONTINUED | OUTPATIENT
Start: 2024-10-13 | End: 2024-10-13

## 2024-10-13 RX ORDER — PANTOPRAZOLE SODIUM 40 MG/1
40 TABLET, DELAYED RELEASE ORAL NIGHTLY
Status: DISCONTINUED | OUTPATIENT
Start: 2024-10-13 | End: 2024-10-16 | Stop reason: HOSPADM

## 2024-10-13 RX ORDER — ONDANSETRON HYDROCHLORIDE 2 MG/ML
4 INJECTION, SOLUTION INTRAVENOUS ONCE
Status: COMPLETED | OUTPATIENT
Start: 2024-10-13 | End: 2024-10-13

## 2024-10-13 RX ORDER — INSULIN GLARGINE 100 [IU]/ML
30 INJECTION, SOLUTION SUBCUTANEOUS NIGHTLY
Status: DISCONTINUED | OUTPATIENT
Start: 2024-10-13 | End: 2024-10-16 | Stop reason: HOSPADM

## 2024-10-13 RX ORDER — INSULIN LISPRO 100 [IU]/ML
0-10 INJECTION, SOLUTION INTRAVENOUS; SUBCUTANEOUS
Status: DISCONTINUED | OUTPATIENT
Start: 2024-10-13 | End: 2024-10-16

## 2024-10-13 RX ORDER — POLYETHYLENE GLYCOL 3350 17 G/17G
17 POWDER, FOR SOLUTION ORAL DAILY
Status: DISCONTINUED | OUTPATIENT
Start: 2024-10-13 | End: 2024-10-16 | Stop reason: HOSPADM

## 2024-10-13 RX ORDER — SODIUM CHLORIDE 9 MG/ML
125 INJECTION, SOLUTION INTRAVENOUS CONTINUOUS
Status: DISCONTINUED | OUTPATIENT
Start: 2024-10-13 | End: 2024-10-13

## 2024-10-13 RX ORDER — CARVEDILOL 12.5 MG/1
12.5 TABLET ORAL 2 TIMES DAILY
COMMUNITY
End: 2024-10-16 | Stop reason: HOSPADM

## 2024-10-13 RX ORDER — LEVOTHYROXINE SODIUM 88 UG/1
88 TABLET ORAL
Status: DISCONTINUED | OUTPATIENT
Start: 2024-10-14 | End: 2024-10-16 | Stop reason: HOSPADM

## 2024-10-13 RX ORDER — ACETAMINOPHEN 325 MG/1
650 TABLET ORAL EVERY 4 HOURS PRN
Status: DISCONTINUED | OUTPATIENT
Start: 2024-10-13 | End: 2024-10-16 | Stop reason: HOSPADM

## 2024-10-13 RX ORDER — BUPROPION HYDROCHLORIDE 100 MG/1
200 TABLET, EXTENDED RELEASE ORAL DAILY
Status: DISCONTINUED | OUTPATIENT
Start: 2024-10-14 | End: 2024-10-16 | Stop reason: HOSPADM

## 2024-10-13 RX ORDER — ROSUVASTATIN CALCIUM 20 MG/1
40 TABLET, COATED ORAL DAILY
Status: DISCONTINUED | OUTPATIENT
Start: 2024-10-14 | End: 2024-10-16 | Stop reason: HOSPADM

## 2024-10-13 RX ORDER — METOCLOPRAMIDE 10 MG/1
5 TABLET ORAL 2 TIMES DAILY
Status: DISCONTINUED | OUTPATIENT
Start: 2024-10-13 | End: 2024-10-16 | Stop reason: HOSPADM

## 2024-10-13 RX ORDER — DEXTROSE 50 % IN WATER (D50W) INTRAVENOUS SYRINGE
12.5
Status: DISCONTINUED | OUTPATIENT
Start: 2024-10-13 | End: 2024-10-16 | Stop reason: HOSPADM

## 2024-10-13 RX ORDER — LANOLIN ALCOHOL/MO/W.PET/CERES
400 CREAM (GRAM) TOPICAL DAILY
Status: DISCONTINUED | OUTPATIENT
Start: 2024-10-14 | End: 2024-10-16 | Stop reason: HOSPADM

## 2024-10-13 RX ORDER — SODIUM CHLORIDE, SODIUM LACTATE, POTASSIUM CHLORIDE, CALCIUM CHLORIDE 600; 310; 30; 20 MG/100ML; MG/100ML; MG/100ML; MG/100ML
125 INJECTION, SOLUTION INTRAVENOUS CONTINUOUS
Status: DISCONTINUED | OUTPATIENT
Start: 2024-10-13 | End: 2024-10-13

## 2024-10-13 RX ORDER — METOPROLOL SUCCINATE 25 MG/1
25 TABLET, EXTENDED RELEASE ORAL DAILY
Status: DISCONTINUED | OUTPATIENT
Start: 2024-10-13 | End: 2024-10-13

## 2024-10-13 RX ORDER — INSULIN LISPRO 100 [IU]/ML
0-10 INJECTION, SOLUTION INTRAVENOUS; SUBCUTANEOUS
Status: DISCONTINUED | OUTPATIENT
Start: 2024-10-13 | End: 2024-10-13

## 2024-10-13 RX ORDER — CHOLECALCIFEROL (VITAMIN D3) 25 MCG
3000 TABLET ORAL DAILY
Status: DISCONTINUED | OUTPATIENT
Start: 2024-10-13 | End: 2024-10-16 | Stop reason: HOSPADM

## 2024-10-13 RX ORDER — DOCUSATE SODIUM 100 MG/1
100 CAPSULE, LIQUID FILLED ORAL DAILY
COMMUNITY

## 2024-10-13 RX ORDER — DOCUSATE SODIUM 100 MG/1
100 CAPSULE, LIQUID FILLED ORAL DAILY
Status: DISCONTINUED | OUTPATIENT
Start: 2024-10-13 | End: 2024-10-16 | Stop reason: HOSPADM

## 2024-10-13 RX ORDER — LABETALOL HYDROCHLORIDE 5 MG/ML
10 INJECTION, SOLUTION INTRAVENOUS ONCE
Status: COMPLETED | OUTPATIENT
Start: 2024-10-13 | End: 2024-10-13

## 2024-10-13 RX ORDER — INSULIN LISPRO 100 [IU]/ML
3 INJECTION, SOLUTION INTRAVENOUS; SUBCUTANEOUS
Status: DISCONTINUED | OUTPATIENT
Start: 2024-10-13 | End: 2024-10-16

## 2024-10-13 RX ORDER — OXYBUTYNIN CHLORIDE 10 MG/1
10 TABLET, EXTENDED RELEASE ORAL NIGHTLY
Status: DISCONTINUED | OUTPATIENT
Start: 2024-10-13 | End: 2024-10-13 | Stop reason: CLARIF

## 2024-10-13 RX ORDER — ALBUTEROL SULFATE 90 UG/1
1-2 INHALANT RESPIRATORY (INHALATION) EVERY 4 HOURS PRN
COMMUNITY

## 2024-10-13 RX ORDER — CLONAZEPAM 0.5 MG/1
2 TABLET ORAL NIGHTLY
Status: DISCONTINUED | OUTPATIENT
Start: 2024-10-13 | End: 2024-10-16 | Stop reason: HOSPADM

## 2024-10-13 RX ORDER — LEVOTHYROXINE SODIUM 88 UG/1
88 TABLET ORAL
Status: DISCONTINUED | OUTPATIENT
Start: 2024-10-13 | End: 2024-10-13

## 2024-10-13 RX ORDER — DESVENLAFAXINE 50 MG/1
100 TABLET, EXTENDED RELEASE ORAL NIGHTLY
Status: DISCONTINUED | OUTPATIENT
Start: 2024-10-13 | End: 2024-10-13

## 2024-10-13 RX ORDER — FUROSEMIDE 40 MG/1
40 TABLET ORAL DAILY
Status: DISCONTINUED | OUTPATIENT
Start: 2024-10-13 | End: 2024-10-15

## 2024-10-13 RX ORDER — POTASSIUM CHLORIDE 20 MEQ/1
20 TABLET, EXTENDED RELEASE ORAL DAILY
Status: DISCONTINUED | OUTPATIENT
Start: 2024-10-14 | End: 2024-10-16 | Stop reason: HOSPADM

## 2024-10-13 RX ORDER — SPIRONOLACTONE 25 MG/1
12.5 TABLET ORAL DAILY
Status: DISCONTINUED | OUTPATIENT
Start: 2024-10-13 | End: 2024-10-13

## 2024-10-13 RX ORDER — SODIUM CHLORIDE 9 MG/ML
75 INJECTION, SOLUTION INTRAVENOUS CONTINUOUS
Status: ACTIVE | OUTPATIENT
Start: 2024-10-13 | End: 2024-10-14

## 2024-10-13 RX ORDER — TOPIRAMATE 100 MG/1
200 TABLET, FILM COATED ORAL 2 TIMES DAILY
Status: DISCONTINUED | OUTPATIENT
Start: 2024-10-13 | End: 2024-10-16 | Stop reason: HOSPADM

## 2024-10-13 RX ORDER — CARVEDILOL 12.5 MG/1
12.5 TABLET ORAL 2 TIMES DAILY
Status: DISCONTINUED | OUTPATIENT
Start: 2024-10-13 | End: 2024-10-14

## 2024-10-13 RX ORDER — DEXTROSE 50 % IN WATER (D50W) INTRAVENOUS SYRINGE
25
Status: DISCONTINUED | OUTPATIENT
Start: 2024-10-13 | End: 2024-10-16 | Stop reason: HOSPADM

## 2024-10-13 RX ORDER — OXYBUTYNIN CHLORIDE 5 MG/1
5 TABLET ORAL 2 TIMES DAILY
Status: DISCONTINUED | OUTPATIENT
Start: 2024-10-13 | End: 2024-10-16 | Stop reason: HOSPADM

## 2024-10-13 RX ORDER — EZETIMIBE 10 MG/1
10 TABLET ORAL NIGHTLY
Status: DISCONTINUED | OUTPATIENT
Start: 2024-10-13 | End: 2024-10-16 | Stop reason: HOSPADM

## 2024-10-13 RX ORDER — METOCLOPRAMIDE HYDROCHLORIDE 5 MG/ML
10 INJECTION INTRAMUSCULAR; INTRAVENOUS ONCE
Status: COMPLETED | OUTPATIENT
Start: 2024-10-13 | End: 2024-10-13

## 2024-10-13 RX ORDER — DEXTROSE 50 % IN WATER (D50W) INTRAVENOUS SYRINGE
12.5
Status: DISCONTINUED | OUTPATIENT
Start: 2024-10-13 | End: 2024-10-13

## 2024-10-13 SDOH — ECONOMIC STABILITY: FOOD INSECURITY: WITHIN THE PAST 12 MONTHS, YOU WORRIED THAT YOUR FOOD WOULD RUN OUT BEFORE YOU GOT THE MONEY TO BUY MORE.: NEVER TRUE

## 2024-10-13 SDOH — SOCIAL STABILITY: SOCIAL INSECURITY: DOES ANYONE TRY TO KEEP YOU FROM HAVING/CONTACTING OTHER FRIENDS OR DOING THINGS OUTSIDE YOUR HOME?: NO

## 2024-10-13 SDOH — SOCIAL STABILITY: SOCIAL INSECURITY: WITHIN THE LAST YEAR, HAVE YOU BEEN HUMILIATED OR EMOTIONALLY ABUSED IN OTHER WAYS BY YOUR PARTNER OR EX-PARTNER?: NO

## 2024-10-13 SDOH — ECONOMIC STABILITY: HOUSING INSECURITY: IN THE PAST 12 MONTHS, HOW MANY TIMES HAVE YOU MOVED WHERE YOU WERE LIVING?: 1

## 2024-10-13 SDOH — SOCIAL STABILITY: SOCIAL INSECURITY: DO YOU FEEL UNSAFE GOING BACK TO THE PLACE WHERE YOU ARE LIVING?: NO

## 2024-10-13 SDOH — ECONOMIC STABILITY: HOUSING INSECURITY: IN THE LAST 12 MONTHS, WAS THERE A TIME WHEN YOU WERE NOT ABLE TO PAY THE MORTGAGE OR RENT ON TIME?: NO

## 2024-10-13 SDOH — ECONOMIC STABILITY: INCOME INSECURITY: IN THE PAST 12 MONTHS HAS THE ELECTRIC, GAS, OIL, OR WATER COMPANY THREATENED TO SHUT OFF SERVICES IN YOUR HOME?: NO

## 2024-10-13 SDOH — SOCIAL STABILITY: SOCIAL INSECURITY
WITHIN THE LAST YEAR, HAVE YOU BEEN RAPED OR FORCED TO HAVE ANY KIND OF SEXUAL ACTIVITY BY YOUR PARTNER OR EX-PARTNER?: NO

## 2024-10-13 SDOH — ECONOMIC STABILITY: FOOD INSECURITY: WITHIN THE PAST 12 MONTHS, THE FOOD YOU BOUGHT JUST DIDN'T LAST AND YOU DIDN'T HAVE MONEY TO GET MORE.: NEVER TRUE

## 2024-10-13 SDOH — ECONOMIC STABILITY: FOOD INSECURITY: HOW HARD IS IT FOR YOU TO PAY FOR THE VERY BASICS LIKE FOOD, HOUSING, MEDICAL CARE, AND HEATING?: NOT HARD AT ALL

## 2024-10-13 SDOH — SOCIAL STABILITY: SOCIAL INSECURITY: DO YOU FEEL ANYONE HAS EXPLOITED OR TAKEN ADVANTAGE OF YOU FINANCIALLY OR OF YOUR PERSONAL PROPERTY?: NO

## 2024-10-13 SDOH — SOCIAL STABILITY: SOCIAL INSECURITY: HAS ANYONE EVER THREATENED TO HURT YOUR FAMILY OR YOUR PETS?: NO

## 2024-10-13 SDOH — SOCIAL STABILITY: SOCIAL INSECURITY: HAVE YOU HAD ANY THOUGHTS OF HARMING ANYONE ELSE?: NO

## 2024-10-13 SDOH — ECONOMIC STABILITY: HOUSING INSECURITY: AT ANY TIME IN THE PAST 12 MONTHS, WERE YOU HOMELESS OR LIVING IN A SHELTER (INCLUDING NOW)?: NO

## 2024-10-13 SDOH — SOCIAL STABILITY: SOCIAL INSECURITY: WITHIN THE LAST YEAR, HAVE YOU BEEN AFRAID OF YOUR PARTNER OR EX-PARTNER?: NO

## 2024-10-13 SDOH — ECONOMIC STABILITY: TRANSPORTATION INSECURITY: IN THE PAST 12 MONTHS, HAS LACK OF TRANSPORTATION KEPT YOU FROM MEDICAL APPOINTMENTS OR FROM GETTING MEDICATIONS?: NO

## 2024-10-13 SDOH — SOCIAL STABILITY: SOCIAL INSECURITY: ARE THERE ANY APPARENT SIGNS OF INJURIES/BEHAVIORS THAT COULD BE RELATED TO ABUSE/NEGLECT?: NO

## 2024-10-13 SDOH — SOCIAL STABILITY: SOCIAL INSECURITY: WERE YOU ABLE TO COMPLETE ALL THE BEHAVIORAL HEALTH SCREENINGS?: YES

## 2024-10-13 SDOH — SOCIAL STABILITY: SOCIAL INSECURITY: ARE YOU OR HAVE YOU BEEN THREATENED OR ABUSED PHYSICALLY, EMOTIONALLY, OR SEXUALLY BY ANYONE?: NO

## 2024-10-13 SDOH — SOCIAL STABILITY: SOCIAL INSECURITY: HAVE YOU HAD THOUGHTS OF HARMING ANYONE ELSE?: NO

## 2024-10-13 SDOH — SOCIAL STABILITY: SOCIAL INSECURITY: ABUSE: ADULT

## 2024-10-13 ASSESSMENT — COLUMBIA-SUICIDE SEVERITY RATING SCALE - C-SSRS
1. IN THE PAST MONTH, HAVE YOU WISHED YOU WERE DEAD OR WISHED YOU COULD GO TO SLEEP AND NOT WAKE UP?: NO
2. HAVE YOU ACTUALLY HAD ANY THOUGHTS OF KILLING YOURSELF?: NO
6. HAVE YOU EVER DONE ANYTHING, STARTED TO DO ANYTHING, OR PREPARED TO DO ANYTHING TO END YOUR LIFE?: NO

## 2024-10-13 ASSESSMENT — COGNITIVE AND FUNCTIONAL STATUS - GENERAL
DAILY ACTIVITIY SCORE: 24
MOBILITY SCORE: 24
DAILY ACTIVITIY SCORE: 24
PATIENT BASELINE BEDBOUND: NO
MOBILITY SCORE: 24

## 2024-10-13 ASSESSMENT — PAIN SCALES - GENERAL
PAINLEVEL_OUTOF10: 6
PAINLEVEL_OUTOF10: 0 - NO PAIN
PAINLEVEL_OUTOF10: 3
PAINLEVEL_OUTOF10: 0 - NO PAIN

## 2024-10-13 ASSESSMENT — ACTIVITIES OF DAILY LIVING (ADL)
PATIENT'S MEMORY ADEQUATE TO SAFELY COMPLETE DAILY ACTIVITIES?: YES
JUDGMENT_ADEQUATE_SAFELY_COMPLETE_DAILY_ACTIVITIES: YES
TOILETING: INDEPENDENT
LACK_OF_TRANSPORTATION: NO
DRESSING YOURSELF: INDEPENDENT
HEARING - LEFT EAR: FUNCTIONAL
FEEDING YOURSELF: INDEPENDENT
GROOMING: INDEPENDENT
BATHING: INDEPENDENT
ADEQUATE_TO_COMPLETE_ADL: YES
WALKS IN HOME: INDEPENDENT
LACK_OF_TRANSPORTATION: NO
HEARING - RIGHT EAR: FUNCTIONAL

## 2024-10-13 ASSESSMENT — PAIN - FUNCTIONAL ASSESSMENT
PAIN_FUNCTIONAL_ASSESSMENT: 0-10

## 2024-10-13 ASSESSMENT — LIFESTYLE VARIABLES
HOW MANY STANDARD DRINKS CONTAINING ALCOHOL DO YOU HAVE ON A TYPICAL DAY: PATIENT DOES NOT DRINK
SKIP TO QUESTIONS 9-10: 1
PRESCIPTION_ABUSE_PAST_12_MONTHS: NO
SUBSTANCE_ABUSE_PAST_12_MONTHS: YES
HOW OFTEN DO YOU HAVE A DRINK CONTAINING ALCOHOL: MONTHLY OR LESS
AUDIT-C TOTAL SCORE: 1
HOW OFTEN DO YOU HAVE 6 OR MORE DRINKS ON ONE OCCASION: NEVER
AUDIT-C TOTAL SCORE: 1

## 2024-10-13 ASSESSMENT — PATIENT HEALTH QUESTIONNAIRE - PHQ9
1. LITTLE INTEREST OR PLEASURE IN DOING THINGS: NOT AT ALL
SUM OF ALL RESPONSES TO PHQ9 QUESTIONS 1 & 2: 2
2. FEELING DOWN, DEPRESSED OR HOPELESS: MORE THAN HALF THE DAYS

## 2024-10-13 ASSESSMENT — PAIN DESCRIPTION - LOCATION: LOCATION: ABDOMEN

## 2024-10-13 NOTE — PROGRESS NOTES
Pharmacy Consult for Warfarin (Coumadin) Management - Daily Progress Note     - Warfarin history:  admitted on warfarin     - Bleeding/bruising events in past 24 hours:  not applicable    - Notable medication changes in past 24 hours:  not applicable    - Additional notes:  not applicable      Anticoagulation Dosing History  Previous home dose:  15 mg every Mon, Thu; 12.5 mg all other days (Per patient's coumadin clinic note from 10/08)    Labs  INR   Date Value Ref Range Status   10/13/2024 5.5 (HH) 0.9 - 1.1 Final   07/10/2024 4.4 (H) 0.9 - 1.1 Final   07/09/2024 4.3 (H) 0.9 - 1.1 Final     Results from last 7 days   Lab Units 10/13/24  0724   HEMOGLOBIN g/dL 16.5*     Results from last 7 days   Lab Units 10/13/24  0724   HEMATOCRIT % 47.9*     Results from last 7 days   Lab Units 10/13/24  0724   PLATELETS AUTO x10*3/uL 168     Review    Treatment Indication:   Myocardial Infarction  Target INR:  2-3    Inpatient dosing pattern:      Date INR   10/13/2024 5.5       Warfarin Dosing Plan: Hold warfarin for today    Orders placed per pharmacy consult. Pharmacy will continue to monitor and adjust therapy as needed.     Norm Guthrie, MaurcieD

## 2024-10-13 NOTE — ED PROVIDER NOTES
HPI   Chief Complaint   Patient presents with    Dizziness     Pt states she was started on a new medication for bipolar depression 4 days ago, she took it once, and she states she started having severe vomiting, diarrhea, dizziness, elevated blood sugars. Pt states she also passed out around 11pm last night.        Patient presents to the emergency department secondary to multiple symptoms including nausea, vomiting, fatigue, and syncope.  The patient states that she has been ill for several days since starting a new medication for bipolar disorder.  She states that she had a syncopal event at about 11 PM last night.  States that she has had notable fatigue since then.  At the time of arrival she is reporting fatigue and nausea.      History provided by:  Patient   used: No            Patient History   Past Medical History:   Diagnosis Date    Acute myocardial infarction, unspecified (Multi) 01/10/2022    Acute MI    Body mass index (BMI) 33.0-33.9, adult 02/01/2022    BMI 33.0-33.9,adult    Disorder of the skin and subcutaneous tissue, unspecified 07/01/2020    Back skin lesion    Encounter for preprocedural cardiovascular examination 01/10/2022    Pre-operative cardiovascular examination    Hypertensive heart disease with heart failure (Multi) 05/21/2020    Hypertensive heart disease with chronic combined systolic and diastolic congestive heart failure    Intracranial and intraspinal phlebitis and thrombophlebitis ([facility]) 01/10/2022    Cavernous sinus thrombosis    Nausea 01/10/2022    Chronic nausea    Obesity, unspecified 03/21/2022    Class 1 obesity with body mass index (BMI) of 33.0 to 33.9 in adult    Obesity, unspecified 08/01/2022    Class 1 obesity with body mass index (BMI) of 32.0 to 32.9 in adult    Old myocardial infarction     History of myocardial infarction    Other mechanical complication of other cardiac electronic device, initial encounter 02/01/2022    Mechanical  complication of implantable cardioverter-defibrillator (ICD)    Other specified counseling 2022    Encounter for medication counseling    Other specified diseases of gallbladder 2020    Porcelain gallbladder    Overweight 01/10/2022    Overweight    Person consulting for explanation of examination or test findings 2022    Encounter to discuss test results    Personal history of other diseases of the digestive system 2020    History of gallbladder disease    Personal history of other venous thrombosis and embolism     History of deep venous thrombosis    Transient cerebral ischemic attack, unspecified     TIA (transient ischemic attack)     Past Surgical History:   Procedure Laterality Date    CARDIAC CATHETERIZATION N/A 2024    Procedure: Left Heart Cath, With LV;  Surgeon: Jose Barclay MD;  Location: [facility];  Service: Cardiovascular;  Laterality: N/A;    CT ABDOMEN PELVIS ANGIOGRAM W AND/OR WO IV CONTRAST  2019    CT ABDOMEN PELVIS ANGIOGRAM W AND/OR WO IV CONTRAST 2019 [facility] CLINICAL LEGACY    OTHER SURGICAL HISTORY  2020     section    OTHER SURGICAL HISTORY  10/12/2022    Corneal lasik    OTHER SURGICAL HISTORY  10/12/2022    Skin lesion excision    OTHER SURGICAL HISTORY  01/10/2022    Colonoscopy    OTHER SURGICAL HISTORY  01/10/2022    Hysterectomy    OTHER SURGICAL HISTORY  01/10/2022    Surgery    OTHER SURGICAL HISTORY  01/10/2022    Median nerve neuroplasty    OTHER SURGICAL HISTORY  01/10/2022    Cardioverter defibrillator insertion    OTHER SURGICAL HISTORY  01/10/2022    Knee surgery    OTHER SURGICAL HISTORY  01/10/2022    Ankle surgery    OTHER SURGICAL HISTORY  01/10/2022    Tonsillectomy    OTHER SURGICAL HISTORY  01/10/2022    Hand surgery    OTHER SURGICAL HISTORY  2022    Knee fracture repair    OTHER SURGICAL HISTORY  2022    Cholecystectomy     Family History   Problem Relation Name Age of Onset     Coronary artery disease Mother      Diabetes Mother      Other (Cardiac pacemaker) Father      Coronary artery disease Father      Other (Stroke syndrome) Other       Social History     Tobacco Use    Smoking status: Every Day     Types: Cigars    Smokeless tobacco: Never   Substance Use Topics    Alcohol use: Yes     Comment: social    Drug use: Yes     Types: Marijuana       Physical Exam   ED Triage Vitals [10/13/24 0657]   Temperature Heart Rate Respirations BP   36.1 °C (97 °F) (!) 126 20 (!) 147/104      Pulse Ox Temp src Heart Rate Source Patient Position   95 % -- -- --      BP Location FiO2 (%)     -- --       Physical Exam  Vitals and nursing note reviewed.   Constitutional:       General: She is not in acute distress.     Appearance: Normal appearance. She is normal weight. She is not ill-appearing, toxic-appearing or diaphoretic.   HENT:      Head: Normocephalic and atraumatic.      Nose: Nose normal. No rhinorrhea.   Neck:      Comments: Trachea is midline  Cardiovascular:      Rate and Rhythm: Regular rhythm. Tachycardia present.      Heart sounds: No murmur heard.  Pulmonary:      Effort: Pulmonary effort is normal.      Breath sounds: Normal breath sounds. No wheezing.   Abdominal:      General: Abdomen is flat. Bowel sounds are normal. There is no distension.      Palpations: Abdomen is soft.      Tenderness: There is no abdominal tenderness.   Musculoskeletal:         General: Normal range of motion.      Cervical back: Normal range of motion. No rigidity or tenderness.   Skin:     General: Skin is warm and dry.      Findings: No rash.   Neurological:      General: No focal deficit present.      Mental Status: She is alert and oriented to person, place, and time. Mental status is at baseline.      Cranial Nerves: No cranial nerve deficit.      Sensory: No sensory deficit.   Psychiatric:         Mood and Affect: Mood normal.         Behavior: Behavior normal.         Thought Content: Thought content  normal.         Judgment: Judgment normal.           ED Course & MDM   Diagnoses as of 10/13/24 1105   Syncope, unspecified syncope type   Diabetic ketoacidosis without coma associated with diabetes mellitus due to underlying condition (Multi)   Acute kidney injury ([facility])   Elevated troponin                 No data recorded                                 Medical Decision Making  Twelve-lead EKG was interpreted by myself and this was noted to contribute directly to patient care.  Study reveals a sinus tachycardia at 132 bpm, rightward axis, delayed R wave progression, no acute ischemic changes.    Patient was noted to have leukocytosis and tachycardia although I feel sepsis is unlikely as she has no fever and a normal lactate level.  Patient's initial laboratory panels reflected mild DKA however after a small insulin bolus and IV fluids the anion gap was noted to be closed therefore she was not started on an insulin drip.    I feel the patient would benefit from inpatient care for continued IV fluids and trending of her troponin.  I also feel she would benefit from additional diabetic education.  She is agreeable to this.  Case was discussed with the hospitalist who agrees and accepted the patient to their service.    Critical care time for this patient excluding billable procedures is 44 minutes secondary to multiple repeat physical examinations, chart review, and interpretation of radiographic studies.        Procedure  Procedures     Tee Bautista, DO  10/13/24 1106

## 2024-10-13 NOTE — H&P
Ne Richardson is a 64 y.o. female with Pmhx of MI (2005), heart failure, hypertension, status post ICD placement, porcelain gallbladder, TIA, DVT presenting to [facility] ED on 10/13 for complaint of nausea, vomiting, hyperglycemia, and syncope.  In the ED patient vital signs were 36.1, 97, 16, 98/61, 98% on room air.  Imaging significant for CT C-spine was negative, CT head was negative, CT angio showed cardiomegaly and hyperinflation and cystic changes.  Labs significant for blood glucose 469/404, sodium 133, chloride 95, creatinine 1.36, INR 5.5, WBC 21.3, UA negative for acute processes, ketones 40, troponins 78-57, B hydroxy 3.0.  Patient was given 3 units of regular insulin 11, labetalol 10 mg, Zofran, 2 L normal saline bolus, Reglan, and started on normal saline 125 mL/h.  Patient will be admitted for syncopal episode in the setting of hyperglycemia.    Subjective   Patient examined in the ER.   present for exam.  Patient reports that last Thursday she started a new medication for bipolar and she has been having nausea and vomiting ever since.  Patient reports that this was discontinued after it started making her sick.  Patient states that she has not been taking her insulin like she should at home.  She states that her  gives her  meds.  Patient states that around 11:00 last night she had a syncopal episode and does not remember what happened.  Patient denies any other complaints at this time    Objective   General Appearance: AAO x 3, not in acute distress  Skin: skin color, texture, turgor normal; no suspicious rashes or lesions  Eyes : PERRL, EOM's intact, conjunctiva pink  ENT: no oral thrush, no pharyngeal erythema or exudates  Neck: no JVD, no lymphadenopathy  Respiratory: lungs CTA, no rhonchi, wheezing, or crackles  Heart: RRR without murmur, gallop, or rubs, no ectopy  Abdomen: Nondistended, positive bowel sounds, soft,  nontender  Extremities: no edema, ROM x 4 extremities  Peripheral  pulses: normal and present x 4 extremities  Neuro: alert, coherent and conversant, no focal motor deficits    Scheduled medications  buPROPion SR, 200 mg, oral, Daily  carvedilol, 12.5 mg, oral, BID  cholecalciferol, 3,000 Units, oral, Daily  clonazePAM, 2 mg, oral, Nightly  desvenlafaxine, 100 mg, oral, Nightly  docusate sodium, 100 mg, oral, Daily  ezetimibe, 10 mg, oral, Nightly  [Held by provider] furosemide, 40 mg, oral, Daily  insulin glargine, 30 Units, subcutaneous, Nightly  insulin lispro, 0-10 Units, subcutaneous, TID  levothyroxine, 88 mcg, oral, Daily  magnesium oxide, 400 mg, oral, Daily  metoclopramide, 5 mg, oral, BID  metoprolol succinate XL, 25 mg, oral, Daily  oxybutynin XL, 10 mg, oral, Nightly  pantoprazole, 40 mg, oral, Nightly  polyethylene glycol, 17 g, oral, Daily  potassium chloride CR, 20 mEq, oral, Daily  rosuvastatin, 40 mg, oral, Daily  spironolactone, 12.5 mg, oral, Daily  topiramate, 200 mg, oral, BID  [Held by provider] warfarin, 12.5 mg, oral, Daily  [Held by provider] warfarin, 15 mg, oral, Daily    Continuous medications  lactated Ringer's, 125 mL/hr, Last Rate: 125 mL/hr (10/13/24 1323)    PRN medications  PRN medications: acetaminophen, dextrose, dextrose, glucagon, glucagon, glucagon, oxygen    Last Recorded Vitals  BP (!) 166/94 (BP Location: Left arm, Patient Position: Lying)   Pulse 108   Temp 36.7 °C (98.1 °F) (Oral)   Resp 18   Wt 68.8 kg (151 lb 10.8 oz)   SpO2 91%     Intake/Output last 3 Shifts:    Intake/Output Summary (Last 24 hours) at 10/13/2024 1327  Last data filed at 10/13/2024 1154  Gross per 24 hour   Intake 2072.92 ml   Output --   Net 2072.92 ml     Admission Weight  Weight: 73.5 kg (162 lb) (10/13/24 0657)    Daily Weight  10/13/24 : 68.8 kg (151 lb 10.8 oz)    Lab results  Results for orders placed or performed during the hospital encounter of 10/13/24 (from the past 24 hour(s))   POCT GLUCOSE   Result Value Ref Range    POCT Glucose 437 (H) 74 - 99  mg/dL   Beta Hydroxybutyrate   Result Value Ref Range    Beta-Hydroxybutyrate 3.10 (H) 0.02 - 0.27 mmol/L   CBC and Auto Differential   Result Value Ref Range    WBC 21.3 (H) 4.4 - 11.3 x10*3/uL    nRBC 0.0 0.0 - 0.0 /100 WBCs    RBC 4.47 4.00 - 5.20 x10*6/uL    Hemoglobin 16.5 (H) 12.0 - 16.0 g/dL    Hematocrit 47.9 (H) 36.0 - 46.0 %     (H) 80 - 100 fL    MCH 36.9 (H) 26.0 - 34.0 pg    MCHC 34.4 32.0 - 36.0 g/dL    RDW 13.3 11.5 - 14.5 %    Platelets 168 150 - 450 x10*3/uL    Neutrophils % 79.5 40.0 - 80.0 %    Immature Granulocytes %, Automated 0.7 0.0 - 0.9 %    Lymphocytes % 13.4 13.0 - 44.0 %    Monocytes % 6.2 2.0 - 10.0 %    Eosinophils % 0.0 0.0 - 6.0 %    Basophils % 0.2 0.0 - 2.0 %    Neutrophils Absolute 16.89 (H) 1.20 - 7.70 x10*3/uL    Immature Granulocytes Absolute, Automated 0.15 0.00 - 0.70 x10*3/uL    Lymphocytes Absolute 2.84 1.20 - 4.80 x10*3/uL    Monocytes Absolute 1.31 (H) 0.10 - 1.00 x10*3/uL    Eosinophils Absolute 0.01 0.00 - 0.70 x10*3/uL    Basophils Absolute 0.05 0.00 - 0.10 x10*3/uL   Comprehensive metabolic panel   Result Value Ref Range    Glucose 469 (HH) 74 - 99 mg/dL    Sodium 135 (L) 136 - 145 mmol/L    Potassium 4.7 3.5 - 5.3 mmol/L    Chloride 89 (L) 98 - 107 mmol/L    Bicarbonate 28 21 - 32 mmol/L    Anion Gap 23 (H) 10 - 20 mmol/L    Urea Nitrogen 47 (H) 6 - 23 mg/dL    Creatinine 1.67 (H) 0.50 - 1.05 mg/dL    eGFR 34 (L) >60 mL/min/1.73m*2    Calcium 9.7 8.6 - 10.3 mg/dL    Albumin 4.6 3.4 - 5.0 g/dL    Alkaline Phosphatase 90 33 - 136 U/L    Total Protein 8.1 6.4 - 8.2 g/dL    AST 15 9 - 39 U/L    Bilirubin, Total 0.5 0.0 - 1.2 mg/dL    ALT 18 7 - 45 U/L   Lipase   Result Value Ref Range    Lipase 15 9 - 82 U/L   Lactate   Result Value Ref Range    Lactate 2.1 (H) 0.4 - 2.0 mmol/L   Troponin I, High Sensitivity, Initial   Result Value Ref Range    Troponin I, High Sensitivity 78 (HH) 0 - 13 ng/L   POCT GLUCOSE   Result Value Ref Range    POCT Glucose 391 (H) 74 - 99  mg/dL   Troponin, High Sensitivity, 1 Hour   Result Value Ref Range    Troponin I, High Sensitivity 57 (HH) 0 - 13 ng/L   Lactate   Result Value Ref Range    Lactate 1.7 0.4 - 2.0 mmol/L   Protime-INR   Result Value Ref Range    Protime 65.3 (HH) 9.8 - 12.8 seconds    INR 5.5 (HH) 0.9 - 1.1   SST TOP   Result Value Ref Range    Extra Tube Hold for add-ons.    Basic metabolic panel   Result Value Ref Range    Glucose 404 (H) 74 - 99 mg/dL    Sodium 133 (L) 136 - 145 mmol/L    Potassium 4.2 3.5 - 5.3 mmol/L    Chloride 95 (L) 98 - 107 mmol/L    Bicarbonate 25 21 - 32 mmol/L    Anion Gap 17 10 - 20 mmol/L    Urea Nitrogen 45 (H) 6 - 23 mg/dL    Creatinine 1.36 (H) 0.50 - 1.05 mg/dL    eGFR 44 (L) >60 mL/min/1.73m*2    Calcium 8.3 (L) 8.6 - 10.3 mg/dL   Urinalysis with Reflex Culture and Microscopic   Result Value Ref Range    Color, Urine Light-Yellow Light-Yellow, Yellow, Dark-Yellow    Appearance, Urine Clear Clear    Specific Gravity, Urine 1.050 (N) 1.005 - 1.035    pH, Urine 6.0 5.0, 5.5, 6.0, 6.5, 7.0, 7.5, 8.0    Protein, Urine 50 (1+) (A) NEGATIVE, 10 (TRACE), 20 (TRACE) mg/dL    Glucose, Urine OVER (4+) (A) Normal mg/dL    Blood, Urine 0.1 (1+) (A) NEGATIVE    Ketones, Urine 40 (2+) (A) NEGATIVE mg/dL    Bilirubin, Urine NEGATIVE NEGATIVE    Urobilinogen, Urine Normal Normal mg/dL    Nitrite, Urine NEGATIVE NEGATIVE    Leukocyte Esterase, Urine NEGATIVE NEGATIVE   Urinalysis Microscopic   Result Value Ref Range    WBC, Urine 1-5 1-5, NONE /HPF    RBC, Urine 3-5 NONE, 1-2, 3-5 /HPF    Squamous Epithelial Cells, Urine 1-9 (SPARSE) Reference range not established. /HPF    Bacteria, Urine 4+ (A) NONE SEEN /HPF    Mucus, Urine FEW Reference range not established. /LPF     Image Results  CT angio chest for pulmonary embolism    Result Date: 10/13/2024  Interpreted By:  Mio Pate, STUDY: CT ANGIO CHEST FOR PULMONARY EMBOLISM;  10/13/2024 9:13 am   INDICATION: Signs/Symptoms:tachycardia/syncope.     COMPARISON:  None   ACCESSION NUMBER(S): [id number]   ORDERING CLINICIAN: ABIGAIL GRAHAM   TECHNIQUE: Helical data acquisition of the chest was obtained after intravenous administration of IV contrast, as per PE protocol. Images were reformatted in coronal and sagittal planes. Axial and coronal maximum intensity projection (MIP) images were created and reviewed.   FINDINGS: POTENTIAL LIMITATIONS OF THE STUDY: Slightly limited due to motion artifact.   HEART AND VESSELS: There are no discrete filling defects within main pulmonary artery and its branches to suggest acute pulmonary embolism. Main pulmonary artery and its branches are normal in caliber.   The thoracic aorta normal in course and caliber. Coronary artery calcification.. Please note, the study is not optimized for evaluation of coronary arteries.   Cardiomegaly.   There is no pericardial effusion seen.   MEDIASTINUM AND NICK, LOWER NECK AND AXILLA: The visualized thyroid gland is within normal limits. No evidence of thoracic lymphadenopathy by CT criteria. Esophagus appears within normal limits as seen.   LUNGS AND AIRWAYS: The trachea and central airways are patent. No endobronchial lesion is seen.   The bilateral lungs are hyperinflated. Cystic changes and air trapping. Likely bibasilar atelectasis and/or scarring.   UPPER ABDOMEN: The visualized subdiaphragmatic structures demonstrate no remarkable findings.       CHEST WALL AND OSSEOUS STRUCTURES: Chest wall is within normal limits. No acute osseous pathology.There are no suspicious osseous lesions.       1. Cardiomegaly with coronary calcification. 2. No evidence of pulmonary embolism. 3. Hyperinflation and cystic changes and bibasilar atelectasis and/or scarring   MACRO: None   Signed by: Mio Pate 10/13/2024 9:47 AM Dictation workstation:   [id number]    CT cervical spine wo IV contrast    Result Date: 10/13/2024  Interpreted By:  Mio Pate, STUDY: CT CERVICAL SPINE WO IV CONTRAST;  10/13/2024 8:52 am    INDICATION: Signs/Symptoms:fall.     COMPARISON: None.   ACCESSION NUMBER(S): [id number]   ORDERING CLINICIAN: ABIGAIL GRAHAM   TECHNIQUE: Axial CT images of the cervical spine are obtained. Axial, coronal and sagittal reconstructions are provided for review.   FINDINGS: Apparent osteopenia. Straightening of normal cervical lordosis. Minimal anterolisthesis of C7-T1. No erosions. No acute fracture-dislocation       No evidence for an acute fracture or subluxation of the cervical spine. Mild multilevel discogenic degenerative changes.   MACRO: None   Signed by: Mio Pate 10/13/2024 9:13 AM Dictation workstation:   [id number]    CT head wo IV contrast    Result Date: 10/13/2024  Interpreted By:  Mio Pate, STUDY: CT HEAD WO IV CONTRAST;  10/13/2024 8:52 am   INDICATION: Signs/Symptoms:fall.     COMPARISON: 05/30/2022   ACCESSION NUMBER(S): [id number]   ORDERING CLINICIAN: ABIGAIL GRAHAM   TECHNIQUE: Noncontrast axial CT scan of head was performed. Angled reformats in brain and bone windows were generated. The images were reviewed in bone, brain, blood and soft tissue windows.   FINDINGS: CSF Spaces: The ventricles, sulci and basal cisterns are within normal limits. There is no extraaxial fluid collection.   Parenchyma: Small area of hypodensity in the left occipital lobe is unchanged and likely related to old infarct. Also similar but smaller area in the right occipital lobe. The grey-white differentiation is intact. There is no mass effect or midline shift.  There is no intracranial hemorrhage.   Calvarium: The calvarium is unremarkable.   Paranasal sinuses and mastoids: Visualized paranasal sinuses and mastoids are clear.       No evidence of acute cortical infarct or intracranial hemorrhage.       MACRO: None   Signed by: Mio Pate 10/13/2024 9:05 AM Dictation workstation:   [id number]    Assessment/Plan      Ne Richardson is a 64 y.o. female with Pmhx of MI (2005), heart failure, hypertension, status  post ICD placement, porcelain gallbladder, TIA, DVT presenting to [facility] ED on 10/13 for complaint of nausea, vomiting, hyperglycemia, and syncope.  Patient will be admitted for syncopal episode in the setting of hyperglycemia.    Principal Problem:    Syncope, unspecified syncope type  Active Problems:    Syncope and collapse    PLAN:    Mild DKA  -  -GAP closed x 2 but b hydro still high, BG high ordred regular insulin 8 units  -added 3 units prandial humalog and sliding scale  -lantus 30 units at hs  -humalog sliding scale   -poct bg ac and hs   -CLD for now until nausea resolves  -s/p 2 liters NS bolus in ED  -LR at 125ml/hr   -patient has a dexcom will place order so patient can use    Syncopal episode/Elevated troponins/tachycardia/history of MI/CAD  -telemetry monitoring   -Echo ordered  - Carotid Dopplers ordered  -ICD present   -Coreg 12.5 mg p.o. twice daily  - Metoprolol 25 mg p.o. daily  -Zetia 10 mg at at bedtime  -Rosuvastatin 40 mg p.o. daily  -INR is 5.5, coumadin is on hold. Patient regimen is 15mg mon/thurs, and 12.5mg the rest of the week. Pharmacy to dose order placed.    Essential hypertension/CHF  - Aldactone 12.5 mg p.o. daily    Anxiety/depression  -Klonopin 2 mg at at bedtime  - Wellbutrin 200 mg p.o. daily  - Pristiq 100 mg p.o. at  bedtime  -Topamax 200 mg p.o. twice daily    Hypothyroidism  - Levothyroxine 88 mcg p.o. daily    GERD  - Protonix 40 mg p.o. at at bedtime    Bowel regimen: MiraLAX 17 g p.o. daily  Dispo: anticipate 24 hours depending on clinical course    Mya Mancilla, DNP, APRN, AGNP-BC  Hospitalist Nurse Practitioner

## 2024-10-14 ENCOUNTER — APPOINTMENT (OUTPATIENT)
Dept: CARDIOLOGY | Facility: HOSPITAL | Age: 64
DRG: 638 | End: 2024-10-14
Payer: MEDICARE

## 2024-10-14 ENCOUNTER — APPOINTMENT (OUTPATIENT)
Dept: VASCULAR MEDICINE | Facility: HOSPITAL | Age: 64
DRG: 638 | End: 2024-10-14
Payer: MEDICARE

## 2024-10-14 LAB
ALBUMIN SERPL BCP-MCNC: 3 G/DL (ref 3.4–5)
ALP SERPL-CCNC: 52 U/L (ref 33–136)
ALT SERPL W P-5'-P-CCNC: 11 U/L (ref 7–45)
ANION GAP SERPL CALC-SCNC: 11 MMOL/L (ref 10–20)
AORTIC VALVE MEAN GRADIENT: 8 MMHG
AORTIC VALVE PEAK VELOCITY: 2.09 M/S
AST SERPL W P-5'-P-CCNC: 10 U/L (ref 9–39)
AV PEAK GRADIENT: 17.5 MMHG
AVA (PEAK VEL): 1.12 CM2
AVA (VTI): 1.29 CM2
BILIRUB SERPL-MCNC: 0.4 MG/DL (ref 0–1.2)
BNP SERPL-MCNC: 290 PG/ML (ref 0–99)
BODY SURFACE AREA: 1.74 M2
BUN SERPL-MCNC: 34 MG/DL (ref 6–23)
CALCIUM SERPL-MCNC: 7.7 MG/DL (ref 8.6–10.3)
CHLORIDE SERPL-SCNC: 104 MMOL/L (ref 98–107)
CO2 SERPL-SCNC: 26 MMOL/L (ref 21–32)
CREAT SERPL-MCNC: 1.07 MG/DL (ref 0.5–1.05)
EGFRCR SERPLBLD CKD-EPI 2021: 58 ML/MIN/1.73M*2
EJECTION FRACTION APICAL 4 CHAMBER: 30.7
EJECTION FRACTION: 30 %
ERYTHROCYTE [DISTWIDTH] IN BLOOD BY AUTOMATED COUNT: 13.5 % (ref 11.5–14.5)
GLUCOSE SERPL-MCNC: 248 MG/DL (ref 74–99)
HCT VFR BLD AUTO: 33.9 % (ref 36–46)
HGB BLD-MCNC: 11.5 G/DL (ref 12–16)
INR PPP: 8.2 (ref 0.9–1.1)
LEFT ATRIUM VOLUME AREA LENGTH INDEX BSA: 23.9 ML/M2
LEFT VENTRICLE INTERNAL DIMENSION DIASTOLE: 3.36 CM (ref 3.5–6)
LEFT VENTRICULAR OUTFLOW TRACT DIAMETER: 1.8 CM
LV EJECTION FRACTION BIPLANE: 31 %
MAGNESIUM SERPL-MCNC: 1.84 MG/DL (ref 1.6–2.4)
MCH RBC QN AUTO: 36.9 PG (ref 26–34)
MCHC RBC AUTO-ENTMCNC: 33.8 G/DL (ref 32–36)
MCV RBC AUTO: 109 FL (ref 80–100)
MITRAL VALVE E/A RATIO: 0.64
NRBC BLD-RTO: 0 /100 WBCS (ref 0–0)
PLATELET # BLD AUTO: 112 X10*3/UL (ref 150–450)
POTASSIUM SERPL-SCNC: 3.6 MMOL/L (ref 3.5–5.3)
PROT SERPL-MCNC: 5.1 G/DL (ref 6.4–8.2)
PROTHROMBIN TIME: 97.3 SECONDS (ref 9.8–12.8)
RBC # BLD AUTO: 3.09 X10*6/UL (ref 4–5.2)
RIGHT VENTRICLE PEAK SYSTOLIC PRESSURE: 38.8 MMHG
SODIUM SERPL-SCNC: 137 MMOL/L (ref 136–145)
TRICUSPID ANNULAR PLANE SYSTOLIC EXCURSION: 1.7 CM
WBC # BLD AUTO: 10.4 X10*3/UL (ref 4.4–11.3)

## 2024-10-14 PROCEDURE — 93880 EXTRACRANIAL BILAT STUDY: CPT

## 2024-10-14 PROCEDURE — 2500000001 HC RX 250 WO HCPCS SELF ADMINISTERED DRUGS (ALT 637 FOR MEDICARE OP): Performed by: HOSPITALIST

## 2024-10-14 PROCEDURE — 80053 COMPREHEN METABOLIC PANEL: CPT | Performed by: NURSE PRACTITIONER

## 2024-10-14 PROCEDURE — 93880 EXTRACRANIAL BILAT STUDY: CPT | Performed by: STUDENT IN AN ORGANIZED HEALTH CARE EDUCATION/TRAINING PROGRAM

## 2024-10-14 PROCEDURE — 1200000002 HC GENERAL ROOM WITH TELEMETRY DAILY

## 2024-10-14 PROCEDURE — 2500000005 HC RX 250 GENERAL PHARMACY W/O HCPCS: Performed by: NURSE PRACTITIONER

## 2024-10-14 PROCEDURE — 2500000004 HC RX 250 GENERAL PHARMACY W/ HCPCS (ALT 636 FOR OP/ED): Performed by: NURSE PRACTITIONER

## 2024-10-14 PROCEDURE — 2500000001 HC RX 250 WO HCPCS SELF ADMINISTERED DRUGS (ALT 637 FOR MEDICARE OP): Performed by: NURSE PRACTITIONER

## 2024-10-14 PROCEDURE — 2500000002 HC RX 250 W HCPCS SELF ADMINISTERED DRUGS (ALT 637 FOR MEDICARE OP, ALT 636 FOR OP/ED): Performed by: NURSE PRACTITIONER

## 2024-10-14 PROCEDURE — 94761 N-INVAS EAR/PLS OXIMETRY MLT: CPT

## 2024-10-14 PROCEDURE — 2500000004 HC RX 250 GENERAL PHARMACY W/ HCPCS (ALT 636 FOR OP/ED)

## 2024-10-14 PROCEDURE — 4B02XTZ MEASUREMENT OF CARDIAC DEFIBRILLATOR, EXTERNAL APPROACH: ICD-10-PCS | Performed by: INTERNAL MEDICINE

## 2024-10-14 PROCEDURE — 83735 ASSAY OF MAGNESIUM: CPT

## 2024-10-14 PROCEDURE — 36415 COLL VENOUS BLD VENIPUNCTURE: CPT | Performed by: NURSE PRACTITIONER

## 2024-10-14 PROCEDURE — 2500000001 HC RX 250 WO HCPCS SELF ADMINISTERED DRUGS (ALT 637 FOR MEDICARE OP)

## 2024-10-14 PROCEDURE — 85610 PROTHROMBIN TIME: CPT

## 2024-10-14 PROCEDURE — 83880 ASSAY OF NATRIURETIC PEPTIDE: CPT

## 2024-10-14 PROCEDURE — 93283 PRGRMG EVAL IMPLANTABLE DFB: CPT

## 2024-10-14 PROCEDURE — 2500000002 HC RX 250 W HCPCS SELF ADMINISTERED DRUGS (ALT 637 FOR MEDICARE OP, ALT 636 FOR OP/ED)

## 2024-10-14 PROCEDURE — 99222 1ST HOSP IP/OBS MODERATE 55: CPT

## 2024-10-14 PROCEDURE — 93306 TTE W/DOPPLER COMPLETE: CPT | Performed by: STUDENT IN AN ORGANIZED HEALTH CARE EDUCATION/TRAINING PROGRAM

## 2024-10-14 PROCEDURE — 93306 TTE W/DOPPLER COMPLETE: CPT

## 2024-10-14 PROCEDURE — 85027 COMPLETE CBC AUTOMATED: CPT | Performed by: NURSE PRACTITIONER

## 2024-10-14 RX ORDER — PHYTONADIONE 5 MG/1
2.5 TABLET ORAL ONCE
Status: DISCONTINUED | OUTPATIENT
Start: 2024-10-14 | End: 2024-10-14

## 2024-10-14 RX ORDER — METOPROLOL SUCCINATE 25 MG/1
25 TABLET, EXTENDED RELEASE ORAL DAILY
Status: DISCONTINUED | OUTPATIENT
Start: 2024-10-14 | End: 2024-10-16 | Stop reason: HOSPADM

## 2024-10-14 RX ORDER — GUAIFENESIN 100 MG/5ML
200 SOLUTION ORAL EVERY 4 HOURS PRN
Status: DISCONTINUED | OUTPATIENT
Start: 2024-10-14 | End: 2024-10-16 | Stop reason: HOSPADM

## 2024-10-14 ASSESSMENT — COGNITIVE AND FUNCTIONAL STATUS - GENERAL
STANDING UP FROM CHAIR USING ARMS: A LITTLE
MOBILITY SCORE: 20
CLIMB 3 TO 5 STEPS WITH RAILING: A LITTLE
WALKING IN HOSPITAL ROOM: A LITTLE
MOVING TO AND FROM BED TO CHAIR: A LITTLE

## 2024-10-14 ASSESSMENT — PAIN - FUNCTIONAL ASSESSMENT
PAIN_FUNCTIONAL_ASSESSMENT: 0-10

## 2024-10-14 ASSESSMENT — PAIN SCALES - GENERAL
PAINLEVEL_OUTOF10: 0 - NO PAIN
PAINLEVEL_OUTOF10: 5 - MODERATE PAIN

## 2024-10-14 NOTE — PROGRESS NOTES
SW reviewed chart and met with pt to complete the assessment. Pt is alert and oriented, pleasant and cooperative and she was educated to the SW roles in the discharge planning process. Prior to hospitalization, pt was living with spouse in a two story home with 7 stairs to climb. Pt does access the second floor of her home for sleeping purposes which she has to climb 14 stairs to access.  She reports she is independent with all care, but requires assistance with activities such as cooking, cleaning, and transportation which her spouse assists with.  She is active with PCP, Chantelle Duke/MARILYN Maxwell, seen last 3-4 weeks ago and uses [facility] for prescriptions. Pt denies any issues obtaining/affording medications. Demographics verified and confirmed.  We reviewed the IM with no questions/concerns- copy added to pt's chart, another copy provided to pt.  Pt will return home at discharge and has no concerns regarding her discharge plan.  Care Transitions will follow as needed.       BRUNILDA Faria

## 2024-10-14 NOTE — PROGRESS NOTES
10/14/24 1433   Discharge Planning   Living Arrangements Spouse/significant other   Support Systems Spouse/significant other   Assistance Needed Independent with care, assist with activities (cooking/cleaning)   Type of Residence Private residence   Number of Stairs to Enter Residence 7   Number of Stairs Within Residence 14   Do you have animals or pets at home? Yes   Type of Animals or Pets 3 dogs, 1 cat   Who is requesting discharge planning? Provider   Home or Post Acute Services None   Expected Discharge Disposition Home

## 2024-10-14 NOTE — TREATMENT PLAN
Device interrogated which showed episodes of SVT in the past few days with longest episode lasting 12 seconds. Will stop carvedilol given soft BP and start Toprol XL 25mg daily.

## 2024-10-14 NOTE — CONSULTS
Pharmacy Consult for Warfarin (Coumadin) Management - Daily Progress Note     - Warfarin history:  admitted on warfarin     - Bleeding/bruising events in past 24 hours:  not applicable    - Notable medication changes in past 24 hours:  not applicable    - Additional notes:  not applicable      Anticoagulation Dosing History  Previous home dose:  Previous home dose: 15 mg every Mon, Thu; 12.5 mg all other days (Per patient's coumadin clinic note from 10/08)     Labs  INR   Date Value Ref Range Status   10/14/2024 8.2 (HH) 0.9 - 1.1 Final   10/13/2024 5.5 (HH) 0.9 - 1.1 Final   07/10/2024 4.4 (H) 0.9 - 1.1 Final     Results from last 7 days   Lab Units 10/14/24  0435 10/13/24  0724   HEMOGLOBIN g/dL 11.5* 16.5*     Results from last 7 days   Lab Units 10/14/24  0435 10/13/24  0724   HEMATOCRIT % 33.9* 47.9*     Results from last 7 days   Lab Units 10/14/24  0435 10/13/24  0724   PLATELETS AUTO x10*3/uL 112* 168     Review    Treatment Indication:  Myocardial Infarction  Target INR:  2-3    Inpatient dosing pattern:  Holding Warfarin    Warfarin Dosing Plan:     Orders placed per pharmacy consult. Pharmacy will continue to monitor and adjust therapy as needed.     Becki KARLENE Friday Cindy Barnes

## 2024-10-14 NOTE — NURSING NOTE
Pt informed nurse that she took 4 units of her own insulin at 0430 for a blood sugar of 237. Instructed patient not to take her own meds that nursing needed to monitor her medications. Pt in agreement. Skip notified

## 2024-10-14 NOTE — PROGRESS NOTES
Jony Richardson is a 64 y.o. female on day 0 of admission presenting with Syncope, unspecified syncope type.      Subjective   Seen and examined.  Awake, alert and oriented x 3, sitting up in the bed.  Denies abdominal pain denies nausea.  Will advance diet to full liquid.  Continue to monitor blood sugar closely       Objective     Last Recorded Vitals  BP 97/62   Pulse 80   Temp 36.7 °C (98 °F) (Oral)   Resp 16   Wt 68.8 kg (151 lb 10.8 oz)   SpO2 97%   Intake/Output last 3 Shifts:    Intake/Output Summary (Last 24 hours) at 10/14/2024 1230  Last data filed at 10/14/2024 0800  Gross per 24 hour   Intake 2672.92 ml   Output --   Net 2672.92 ml       Admission Weight  Weight: 73.5 kg (162 lb) (10/13/24 0657)    Daily Weight  10/13/24 : 68.8 kg (151 lb 10.8 oz)    Image Results  ECG 12 lead  Sinus tachycardia  Biatrial enlargement  Inferior infarct (cited on or before 23-APR-2024)  Anterolateral infarct (cited on or before 22-APR-2024)  Abnormal ECG  When compared with ECG of 08-JUL-2024 09:35,  ST elevation now present in Inferior leads  ST no longer elevated in Anterior leads  Carotid duplex bilateral  Preliminary Cardiology [place] [address]  Phone [phone], Fax [phone]           Preliminary Vascular Lab Report     [facility] CAROTID ARTERY DUPLEX BILATERAL       Patient Name:    JONY FARLEY       [place] Physician: [id number] Brennan RICHARDSON MD  Study Date:      10/14/2024   Ordering Provider: [id number] CHRISTOPHER HAMMOND  MRN/PID:         [id number]     Fellow:  Accession#:      [id number] Technologist:      Esdras Petersen RVT  YOB: 1960    Technologist 2:  Gender:          F            Encounter#:        [id number]  Admission        Inpatient    Location           [facility]  Status:                       Performed:       Diagnosis/ICD: Other specified  symptoms and signs involving the circulatory and                 respiratory systems-R09.89  CPT Codes:     93449 Cerebrovascular Carotid Duplex scan complete       PRELIMINARY CONCLUSIONS:     Right Carotid: Findings are consistent with less than 50% stenosis of the right proximal internal carotid artery. Laminar flow seen by color Doppler. Right external carotid artery appears patent with no evidence of stenosis. No evidence of hemodynamically significant stenosis of the right common carotid artery. The right vertebral artery is patent with antegrade flow. No evidence of hemodynamically significant stenosis in the right subclavian artery.  Left Carotid: Findings are consistent with less than 50% stenosis of the left proximal internal carotid artery. Laminar flow seen by color Doppler. Left external carotid artery appears patent with no evidence of stenosis. No evidence of hemodynamically significant stenosis of the left common carotid artery. The left vertebral artery demonstrates bidirectional flow which may be suggestive of a more proximal stenosis or occlusion. There are elevated velocities in the left subclavian artery that are suggestive of disease.     Imaging & Doppler Findings:  Right Plaque Morph: The proximal right internal carotid artery demonstrates irregular and calcified plaque. The proximal right external carotid artery demonstrates irregular and calcified plaque. The mid right common carotid artery demonstrates heterogenous plaque. The distal right common carotid artery demonstrates heterogenous plaque. The right carotid bulb demonstrates irregular and calcified plaque.  Left Plaque Morph: The proximal left internal carotid artery demonstrates irregular and calcified plaque. The proximal left external carotid artery demonstrates heterogenous plaque. The left carotid bulb demonstrates irregular and calcified plaque.      Right                        Left    PSV      EDV                PSV       EDV  100 cm/s           CCA P    83 cm/s  82 cm/s            CCA D    63 cm/s  135 cm/s 27 cm/s   ICA P    94 cm/s  29 cm/s  69 cm/s  26 cm/s   ICA D    82 cm/s  29 cm/s  183 cm/s            ECA     152 cm/s  95 cm/s  17 cm/s Vertebral  144 cm/s         Subclavian 288 cm/s                     Right Left  ICA/CCA Ratio  1.6  1.5          VASCULAR PRELIMINARY REPORT  completed by Esdras Petersen RVT on 10/14/2024 at 8:22:18 AM       ** Final **      Physical Exam  General Appearance: AAO x 3, not in acute distress  Skin: skin color, texture, turgor normal; no suspicious rashes or lesions  Eyes : PERRL, EOM's intact, conjunctiva pink  ENT: no oral thrush, no pharyngeal erythema or exudates  Neck: no JVD, no lymphadenopathy  Respiratory: lungs CTA, no rhonchi, wheezing, or crackles  Heart: RRR without murmur, gallop, or rubs, no ectopy  Abdomen: Nondistended, positive bowel sounds, soft,  nontender  Extremities: no edema, ROM x 4 extremities  Peripheral pulses: normal and present x 4 extremities  Neuro: alert, coherent and conversant, no focal motor deficits     Relevant Results             No current facility-administered medications on file prior to encounter.     Current Outpatient Medications on File Prior to Encounter   Medication Sig Dispense Refill    buPROPion SR (Wellbutrin SR) 200 mg 12 hr tablet Take 1 tablet (200 mg) by mouth twice a day.      carvedilol (Coreg) 12.5 mg tablet Take 1 tablet (12.5 mg) by mouth 2 times a day.      cholecalciferol (Vitamin D-3) 25 MCG (1000 UT) capsule Take 3 capsules (75 mcg) by mouth once daily.      clonazePAM (KlonoPIN) 2 mg tablet Take 1 tablet (2 mg) by mouth once daily at bedtime.      desvenlafaxine 100 mg 24 hr tablet Take 1 tablet (100 mg) by mouth once daily at bedtime.      furosemide (Lasix) 40 mg tablet Take 1 tablet (40 mg) by mouth once daily.      insulin aspart (NovoLOG) 100 unit/mL (3 mL) pen Inject under the skin 3 times a day with meals. Per Sliding Scale       insulin glargine (Lantus) 100 unit/mL injection Inject 30 Units under the skin once daily at bedtime.      levothyroxine (Synthroid, Levoxyl) 88 mcg tablet Take 1 tablet (88 mcg) by mouth once daily.      magnesium oxide 500 mg capsule Take 1 capsule (500 mg) by mouth once daily at bedtime.      metoclopramide (Reglan) 5 mg tablet Take 1 tablet (5 mg) by mouth 2 times a day.      oxybutynin XL (Ditropan-XL) 10 mg 24 hr tablet Take 1 tablet (10 mg) by mouth once daily at bedtime.      pantoprazole (ProtoNix) 40 mg EC tablet Take 1 tablet (40 mg) by mouth once daily at bedtime. Do not crush, chew, or split. 30 tablet 0    potassium chloride CR 20 mEq ER tablet Take 1 tablet (20 mEq) by mouth once daily.      rOPINIRole (Requip) 2 mg tablet Take 1 tablet (2 mg) by mouth once daily at bedtime.      rosuvastatin (Crestor) 40 mg tablet Take 1 tablet (40 mg) by mouth once daily.      topiramate (Topamax) 200 mg tablet Take 1 tablet (200 mg) by mouth 2 times a day.      warfarin (Coumadin) 5 mg tablet Take 2.5 tablets (12.5 mg) by mouth 5 times a week. Tuesday, Wednesday, Friday, Saturday & Sunday, in the evening.      albuterol 90 mcg/actuation inhaler Inhale 1-2 puffs every 4 hours if needed for wheezing. Patient states she seldom takes this      docusate sodium (Colace) 100 mg capsule Take 1 capsule (100 mg) by mouth once daily.      ezetimibe (Zetia) 10 mg tablet Take 1 tablet (10 mg) by mouth once daily at bedtime.      glucagon (Baqsimi) 3 mg/actuation spray,non-aerosol Administer 1 spray into affected nostril(s) if needed (for low blood sugar. May repeat after 15 minutes using a new device if there is no response).      glucagon 1 mg injection Use as directed for hypoglycemia      metoprolol succinate XL (Toprol-XL) 25 mg 24 hr tablet Take 1 tablet (25 mg) by mouth once daily. Do not crush or chew. Do not fill before July 11, 2024. 30 tablet 0    nitroglycerin (Nitrostat) 0.4 mg SL tablet Place 1 tablet (0.4 mg)  under the tongue every 5 minutes if needed for chest pain. Up to 3 doses      spironolactone (Aldactone) 25 mg tablet Take 0.5 tablets (12.5 mg) by mouth once daily. 15 tablet 0    warfarin (Coumadin) 7.5 mg tablet Take 2 tablets (15 mg) by mouth 2 times a week. Monday & Thursday in the evening      [DISCONTINUED] ARIPiprazole (Abilify) 2 mg tablet Take 2 tablets (4 mg) by mouth once daily at bedtime.      [DISCONTINUED] valsartan (Diovan) 40 mg tablet Take 1 tablet (40 mg) by mouth once daily.       Results for orders placed or performed during the hospital encounter of 10/13/24 (from the past 24 hour(s))   Basic metabolic panel   Result Value Ref Range    Glucose 389 (H) 74 - 99 mg/dL    Sodium 133 (L) 136 - 145 mmol/L    Potassium 4.4 3.5 - 5.3 mmol/L    Chloride 96 (L) 98 - 107 mmol/L    Bicarbonate 22 21 - 32 mmol/L    Anion Gap 19 10 - 20 mmol/L    Urea Nitrogen 43 (H) 6 - 23 mg/dL    Creatinine 1.29 (H) 0.50 - 1.05 mg/dL    eGFR 47 (L) >60 mL/min/1.73m*2    Calcium 8.3 (L) 8.6 - 10.3 mg/dL   Beta Hydroxybutyrate   Result Value Ref Range    Beta-Hydroxybutyrate 3.38 (H) 0.02 - 0.27 mmol/L   POCT GLUCOSE   Result Value Ref Range    POCT Glucose 377 (H) 74 - 99 mg/dL   Basic metabolic panel   Result Value Ref Range    Glucose 407 (H) 74 - 99 mg/dL    Sodium 134 (L) 136 - 145 mmol/L    Potassium 3.7 3.5 - 5.3 mmol/L    Chloride 99 98 - 107 mmol/L    Bicarbonate 25 21 - 32 mmol/L    Anion Gap 14 10 - 20 mmol/L    Urea Nitrogen 41 (H) 6 - 23 mg/dL    Creatinine 1.26 (H) 0.50 - 1.05 mg/dL    eGFR 48 (L) >60 mL/min/1.73m*2    Calcium 7.9 (L) 8.6 - 10.3 mg/dL   Beta Hydroxybutyrate   Result Value Ref Range    Beta-Hydroxybutyrate 0.55 (H) 0.02 - 0.27 mmol/L   Comprehensive metabolic panel   Result Value Ref Range    Glucose 248 (H) 74 - 99 mg/dL    Sodium 137 136 - 145 mmol/L    Potassium 3.6 3.5 - 5.3 mmol/L    Chloride 104 98 - 107 mmol/L    Bicarbonate 26 21 - 32 mmol/L    Anion Gap 11 10 - 20 mmol/L    Urea  Nitrogen 34 (H) 6 - 23 mg/dL    Creatinine 1.07 (H) 0.50 - 1.05 mg/dL    eGFR 58 (L) >60 mL/min/1.73m*2    Calcium 7.7 (L) 8.6 - 10.3 mg/dL    Albumin 3.0 (L) 3.4 - 5.0 g/dL    Alkaline Phosphatase 52 33 - 136 U/L    Total Protein 5.1 (L) 6.4 - 8.2 g/dL    AST 10 9 - 39 U/L    Bilirubin, Total 0.4 0.0 - 1.2 mg/dL    ALT 11 7 - 45 U/L   CBC   Result Value Ref Range    WBC 10.4 4.4 - 11.3 x10*3/uL    nRBC 0.0 0.0 - 0.0 /100 WBCs    RBC 3.09 (L) 4.00 - 5.20 x10*6/uL    Hemoglobin 11.5 (L) 12.0 - 16.0 g/dL    Hematocrit 33.9 (L) 36.0 - 46.0 %     (H) 80 - 100 fL    MCH 36.9 (H) 26.0 - 34.0 pg    MCHC 33.8 32.0 - 36.0 g/dL    RDW 13.5 11.5 - 14.5 %    Platelets 112 (L) 150 - 450 x10*3/uL   Protime-INR   Result Value Ref Range    Protime 97.3 (HH) 9.8 - 12.8 seconds    INR 8.2 (HH) 0.9 - 1.1   Transthoracic Echo (TTE) Complete   Result Value Ref Range    BSA 1.74 m2      Assessment/Plan        Ne Richardson is a 64 y.o. female with Pmhx of MI (2005), heart failure, hypertension, status post ICD placement, porcelain gallbladder, TIA, DVT presenting to [facility] ED on 10/13 for complaint of nausea, vomiting, hyperglycemia, and syncope.  Patient will be admitted for syncopal episode in the setting of hyperglycemia     Assessment & Plan  Syncope, unspecified syncope type    Syncope and collapse    Diabetes  - on admission, today to 48  -3 units prandial humalog and sliding scale  -lantus 30 units at hs  -poct bg ac and hs   -Diet advanced to full liquid today, no complaints of nausea or abdominal pain  -LR at 75 ml/hr  for 24 hrs  -Monitor closely for fluid overload  -patient has a dexcom will place order so patient can use     Syncopal episode/Elevated troponins/tachycardia/history of MI/CAD  -telemetry monitoring   -Cardiology consulted, recommendations appreciated  -Echo ordered  - Carotid Dopplers showed less than 50% stenosis bilaterally  -ICD present, ICD interrogation ordered  -Carvedilol 12.5 mg p.o.  twice daily  -Zetia 10 mg at at bedtime  -Rosuvastatin 40 mg p.o. daily  -INR is 8.2, coumadin is on hold. Pharmacy to dose order placed.     Essential hypertension/CHF  -Continue home dose Coreg, monitor blood pressure closely     Anxiety/depression  -Klonopin 2 mg at at bedtime  - Wellbutrin 200 mg p.o. daily  - Pristiq 100 mg p.o. at  bedtime  -Topamax 200 mg p.o. twice daily     Hypothyroidism  - Levothyroxine 88 mcg p.o. daily     GERD  - Protonix 40 mg p.o. at at bedtime     Bowel regimen: MiraLAX 17 g p.o. daily  Dispo: anticipate 24 hours depending on clinical course     ARMANDO Pleitez  Hospitalist Nurse Practitioner              ARMANDO Pleitez-CNP

## 2024-10-14 NOTE — SIGNIFICANT EVENT
INR is 8.2.  Coumadin remains on hold.  No signs of bleeding.  So no indications for Coumadin the time being.  Will continue to monitor

## 2024-10-14 NOTE — CONSULTS
Inpatient consult to Cardiology  Consult performed by: JON Andrews, GERA  Consult ordered by: JON Pleitez        History Of Present Illness:    Ne Richardson is a 64 y.o. female who presented to [facility] ED on 10/13 in the setting of nausea, vomiting, hyperglycemia, and syncope. CT angio of the chest showing cardiomegaly and hyperinflation with cystic changes. Pertinent labs on presentation include blood glucose 469, sodium 133, creatinine 1.36, INR 5.5, WBC 21,000, troponin 78-57. Patient admitted for further work up and treatment. Cardiology consulted secondary to syncope.     Patient examined at bedside and reports a dry cough. Patient currently follows with Dr. eJnkins and Dr. 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 Anteroapical/inferoapical walls appear to be thin and akinetic    Last Recorded Vitals:  Vitals:    10/13/24 2304 10/14/24 0444 10/14/24 0750 10/14/24 0901   BP:  98/62 100/67    BP Location:   Left arm    Patient Position:   Lying    Pulse:  88 80    Resp:   24    Temp:  36.7 °C (98.1 °F) 35.9 °C (96.6 °F)    TempSrc:   Temporal    SpO2: 97% 96% 97% 98%   Weight:       Height:           Last Labs:  CBC - 10/14/2024:  4:35 AM  10.4 11.5 112    33.9      CMP - 10/14/2024:  4:35 AM  7.7 5.1 10 --- 0.4   _ 3.0 11 52      PTT - No results in last year.  8.2   97.3 _     Troponin I, High Sensitivity   Date/Time Value Ref Range Status   10/13/2024 08:41 AM 57 (HH) 0 - 13 ng/L Final     Comment:     Previous result verified on 10/13/2024 0810 on specimen/case  [id number] called with component TRPHS for procedure Troponin I, High Sensitivity, Initial with value 78 ng/L.   10/13/2024 07:24 AM 78 (HH) 0 - 13 ng/L Final   07/08/2024 11:42 AM 89 (HH) 0 - 13 ng/L Final     Comment:     Previous result verified on 7/8/2024 1052 on specimen/case [id number] called with component TRPHS for procedure Troponin I, High Sensitivity, Initial with value 88 ng/L.     BNP   Date/Time Value Ref Range Status   07/08/2024 10:09  (H) 0 - 99 pg/mL Final   04/22/2024 11:13 AM 58 0 - 99 pg/mL Final     Hemoglobin A1C   Date/Time Value Ref Range Status   10/13/2024 07:24 AM 8.1 (H) See comment % Final   03/14/2024 06:24 AM 8.4 (H) 4.8 - 5.9 % Final   12/17/2023 06:11 AM 8.6 (H) 4.8 - 5.9 % Final   10/18/2023 01:36 PM 8.5 (H) 4.3 - 5.6 % Final     Comment:     American Diabetes Association guidelines indicate that patients with HgbA1c in the range 5.7-6.4% are at increased risk for development of diabetes, and intervention by lifestyle modification may be beneficial. HgbA1c greater or equal to 6.5% is considered diagnostic of diabetes.   05/24/2023 10:54 AM 7.8 (H) 4.3 - 5.6 % Final     Comment:     American Diabetes Association guidelines indicate that patients with HgbA1c in the range 5.7-6.4% are at increased risk for development of diabetes, and intervention by lifestyle modification may be beneficial. HgbA1c greater or equal to 6.5% is considered diagnostic of diabetes.     POCT Hemoglobin A1C   Date/Time Value Ref Range Status   08/27/2024 01:55 PM 8.8 (A) 4.3 - 5.6 % Final     Comment:     Location:[facility], [facility] [place], [place], [facility]  Point of care (POC) Hemoglobin A1c (HGBA1C) testing is intended to assess  glucose control and provide a management tool for patients known to have  diabetes and their healthcare providers.  Target HGBA1C levels may depend on  specific clinical circumstances.  POC HGBA1C is not intended for use as  a  diagnostic or screening test; laboratory-based testing should be used for  diagnostic purposes.  The following information is supplemental and may not  be applicable to specific diabetes management situations:  The POC device   provides a normal range of 4.2% to 6.5% for the HGBA1C POC test.  However, the American Diabetes Association  guidelines indicate that  patients with HGBA1C in the range of 5.7% to 6.4% are at increased risk for  development of diabetes and that intervention by lifestyle modification may  be beneficial.  A HGBA1C level greater than or equal to 6.5% is considered  diagnostic of diabetes, pending confirmatory testing.  Use of HGBA1C testing  to evaluate glucose control may not be appropriate for patients with  hemoglobin variants or other conditions (e.g. anemia) that alter red blood  cell lifespan.   06/04/2024 10:17 AM 8.9 (A) 4.3 - 5.6 % Final     Comment:     Location:[facility], [address] [place] [address], [facility]  Point of care (POC) Hemoglobin A1c (HGBA1C) testing is intended to assess  glucose control and provide a management tool for patients known to have  diabetes and their healthcare providers.  Target HGBA1C levels may depend on  specific clinical circumstances.  POC HGBA1C is not intended for use as a  diagnostic or screening test; laboratory-based testing should be used for  diagnostic purposes.  The following information is supplemental and may not  be applicable to specific diabetes management situations:  The POC device   provides a normal range of 4.2% to 6.5% for the HGBA1C POC test.  However, the American Diabetes Association  guidelines indicate that  patients with HGBA1C in the range of 5.7% to 6.4% are at increased risk for  development of diabetes and that intervention by lifestyle modification may  be beneficial.  A HGBA1C level greater than or equal to 6.5% is considered  diagnostic of diabetes, pending  confirmatory testing.  Use of HGBA1C testing  to evaluate glucose control may not be appropriate for patients with  hemoglobin variants or other conditions (e.g. anemia) that alter red blood  cell lifespan.     LDL Calculated   Date/Time Value Ref Range Status   04/24/2024 06:18 AM 37 <=99 mg/dL Final     Comment:                                 Near   Borderline      AGE      Desirable  Optimal    High     High     Very High     0-19 Y     0 - 109     ---    110-129   >/= 130     ----    20-24 Y     0 - 119     ---    120-159   >/= 160     ----      >24 Y     0 -  99   100-129  130-159   160-189     >/=190       VLDL   Date/Time Value Ref Range Status   04/24/2024 06:18 AM 35 0 - 40 mg/dL Final   08/15/2019 03:10 AM 29 0 - 40 mg/dL Final      Last I/O:  I/O last 3 completed shifts:  In: 4385.8 (63.7 mL/kg) [P.O.:240; I.V.:2145.8 (31.2 mL/kg); IV Piggyback:2000]  Out: - (0 mL/kg)   Weight: 68.8 kg     Past Cardiology Tests (Last 3 Years):  EKG:  ECG 12 lead 10/13/2024 (Preliminary)      ECG 12 lead 07/08/2024      Electrocardiogram 12 Lead 04/23/2024      ECG 12 lead 04/22/2024    Echo:  Transthoracic Echo (TTE) Complete 07/09/2024    Ejection Fractions:  EF   Date/Time Value Ref Range Status   07/09/2024 01:08 PM 28 %      Cath:  Cardiac Catheterization Procedure 04/23/2024    Stress Test:  No results found for this or any previous visit from the past 1095 days.    Cardiac Imaging:  No results found for this or any previous visit from the past 1095 days.      Past Medical History:  She has a past medical history of Acute myocardial infarction, unspecified (01/10/2022), Body mass index (BMI) 33.0-33.9, adult (02/01/2022), Disorder of the skin and subcutaneous tissue, unspecified (07/01/2020), Encounter for preprocedural cardiovascular examination (01/10/2022), Hypertensive heart disease with heart failure (05/21/2020), Intracranial and intraspinal phlebitis and thrombophlebitis (HHS-HCC) (01/10/2022), Nausea  (01/10/2022), Obesity, unspecified (03/21/2022), Obesity, unspecified (08/01/2022), Old myocardial infarction, Other mechanical complication of other cardiac electronic device, initial encounter (02/01/2022), Other specified counseling (02/01/2022), Other specified diseases of gallbladder (06/02/2020), Overweight (01/10/2022), Person consulting for explanation of examination or test findings (02/01/2022), Personal history of other diseases of the digestive system (07/01/2020), Personal history of other venous thrombosis and embolism, and Transient cerebral ischemic attack, unspecified.    Past Surgical History:  She has a past surgical history that includes Other surgical history (06/02/2020); Other surgical history (10/12/2022); Other surgical history (10/12/2022); Other surgical history (01/10/2022); Other surgical history (01/10/2022); Other surgical history (01/10/2022); Other surgical history (01/10/2022); Other surgical history (01/10/2022); Other surgical history (01/10/2022); Other surgical history (01/10/2022); Other surgical history (01/10/2022); Other surgical history (01/10/2022); Other surgical history (02/01/2022); Other surgical history (02/01/2022); CT angio abdomen pelvis w and or wo IV IV contrast (8/14/2019); and Cardiac catheterization (N/A, 4/23/2024).      Social History:  She reports that she has been smoking cigars. She has never used smokeless tobacco. She reports current alcohol use. She reports current drug use. Drug: Marijuana.    Family History:  Family History   Problem Relation Name Age of Onset    Coronary artery disease Mother      Diabetes Mother      Other (Cardiac pacemaker) Father      Coronary artery disease Father      Other (Stroke syndrome) Other          Allergies:  Ropinirole    Inpatient Medications:  Scheduled medications   Medication Dose Route Frequency    buPROPion SR  200 mg oral Daily    carvedilol  12.5 mg oral BID    cholecalciferol  3,000 Units oral Daily     clonazePAM  2 mg oral Nightly    docusate sodium  100 mg oral Daily    ezetimibe  10 mg oral Nightly    [Held by provider] furosemide  40 mg oral Daily    insulin glargine  30 Units subcutaneous Nightly    insulin lispro  0-10 Units subcutaneous TID    insulin lispro  3 Units subcutaneous TID    levothyroxine  88 mcg oral Daily    lumateperone  21 mg oral Daily    magnesium oxide  400 mg oral Daily    metoclopramide  5 mg oral BID    oxybutynin  5 mg oral BID    pantoprazole  40 mg oral Nightly    perflutren protein A microsphere  0.5 mL intravenous Once in imaging    polyethylene glycol  17 g oral Daily    potassium chloride CR  20 mEq oral Daily    rosuvastatin  40 mg oral Daily    sulfur hexafluoride microsphr  2 mL intravenous Once in imaging    topiramate  200 mg oral BID     PRN medications   Medication    acetaminophen    dextrose    dextrose    glucagon    glucagon    glucagon    oxygen     Continuous Medications   Medication Dose Last Rate    sodium chloride 0.9%  125 mL/hr 125 mL/hr (10/14/24 0611)     Outpatient Medications:  Current Outpatient Medications   Medication Instructions    albuterol 90 mcg/actuation inhaler 1-2 puffs, inhalation, Every 4 hours PRN, Patient states she seldom takes this    buPROPion SR (WELLBUTRIN SR) 200 mg, oral, 2 times daily    carvedilol (COREG) 12.5 mg, oral, 2 times daily    cholecalciferol (VITAMIN D-3) 3,000 Units, oral, Daily    clonazePAM (KlonoPIN) 2 mg tablet 1 tablet, oral, Nightly    desvenlafaxine (PRISTIQ) 100 mg, oral, Nightly    docusate sodium (COLACE) 100 mg, oral, Daily    ezetimibe (Zetia) 10 mg tablet 1 tablet, oral, Nightly    furosemide (LASIX) 40 mg, oral, Daily    glucagon (Baqsimi) 3 mg/actuation spray,non-aerosol 1 spray, nasal, As needed    glucagon 1 mg injection Use as directed for hypoglycemia     insulin aspart (NovoLOG) 100 unit/mL (3 mL) pen subcutaneous, 3 times daily (morning, midday, late afternoon), Per Sliding Scale    insulin glargine  (LANTUS) 30 Units, subcutaneous, Nightly    levothyroxine (SYNTHROID, LEVOXYL) 88 mcg, oral, Daily RT    magnesium oxide 500 mg capsule 1 capsule, oral, Nightly    metoclopramide (REGLAN) 5 mg, oral, 2 times daily    metoprolol succinate XL (TOPROL-XL) 25 mg, oral, Daily, Do not crush or chew.    nitroglycerin (NITROSTAT) 0.4 mg, sublingual, Every 5 min PRN, Up to 3 doses  <BR>    oxybutynin XL (DITROPAN-XL) 10 mg, oral, Nightly    pantoprazole (PROTONIX) 40 mg, oral, Nightly, Do not crush, chew, or split.    potassium chloride CR 20 mEq ER tablet 20 mEq, oral, Daily    rOPINIRole (Requip) 2 mg tablet 1 tablet, oral, Nightly    rosuvastatin (Crestor) 40 mg tablet 1 tablet, oral, Daily    spironolactone (ALDACTONE) 12.5 mg, oral, Daily    topiramate (Topamax) 200 mg tablet 1 tablet, oral, 2 times daily    warfarin (Coumadin) 5 mg tablet 2.5 tablets, oral, 5 times weekly, Tuesday, Wednesday, Friday, Saturday & Sunday, in the evening.    warfarin (Coumadin) 7.5 mg tablet 2 tablets, oral, 2 times weekly, Monday & Thursday in the evening       Physical Exam:  General: awake, alert and oriented. No acute distress.   Skin: Skin is warm, dry and intact without rashes or lesions.   HEENT: normocephalic, atraumatic; conjunctivae are clear without exudates or hemorrhage. Sclera is non-icteric. Eyelids are normal in appearance without swelling or lesions. Hearing intact. Nares are patent bilaterally. Moist mucous membranes.   Cardiovascular: heart rate and rhythm are normal.   Respiratory: bilateral lung sounds clear to auscultations without rales, rhonchi, or wheezes.   Musculoskeletal: no deformities  Extremities: no edema   Neurological: no focal deficits  Psychiatric: appropriate mood and affect; good judgment and insight       Assessment/Plan     Syncope:  -Most likely in the setting of hyperglycemia; defer treatment of hyperglycemia to primary team   -Carotid duplex showing <50% stenosis bilaterally  -Patient currently  has a [employer] ICD with last device check in April. She was due to have a remote check in July, but I cannot see where this was completed.   -Will order an inpatient device interrogation; LAYNE, device nurse, is aware    ACC/AHA Stage C HFrEF:  -Ischemic cardiomyopathy  -NYHA Class III  -Status post ICD placement for secondary prevention of ventricular arrhythmias secondary to dilated cardiomyopathy.   -Echo (July, 2024)-showing severely decreased LV systolic function with estimated LVEF of 25 to 30% with a large and aneurysmal LV apex with thin and akinetic anterior apical and inferior apical walls. Normal RV systolic function; repeat echo results are pending  -Current GDMT includes carvedilol 12.5mg twice daily, spironolactone 12.5mg daily, and Lasix 40mg daily. Spironolactone not started on admission and Lasix currently on hold; please resume home medications at discharge. Patient hypotensive this admission and likely would not tolerated ARNi, ACE or ARB. Patient Type I Diabetic, therefore, SGLT2i contraindicated  -Patient established with Dr. Aponte and Dr. Jenkins and at last admission was wanting to switch to us, but currently reports she will continue to see the above providers    Cough:  -CTA of the chest showing bibasilar atelectasis; will order incentive spirometer  Peripheral IV 10/13/24 20 G Right Antecubital (Active)   Site Assessment Clean;Dry;Intact 10/14/24 0600   Dressing Status Clean;Dry 10/14/24 0600   Number of days: 1       Code Status:  Full Code    Thank you for allowing me to participate in the care of this patient. Please reach me out if you have any questions or if you need any clarifications regarding the patient's care.          Eloisa Parker, APRN-CNP, DNP

## 2024-10-14 NOTE — CARE PLAN
The patient's goals for the shift include DECREASE SUGARS    The clinical goals for the shift include no syncopal episodes or nause by eos    Over the shift, the patient did not make progress toward the following goals. Barriers to progression include . Recommendations to address these barriers include .

## 2024-10-15 LAB
ANION GAP SERPL CALC-SCNC: 10 MMOL/L (ref 10–20)
ANION GAP SERPL CALC-SCNC: 7 MMOL/L (ref 10–20)
ATRIAL RATE: 132 BPM
B-OH-BUTYR SERPL-SCNC: 0.27 MMOL/L (ref 0.02–0.27)
BUN SERPL-MCNC: 20 MG/DL (ref 6–23)
BUN SERPL-MCNC: 20 MG/DL (ref 6–23)
CALCIUM SERPL-MCNC: 7.7 MG/DL (ref 8.6–10.3)
CALCIUM SERPL-MCNC: 7.8 MG/DL (ref 8.6–10.3)
CHLORIDE SERPL-SCNC: 109 MMOL/L (ref 98–107)
CHLORIDE SERPL-SCNC: 110 MMOL/L (ref 98–107)
CO2 SERPL-SCNC: 25 MMOL/L (ref 21–32)
CO2 SERPL-SCNC: 27 MMOL/L (ref 21–32)
CREAT SERPL-MCNC: 0.99 MG/DL (ref 0.5–1.05)
CREAT SERPL-MCNC: 1.06 MG/DL (ref 0.5–1.05)
EGFRCR SERPLBLD CKD-EPI 2021: 59 ML/MIN/1.73M*2
EGFRCR SERPLBLD CKD-EPI 2021: 64 ML/MIN/1.73M*2
ERYTHROCYTE [DISTWIDTH] IN BLOOD BY AUTOMATED COUNT: 13.3 % (ref 11.5–14.5)
GLUCOSE SERPL-MCNC: 175 MG/DL (ref 74–99)
GLUCOSE SERPL-MCNC: 179 MG/DL (ref 74–99)
HCT VFR BLD AUTO: 38.4 % (ref 36–46)
HGB BLD-MCNC: 12.4 G/DL (ref 12–16)
HOLD SPECIMEN: NORMAL
INR PPP: 4.7 (ref 0.9–1.1)
MAGNESIUM SERPL-MCNC: 2.07 MG/DL (ref 1.6–2.4)
MAGNESIUM SERPL-MCNC: 2.12 MG/DL (ref 1.6–2.4)
MCH RBC QN AUTO: 35.8 PG (ref 26–34)
MCHC RBC AUTO-ENTMCNC: 32.3 G/DL (ref 32–36)
MCV RBC AUTO: 111 FL (ref 80–100)
NRBC BLD-RTO: 0 /100 WBCS (ref 0–0)
P AXIS: 79 DEGREES
P OFFSET: 203 MS
P ONSET: 154 MS
PLATELET # BLD AUTO: 99 X10*3/UL (ref 150–450)
POTASSIUM SERPL-SCNC: 4 MMOL/L (ref 3.5–5.3)
POTASSIUM SERPL-SCNC: 4 MMOL/L (ref 3.5–5.3)
PR INTERVAL: 128 MS
PROTHROMBIN TIME: 55.3 SECONDS (ref 9.8–12.8)
Q ONSET: 218 MS
QRS COUNT: 22 BEATS
QRS DURATION: 74 MS
QT INTERVAL: 302 MS
QTC CALCULATION(BAZETT): 447 MS
QTC FREDERICIA: 393 MS
R AXIS: 110 DEGREES
RBC # BLD AUTO: 3.46 X10*6/UL (ref 4–5.2)
SODIUM SERPL-SCNC: 139 MMOL/L (ref 136–145)
SODIUM SERPL-SCNC: 141 MMOL/L (ref 136–145)
T AXIS: 74 DEGREES
T OFFSET: 369 MS
VENTRICULAR RATE: 132 BPM
WBC # BLD AUTO: 7.2 X10*3/UL (ref 4.4–11.3)

## 2024-10-15 PROCEDURE — 2500000002 HC RX 250 W HCPCS SELF ADMINISTERED DRUGS (ALT 637 FOR MEDICARE OP, ALT 636 FOR OP/ED)

## 2024-10-15 PROCEDURE — 80048 BASIC METABOLIC PNL TOTAL CA: CPT

## 2024-10-15 PROCEDURE — 2500000002 HC RX 250 W HCPCS SELF ADMINISTERED DRUGS (ALT 637 FOR MEDICARE OP, ALT 636 FOR OP/ED): Performed by: NURSE PRACTITIONER

## 2024-10-15 PROCEDURE — 99222 1ST HOSP IP/OBS MODERATE 55: CPT

## 2024-10-15 PROCEDURE — 2500000004 HC RX 250 GENERAL PHARMACY W/ HCPCS (ALT 636 FOR OP/ED): Performed by: NURSE PRACTITIONER

## 2024-10-15 PROCEDURE — 94668 MNPJ CHEST WALL SBSQ: CPT

## 2024-10-15 PROCEDURE — 85027 COMPLETE CBC AUTOMATED: CPT

## 2024-10-15 PROCEDURE — 83735 ASSAY OF MAGNESIUM: CPT

## 2024-10-15 PROCEDURE — 2500000001 HC RX 250 WO HCPCS SELF ADMINISTERED DRUGS (ALT 637 FOR MEDICARE OP): Performed by: HOSPITALIST

## 2024-10-15 PROCEDURE — 2500000001 HC RX 250 WO HCPCS SELF ADMINISTERED DRUGS (ALT 637 FOR MEDICARE OP): Performed by: NURSE PRACTITIONER

## 2024-10-15 PROCEDURE — 85610 PROTHROMBIN TIME: CPT

## 2024-10-15 PROCEDURE — 82010 KETONE BODYS QUAN: CPT

## 2024-10-15 PROCEDURE — 94667 MNPJ CHEST WALL 1ST: CPT

## 2024-10-15 PROCEDURE — 36415 COLL VENOUS BLD VENIPUNCTURE: CPT

## 2024-10-15 PROCEDURE — 1200000002 HC GENERAL ROOM WITH TELEMETRY DAILY

## 2024-10-15 PROCEDURE — 2500000001 HC RX 250 WO HCPCS SELF ADMINISTERED DRUGS (ALT 637 FOR MEDICARE OP)

## 2024-10-15 PROCEDURE — 94761 N-INVAS EAR/PLS OXIMETRY MLT: CPT

## 2024-10-15 RX ORDER — FUROSEMIDE 20 MG/1
20 TABLET ORAL DAILY
Status: DISCONTINUED | OUTPATIENT
Start: 2024-10-16 | End: 2024-10-16 | Stop reason: HOSPADM

## 2024-10-15 ASSESSMENT — COGNITIVE AND FUNCTIONAL STATUS - GENERAL
MOBILITY SCORE: 24
DAILY ACTIVITIY SCORE: 24
DAILY ACTIVITIY SCORE: 24
MOBILITY SCORE: 24

## 2024-10-15 ASSESSMENT — PAIN SCALES - GENERAL
PAINLEVEL_OUTOF10: 0 - NO PAIN
PAINLEVEL_OUTOF10: 0 - NO PAIN

## 2024-10-15 ASSESSMENT — PAIN - FUNCTIONAL ASSESSMENT: PAIN_FUNCTIONAL_ASSESSMENT: 0-10

## 2024-10-15 NOTE — CONSULTS
CHF Navigator Nurse was consulted for CHF screening.    Notes, labs, flowsheets and medications reviewed in EMR.    Admitting Dx: Elevated Troponin, MICHELLE, DKA, Syncope   Cardiologist/PCP: Dr. Barclay [facility]   Last Echo: 10/14/24  EF: 30%  BNP: 290 10/14/24    Patient is not currently being treated for Heart Failure Exacerbated as GDMT's being held due to hypotension. Patient is alsoestablished with Dr. JADA Aponte and Dr. Jenkins at [facility] and at her last admission was wanting to switch to [facility], but currently reports she will continue to see the above providers at [facility] with a scheduled appointment on April 9th, 2025.

## 2024-10-15 NOTE — PROGRESS NOTES
Jony Richardson is a 64 y.o. female on day 1 of admission presenting with Syncope, unspecified syncope type.      Subjective   Seen and examined.  Reports feeling tired and fatigued.  Tolerating regular diet, denies abdominal pain, chest pain, and nausea.  Objective     Last Recorded Vitals  /66 (BP Location: Left arm, Patient Position: Lying)   Pulse 76   Temp 35.9 °C (96.6 °F) (Temporal)   Resp 18   Wt 68.8 kg (151 lb 10.8 oz)   SpO2 97%   Intake/Output last 3 Shifts:    Intake/Output Summary (Last 24 hours) at 10/15/2024 1143  Last data filed at 10/15/2024 0000  Gross per 24 hour   Intake 1505.83 ml   Output 1 ml   Net 1504.83 ml       Admission Weight  Weight: 73.5 kg (162 lb) (10/13/24 0657)    Daily Weight  10/13/24 : 68.8 kg (151 lb 10.8 oz)    Image Results  Carotid [place] [address]  Phone [phone], Fax [phone]       Vascular Lab Report     [facility]– [place] CAROTID ARTERY DUPLEX BILATERAL    Patient Name:      JONY RICHARDSON        Reading Physician:  [id number] Brennan Castillo MD  Study Date:        10/14/2024           Ordering Provider:  [id number] CHRISTOPHER HAMMOND  MRN/PID:           [id number]             Fellow:  Accession#:        [id number]         Technologist:       Esdras Petersen RVT  Date of Birth/Age: 1960 / 64 years Technologist 2:  Gender:            F                    Encounter#:         [id number]  Admission Status:  Inpatient            Location Performed: [facility]       Diagnosis/ICD: Other specified symptoms and signs involving the circulatory and                 respiratory systems-R09.89  CPT Codes:     13612 Cerebrovascular Carotid Duplex scan complete       CONCLUSIONS:  Right Carotid: Findings are consistent with less than 50% stenosis of the right  proximal internal carotid artery. Laminar flow seen by color Doppler. Right external carotid artery appears patent with no evidence of stenosis. No evidence of hemodynamically significant stenosis of the right common carotid artery. The right vertebral artery is patent with antegrade flow. No evidence of hemodynamically significant stenosis in the right subclavian artery.  Left Carotid: Findings are consistent with less than 50% stenosis of the left proximal internal carotid artery. Laminar flow seen by color Doppler. Left external carotid artery appears patent with no evidence of stenosis. No evidence of hemodynamically significant stenosis of the left common carotid artery. The left vertebral artery demonstrates bidirectional flow which may be suggestive of a more proximal stenosis or occlusion. There are elevated velocities in the left subclavian artery that are suggestive of disease.     Imaging & Doppler Findings:  Right Plaque Morph: The proximal right internal carotid artery demonstrates irregular and calcified plaque. The proximal right external carotid artery demonstrates irregular and calcified plaque. The mid right common carotid artery demonstrates heterogenous plaque. The distal right common carotid artery demonstrates heterogenous plaque. The right carotid bulb demonstrates irregular and calcified plaque.  Left Plaque Morph: The proximal left internal carotid artery demonstrates irregular and calcified plaque. The proximal left external carotid artery demonstrates heterogenous plaque. The left carotid bulb demonstrates irregular and calcified plaque.      Right                        Left    PSV      EDV                PSV      EDV  100 cm/s           CCA P    83 cm/s  82 cm/s            CCA D    63 cm/s  135 cm/s 27 cm/s   ICA P    94 cm/s  29 cm/s  69 cm/s  26 cm/s   ICA D    82 cm/s  29 cm/s  183 cm/s            ECA     152 cm/s  95 cm/s  17 cm/s Vertebral  144 cm/s         Subclavian 288 cm/s                      Right Left  ICA/CCA Ratio  1.6  1.5          [id number] Brennan Castillo MD  Electronically signed by [id number] Brennan Castillo MD on 10/14/2024 at 6:19:00 PM       ** Final **  Transthoracic Echo (TTE) [place] [address]  Phone [phone] [phone], Fax [phone]    TRANSTHORACIC ECHOCARDIOGRAM REPORT    Patient Name:      JONY RAJPUT        Reading Physician:    [id number] Shakir Nascimento MD  Study Date:        10/14/2024           Ordering Provider:    [id number] CHRISTOPHER HAMMOND  MRN/PID:           [id number]             Fellow:  Accession#:        [id number]         Nurse:                Yeimy Burk RN  Date of Birth/Age: 1960 / 64 years Sonographer:          SCARLET Porter RVT  Gender:            F                    Additional Staff:  Height:            157.48 cm            Admit Date:           10/13/2024  Weight:            73.48 kg             Admission Status:     Inpatient -                                                                Routine  BSA / BMI:         1.75 m2 / 29.63      Department Location:  [address]                     kg/m2  Blood Pressure: 110 /70 mmHg    Study Type:    TRANSTHORACIC ECHO (TTE) COMPLETE  Diagnosis/ICD: Syncope-R55  Indication:    Syncope, Bubble Study, Syncope  CPT Codes:     Echo Complete w Full Doppler-80500    Patient History:  Pertinent History: Previousecho 7-9-24.    Study Detail: The following Echo studies were performed: 2D, M-Mode, Doppler and                color flow. Definity used as a contrast agent for endocardial                border definition and agitated saline used as a contrast agent for                intraseptal flow evaluation. Total contrast used for this                 procedure was 2 mL via IV push. The patient was awake.       PHYSICIAN INTERPRETATION:  Left Ventricle: Left ventricular ejection fraction is moderately decreased, by visual estimate at 30%. Wall motion is abnormal. The left ventricular cavity size is normal. There is mild concentric left ventricular hypertrophy. Spectral Doppler shows an impaired relaxation pattern of left ventricular diastolic filling. Left ventricular apex is large and aneurysmal, anteroapical & inferoapical walls are thin and akinetic.  Left Atrium: The left atrium is normal in size. A bubble study using agitated saline was performed. Bubble study is negative.  Right Ventricle: The right ventricle is normal in size. There is normal right ventricular global systolic function. A device is visualized in the right ventricle.  Right Atrium: The right atrium is normal in size.  Aortic Valve: The aortic valve is probably trileaflet. The aortic valve dimensionless index is 0.51. There is no evidence of aortic valve regurgitation. The peak instantaneous gradient of the aortic valve is 17.5 mmHg. The mean gradient of the aortic valve is 8.0 mmHg.  Mitral Valve: The mitral valve is normal in structure. There is no evidence of mitral valve regurgitation.  Tricuspid Valve: The tricuspid valve was not well visualized. There is mild tricuspid regurgitation.  Pulmonic Valve: The pulmonic valve is not well visualized. The pulmonic valve regurgitation was not well visualized.  Pericardium: No pericardial effusion noted. There is a pericardial fat pad present.  Aorta: The aortic root is normal.  Systemic Veins: The inferior vena cava appears normal in size, with IVC inspiratory collapse greater than 50%.  In comparison to the previous echocardiogram(s): No changes compared to prior echocardiogram 7/9/2024.       CONCLUSIONS:   1. Left ventricular ejection fraction is moderately decreased, by visual estimate at 30%.   2. Left ventricular apex is large  and aneurysmal, anteroapical & inferoapical walls are thin and akinetic.   3. Spectral Doppler shows an impaired relaxation pattern of left ventricular diastolic filling.   4. There is normal right ventricular global systolic function.   5. Pacemaker/defibrillator wire visualized in the right ventricle and in the right atrium.    QUANTITATIVE DATA SUMMARY:     2D MEASUREMENTS:           Normal Ranges:  Ao Root d:       2.20 cm   (2.0-3.7cm)  LAs:             2.50 cm   (2.7-4.0cm)  IVSd:            1.24 cm   (0.6-1.1cm)  LVPWd:           1.24 cm   (0.6-1.1cm)  LVIDd:           3.36 cm   (3.9-5.9cm)  LVIDs:           2.38 cm  LV Mass Index:   77.1 g/m2  LV % FS          29.2 %       LA VOLUME:                    Normal Ranges:  LA Vol A4C:        49.5 ml    (22+/-6mL/m2)  LA Vol A2C:        32.1 ml  LA Vol BP:         41.8 ml  LA Vol Index A4C:  28.3ml/m2  LA Vol Index A2C:  18.4 ml/m2  LA Vol Index BP:   23.9 ml/m2  LA Area A4C:       16.0 cm2  LA Area A2C:       12.3 cm2  LA Major Axis A4C: 4.4 cm  LA Major Axis A2C: 4.0 cm  LA Volume Index:   18.3 ml/m2  LA Vol A4C:        46.6 ml  LA Vol A2C:        32.0 ml  LA Vol Index BSA:  22.5 ml/m2       M-MODE MEASUREMENTS:         Normal Ranges:  AoV Exc:             1.60 cm (1.5-2.5cm)       AORTA MEASUREMENTS:         Normal Ranges:  AoV Exc:            1.60 cm (1.5-2.5cm)       LV SYSTOLIC FUNCTION BY 2D PLANIMETRY (MOD):                       Normal Ranges:  EF-A4C View:    31 % (>=55%)  EF-A2C View:    33 %  EF-Biplane:     31 %  EF-Visual:      30 %  LV EF Reported: 30 %       LV DIASTOLIC FUNCTION:           Normal Ranges:  MV Peak E:             0.99 m/s  (0.7-1.2 m/s)  MV Peak A:             1.55 m/s  (0.42-0.7 m/s)  E/A Ratio:             0.64      (1.0-2.2)  MV e'                  0.088 m/s (>8.0)  MV lateral e'          0.12 m/s  MV medial e'           0.05 m/s  E/e' Ratio:            11.21     (<8.0)       MITRAL VALVE:          Normal Ranges:  MV DT:         246 msec (150-240msec)       AORTIC VALVE:                      Normal Ranges:  AoV Vmax:                2.09 m/s  (<=1.7m/s)  AoV Peak P.5 mmHg (<20mmHg)  AoV Mean P.0 mmHg  (1.7-11.5mmHg)  LVOT Max Quincy:            0.92 m/s  (<=1.1m/s)  AoV VTI:                 47.70 cm  (18-25cm)  LVOT VTI:                24.10 cm  LVOT Diameter:           1.80 cm   (1.8-2.4cm)  AoV Area, VTI:           1.29 cm2  (2.5-5.5cm2)  AoV Area,Vmax:           1.12 cm2  (2.5-4.5cm2)  AoV Dimensionless Index: 0.51       RIGHT VENTRICLE:  RV Basal 3.25 cm  RV Mid   2.46 cm  RV Major 6.9 cm  TAPSE:   17.1 mm       TRICUSPID VALVE/RVSP:          Normal Ranges:  Peak TR Velocity:     2.99 m/s  RV Syst Pressure:     39 mmHg  (< 30mmHg)       PULMONIC VALVE:          Normal Ranges:  PV Accel Time:  130 msec (>120ms)  PV Max Quincy:     0.9 m/s  (0.6-0.9m/s)  PV Max PG:      3.4 mmHg       [id number] Shakir Nascimento MD  Electronically signed on 10/14/2024 at 1:52:41 PM       ** Final **  ECG 12 lead  Sinus tachycardia  Biatrial enlargement  Inferior infarct (cited on or before 2024)  Anterolateral infarct (cited on or before 2024)  Abnormal ECG  When compared with ECG of 2024 09:35,  ST elevation now present in Inferior leads  ST no longer elevated in Anterior leads      Physical Exam  General Appearance: AAO x 3, not in acute distress  Skin: skin color, texture, turgor normal; no suspicious rashes or lesions  Eyes : PERRL, EOM's intact, conjunctiva pink  ENT: no oral thrush, no pharyngeal erythema or exudates  Neck: no JVD, no lymphadenopathy  Respiratory: lungs CTA, no rhonchi, wheezing, or crackles  Heart: RRR without murmur, gallop, or rubs, no ectopy  Abdomen: Nondistended, positive bowel sounds, soft,  nontender  Extremities: no edema, ROM x 4 extremities  Peripheral pulses: normal and present x 4 extremities  Neuro: alert, coherent and conversant, no focal motor deficits     Relevant  Results             No current facility-administered medications on file prior to encounter.     Current Outpatient Medications on File Prior to Encounter   Medication Sig Dispense Refill   • buPROPion SR (Wellbutrin SR) 200 mg 12 hr tablet Take 1 tablet (200 mg) by mouth twice a day.     • carvedilol (Coreg) 12.5 mg tablet Take 1 tablet (12.5 mg) by mouth 2 times a day.     • cholecalciferol (Vitamin D-3) 25 MCG (1000 UT) capsule Take 3 capsules (75 mcg) by mouth once daily.     • clonazePAM (KlonoPIN) 2 mg tablet Take 1 tablet (2 mg) by mouth once daily at bedtime.     • desvenlafaxine 100 mg 24 hr tablet Take 1 tablet (100 mg) by mouth once daily at bedtime.     • furosemide (Lasix) 40 mg tablet Take 1 tablet (40 mg) by mouth once daily.     • insulin aspart (NovoLOG) 100 unit/mL (3 mL) pen Inject under the skin 3 times a day with meals. Per Sliding Scale     • insulin glargine (Lantus) 100 unit/mL injection Inject 30 Units under the skin once daily at bedtime.     • levothyroxine (Synthroid, Levoxyl) 88 mcg tablet Take 1 tablet (88 mcg) by mouth once daily.     • magnesium oxide 500 mg capsule Take 1 capsule (500 mg) by mouth once daily at bedtime.     • metoclopramide (Reglan) 5 mg tablet Take 1 tablet (5 mg) by mouth 2 times a day.     • oxybutynin XL (Ditropan-XL) 10 mg 24 hr tablet Take 1 tablet (10 mg) by mouth once daily at bedtime.     • pantoprazole (ProtoNix) 40 mg EC tablet Take 1 tablet (40 mg) by mouth once daily at bedtime. Do not crush, chew, or split. 30 tablet 0   • potassium chloride CR 20 mEq ER tablet Take 1 tablet (20 mEq) by mouth once daily.     • rOPINIRole (Requip) 2 mg tablet Take 1 tablet (2 mg) by mouth once daily at bedtime.     • rosuvastatin (Crestor) 40 mg tablet Take 1 tablet (40 mg) by mouth once daily.     • topiramate (Topamax) 200 mg tablet Take 1 tablet (200 mg) by mouth 2 times a day.     • warfarin (Coumadin) 5 mg tablet Take 2.5 tablets (12.5 mg) by mouth 5 times a week.  Tuesday, Wednesday, Friday, Saturday & Sunday, in the evening.     • albuterol 90 mcg/actuation inhaler Inhale 1-2 puffs every 4 hours if needed for wheezing. Patient states she seldom takes this     • docusate sodium (Colace) 100 mg capsule Take 1 capsule (100 mg) by mouth once daily.     • ezetimibe (Zetia) 10 mg tablet Take 1 tablet (10 mg) by mouth once daily at bedtime.     • glucagon (Baqsimi) 3 mg/actuation spray,non-aerosol Administer 1 spray into affected nostril(s) if needed (for low blood sugar. May repeat after 15 minutes using a new device if there is no response).     • glucagon 1 mg injection Use as directed for hypoglycemia     • metoprolol succinate XL (Toprol-XL) 25 mg 24 hr tablet Take 1 tablet (25 mg) by mouth once daily. Do not crush or chew. Do not fill before July 11, 2024. 30 tablet 0   • nitroglycerin (Nitrostat) 0.4 mg SL tablet Place 1 tablet (0.4 mg) under the tongue every 5 minutes if needed for chest pain. Up to 3 doses     • spironolactone (Aldactone) 25 mg tablet Take 0.5 tablets (12.5 mg) by mouth once daily. 15 tablet 0   • warfarin (Coumadin) 7.5 mg tablet Take 2 tablets (15 mg) by mouth 2 times a week. Monday & Thursday in the evening     • [DISCONTINUED] ARIPiprazole (Abilify) 2 mg tablet Take 2 tablets (4 mg) by mouth once daily at bedtime.     • [DISCONTINUED] valsartan (Diovan) 40 mg tablet Take 1 tablet (40 mg) by mouth once daily.       Results for orders placed or performed during the hospital encounter of 10/13/24 (from the past 24 hour(s))   Cardiac Device Check - Inpatient   Result Value Ref Range    BSA 1.74 m2   Protime-INR   Result Value Ref Range    Protime 55.3 (HH) 9.8 - 12.8 seconds    INR 4.7 (H) 0.9 - 1.1   CBC   Result Value Ref Range    WBC 7.2 4.4 - 11.3 x10*3/uL    nRBC 0.0 0.0 - 0.0 /100 WBCs    RBC 3.46 (L) 4.00 - 5.20 x10*6/uL    Hemoglobin 12.4 12.0 - 16.0 g/dL    Hematocrit 38.4 36.0 - 46.0 %     (H) 80 - 100 fL    MCH 35.8 (H) 26.0 - 34.0 pg     MCHC 32.3 32.0 - 36.0 g/dL    RDW 13.3 11.5 - 14.5 %    Platelets 99 (L) 150 - 450 x10*3/uL   Basic Metabolic Panel   Result Value Ref Range    Glucose 175 (H) 74 - 99 mg/dL    Sodium 141 136 - 145 mmol/L    Potassium 4.0 3.5 - 5.3 mmol/L    Chloride 110 (H) 98 - 107 mmol/L    Bicarbonate 25 21 - 32 mmol/L    Anion Gap 10 10 - 20 mmol/L    Urea Nitrogen 20 6 - 23 mg/dL    Creatinine 0.99 0.50 - 1.05 mg/dL    eGFR 64 >60 mL/min/1.73m*2    Calcium 7.8 (L) 8.6 - 10.3 mg/dL   Magnesium   Result Value Ref Range    Magnesium 2.12 1.60 - 2.40 mg/dL   SST TOP   Result Value Ref Range    Extra Tube Hold for add-ons.    Beta Hydroxybutyrate   Result Value Ref Range    Beta-Hydroxybutyrate 0.27 0.02 - 0.27 mmol/L   Basic Metabolic Panel   Result Value Ref Range    Glucose 179 (H) 74 - 99 mg/dL    Sodium 139 136 - 145 mmol/L    Potassium 4.0 3.5 - 5.3 mmol/L    Chloride 109 (H) 98 - 107 mmol/L    Bicarbonate 27 21 - 32 mmol/L    Anion Gap 7 (L) 10 - 20 mmol/L    Urea Nitrogen 20 6 - 23 mg/dL    Creatinine 1.06 (H) 0.50 - 1.05 mg/dL    eGFR 59 (L) >60 mL/min/1.73m*2    Calcium 7.7 (L) 8.6 - 10.3 mg/dL   Magnesium   Result Value Ref Range    Magnesium 2.07 1.60 - 2.40 mg/dL      Assessment/Plan        Ne Richardson is a 64 y.o. female with Pmhx of MI (2005), heart failure, hypertension, status post ICD placement, porcelain gallbladder, TIA, DVT presenting to [facility] ED on 10/13 for complaint of nausea, vomiting, hyperglycemia, and syncope.  Patient will be admitted for syncopal episode in the setting of hyperglycemia     Assessment & Plan  Syncope, unspecified syncope type    Syncope and collapse    Diabetes  - on admission, this morning 179  -3 units prandial humalog and sliding scale  -lantus 30 units at hs  -poct bg ac and hs   -Diet advanced to regular no complaints of nausea or abdominal pain  -IV fluids discontinued  -patient has a dexcom will place order so patient can use     Syncopal episode/Elevated  troponins/tachycardia/history of MI/CAD  -telemetry monitoring   -Cardiology consulted, recommendations appreciated  -Echo 10/14/24  1. Left ventricular ejection fraction is moderately decreased, by visual estimate at 30%.   2. Left ventricular apex is large and aneurysmal, anteroapical & inferoapical walls are thin and akinetic.   3. Spectral Doppler shows an impaired relaxation pattern of left ventricular diastolic filling.   4. There is normal right ventricular global systolic function.   5. Pacemaker/defibrillator wire visualized in the right ventricle and in the right atrium.  - Carotid Dopplers showed less than 50% stenosis bilaterally  -ICD present, ICD interrogation ordered  -MI (2005)  NSTEMI (April, 2024)-[facility] showing LAD stenosis of 100% with collaterals with estimated LV of 25%  -Ischemic cardiomyopathy, Vtach s/p single-chamber ICD (implanted in 2013 with generator change in 2022)  -Cardiac diet  -Monitor electrolytes, replace as needed  -Toprol-XL 25 mg daily  -Zetia 10 mg at at bedtime  -Rosuvastatin 40 mg p.o. daily  -INR 4.7 today, coumadin is on hold. Pharmacy to dose order placed.     Essential hypertension/CHF  -Continue home dose Coreg, monitor blood pressure closely     Anxiety/depression  -Klonopin 2 mg at at bedtime  - Wellbutrin 200 mg p.o. daily  - Pristiq 100 mg p.o. at  bedtime  -Topamax 200 mg p.o. twice daily     Hypothyroidism  - Levothyroxine 88 mcg p.o. daily     GERD  - Protonix 40 mg p.o. at at bedtime     Bowel regimen: MiraLAX 17 g p.o. daily  Dispo: anticipate 24 hours depending on clinical course                ARMANDO Pleitez-CNP

## 2024-10-15 NOTE — CONSULTS
Pharmacy Consult for Warfarin (Coumadin) Management - Daily Progress Note     - Warfarin history:  admitted on warfarin     - Bleeding/bruising events in past 24 hours:  N/A    - Notable medication changes in past 24 hours:  Cardiology stopped carvedilol (soft BPs); started metoprolol succinate 25 mg daily.  No other modifications to home medications.    - Additional notes:  not applicable      Anticoagulation Dosing History  Previous home dose:  15 mg warfarin Mon, Thur; 12.5 mg warfarin all other days    Labs  INR   Date Value Ref Range Status   10/15/2024 4.7 (H) 0.9 - 1.1 Final   10/14/2024 8.2 (HH) 0.9 - 1.1 Final   10/13/2024 5.5 (HH) 0.9 - 1.1 Final     Results from last 7 days   Lab Units 10/15/24  0613 10/14/24  0435 10/13/24  0724   HEMOGLOBIN g/dL 12.4 11.5* 16.5*     Results from last 7 days   Lab Units 10/15/24  0613 10/14/24  0435 10/13/24  0724   HEMATOCRIT % 38.4 33.9* 47.9*     Results from last 7 days   Lab Units 10/15/24  0613 10/14/24  0435 10/13/24  0724   PLATELETS AUTO x10*3/uL 99* 112* 168     Review    Treatment Indication:   past myocardial infarction  Target INR:  2-3    Inpatient dosing pattern:      10/13 - Hold warfarin (INR 5.5)  10/14 - Hold warfarin (INR 8.2)      Warfarin Dosing Plan: Hold today's warfarin dose (INR 4.7 but trending downward).  Continue to monitor daily PT/INR and reassess daily when to resume warfarin.    Orders not placed. Pharmacy will continue to monitor and place anticoagulant orders following INR result 10/16/24 with AM labs     Charly Bonilla [facility]

## 2024-10-15 NOTE — CARE PLAN
The patient's goals for the shift include DECREASE SUGARS    The clinical goals for the shift include no synocpial episodes by EOS    Over the shift, the patient did not make progress toward the following goals. Barriers to progression include   Problem: Pain - Adult  Goal: Verbalizes/displays adequate comfort level or baseline comfort level  Outcome: Progressing   . Recommendations to address these barriers include   Problem: Chronic Conditions and Co-morbidities  Goal: Patient's chronic conditions and co-morbidity symptoms are monitored and maintained or improved  Outcome: Progressing   .

## 2024-10-15 NOTE — PROGRESS NOTES
Cardiology Inpatient Progress Note  [facility] [place]    ASSESSMENT AND PLAN  Syncope:  -Most likely in the setting of hyperglycemia; defer treatment of hyperglycemia to primary team   -Carotid duplex showing <50% stenosis bilaterally  -Patient currently has a [employer] ICD with last device check in April. She was due to have a remote check in July, but I cannot see where this was completed.   -Device interrogation showed several episodes of SVT with the longest episode lasting 12 seconds.  It is unlikely that she syncopal with this very short episode.  Continue with Toprol XL 25 mg daily.     ACC/AHA Stage C HFrEF:  -Ischemic cardiomyopathy  -NYHA Class III  -Status post ICD placement for secondary prevention of ventricular arrhythmias secondary to dilated cardiomyopathy.   -Echo (July, 2024)-showing severely decreased LV systolic function with estimated LVEF of 25 to 30% with a large and aneurysmal LV apex with thin and akinetic anterior apical and inferior apical walls. Normal RV systolic function.  Repeat echocardiogram shows LVEF of 30% with no changes compared to prior previous echo from July..   -Current GDMT includes carvedilol 12.5mg twice daily, spironolactone 12.5mg daily, and Lasix 40mg daily. Spironolactone not started on admission and Lasix currently on hold; please resume home medications at discharge. Patient hypotensive this admission and likely would not tolerated ARNi, ACE or ARB. Patient Type I Diabetic, therefore, SGLT2i contraindicated.   -Continue Coumadin with pharmacy to dose for LV thrombus prophylaxis  -Patient established with Dr. Aponte and Dr. Jenkins and at last admission was wanting to switch to us, but currently reports she will continue to see the above providers  -We extremely limited in her medical therapy due to hypotension.  Will not make any changes today    Subjective  Patient complaining of chest pain shortness of breath  however upon, to the room she was sleeping sound asleep and when asked elaborate further she says that she does not know.    Objective:  Intake & Output  Net IO Since Admission: 6,250.67 mL [10/15/24 1206]    Today's Weight:  Vitals:    10/13/24 1246   Weight: 68.8 kg (151 lb 10.8 oz)       PHYSICAL EXAM  Physical Exam  Vitals and nursing note reviewed.   Constitutional:       General: She is not in acute distress.  HENT:      Head: Normocephalic and atraumatic.      Mouth/Throat:      Mouth: Mucous membranes are moist.      Pharynx: Oropharynx is clear.   Eyes:      General: No scleral icterus.     Pupils: Pupils are equal, round, and reactive to light.   Cardiovascular:      Rate and Rhythm: Normal rate and regular rhythm.      Pulses: Normal pulses.      Heart sounds: Normal heart sounds, S1 normal and S2 normal. No murmur heard.     No friction rub.   Pulmonary:      Effort: Pulmonary effort is normal.      Breath sounds: Normal breath sounds.   Abdominal:      General: Bowel sounds are normal. There is no distension.      Palpations: Abdomen is soft.      Tenderness: There is no abdominal tenderness.   Musculoskeletal:         General: Normal range of motion.      Cervical back: Normal range of motion and neck supple.      Right lower leg: No edema.      Left lower leg: No edema.   Skin:     General: Skin is warm and dry.      Capillary Refill: Capillary refill takes less than 2 seconds.      Findings: No rash.   Neurological:      General: No focal deficit present.      Mental Status: She is alert.   Psychiatric:         Mood and Affect: Mood normal.         Behavior: Behavior normal.        Labs:     CMP:  Recent Labs     10/15/24  0903 10/15/24  0613 10/14/24  0435 10/13/24  1758 10/13/24  1338 07/08/24  1142 07/08/24  1009 04/23/24  0637 04/22/24  1113    141 137 134* 133*   < > 131*  131*   < > 136   K 4.0 4.0 3.6 3.7 4.4   < > 4.1  3.7   < > 4.1   * 110* 104 99 96*   < > 90*  90*   < > 103  "  CO2 27 25 26 25 22   < > 21  21   < > 21   ANIONGAP 7* 10 11 14 19   < > 24*  24*   < > 16   BUN 20 20 34* 41* 43*   < > 56*  56*   < > 23   CREATININE 1.06* 0.99 1.07* 1.26* 1.29*   < > 1.68*  1.75*   < > 1.20*   EGFR 59* 64 58* 48* 47*   < > 34*  32*   < > 51*   MG 2.07 2.12 1.84  --   --   --  2.12  --  1.85    < > = values in this interval not displayed.     Recent Labs     10/14/24  0435 10/13/24  0724 07/08/24  1009 04/24/24  0618 04/23/24  0637 03/21/23  1500 11/01/22  0010 10/26/22  1337 05/30/22  1350   ALBUMIN 3.0* 4.6 4.1 3.4 3.6   < > 3.6   < > 3.9   ALKPHOS 52 90 84 52 55   < > 60   < > 64   ALT 11 18 18 23 17   < > 24   < > 54*   AST 10 15 14 21 13   < > 18   < > 61*   BILITOT 0.4 0.5 0.6 0.3 0.4   < > 0.5   < > 0.4   LIPASE  --  15 12  --   --   --  15  --  12    < > = values in this interval not displayed.     CBC:  Recent Labs     10/15/24  0613 10/14/24  0435 10/13/24  0724 07/10/24  0518 07/09/24  0531   WBC 7.2 10.4 21.3* 10.5 12.4*   HGB 12.4 11.5* 16.5* 14.8 14.9   HCT 38.4 33.9* 47.9* 43.9 43.0   PLT 99* 112* 168 139* 140*   * 109* 107* 108* 105*     COAG:   Recent Labs     10/15/24  0613 10/14/24  0435 10/13/24  0946 06/04/22  0639 06/03/22  0755 06/03/22  0048 06/02/22  2040   INR 4.7* 8.2* 5.5*   < > 2.0*  --   --    HAUF  --   --   --   --  0.2 0.4 0.5    < > = values in this interval not displayed.     ABO: No results for input(s): \"ABO\" in the last 77594 hours.  HEME/ENDO:  Recent Labs     10/13/24  0724 08/27/24  1355 06/04/24  1017 03/14/24  0624 09/01/21  1447 05/28/21  0503 06/16/20  0638 05/14/20  0414   TSH  --   --   --   --   --  2.68  --  1.42   HGBA1C 8.1* 8.8* 8.9* 8.4*   < >  --    < >  --     < > = values in this interval not displayed.      CARDIAC:   Recent Labs     10/14/24  0435 10/13/24  0841 10/13/24  0724 07/08/24  1142 07/08/24  1009 04/22/24  1739 04/22/24  1405 04/22/24  1113 10/26/22  1430 10/26/22  1337 05/30/22  1447 05/30/22  1350   TROPHS  --  " 57* 78* 89* 88* 204*   < > 39*   < > 88*   < > 173*   *  --   --   --  390*  --   --  58  --  997*  --  462*    < > = values in this interval not displayed.     Recent Labs     04/24/24  0618 08/15/19  0310   CHOL 103 123   LDLF  --  63   HDL 30.8 31.0*   TRIG 177* 146       Inpatient Medications:    Current Facility-Administered Medications:     acetaminophen (Tylenol) tablet 650 mg, 650 mg, oral, q4h PRN, Mya Mancilla APRN-CNP    buPROPion SR (Wellbutrin SR) 12 hr tablet 200 mg, 200 mg, oral, Daily, ARMANDO Ramsay-CNP, 200 mg at 10/15/24 0917    cholecalciferol (Vitamin D-3) tablet 3,000 Units, 3,000 Units, oral, Daily, Mya Mancilla, APRJOSSUE-CNP, 3,000 Units at 10/15/24 0917    clonazePAM (KlonoPIN) tablet 2 mg, 2 mg, oral, Nightly, Mya Mancilla, APRN-CNP, 2 mg at 10/14/24 2026    dextrose 50 % injection 12.5 g, 12.5 g, intravenous, q15 min PRN, Mya Mancilla, APRN-CNP    dextrose 50 % injection 25 g, 25 g, intravenous, q15 min PRN, Mya Mancilla, APRN-CNP    docusate sodium (Colace) capsule 100 mg, 100 mg, oral, Daily, Mya Mancilla APRN-CNP, 100 mg at 10/15/24 0917    ezetimibe (Zetia) tablet 10 mg, 10 mg, oral, Nightly, Mya Mancilla, APRN-CNP, 10 mg at 10/14/24 2025    [START ON 10/16/2024] furosemide (Lasix) tablet 20 mg, 20 mg, oral, Daily, ARMANDO Pleitez-CNP    glucagon (Glucagen) injection 1 mg, 1 mg, intramuscular, q15 min PRN, Mya Mancilla APRN-CNP    glucagon (Glucagen) injection 1 mg, 1 mg, intramuscular, q15 min PRN, Mya Mancilla APRN-CNP    glucagon (Glucagen) injection 1 mg, 1 mg, intramuscular, q15 min PRN, JON Ramsay    guaiFENesin (Robitussin) 100 mg/5 mL syrup 200 mg, 200 mg, oral, q4h PRN, JON Pleitez, 200 mg at 10/14/24 1247    insulin glargine (Lantus) injection 30 Units, 30 Units, subcutaneous, Nightly, JON Ramsay, 30 Units at 10/14/24 2026    insulin lispro (HumaLOG) injection 0-10 Units,  0-10 Units, subcutaneous, TID, JON Ramsay, 2 Units at 10/15/24 0925    insulin lispro (HumaLOG) injection 3 Units, 3 Units, subcutaneous, TID, JON Ramsay, 3 Units at 10/15/24 0924    levothyroxine (Synthroid, Levoxyl) tablet 88 mcg, 88 mcg, oral, Daily, Norm Guthrie, PharmD, 88 mcg at 10/15/24 0639    lumateperone capsule 21 mg, 21 mg, oral, Daily, Armando Fung MD, 21 mg at 10/15/24 0919    magnesium oxide (Mag-Ox) tablet 400 mg, 400 mg, oral, Daily, JON Ramsay, 400 mg at 10/15/24 0917    metoclopramide (Reglan) tablet 5 mg, 5 mg, oral, BID, JON Ramsay, 5 mg at 10/15/24 0916    metoprolol succinate XL (Toprol-XL) 24 hr tablet 25 mg, 25 mg, oral, Daily, JON Andrews, DNP, 25 mg at 10/15/24 0917    oxybutynin (Ditropan) tablet 5 mg, 5 mg, oral, BID, Norm Guthrie, PharmD, 5 mg at 10/15/24 0917    oxygen (O2) therapy, , inhalation, Continuous PRN - O2/gases, JON Ramsay, 21 percent at 10/14/24 2219    pantoprazole (ProtoNix) EC tablet 40 mg, 40 mg, oral, Nightly, JON Ramsay, 40 mg at 10/14/24 2026    perflutren protein A microsphere (Optison) injection 0.5 mL, 0.5 mL, intravenous, Once in imaging, JON Ramsay    polyethylene glycol (Glycolax, Miralax) packet 17 g, 17 g, oral, Daily, JON Ramsay, 17 g at 10/15/24 0917    potassium chloride CR (Klor-Con M20) ER tablet 20 mEq, 20 mEq, oral, Daily, JON Ramsay, 20 mEq at 10/15/24 0917    rosuvastatin (Crestor) tablet 40 mg, 40 mg, oral, Daily, JON Ramsay, 40 mg at 10/15/24 0917    sulfur hexafluoride microsphr (Lumason) injection 24.28 mg, 2 mL, intravenous, Once in imaging, JON Ramsay    topiramate (Topamax) tablet 200 mg, 200 mg, oral, BID, JON Ramsay, 200 mg at 10/15/24 0917     VITALS  Vitals:    10/15/24 0906   BP:    Pulse:    Resp:    Temp:    SpO2: 97%       Thank  you for this interesting clinical case and allowing me to participate in the care of this patient.  Please reach me out if you have any questions or if you need any clarifications regarding the patient's care.    **Disclaimer: This note was dictated by speech recognition, and every effort has been made to prevent any error in transcription, however minor errors may be present**  _________________________________________________________  Nader Cameron, MSN, CNP, ACNPC, CCRN  Advanced Practice Provider, Nurse Practitioner  Division of Cardiovascular Medicine  [place] [facility] [place]  [facility]

## 2024-10-15 NOTE — CARE PLAN
The patient's goals for the shift include rest comfortably while in bed     The clinical goals for the shift include no falls     Pt rested in bed with eyes closed for most of the night.  Respirations even and unlabored.  Denies any pain.  Alert and oriented.   No issues at this time.  Will continue to monitor.   Problem: Pain - Adult  Goal: Verbalizes/displays adequate comfort level or baseline comfort level  Outcome: Progressing     Problem: Safety - Adult  Goal: Free from fall injury  Outcome: Progressing     Problem: Discharge Planning  Goal: Discharge to home or other facility with appropriate resources  Outcome: Progressing     Problem: Chronic Conditions and Co-morbidities  Goal: Patient's chronic conditions and co-morbidity symptoms are monitored and maintained or improved  Outcome: Progressing

## 2024-10-16 VITALS
DIASTOLIC BLOOD PRESSURE: 69 MMHG | HEIGHT: 62 IN | TEMPERATURE: 97.9 F | BODY MASS INDEX: 27.91 KG/M2 | SYSTOLIC BLOOD PRESSURE: 104 MMHG | HEART RATE: 80 BPM | WEIGHT: 151.68 LBS | RESPIRATION RATE: 14 BRPM | OXYGEN SATURATION: 92 %

## 2024-10-16 LAB
ALBUMIN SERPL BCP-MCNC: 3.1 G/DL (ref 3.4–5)
ALP SERPL-CCNC: 51 U/L (ref 33–136)
ALT SERPL W P-5'-P-CCNC: 25 U/L (ref 7–45)
ANION GAP SERPL CALC-SCNC: 9 MMOL/L (ref 10–20)
AST SERPL W P-5'-P-CCNC: 16 U/L (ref 9–39)
BILIRUB SERPL-MCNC: 0.4 MG/DL (ref 0–1.2)
BUN SERPL-MCNC: 21 MG/DL (ref 6–23)
CA-I BLD-SCNC: 1.08 MMOL/L (ref 1.1–1.33)
CALCIUM SERPL-MCNC: 7.8 MG/DL (ref 8.6–10.3)
CHLORIDE SERPL-SCNC: 108 MMOL/L (ref 98–107)
CO2 SERPL-SCNC: 23 MMOL/L (ref 21–32)
CREAT SERPL-MCNC: 1.2 MG/DL (ref 0.5–1.05)
EGFRCR SERPLBLD CKD-EPI 2021: 51 ML/MIN/1.73M*2
ERYTHROCYTE [DISTWIDTH] IN BLOOD BY AUTOMATED COUNT: 12.9 % (ref 11.5–14.5)
GLUCOSE SERPL-MCNC: 146 MG/DL (ref 74–99)
HCT VFR BLD AUTO: 33.6 % (ref 36–46)
HGB BLD-MCNC: 11.2 G/DL (ref 12–16)
INR PPP: 1.7 (ref 0.9–1.1)
MCH RBC QN AUTO: 36.8 PG (ref 26–34)
MCHC RBC AUTO-ENTMCNC: 33.3 G/DL (ref 32–36)
MCV RBC AUTO: 111 FL (ref 80–100)
NRBC BLD-RTO: 0 /100 WBCS (ref 0–0)
PLATELET # BLD AUTO: 81 X10*3/UL (ref 150–450)
POTASSIUM SERPL-SCNC: 4 MMOL/L (ref 3.5–5.3)
PROT SERPL-MCNC: 5 G/DL (ref 6.4–8.2)
PROTHROMBIN TIME: 20.3 SECONDS (ref 9.8–12.8)
RBC # BLD AUTO: 3.04 X10*6/UL (ref 4–5.2)
SODIUM SERPL-SCNC: 136 MMOL/L (ref 136–145)
WBC # BLD AUTO: 6.7 X10*3/UL (ref 4.4–11.3)

## 2024-10-16 PROCEDURE — 36415 COLL VENOUS BLD VENIPUNCTURE: CPT

## 2024-10-16 PROCEDURE — 2500000002 HC RX 250 W HCPCS SELF ADMINISTERED DRUGS (ALT 637 FOR MEDICARE OP, ALT 636 FOR OP/ED)

## 2024-10-16 PROCEDURE — 2500000001 HC RX 250 WO HCPCS SELF ADMINISTERED DRUGS (ALT 637 FOR MEDICARE OP)

## 2024-10-16 PROCEDURE — 80053 COMPREHEN METABOLIC PANEL: CPT

## 2024-10-16 PROCEDURE — 2500000005 HC RX 250 GENERAL PHARMACY W/O HCPCS: Performed by: NURSE PRACTITIONER

## 2024-10-16 PROCEDURE — 94761 N-INVAS EAR/PLS OXIMETRY MLT: CPT

## 2024-10-16 PROCEDURE — 2500000004 HC RX 250 GENERAL PHARMACY W/ HCPCS (ALT 636 FOR OP/ED): Performed by: NURSE PRACTITIONER

## 2024-10-16 PROCEDURE — 2500000002 HC RX 250 W HCPCS SELF ADMINISTERED DRUGS (ALT 637 FOR MEDICARE OP, ALT 636 FOR OP/ED): Performed by: INTERNAL MEDICINE

## 2024-10-16 PROCEDURE — 2500000002 HC RX 250 W HCPCS SELF ADMINISTERED DRUGS (ALT 637 FOR MEDICARE OP, ALT 636 FOR OP/ED): Performed by: NURSE PRACTITIONER

## 2024-10-16 PROCEDURE — 85610 PROTHROMBIN TIME: CPT

## 2024-10-16 PROCEDURE — 2500000001 HC RX 250 WO HCPCS SELF ADMINISTERED DRUGS (ALT 637 FOR MEDICARE OP): Performed by: NURSE PRACTITIONER

## 2024-10-16 PROCEDURE — 99222 1ST HOSP IP/OBS MODERATE 55: CPT

## 2024-10-16 PROCEDURE — 82330 ASSAY OF CALCIUM: CPT

## 2024-10-16 PROCEDURE — 94668 MNPJ CHEST WALL SBSQ: CPT

## 2024-10-16 PROCEDURE — 85027 COMPLETE CBC AUTOMATED: CPT

## 2024-10-16 PROCEDURE — 2500000001 HC RX 250 WO HCPCS SELF ADMINISTERED DRUGS (ALT 637 FOR MEDICARE OP): Performed by: HOSPITALIST

## 2024-10-16 RX ORDER — METOPROLOL SUCCINATE 25 MG/1
25 TABLET, EXTENDED RELEASE ORAL DAILY
Qty: 30 TABLET | Refills: 0 | Status: SHIPPED | OUTPATIENT
Start: 2024-10-16 | End: 2024-11-15

## 2024-10-16 RX ORDER — CALCIUM CARBONATE 200(500)MG
1000 TABLET,CHEWABLE ORAL ONCE
Status: COMPLETED | OUTPATIENT
Start: 2024-10-16 | End: 2024-10-16

## 2024-10-16 RX ORDER — INSULIN LISPRO 100 [IU]/ML
10 INJECTION, SOLUTION INTRAVENOUS; SUBCUTANEOUS ONCE
Status: COMPLETED | OUTPATIENT
Start: 2024-10-16 | End: 2024-10-16

## 2024-10-16 RX ORDER — INSULIN LISPRO 100 [IU]/ML
0-20 INJECTION, SOLUTION INTRAVENOUS; SUBCUTANEOUS
Status: DISCONTINUED | OUTPATIENT
Start: 2024-10-16 | End: 2024-10-16 | Stop reason: HOSPADM

## 2024-10-16 ASSESSMENT — COGNITIVE AND FUNCTIONAL STATUS - GENERAL
DAILY ACTIVITIY SCORE: 24
MOBILITY SCORE: 24

## 2024-10-16 ASSESSMENT — PAIN - FUNCTIONAL ASSESSMENT
PAIN_FUNCTIONAL_ASSESSMENT: 0-10

## 2024-10-16 ASSESSMENT — PAIN SCALES - GENERAL
PAINLEVEL_OUTOF10: 0 - NO PAIN

## 2024-10-16 NOTE — NURSING NOTE
Discharge Note: 10/16/2024 1326 AVS and pt responsibilities reviewed with pt and copy given. Syncope, Heart Failure, education reviewed with pt and information sheets given. Pt verbalizes understanding of instructions received, verbalizes understanding of when to seek medical attention, denies any home going or personal care needs. Denies further questions or concerns. Reviewed follow up appts with pt and verbalizes understanding. Discharged via wheelchair to private car accompanied by PCT, personal belongings taken with pt, no distress noted, no complaints voiced. Latia OROSCO

## 2024-10-16 NOTE — PROGRESS NOTES
Cardiology Inpatient Progress Note  [facility] [place]    ASSESSMENT AND PLAN  Syncope:  -Most likely in the setting of hyperglycemia; defer treatment of hyperglycemia to primary team   -Carotid duplex showing <50% stenosis bilaterally  -Patient currently has a [employer] ICD with last device check in April. She was due to have a remote check in July, but I cannot see where this was completed.   -Device interrogation showed several episodes of SVT with the longest episode lasting 12 seconds.  It is unlikely that she syncopal with this very short episode.    -Continue with Toprol XL 25 mg daily.  -Stable from a cardiac standpoint for discharge with follow-up I will message the office to arrange her follow-up with cardiology     ACC Stage C HFrEF, chronic   -Ischemic cardiomyopathy  -NYHA Class III  -Status post ICD placement for secondary prevention of ventricular arrhythmias secondary to dilated cardiomyopathy.   -Echo (July, 2024)-showing severely decreased LV systolic function with estimated LVEF of 25 to 30% with a large and aneurysmal LV apex with thin and akinetic anterior apical and inferior apical walls. Normal RV systolic function.  Repeat echocardiogram shows LVEF of 30% with no changes compared to prior previous echo from July..   -Current GDMT includes carvedilol 12.5mg twice daily, spironolactone 12.5mg daily, and Lasix 40mg daily. Spironolactone not started on admission and Lasix currently on hold; please resume home medications at discharge. Patient hypotensive this admission and likely would not tolerated ARNi, ACE or ARB. Patient Type I Diabetic, therefore, SGLT2i contraindicated.   -Continue Coumadin with pharmacy to dose for LV thrombus prophylaxis.   -Patient established with Dr. Aponte and Dr. Jenkins and at last admission was wanting to switch to us, but currently reports she will continue to see the above providers  -We extremely limited in her  medical therapy due to hypotension.  Will not make any changes today    Subjective  denies chest pain, shortness of breath, palpitations, leg edema, fever, chills, orthopnea, paroxysmal nocturnal dyspnea or syncope.     Objective:  Intake & Output  Net IO Since Admission: 7,470.67 mL [10/16/24 1008]    Today's Weight:  Vitals:    10/13/24 1246   Weight: 68.8 kg (151 lb 10.8 oz)       PHYSICAL EXAM  Physical Exam  Vitals and nursing note reviewed.   Constitutional:       General: She is not in acute distress.  HENT:      Head: Normocephalic and atraumatic.      Mouth/Throat:      Mouth: Mucous membranes are moist.      Pharynx: Oropharynx is clear.   Eyes:      General: No scleral icterus.     Pupils: Pupils are equal, round, and reactive to light.   Cardiovascular:      Rate and Rhythm: Normal rate and regular rhythm.      Pulses: Normal pulses.      Heart sounds: Normal heart sounds, S1 normal and S2 normal. No murmur heard.     No friction rub.   Pulmonary:      Effort: Pulmonary effort is normal.      Breath sounds: Normal breath sounds.   Abdominal:      General: Bowel sounds are normal. There is no distension.      Palpations: Abdomen is soft.      Tenderness: There is no abdominal tenderness.   Musculoskeletal:         General: Normal range of motion.      Cervical back: Normal range of motion and neck supple.      Right lower leg: No edema.      Left lower leg: No edema.   Skin:     General: Skin is warm and dry.      Capillary Refill: Capillary refill takes less than 2 seconds.      Findings: No rash.   Neurological:      General: No focal deficit present.      Mental Status: She is alert.   Psychiatric:         Mood and Affect: Mood normal.         Behavior: Behavior normal.        Labs:     CMP:  Recent Labs     10/16/24  0533 10/15/24  0903 10/15/24  0613 10/14/24  0435 10/13/24  1758 07/08/24  1142 07/08/24  1009 04/23/24  0637 04/22/24  1113    139 141 137 134*   < > 131*  131*   < > 136   K 4.0  "4.0 4.0 3.6 3.7   < > 4.1  3.7   < > 4.1   * 109* 110* 104 99   < > 90*  90*   < > 103   CO2 23 27 25 26 25   < > 21  21   < > 21   ANIONGAP 9* 7* 10 11 14   < > 24*  24*   < > 16   BUN 21 20 20 34* 41*   < > 56*  56*   < > 23   CREATININE 1.20* 1.06* 0.99 1.07* 1.26*   < > 1.68*  1.75*   < > 1.20*   EGFR 51* 59* 64 58* 48*   < > 34*  32*   < > 51*   MG  --  2.07 2.12 1.84  --   --  2.12  --  1.85    < > = values in this interval not displayed.     Recent Labs     10/16/24  0533 10/14/24  0435 10/13/24  0724 07/08/24  1009 04/24/24  0618 03/21/23  1500 11/01/22  0010 10/26/22  1337 05/30/22  1350   ALBUMIN 3.1* 3.0* 4.6 4.1 3.4   < > 3.6   < > 3.9   ALKPHOS 51 52 90 84 52   < > 60   < > 64   ALT 25 11 18 18 23   < > 24   < > 54*   AST 16 10 15 14 21   < > 18   < > 61*   BILITOT 0.4 0.4 0.5 0.6 0.3   < > 0.5   < > 0.4   LIPASE  --   --  15 12  --   --  15  --  12    < > = values in this interval not displayed.     CBC:  Recent Labs     10/16/24  0533 10/15/24  0613 10/14/24  0435 10/13/24  0724 07/10/24  0518   WBC 6.7 7.2 10.4 21.3* 10.5   HGB 11.2* 12.4 11.5* 16.5* 14.8   HCT 33.6* 38.4 33.9* 47.9* 43.9   PLT 81* 99* 112* 168 139*   * 111* 109* 107* 108*     COAG:   Recent Labs     10/16/24  0533 10/15/24  0613 10/14/24  0435 06/04/22  0639 06/03/22  0755 06/03/22  0048 06/02/22  2040   INR 1.7* 4.7* 8.2*   < > 2.0*  --   --    HAUF  --   --   --   --  0.2 0.4 0.5    < > = values in this interval not displayed.     ABO: No results for input(s): \"ABO\" in the last 14170 hours.  HEME/ENDO:  Recent Labs     10/13/24  0724 08/27/24  1355 06/04/24  1017 03/14/24  0624 09/01/21  1447 05/28/21  0503 06/16/20  0638 05/14/20  0414   TSH  --   --   --   --   --  2.68  --  1.42   HGBA1C 8.1* 8.8* 8.9* 8.4*   < >  --    < >  --     < > = values in this interval not displayed.      CARDIAC:   Recent Labs     10/14/24  0435 10/13/24  0841 10/13/24  0724 07/08/24  1142 07/08/24  1009 04/22/24  1739 " 04/22/24  1405 04/22/24  1113 10/26/22  1430 10/26/22  1337 05/30/22  1447 05/30/22  1350   TROPHS  --  57* 78* 89* 88* 204*   < > 39*   < > 88*   < > 173*   *  --   --   --  390*  --   --  58  --  997*  --  462*    < > = values in this interval not displayed.     Recent Labs     04/24/24  0618 08/15/19  0310   CHOL 103 123   LDLF  --  63   HDL 30.8 31.0*   TRIG 177* 146       Inpatient Medications:    Current Facility-Administered Medications:     acetaminophen (Tylenol) tablet 650 mg, 650 mg, oral, q4h PRN, ARMANDO Ramsay-CNP    buPROPion SR (Wellbutrin SR) 12 hr tablet 200 mg, 200 mg, oral, Daily, ARMANDO Ramsay-CNP, 200 mg at 10/16/24 0959    cholecalciferol (Vitamin D-3) tablet 3,000 Units, 3,000 Units, oral, Daily, ARMANDO Ramsay-CNP, 3,000 Units at 10/16/24 0959    clonazePAM (KlonoPIN) tablet 2 mg, 2 mg, oral, Nightly, Mya Mancilla, ARMANDO-CNP, 2 mg at 10/15/24 2042    dextrose 50 % injection 12.5 g, 12.5 g, intravenous, q15 min PRN, Mya Mancilla, APRN-CNP    dextrose 50 % injection 25 g, 25 g, intravenous, q15 min PRN, Mya Mancilla APRN-CNP    docusate sodium (Colace) capsule 100 mg, 100 mg, oral, Daily, Mya Mancilla, APRN-CNP, 100 mg at 10/16/24 1000    ezetimibe (Zetia) tablet 10 mg, 10 mg, oral, Nightly, Mya Mancilla, APRN-CNP, 10 mg at 10/15/24 2042    furosemide (Lasix) tablet 20 mg, 20 mg, oral, Daily, Laurel Mcintyre APRN-CNP, 20 mg at 10/16/24 1000    glucagon (Glucagen) injection 1 mg, 1 mg, intramuscular, q15 min PRN, JON Ramsay    glucagon (Glucagen) injection 1 mg, 1 mg, intramuscular, q15 min PRN, JON Ramsay    glucagon (Glucagen) injection 1 mg, 1 mg, intramuscular, q15 min PRN, JON Ramsay    guaiFENesin (Robitussin) 100 mg/5 mL syrup 200 mg, 200 mg, oral, q4h PRN, JON Pleitez, 200 mg at 10/14/24 1247    insulin glargine (Lantus) injection 30 Units, 30 Units, subcutaneous,  Nightly, JON Ramsay, 30 Units at 10/15/24 2043    insulin lispro (HumaLOG) injection 0-20 Units, 0-20 Units, subcutaneous, Before meals & nightly, Skip Che MD    levothyroxine (Synthroid, Levoxyl) tablet 88 mcg, 88 mcg, oral, Daily, Norm Guthrie, PharmD, 88 mcg at 10/16/24 0533    lumateperone capsule 21 mg, 21 mg, oral, Daily, Armando Fung MD, 21 mg at 10/16/24 1000    magnesium oxide (Mag-Ox) tablet 400 mg, 400 mg, oral, Daily, JON Ramsay, 400 mg at 10/16/24 1000    metoclopramide (Reglan) tablet 5 mg, 5 mg, oral, BID, JON Ramsay, 5 mg at 10/16/24 0958    metoprolol succinate XL (Toprol-XL) 24 hr tablet 25 mg, 25 mg, oral, Daily, JON Andrews, DNP, 25 mg at 10/16/24 0957    oxybutynin (Ditropan) tablet 5 mg, 5 mg, oral, BID, Norm Guthrie, PharmD, 5 mg at 10/16/24 1000    oxygen (O2) therapy, , inhalation, Continuous PRN - O2/gases, JON Ramsay, 21 percent at 10/16/24 0628    pantoprazole (ProtoNix) EC tablet 40 mg, 40 mg, oral, Nightly, JON Ramsay, 40 mg at 10/15/24 2043    perflutren protein A microsphere (Optison) injection 0.5 mL, 0.5 mL, intravenous, Once in imaging, JON Ramsay    polyethylene glycol (Glycolax, Miralax) packet 17 g, 17 g, oral, Daily, JON Ramsay, 17 g at 10/16/24 0957    potassium chloride CR (Klor-Con M20) ER tablet 20 mEq, 20 mEq, oral, Daily, JON Ramsay, 20 mEq at 10/16/24 0959    rosuvastatin (Crestor) tablet 40 mg, 40 mg, oral, Daily, JON Ramsay, 40 mg at 10/16/24 1000    sulfur hexafluoride microsphr (Lumason) injection 24.28 mg, 2 mL, intravenous, Once in imaging, JON Ramsay    topiramate (Topamax) tablet 200 mg, 200 mg, oral, BID, JON Ramsay, 200 mg at 10/16/24 0959    warfarin (Coumadin) tablet 12.5 mg, 12.5 mg, oral, Once, JON Ramsay     VITALS  Vitals:    10/16/24 0737    BP: 116/73   Pulse: 71   Resp: 16   Temp: 36.9 °C (98.4 °F)   SpO2: 97%       Thank you for this interesting clinical case and allowing me to participate in the care of this patient.  Please reach me out if you have any questions or if you need any clarifications regarding the patient's care.    **Disclaimer: This note was dictated by speech recognition, and every effort has been made to prevent any error in transcription, however minor errors may be present**  _________________________________________________________  Nader Cameron, MSN, CNP, ACNPC, CCRN  Advanced Practice Provider, Nurse Practitioner  Division of Cardiovascular Medicine  [place] [facility] [place]  [facility]

## 2024-10-16 NOTE — CONSULTS
Pharmacy Consult for Warfarin (Coumadin) Management - Daily Progress Note     - Warfarin history:  admitted on warfarin     - Bleeding/bruising events in past 24 hours:  not applicable    - Notable medication changes in past 24 hours:  not applicable    - Additional notes:  not applicable      Anticoagulation Dosing History  Previous home dose:  15 mg warfarin Mon, Thur; 12.5 mg warfarin all other days     Labs  INR   Date Value Ref Range Status   10/16/2024 1.7 (H) 0.9 - 1.1 Final   10/15/2024 4.7 (H) 0.9 - 1.1 Final   10/14/2024 8.2 (HH) 0.9 - 1.1 Final     Results from last 7 days   Lab Units 10/16/24  0533 10/15/24  0613 10/14/24  0435   HEMOGLOBIN g/dL 11.2* 12.4 11.5*     Results from last 7 days   Lab Units 10/16/24  0533 10/15/24  0613 10/14/24  0435   HEMATOCRIT % 33.6* 38.4 33.9*     Results from last 7 days   Lab Units 10/16/24  0533 10/15/24  0613 10/14/24  0435   PLATELETS AUTO x10*3/uL 81* 99* 112*     Review    Treatment Indication:   past myocardial infarction  Target INR:  2-3    Inpatient dosing pattern:  Warfarin has been held for 3 days    Warfarin Dosing Plan: Will resume home dose of warfarin 12.5 mg x 1 for today.  Orders placed for repeat PT/INR for tomorrow.    Orders placed per pharmacy consult. Pharmacy will continue to monitor and adjust therapy as needed.     Becki KARLENE Friday Cindy Barnes

## 2024-10-16 NOTE — CARE PLAN
The patient's goals for the shift include DECREASE SUGARS    The clinical goals for the shift include Monitor labs, and v/s      Problem: Pain - Adult  Goal: Verbalizes/displays adequate comfort level or baseline comfort level  Flowsheets (Taken 10/15/2024 2227)  Verbalizes/displays adequate comfort level or baseline comfort level: Encourage patient to monitor pain and request assistance     Problem: Discharge Planning  Goal: Discharge to home or other facility with appropriate resources  Flowsheets (Taken 10/15/2024 2227)  Discharge to home or other facility with appropriate resources: Identify barriers to discharge with patient and caregiver     Problem: Chronic Conditions and Co-morbidities  Goal: Patient's chronic conditions and co-morbidity symptoms are monitored and maintained or improved  Flowsheets (Taken 10/15/2024 2227)  Care Plan - Patient's Chronic Conditions and Co-Morbidity Symptoms are Monitored and Maintained or Improved: Monitor and assess patient's chronic conditions and comorbid symptoms for stability, deterioration, or improvement     Problem: Diabetes  Goal: Achieve decreasing blood glucose levels by end of shift  Flowsheets (Taken 10/15/2024 2227)  Achieve decreasing blood glucose levels by end of shift: Self monitor blood glucose with staff oversight     Problem: Fall/Injury  Goal: Not fall by end of shift  Reactivated

## 2024-10-16 NOTE — DISCHARGE SUMMARY
Discharge Diagnosis  Syncope, unspecified syncope type    Issues Requiring Follow-Up  INR    Discharge Meds     Medication List      CONTINUE taking these medications     albuterol 90 mcg/actuation inhaler   * glucagon 1 mg injection; Commonly known as: Glucagen   * Baqsimi 3 mg/actuation spray,non-aerosol; Generic drug: glucagon   buPROPion  mg 12 hr tablet; Commonly known as: Wellbutrin SR   cholecalciferol 25 MCG (1000 UT) capsule; Commonly known as: Vitamin D-3   clonazePAM 2 mg tablet; Commonly known as: KlonoPIN   desvenlafaxine 100 mg 24 hr tablet; Commonly known as: Pristiq   docusate sodium 100 mg capsule; Commonly known as: Colace   ezetimibe 10 mg tablet; Commonly known as: Zetia   furosemide 40 mg tablet; Commonly known as: Lasix; Take 1 tablet (40 mg)   by mouth once daily.   insulin aspart 100 unit/mL (3 mL) pen; Commonly known as: NovoLOG   insulin glargine 100 unit/mL injection; Commonly known as: Lantus   levothyroxine 88 mcg tablet; Commonly known as: Synthroid, Levoxyl   magnesium oxide 500 mg capsule   metoclopramide 5 mg tablet; Commonly known as: Reglan   metoprolol succinate XL 25 mg 24 hr tablet; Commonly known as:   Toprol-XL; Take 1 tablet (25 mg) by mouth once daily. Do not crush or   chew. Do not fill before July 11, 2024.   oxybutynin XL 10 mg 24 hr tablet; Commonly known as: Ditropan-XL   pantoprazole 40 mg EC tablet; Commonly known as: ProtoNix; Take 1 tablet   (40 mg) by mouth once daily at bedtime. Do not crush, chew, or split.   potassium chloride CR 20 mEq ER tablet; Commonly known as: Klor-Con M20   rOPINIRole 2 mg tablet; Commonly known as: Requip   rosuvastatin 40 mg tablet; Commonly known as: Crestor   spironolactone 25 mg tablet; Commonly known as: Aldactone; Take 0.5   tablets (12.5 mg) by mouth once daily.   Topamax 200 mg tablet; Generic drug: topiramate   * warfarin 7.5 mg tablet; Commonly known as: Coumadin   * warfarin 5 mg tablet; Commonly known as: Coumadin  *  This list has 4 medication(s) that are the same as other medications   prescribed for you. Read the directions carefully, and ask your doctor or   other care provider to review them with you.     STOP taking these medications     carvedilol 12.5 mg tablet; Commonly known as: Coreg   nitroglycerin 0.4 mg SL tablet; Commonly known as: Nitrostat       Test Results Pending At Discharge  Pending Labs       No current pending labs.            Hospital Course   Ne Richardson is a 64 y.o. female with Pmhx of MI (2005), heart failure, hypertension, status post ICD placement, porcelain gallbladder, TIA, DVT presenting to [facility] ED on 10/13 for complaint of nausea, vomiting, hyperglycemia, and syncope.  In the ED patient vital signs were 36.1, 97, 16, 98/61, 98% on room air.  Imaging significant for CT C-spine was negative, CT head was negative, CT angio showed cardiomegaly and hyperinflation and cystic changes.  Labs significant for blood glucose 469/404, sodium 133, chloride 95, creatinine 1.36, INR 5.5, WBC 21.3, UA negative for acute processes, ketones 40, troponins 78-57, B hydroxy 3.0.  Patient was given 3 units of regular insulin 11, labetalol 10 mg, Zofran, 2 L normal saline bolus, Reglan, and started on normal saline 125 mL/h.  Patient was admitted for syncopal episode in the setting of hyperglycemia and October 13.  She was seen by cardiologist here.  Had ICD  device interrogation done. Device interrogated which showed episodes of SVT in the past few days with longest episode lasting 12 seconds. Will stop carvedilol given soft BP and start Toprol XL 25mg daily.   She had echo done this admission:   1. Left ventricular ejection fraction is moderately decreased, by visual estimate at 30%.   2. Left ventricular apex is large and aneurysmal, anteroapical & inferoapical walls are thin and akinetic.   3. Spectral Doppler shows an impaired relaxation pattern of left ventricular diastolic filling.   4. There is normal  right ventricular global systolic function.   5. Pacemaker/defibrillator wire visualized in the right ventricle and in the right atrium.  No changes compared to prior echocardiogram 7/9/2024     She also had carotid duplex done this admission that reveals less than 50% stenosis bilaterally.  Patient advised to follow-up with her cardiologist and PCP in 1 week  Pertinent Physical Exam At Time of Discharge  Physical Exam    Outpatient Follow-Up  Future Appointments   Date Time Provider Department Center   10/23/2024  3:30 PM Jose Barclay MD [id number] [place]   4/9/2025  7:45 AM Jose Barclay MD [id number] [place]       Follow-up with PCP in 1 week  Follow-up with your cardiologist in 1 week  Follow-up with Coumadin clinic tomorrow for INR check  ARMANDO Pleitez-CNP

## 2024-10-16 NOTE — PROGRESS NOTES
Pt reviewed during Care Rounds today and she is ready for discharge. SW met with pt to review the plan. Spouse at the bedside and pt provided permission to speak in front of spouse. Pt confirms her desires to discharge home without services or concerns. IM reviewed with no questions/concerns- copy added to pt's chart, she denied a copy for her own records. No further needs identified.       BRUNILDA Faria

## 2024-10-17 ENCOUNTER — PATIENT OUTREACH (OUTPATIENT)
Dept: CARDIOLOGY | Facility: CLINIC | Age: 64
End: 2024-10-17

## 2024-10-17 NOTE — PROGRESS NOTES
Discharge Facility:  [facility]   Discharge Diagnosis:  Syncope,    Admission Date:  10/13/24  Discharge Date:   10/16/24    PCP Appointment Date:  TBD   Specialist Appointment Date:   10/23/2024 3:30 PM   CARDIOLOGY HOSP DISCHARGE   Jose Barclay MD   Hospital Encounter and Summary Linked: Yes    See discharge assessment below for further details  Medications  Medications reviewed with patient/caregiver?: Yes (10/17/2024  9:05 AM)  Is the patient having any side effects they believe may be caused by any medication additions or changes?: No (10/17/2024  9:05 AM)  Does the patient have all medications ordered at discharge?: Not applicable (10/17/2024  9:05 AM)  Care Management Interventions: Provided patient education (10/17/2024  9:05 AM)  Prescription Comments: STOP taking these medications     carvedilol 12.5 mg tablet; Commonly known as: Coreg   nitroglycerin 0.4 mg SL tablet; Commonly known as: Nitrostat (10/17/2024  9:05 AM)  Is the patient taking all medications as directed (includes completed medication regime)?: Yes (10/17/2024  9:05 AM)  Medication Comments: CM discussed referencing discharge paperwork to follow detailed daily medication schedule. Reminded to bring all medications to future appointments. Patient endorses understanding & compliance with medications. (10/17/2024  9:05 AM)    Appointments  Does the patient have a primary care provider?: Yes (10/17/2024  9:05 AM)  Has the patient kept scheduled appointments due by today?: Yes (10/17/2024  9:05 AM)  Care Management Interventions: Educated on importance of keeping appointment (Confirmed that pt is aware of upcoming appt with Dr Barclay 10/23/24) (10/17/2024  9:05 AM)    Self Management  Has home health visited the patient within 72 hours of discharge?: Not applicable (10/17/2024  9:05 AM)  What Durable Medical Equipment (DME) was ordered?: na (10/17/2024  9:05 AM)    Patient Teaching  Care Management  Interventions: Reviewed instructions with patient (10/17/2024  9:05 AM)  What is the patient's perception of their health status since discharge?: Improving (10/17/2024  9:05 AM)  Is the patient/caregiver able to teach back the hierarchy of who to call/visit for symptoms/problems? PCP, Specialist, Home Health nurse, Urgent Care, ED, 911: Yes (10/17/2024  9:05 AM)  Patient/Caregiver Education Comments: INR was already drawn this morning as instructed at discharge (10/17/2024  9:05 AM)    Wrap Up  Is the patient/caregiver familiar with Advance Care Planning?: Yes (Scanned into chart) (10/17/2024  9:05 AM)  Wrap Up Additional Comments: Successful transition of care outreach with patient. CM introduced myself and the TCM program to Ne Richardson. Reviewed hospital stay and answered any questions. Patient denies any further discharge questions/needs at this time. CM gave my contact information and encouraged to call if needing assistance or has any further non-emergent questions prior to my next outreach. (10/17/2024  9:05 AM)

## 2024-10-23 ENCOUNTER — APPOINTMENT (OUTPATIENT)
Dept: CARDIOLOGY | Facility: CLINIC | Age: 64
End: 2024-10-23

## 2024-10-29 ENCOUNTER — PATIENT OUTREACH (OUTPATIENT)
Dept: PRIMARY CARE | Facility: CLINIC | Age: 64
End: 2024-10-29
Payer: MEDICARE

## 2024-11-18 DIAGNOSIS — I50.9 CONGESTIVE HEART FAILURE, UNSPECIFIED HF CHRONICITY, UNSPECIFIED HEART FAILURE TYPE: ICD-10-CM

## 2024-11-18 RX ORDER — METOPROLOL SUCCINATE 25 MG/1
25 TABLET, EXTENDED RELEASE ORAL DAILY
Qty: 90 TABLET | Refills: 3 | Status: SHIPPED | OUTPATIENT
Start: 2024-11-18 | End: 2025-11-18

## 2024-11-18 NOTE — TELEPHONE ENCOUNTER
Received request for prescription refill for patient.  Patient follows with Dr. Jose Barclay MD     Request is for metoprolol  Is patient currently on medication- yes    Last OV- 4/10/24  Next OV- 11/21/24    Pended for signing and sent to provider.

## 2024-11-21 ENCOUNTER — APPOINTMENT (OUTPATIENT)
Dept: CARDIOLOGY | Facility: CLINIC | Age: 64
End: 2024-11-21

## 2024-11-21 VITALS
HEART RATE: 68 BPM | SYSTOLIC BLOOD PRESSURE: 118 MMHG | DIASTOLIC BLOOD PRESSURE: 60 MMHG | BODY MASS INDEX: 30.97 KG/M2 | HEIGHT: 62 IN | WEIGHT: 168.3 LBS

## 2024-11-21 DIAGNOSIS — E78.2 MIXED HYPERLIPIDEMIA: ICD-10-CM

## 2024-11-21 DIAGNOSIS — I10 ESSENTIAL HYPERTENSION, BENIGN: ICD-10-CM

## 2024-11-21 DIAGNOSIS — N18.31 STAGE 3A CHRONIC KIDNEY DISEASE (MULTI): ICD-10-CM

## 2024-11-21 DIAGNOSIS — Z86.73 HISTORY OF CVA (CEREBROVASCULAR ACCIDENT): ICD-10-CM

## 2024-11-21 DIAGNOSIS — I25.5 ISCHEMIC CARDIOMYOPATHY: ICD-10-CM

## 2024-11-21 DIAGNOSIS — E66.811 CLASS 1 OBESITY WITH BODY MASS INDEX (BMI) OF 30.0 TO 30.9 IN ADULT, UNSPECIFIED OBESITY TYPE, UNSPECIFIED WHETHER SERIOUS COMORBIDITY PRESENT: ICD-10-CM

## 2024-11-21 DIAGNOSIS — I50.22 CHRONIC SYSTOLIC HEART FAILURE: ICD-10-CM

## 2024-11-21 DIAGNOSIS — E11.9 TYPE 2 DIABETES MELLITUS WITHOUT COMPLICATION, UNSPECIFIED WHETHER LONG TERM INSULIN USE (MULTI): ICD-10-CM

## 2024-11-21 DIAGNOSIS — Z95.810 CARDIAC DEFIBRILLATOR IN SITU: ICD-10-CM

## 2024-11-21 DIAGNOSIS — Z98.61 CAD S/P PERCUTANEOUS CORONARY ANGIOPLASTY: ICD-10-CM

## 2024-11-21 DIAGNOSIS — F17.200 CURRENT SMOKER: ICD-10-CM

## 2024-11-21 DIAGNOSIS — I25.10 CAD S/P PERCUTANEOUS CORONARY ANGIOPLASTY: ICD-10-CM

## 2024-11-21 DIAGNOSIS — I25.2 OLD MI (MYOCARDIAL INFARCTION): ICD-10-CM

## 2024-11-21 PROCEDURE — 3008F BODY MASS INDEX DOCD: CPT | Performed by: INTERNAL MEDICINE

## 2024-11-21 PROCEDURE — 3052F HG A1C>EQUAL 8.0%<EQUAL 9.0%: CPT | Performed by: INTERNAL MEDICINE

## 2024-11-21 PROCEDURE — 3048F LDL-C <100 MG/DL: CPT | Performed by: INTERNAL MEDICINE

## 2024-11-21 PROCEDURE — 3074F SYST BP LT 130 MM HG: CPT | Performed by: INTERNAL MEDICINE

## 2024-11-21 PROCEDURE — 99214 OFFICE O/P EST MOD 30 MIN: CPT | Performed by: INTERNAL MEDICINE

## 2024-11-21 PROCEDURE — 3078F DIAST BP <80 MM HG: CPT | Performed by: INTERNAL MEDICINE

## 2024-11-21 NOTE — PROGRESS NOTES
CARDIOLOGY OFFICE VISIT      CHIEF COMPLAINT      HISTORY OF PRESENT ILLNESS  The patient is being seen after hospitalization at [facility] about a month ago.  She states she had a syncopal episode.  Apparently was determined it was due to low blood pressure.  She has had her medications adjusted and has not had any further episodes.  She denies any recent chest discomfort.  She denies any major problem with dyspnea.  She does have chronic mild dyspnea with activities.  She denies palpitations and syncope.  She denies any shocks from her ICD.  She denies Amprol with her current medication.      IMPRESSION:   1. Obesity  2. Coronary artery disease, no angina  3. Remote anterior ST-segment elevation myocardial infarction.  4. Remote PCI.  5. Essential hypertension.  6. Mixed hyperlipidemia.  7. Diabetes mellitus type 2, requiring insulin control.  8.  History of ischemic cardiomyopathy. Left ventricular ejection fraction 50-55% on Echo from [facility] December 2023  9. Chronic systolic heart failure  10. AICD.  11. Remote cerebrovascular accident.  12. History of cerebral cavernous sinus thrombosis, on long-term oral anticoagulation.  13. Chronic kidney disease stage 3        Please excuse any errors in grammar or translation related to this dictation. Voice recognition software was utilized to prepare this document.         Past Medical History  Past Medical History:   Diagnosis Date    Acute myocardial infarction, unspecified 01/10/2022    Acute MI    Body mass index (BMI) 33.0-33.9, adult 02/01/2022    BMI 33.0-33.9,adult    Disorder of the skin and subcutaneous tissue, unspecified 07/01/2020    Back skin lesion    Encounter for preprocedural cardiovascular examination 01/10/2022    Pre-operative cardiovascular examination    Hypertensive heart disease with heart failure 05/21/2020    Hypertensive heart disease with chronic combined systolic and diastolic congestive heart failure    Intracranial and intraspinal phlebitis  and thrombophlebitis ([facility]) 01/10/2022    Cavernous sinus thrombosis    Nausea 01/10/2022    Chronic nausea    Obesity, unspecified 03/21/2022    Class 1 obesity with body mass index (BMI) of 33.0 to 33.9 in adult    Obesity, unspecified 08/01/2022    Class 1 obesity with body mass index (BMI) of 32.0 to 32.9 in adult    Old myocardial infarction     History of myocardial infarction    Other mechanical complication of other cardiac electronic device, initial encounter 02/01/2022    Mechanical complication of implantable cardioverter-defibrillator (ICD)    Other specified counseling 02/01/2022    Encounter for medication counseling    Other specified diseases of gallbladder 06/02/2020    Porcelain gallbladder    Overweight 01/10/2022    Overweight    Person consulting for explanation of examination or test findings 02/01/2022    Encounter to discuss test results    Personal history of other diseases of the digestive system 07/01/2020    History of gallbladder disease    Personal history of other venous thrombosis and embolism     History of deep venous thrombosis    Transient cerebral ischemic attack, unspecified     TIA (transient ischemic attack)       Social History  Social History     Tobacco Use    Smoking status: Every Day     Types: Cigars    Smokeless tobacco: Never   Substance Use Topics    Alcohol use: Yes     Comment: social    Drug use: Yes     Types: Marijuana       Family History     Family History   Problem Relation Name Age of Onset    Coronary artery disease Mother      Diabetes Mother      Other (Cardiac pacemaker) Father      Coronary artery disease Father      Other (Stroke syndrome) Other          Allergies:  Allergies   Allergen Reactions    Ropinirole Nausea And Vomiting     Patient denies allergy and takes rx with no difficulty         Outpatient Medications:  Current Outpatient Medications   Medication Instructions    albuterol 90 mcg/actuation inhaler 1-2 puffs, Every 4 hours PRN     buPROPion SR (WELLBUTRIN SR) 200 mg, 2 times daily    cariprazine (VRAYLAR) 1.5 mg    cholecalciferol (VITAMIN D-3) 3,000 Units, Daily    clonazePAM (KlonoPIN) 2 mg tablet 1 tablet, Nightly    desvenlafaxine (PRISTIQ) 100 mg, Nightly    docusate sodium (COLACE) 100 mg, Daily    ezetimibe (Zetia) 10 mg tablet 1 tablet, Nightly    furosemide (LASIX) 40 mg, oral, Daily    glucagon (Baqsimi) 3 mg/actuation spray,non-aerosol 1 spray, As needed    glucagon 1 mg injection Use as directed for hypoglycemia    insulin aspart (NovoLOG) 100 unit/mL (3 mL) pen 3 times daily (morning, midday, late afternoon)    insulin glargine (LANTUS) 30 Units, Nightly    levothyroxine (SYNTHROID, LEVOXYL) 88 mcg, Daily RT    magnesium oxide 500 mg capsule 1 capsule, Nightly    metoclopramide (REGLAN) 5 mg, 2 times daily    metoprolol succinate XL (TOPROL-XL) 25 mg, oral, Daily, Do not crush or chew.    oxybutynin XL (DITROPAN-XL) 10 mg, Nightly    pantoprazole (PROTONIX) 40 mg, oral, Nightly, Do not crush, chew, or split.    potassium chloride CR 20 mEq ER tablet 20 mEq, Daily    rOPINIRole (Requip) 2 mg tablet 1 tablet, Nightly    rosuvastatin (Crestor) 40 mg tablet 1 tablet, Daily    spironolactone (ALDACTONE) 12.5 mg, oral, Daily    topiramate (Topamax) 200 mg tablet 1 tablet, 2 times daily    warfarin (Coumadin) 5 mg tablet 2.5 tablets, 5 times weekly    warfarin (Coumadin) 7.5 mg tablet 2 tablets, 2 times weekly          REVIEW OF SYSTEMS  Review of Systems   All other systems reviewed and are negative.        VITALS  Vitals:    11/21/24 1512   BP: 118/60   Pulse: 68       PHYSICAL EXAM  Vitals and nursing note reviewed.   Constitutional:       Appearance: Healthy appearance.   Eyes:      Conjunctiva/sclera: Conjunctivae normal.      Pupils: Pupils are equal, round, and reactive to light.   Pulmonary:      Effort: Pulmonary effort is normal.      Breath sounds: Normal breath sounds.   Cardiovascular:      PMI at left midclavicular line.  Normal rate. Regular rhythm.      Murmurs: There is a grade 1/6 systolic murmur at the apex.      No gallop.  No click. No rub.   Pulses:     Intact distal pulses.   Edema:     Peripheral edema absent.   Musculoskeletal: Normal range of motion. Skin:     General: Skin is warm and dry.   Neurological:      Mental Status: Alert and oriented to person, place and time.           ASSESSMENT AND PLAN  Diagnoses and all orders for this visit:  CAD S/P percutaneous coronary angioplasty  Old MI (myocardial infarction)  Essential hypertension, benign  Mixed hyperlipidemia  Type 2 diabetes mellitus without complication, unspecified whether long term insulin use (Multi)  Ischemic cardiomyopathy  Chronic systolic heart failure  Cardiac defibrillator in situ  History of CVA (cerebrovascular accident)  Stage 3a chronic kidney disease (Multi)  Class 1 obesity with body mass index (BMI) of 30.0 to 30.9 in adult, unspecified obesity type, unspecified whether serious comorbidity present  Current smoker      [unfilled]

## 2024-11-21 NOTE — PATIENT INSTRUCTIONS
Follow up office visit in 1 year.    Continue same medications/treatment.  Patient educated on proper medication use.  Patient educated on risk factor modification.  Please bring any lab results from other providers / physicians to your next appointment.    Please bring all medicines, vitamins and herbal supplements with you when you come to the office.    Prescriptions will not be filled unless you are compliant with your follow up appointments or have a follow up  appointment scheduled as per instruction of your physician.  Refills should be requested at the time of  Your visit.    Anupama QUINONES LPN, am scribing for and in the presence of Dr. Jose Barclay MD, FACC

## 2024-12-04 ENCOUNTER — PATIENT OUTREACH (OUTPATIENT)
Dept: CARDIOLOGY | Facility: CLINIC | Age: 64
End: 2024-12-04
Payer: MEDICARE

## 2024-12-13 ENCOUNTER — HOSPITAL ENCOUNTER (INPATIENT)
Facility: HOSPITAL | Age: 64
LOS: 2 days | Discharge: HOME | DRG: 871 | End: 2024-12-15
Attending: EMERGENCY MEDICINE | Admitting: INTERNAL MEDICINE
Payer: MEDICARE

## 2024-12-13 ENCOUNTER — APPOINTMENT (OUTPATIENT)
Dept: RADIOLOGY | Facility: HOSPITAL | Age: 64
DRG: 871 | End: 2024-12-13
Payer: MEDICARE

## 2024-12-13 ENCOUNTER — APPOINTMENT (OUTPATIENT)
Dept: CARDIOLOGY | Facility: HOSPITAL | Age: 64
DRG: 871 | End: 2024-12-13
Payer: MEDICARE

## 2024-12-13 DIAGNOSIS — Z95.810 CARDIAC DEFIBRILLATOR IN SITU: ICD-10-CM

## 2024-12-13 DIAGNOSIS — E10.10 DIABETIC KETOACIDOSIS WITHOUT COMA ASSOCIATED WITH TYPE 1 DIABETES MELLITUS (MULTI): Primary | ICD-10-CM

## 2024-12-13 DIAGNOSIS — N28.9 RENAL INSUFFICIENCY: ICD-10-CM

## 2024-12-13 LAB
ALBUMIN SERPL BCP-MCNC: 4.7 G/DL (ref 3.4–5)
ALP SERPL-CCNC: 94 U/L (ref 33–136)
ALT SERPL W P-5'-P-CCNC: 28 U/L (ref 7–45)
ANION GAP SERPL CALC-SCNC: 12 MMOL/L (ref 10–20)
ANION GAP SERPL CALC-SCNC: 12 MMOL/L (ref 10–20)
ANION GAP SERPL CALC-SCNC: 16 MMOL/L (ref 10–20)
ANION GAP SERPL CALC-SCNC: 23 MMOL/L (ref 10–20)
APPEARANCE UR: CLEAR
ARTERIAL PATENCY WRIST A: POSITIVE
AST SERPL W P-5'-P-CCNC: 18 U/L (ref 9–39)
B-OH-BUTYR SERPL-SCNC: 2.95 MMOL/L (ref 0.02–0.27)
BASE EXCESS BLDA CALC-SCNC: -7.1 MMOL/L (ref -2–3)
BASOPHILS # BLD AUTO: 0.03 X10*3/UL (ref 0–0.1)
BASOPHILS NFR BLD AUTO: 0.2 %
BILIRUB SERPL-MCNC: 0.9 MG/DL (ref 0–1.2)
BILIRUB UR STRIP.AUTO-MCNC: NEGATIVE MG/DL
BODY TEMPERATURE: 37 DEGREES CELSIUS
BUN SERPL-MCNC: 24 MG/DL (ref 6–23)
BUN SERPL-MCNC: 24 MG/DL (ref 6–23)
BUN SERPL-MCNC: 25 MG/DL (ref 6–23)
BUN SERPL-MCNC: 26 MG/DL (ref 6–23)
CALCIUM SERPL-MCNC: 7.3 MG/DL (ref 8.6–10.3)
CALCIUM SERPL-MCNC: 7.8 MG/DL (ref 8.6–10.3)
CALCIUM SERPL-MCNC: 7.8 MG/DL (ref 8.6–10.3)
CALCIUM SERPL-MCNC: 9.5 MG/DL (ref 8.6–10.3)
CARDIAC TROPONIN I PNL SERPL HS: 42 NG/L (ref 0–13)
CARDIAC TROPONIN I PNL SERPL HS: 44 NG/L (ref 0–13)
CARDIAC TROPONIN I PNL SERPL HS: 59 NG/L (ref 0–13)
CHLORIDE SERPL-SCNC: 108 MMOL/L (ref 98–107)
CHLORIDE SERPL-SCNC: 109 MMOL/L (ref 98–107)
CHLORIDE SERPL-SCNC: 109 MMOL/L (ref 98–107)
CHLORIDE SERPL-SCNC: 97 MMOL/L (ref 98–107)
CO2 SERPL-SCNC: 18 MMOL/L (ref 21–32)
CO2 SERPL-SCNC: 19 MMOL/L (ref 21–32)
COLOR UR: ABNORMAL
CREAT SERPL-MCNC: 1.1 MG/DL (ref 0.5–1.05)
CREAT SERPL-MCNC: 1.13 MG/DL (ref 0.5–1.05)
CREAT SERPL-MCNC: 1.19 MG/DL (ref 0.5–1.05)
CREAT SERPL-MCNC: 1.34 MG/DL (ref 0.5–1.05)
EGFRCR SERPLBLD CKD-EPI 2021: 45 ML/MIN/1.73M*2
EGFRCR SERPLBLD CKD-EPI 2021: 51 ML/MIN/1.73M*2
EGFRCR SERPLBLD CKD-EPI 2021: 55 ML/MIN/1.73M*2
EGFRCR SERPLBLD CKD-EPI 2021: 57 ML/MIN/1.73M*2
EOSINOPHIL # BLD AUTO: 0 X10*3/UL (ref 0–0.7)
EOSINOPHIL NFR BLD AUTO: 0 %
ERYTHROCYTE [DISTWIDTH] IN BLOOD BY AUTOMATED COUNT: 12.7 % (ref 11.5–14.5)
FLUAV RNA RESP QL NAA+PROBE: NOT DETECTED
FLUBV RNA RESP QL NAA+PROBE: NOT DETECTED
GLUCOSE BLD MANUAL STRIP-MCNC: 156 MG/DL (ref 74–99)
GLUCOSE BLD MANUAL STRIP-MCNC: 237 MG/DL (ref 74–99)
GLUCOSE BLD MANUAL STRIP-MCNC: 250 MG/DL (ref 74–99)
GLUCOSE BLD MANUAL STRIP-MCNC: 264 MG/DL (ref 74–99)
GLUCOSE BLD MANUAL STRIP-MCNC: 282 MG/DL (ref 74–99)
GLUCOSE BLD MANUAL STRIP-MCNC: 285 MG/DL (ref 74–99)
GLUCOSE BLD MANUAL STRIP-MCNC: 288 MG/DL (ref 74–99)
GLUCOSE BLD MANUAL STRIP-MCNC: 297 MG/DL (ref 74–99)
GLUCOSE BLD MANUAL STRIP-MCNC: 362 MG/DL (ref 74–99)
GLUCOSE BLD MANUAL STRIP-MCNC: 383 MG/DL (ref 74–99)
GLUCOSE BLD MANUAL STRIP-MCNC: 400 MG/DL (ref 74–99)
GLUCOSE SERPL-MCNC: 260 MG/DL (ref 74–99)
GLUCOSE SERPL-MCNC: 272 MG/DL (ref 74–99)
GLUCOSE SERPL-MCNC: 316 MG/DL (ref 74–99)
GLUCOSE SERPL-MCNC: 393 MG/DL (ref 74–99)
GLUCOSE UR STRIP.AUTO-MCNC: ABNORMAL MG/DL
HCO3 BLDA-SCNC: 16.8 MMOL/L (ref 22–26)
HCT VFR BLD AUTO: 47.4 % (ref 36–46)
HGB BLD-MCNC: 16 G/DL (ref 12–16)
HOLD SPECIMEN: NORMAL
HYALINE CASTS #/AREA URNS AUTO: ABNORMAL /LPF
IMM GRANULOCYTES # BLD AUTO: 0.18 X10*3/UL (ref 0–0.7)
IMM GRANULOCYTES NFR BLD AUTO: 1.1 % (ref 0–0.9)
INHALED O2 CONCENTRATION: 21 %
INR PPP: 2.1 (ref 0.9–1.1)
KETONES UR STRIP.AUTO-MCNC: ABNORMAL MG/DL
LACTATE SERPL-SCNC: 2.5 MMOL/L (ref 0.4–2)
LACTATE SERPL-SCNC: 3.6 MMOL/L (ref 0.4–2)
LEUKOCYTE ESTERASE UR QL STRIP.AUTO: NEGATIVE
LYMPHOCYTES # BLD AUTO: 1.1 X10*3/UL (ref 1.2–4.8)
LYMPHOCYTES NFR BLD AUTO: 7 %
MAGNESIUM SERPL-MCNC: 1.61 MG/DL (ref 1.6–2.4)
MCH RBC QN AUTO: 35.8 PG (ref 26–34)
MCHC RBC AUTO-ENTMCNC: 33.8 G/DL (ref 32–36)
MCV RBC AUTO: 106 FL (ref 80–100)
MONOCYTES # BLD AUTO: 0.41 X10*3/UL (ref 0.1–1)
MONOCYTES NFR BLD AUTO: 2.6 %
NEUTROPHILS # BLD AUTO: 14.09 X10*3/UL (ref 1.2–7.7)
NEUTROPHILS NFR BLD AUTO: 89.1 %
NITRITE UR QL STRIP.AUTO: NEGATIVE
NRBC BLD-RTO: 0 /100 WBCS (ref 0–0)
OXYHGB MFR BLDA: 94.4 % (ref 94–98)
PCO2 BLDA: 29 MM HG (ref 38–42)
PH BLDA: 7.37 PH (ref 7.38–7.42)
PH UR STRIP.AUTO: 6 [PH]
PHOSPHATE SERPL-MCNC: 2 MG/DL (ref 2.5–4.9)
PLATELET # BLD AUTO: 171 X10*3/UL (ref 150–450)
PO2 BLDA: 81 MM HG (ref 85–95)
POTASSIUM SERPL-SCNC: 3.5 MMOL/L (ref 3.5–5.3)
POTASSIUM SERPL-SCNC: 3.5 MMOL/L (ref 3.5–5.3)
POTASSIUM SERPL-SCNC: 4 MMOL/L (ref 3.5–5.3)
POTASSIUM SERPL-SCNC: 4.4 MMOL/L (ref 3.5–5.3)
PROT SERPL-MCNC: 8.4 G/DL (ref 6.4–8.2)
PROT UR STRIP.AUTO-MCNC: ABNORMAL MG/DL
PROTHROMBIN TIME: 25.1 SECONDS (ref 9.8–12.8)
RBC # BLD AUTO: 4.47 X10*6/UL (ref 4–5.2)
RBC # UR STRIP.AUTO: ABNORMAL /UL
RBC #/AREA URNS AUTO: ABNORMAL /HPF
RSV RNA RESP QL NAA+PROBE: NOT DETECTED
SAO2 % BLDA: 97 % (ref 94–100)
SARS-COV-2 RNA RESP QL NAA+PROBE: NOT DETECTED
SODIUM SERPL-SCNC: 135 MMOL/L (ref 136–145)
SODIUM SERPL-SCNC: 136 MMOL/L (ref 136–145)
SODIUM SERPL-SCNC: 136 MMOL/L (ref 136–145)
SODIUM SERPL-SCNC: 138 MMOL/L (ref 136–145)
SP GR UR STRIP.AUTO: 1.02
SPECIMEN DRAWN FROM PATIENT: ABNORMAL
SQUAMOUS #/AREA URNS AUTO: ABNORMAL /HPF
UROBILINOGEN UR STRIP.AUTO-MCNC: NORMAL MG/DL
WBC # BLD AUTO: 15.8 X10*3/UL (ref 4.4–11.3)
WBC #/AREA URNS AUTO: ABNORMAL /HPF
YEAST BUDDING #/AREA UR COMP ASSIST: PRESENT /HPF

## 2024-12-13 PROCEDURE — 80048 BASIC METABOLIC PNL TOTAL CA: CPT | Mod: CCI | Performed by: EMERGENCY MEDICINE

## 2024-12-13 PROCEDURE — 82805 BLOOD GASES W/O2 SATURATION: CPT | Performed by: EMERGENCY MEDICINE

## 2024-12-13 PROCEDURE — 36415 COLL VENOUS BLD VENIPUNCTURE: CPT | Performed by: EMERGENCY MEDICINE

## 2024-12-13 PROCEDURE — 84100 ASSAY OF PHOSPHORUS: CPT | Performed by: EMERGENCY MEDICINE

## 2024-12-13 PROCEDURE — 99291 CRITICAL CARE FIRST HOUR: CPT | Performed by: EMERGENCY MEDICINE

## 2024-12-13 PROCEDURE — 87637 SARSCOV2&INF A&B&RSV AMP PRB: CPT | Performed by: EMERGENCY MEDICINE

## 2024-12-13 PROCEDURE — 2500000002 HC RX 250 W HCPCS SELF ADMINISTERED DRUGS (ALT 637 FOR MEDICARE OP, ALT 636 FOR OP/ED): Performed by: HOSPITALIST

## 2024-12-13 PROCEDURE — 96376 TX/PRO/DX INJ SAME DRUG ADON: CPT

## 2024-12-13 PROCEDURE — 82947 ASSAY GLUCOSE BLOOD QUANT: CPT

## 2024-12-13 PROCEDURE — 80048 BASIC METABOLIC PNL TOTAL CA: CPT | Mod: CCI | Performed by: INTERNAL MEDICINE

## 2024-12-13 PROCEDURE — 85025 COMPLETE CBC W/AUTO DIFF WBC: CPT | Performed by: EMERGENCY MEDICINE

## 2024-12-13 PROCEDURE — 84484 ASSAY OF TROPONIN QUANT: CPT | Performed by: EMERGENCY MEDICINE

## 2024-12-13 PROCEDURE — 96361 HYDRATE IV INFUSION ADD-ON: CPT

## 2024-12-13 PROCEDURE — 36600 WITHDRAWAL OF ARTERIAL BLOOD: CPT

## 2024-12-13 PROCEDURE — 85610 PROTHROMBIN TIME: CPT | Performed by: INTERNAL MEDICINE

## 2024-12-13 PROCEDURE — 94664 DEMO&/EVAL PT USE INHALER: CPT

## 2024-12-13 PROCEDURE — 84484 ASSAY OF TROPONIN QUANT: CPT | Performed by: STUDENT IN AN ORGANIZED HEALTH CARE EDUCATION/TRAINING PROGRAM

## 2024-12-13 PROCEDURE — 2500000002 HC RX 250 W HCPCS SELF ADMINISTERED DRUGS (ALT 637 FOR MEDICARE OP, ALT 636 FOR OP/ED): Performed by: INTERNAL MEDICINE

## 2024-12-13 PROCEDURE — 96375 TX/PRO/DX INJ NEW DRUG ADDON: CPT

## 2024-12-13 PROCEDURE — 9420000001 HC RT PATIENT EDUCATION 5 MIN

## 2024-12-13 PROCEDURE — 2500000004 HC RX 250 GENERAL PHARMACY W/ HCPCS (ALT 636 FOR OP/ED): Performed by: INTERNAL MEDICINE

## 2024-12-13 PROCEDURE — 82010 KETONE BODYS QUAN: CPT | Performed by: EMERGENCY MEDICINE

## 2024-12-13 PROCEDURE — 83605 ASSAY OF LACTIC ACID: CPT | Performed by: EMERGENCY MEDICINE

## 2024-12-13 PROCEDURE — 2500000004 HC RX 250 GENERAL PHARMACY W/ HCPCS (ALT 636 FOR OP/ED): Performed by: STUDENT IN AN ORGANIZED HEALTH CARE EDUCATION/TRAINING PROGRAM

## 2024-12-13 PROCEDURE — 2500000001 HC RX 250 WO HCPCS SELF ADMINISTERED DRUGS (ALT 637 FOR MEDICARE OP): Performed by: INTERNAL MEDICINE

## 2024-12-13 PROCEDURE — 71045 X-RAY EXAM CHEST 1 VIEW: CPT

## 2024-12-13 PROCEDURE — 96372 THER/PROPH/DIAG INJ SC/IM: CPT | Performed by: EMERGENCY MEDICINE

## 2024-12-13 PROCEDURE — 83735 ASSAY OF MAGNESIUM: CPT | Performed by: EMERGENCY MEDICINE

## 2024-12-13 PROCEDURE — 96365 THER/PROPH/DIAG IV INF INIT: CPT | Mod: 59

## 2024-12-13 PROCEDURE — 71045 X-RAY EXAM CHEST 1 VIEW: CPT | Performed by: RADIOLOGY

## 2024-12-13 PROCEDURE — 99291 CRITICAL CARE FIRST HOUR: CPT | Performed by: INTERNAL MEDICINE

## 2024-12-13 PROCEDURE — 80048 BASIC METABOLIC PNL TOTAL CA: CPT | Mod: CCI | Performed by: STUDENT IN AN ORGANIZED HEALTH CARE EDUCATION/TRAINING PROGRAM

## 2024-12-13 PROCEDURE — 80053 COMPREHEN METABOLIC PANEL: CPT | Performed by: EMERGENCY MEDICINE

## 2024-12-13 PROCEDURE — 2500000004 HC RX 250 GENERAL PHARMACY W/ HCPCS (ALT 636 FOR OP/ED): Performed by: EMERGENCY MEDICINE

## 2024-12-13 PROCEDURE — 93005 ELECTROCARDIOGRAM TRACING: CPT

## 2024-12-13 PROCEDURE — 1100000001 HC PRIVATE ROOM DAILY

## 2024-12-13 PROCEDURE — 2500000002 HC RX 250 W HCPCS SELF ADMINISTERED DRUGS (ALT 637 FOR MEDICARE OP, ALT 636 FOR OP/ED): Performed by: STUDENT IN AN ORGANIZED HEALTH CARE EDUCATION/TRAINING PROGRAM

## 2024-12-13 PROCEDURE — 81001 URINALYSIS AUTO W/SCOPE: CPT | Performed by: EMERGENCY MEDICINE

## 2024-12-13 RX ORDER — DEXTROSE MONOHYDRATE AND SODIUM CHLORIDE 5; .45 G/100ML; G/100ML
150 INJECTION, SOLUTION INTRAVENOUS CONTINUOUS PRN
Status: DISCONTINUED | OUTPATIENT
Start: 2024-12-13 | End: 2024-12-13

## 2024-12-13 RX ORDER — DICYCLOMINE HYDROCHLORIDE 10 MG/ML
20 INJECTION INTRAMUSCULAR ONCE
Status: COMPLETED | OUTPATIENT
Start: 2024-12-13 | End: 2024-12-13

## 2024-12-13 RX ORDER — LEVOTHYROXINE SODIUM 88 UG/1
88 TABLET ORAL
Status: DISCONTINUED | OUTPATIENT
Start: 2024-12-13 | End: 2024-12-15 | Stop reason: HOSPADM

## 2024-12-13 RX ORDER — ACETAMINOPHEN 160 MG/5ML
650 SOLUTION ORAL EVERY 4 HOURS PRN
Status: DISCONTINUED | OUTPATIENT
Start: 2024-12-13 | End: 2024-12-15 | Stop reason: HOSPADM

## 2024-12-13 RX ORDER — METOPROLOL SUCCINATE 25 MG/1
25 TABLET, EXTENDED RELEASE ORAL DAILY
Status: DISCONTINUED | OUTPATIENT
Start: 2024-12-13 | End: 2024-12-15 | Stop reason: HOSPADM

## 2024-12-13 RX ORDER — ONDANSETRON HYDROCHLORIDE 2 MG/ML
4 INJECTION, SOLUTION INTRAVENOUS EVERY 8 HOURS PRN
Status: DISCONTINUED | OUTPATIENT
Start: 2024-12-13 | End: 2024-12-15 | Stop reason: HOSPADM

## 2024-12-13 RX ORDER — WARFARIN 7.5 MG/1
15 TABLET ORAL
Status: DISCONTINUED | OUTPATIENT
Start: 2024-12-16 | End: 2024-12-15 | Stop reason: HOSPADM

## 2024-12-13 RX ORDER — INSULIN LISPRO 100 [IU]/ML
0-5 INJECTION, SOLUTION INTRAVENOUS; SUBCUTANEOUS
Status: DISCONTINUED | OUTPATIENT
Start: 2024-12-13 | End: 2024-12-13

## 2024-12-13 RX ORDER — DEXTROSE 50 % IN WATER (D50W) INTRAVENOUS SYRINGE
50
Status: DISCONTINUED | OUTPATIENT
Start: 2024-12-13 | End: 2024-12-15 | Stop reason: HOSPADM

## 2024-12-13 RX ORDER — DEXTROSE MONOHYDRATE 100 MG/ML
150 INJECTION, SOLUTION INTRAVENOUS CONTINUOUS PRN
Status: DISCONTINUED | OUTPATIENT
Start: 2024-12-13 | End: 2024-12-14

## 2024-12-13 RX ORDER — DEXTROSE MONOHYDRATE 100 MG/ML
150 INJECTION, SOLUTION INTRAVENOUS CONTINUOUS PRN
Status: DISCONTINUED | OUTPATIENT
Start: 2024-12-13 | End: 2024-12-13

## 2024-12-13 RX ORDER — ACETAMINOPHEN 325 MG/1
650 TABLET ORAL EVERY 4 HOURS PRN
Status: DISCONTINUED | OUTPATIENT
Start: 2024-12-13 | End: 2024-12-15 | Stop reason: HOSPADM

## 2024-12-13 RX ORDER — ACETAMINOPHEN 650 MG/1
650 SUPPOSITORY RECTAL EVERY 4 HOURS PRN
Status: DISCONTINUED | OUTPATIENT
Start: 2024-12-13 | End: 2024-12-15 | Stop reason: HOSPADM

## 2024-12-13 RX ORDER — INSULIN GLARGINE 100 [IU]/ML
30 INJECTION, SOLUTION SUBCUTANEOUS EVERY 24 HOURS
Status: DISCONTINUED | OUTPATIENT
Start: 2024-12-13 | End: 2024-12-14

## 2024-12-13 RX ORDER — IPRATROPIUM BROMIDE AND ALBUTEROL SULFATE 2.5; .5 MG/3ML; MG/3ML
3 SOLUTION RESPIRATORY (INHALATION) EVERY 6 HOURS PRN
Status: DISCONTINUED | OUTPATIENT
Start: 2024-12-13 | End: 2024-12-15 | Stop reason: HOSPADM

## 2024-12-13 RX ORDER — ONDANSETRON HYDROCHLORIDE 2 MG/ML
4 INJECTION, SOLUTION INTRAVENOUS ONCE
Status: COMPLETED | OUTPATIENT
Start: 2024-12-13 | End: 2024-12-13

## 2024-12-13 RX ORDER — EZETIMIBE 10 MG/1
10 TABLET ORAL NIGHTLY
Status: DISCONTINUED | OUTPATIENT
Start: 2024-12-13 | End: 2024-12-15 | Stop reason: HOSPADM

## 2024-12-13 RX ORDER — DEXTROSE 50 % IN WATER (D50W) INTRAVENOUS SYRINGE
25
Status: DISCONTINUED | OUTPATIENT
Start: 2024-12-13 | End: 2024-12-15 | Stop reason: HOSPADM

## 2024-12-13 RX ORDER — INSULIN ASPART 100 [IU]/ML
INJECTION, SOLUTION INTRAVENOUS; SUBCUTANEOUS
COMMUNITY

## 2024-12-13 RX ORDER — INSULIN LISPRO 100 [IU]/ML
0-20 INJECTION, SOLUTION INTRAVENOUS; SUBCUTANEOUS
Status: DISCONTINUED | OUTPATIENT
Start: 2024-12-13 | End: 2024-12-14

## 2024-12-13 RX ORDER — TOPIRAMATE 100 MG/1
200 TABLET, FILM COATED ORAL 2 TIMES DAILY
Status: DISCONTINUED | OUTPATIENT
Start: 2024-12-13 | End: 2024-12-15 | Stop reason: HOSPADM

## 2024-12-13 RX ORDER — DEXTROSE 50 % IN WATER (D50W) INTRAVENOUS SYRINGE
50
Status: DISCONTINUED | OUTPATIENT
Start: 2024-12-13 | End: 2024-12-13

## 2024-12-13 RX ORDER — ONDANSETRON 4 MG/1
4 TABLET, ORALLY DISINTEGRATING ORAL EVERY 8 HOURS PRN
Status: DISCONTINUED | OUTPATIENT
Start: 2024-12-13 | End: 2024-12-15 | Stop reason: HOSPADM

## 2024-12-13 RX ORDER — SODIUM CHLORIDE 9 MG/ML
250 INJECTION, SOLUTION INTRAVENOUS CONTINUOUS
Status: DISCONTINUED | OUTPATIENT
Start: 2024-12-13 | End: 2024-12-13

## 2024-12-13 RX ORDER — CLONAZEPAM 0.5 MG/1
2 TABLET ORAL NIGHTLY
Status: DISCONTINUED | OUTPATIENT
Start: 2024-12-13 | End: 2024-12-15 | Stop reason: HOSPADM

## 2024-12-13 RX ORDER — DEXTROSE 50 % IN WATER (D50W) INTRAVENOUS SYRINGE
12.5
Status: DISCONTINUED | OUTPATIENT
Start: 2024-12-13 | End: 2024-12-15 | Stop reason: HOSPADM

## 2024-12-13 RX ORDER — DESVENLAFAXINE 50 MG/1
100 TABLET, EXTENDED RELEASE ORAL NIGHTLY
Status: DISCONTINUED | OUTPATIENT
Start: 2024-12-13 | End: 2024-12-15 | Stop reason: HOSPADM

## 2024-12-13 RX ORDER — ROPINIROLE 2 MG/1
2 TABLET, FILM COATED ORAL NIGHTLY
Status: DISCONTINUED | OUTPATIENT
Start: 2024-12-13 | End: 2024-12-15 | Stop reason: HOSPADM

## 2024-12-13 RX ORDER — ADHESIVE BANDAGE
30 BANDAGE TOPICAL DAILY PRN
Status: DISCONTINUED | OUTPATIENT
Start: 2024-12-13 | End: 2024-12-15 | Stop reason: HOSPADM

## 2024-12-13 RX ORDER — ROSUVASTATIN CALCIUM 20 MG/1
40 TABLET, COATED ORAL DAILY
Status: DISCONTINUED | OUTPATIENT
Start: 2024-12-13 | End: 2024-12-15 | Stop reason: HOSPADM

## 2024-12-13 RX ORDER — SODIUM CHLORIDE 450 MG/100ML
250 INJECTION, SOLUTION INTRAVENOUS CONTINUOUS
Status: DISCONTINUED | OUTPATIENT
Start: 2024-12-13 | End: 2024-12-13

## 2024-12-13 RX ORDER — BUPROPION HYDROCHLORIDE 100 MG/1
200 TABLET, EXTENDED RELEASE ORAL DAILY
Status: DISCONTINUED | OUTPATIENT
Start: 2024-12-13 | End: 2024-12-15 | Stop reason: HOSPADM

## 2024-12-13 RX ORDER — OXYBUTYNIN CHLORIDE 5 MG/1
5 TABLET ORAL 2 TIMES DAILY
Status: DISCONTINUED | OUTPATIENT
Start: 2024-12-13 | End: 2024-12-15 | Stop reason: HOSPADM

## 2024-12-13 RX ORDER — SODIUM CHLORIDE 9 MG/ML
100 INJECTION, SOLUTION INTRAVENOUS CONTINUOUS
Status: DISCONTINUED | OUTPATIENT
Start: 2024-12-13 | End: 2024-12-14

## 2024-12-13 RX ADMIN — EZETIMIBE 10 MG: 10 TABLET ORAL at 20:02

## 2024-12-13 RX ADMIN — ONDANSETRON 4 MG: 2 INJECTION INTRAMUSCULAR; INTRAVENOUS at 02:22

## 2024-12-13 RX ADMIN — INSULIN LISPRO 12 UNITS: 100 INJECTION, SOLUTION INTRAVENOUS; SUBCUTANEOUS at 12:30

## 2024-12-13 RX ADMIN — DEXTROSE AND SODIUM CHLORIDE 150 ML/HR: 5; 450 INJECTION, SOLUTION INTRAVENOUS at 08:48

## 2024-12-13 RX ADMIN — WARFARIN SODIUM 12.5 MG: 2.5 TABLET ORAL at 17:32

## 2024-12-13 RX ADMIN — LEVOTHYROXINE SODIUM 88 MCG: 88 TABLET ORAL at 06:40

## 2024-12-13 RX ADMIN — INSULIN HUMAN 1 UNITS/HR: 1 INJECTION, SOLUTION INTRAVENOUS at 03:06

## 2024-12-13 RX ADMIN — SODIUM CHLORIDE 100 ML/HR: 9 INJECTION, SOLUTION INTRAVENOUS at 20:08

## 2024-12-13 RX ADMIN — PROMETHAZINE HYDROCHLORIDE 6.25 MG: 25 INJECTION INTRAMUSCULAR; INTRAVENOUS at 02:51

## 2024-12-13 RX ADMIN — SODIUM CHLORIDE 100 ML/HR: 9 INJECTION, SOLUTION INTRAVENOUS at 10:05

## 2024-12-13 RX ADMIN — OXYBUTYNIN CHLORIDE 5 MG: 5 TABLET ORAL at 08:48

## 2024-12-13 RX ADMIN — DICYCLOMINE HYDROCHLORIDE 20 MG: 10 INJECTION INTRAMUSCULAR at 01:21

## 2024-12-13 RX ADMIN — ROSUVASTATIN CALCIUM 40 MG: 20 TABLET, FILM COATED ORAL at 08:48

## 2024-12-13 RX ADMIN — BUPROPION HYDROCHLORIDE 200 MG: 100 TABLET, FILM COATED, EXTENDED RELEASE ORAL at 08:48

## 2024-12-13 RX ADMIN — ONDANSETRON 4 MG: 2 INJECTION INTRAMUSCULAR; INTRAVENOUS at 01:20

## 2024-12-13 RX ADMIN — CLONAZEPAM 2 MG: 0.5 TABLET ORAL at 20:02

## 2024-12-13 RX ADMIN — PROMETHAZINE HYDROCHLORIDE 6.25 MG: 25 INJECTION INTRAMUSCULAR; INTRAVENOUS at 02:46

## 2024-12-13 RX ADMIN — SODIUM CHLORIDE 1000 ML: 9 INJECTION, SOLUTION INTRAVENOUS at 01:19

## 2024-12-13 RX ADMIN — SODIUM CHLORIDE 250 ML/HR: 9 INJECTION, SOLUTION INTRAVENOUS at 03:26

## 2024-12-13 RX ADMIN — SODIUM CHLORIDE 250 ML/HR: 4.5 INJECTION, SOLUTION INTRAVENOUS at 05:15

## 2024-12-13 RX ADMIN — SODIUM CHLORIDE 1000 ML: 9 INJECTION, SOLUTION INTRAVENOUS at 02:25

## 2024-12-13 RX ADMIN — ROPINIROLE 2 MG: 2 TABLET, FILM COATED ORAL at 20:02

## 2024-12-13 RX ADMIN — TOPIRAMATE 200 MG: 100 TABLET, FILM COATED ORAL at 20:02

## 2024-12-13 RX ADMIN — METOPROLOL SUCCINATE 25 MG: 25 TABLET, EXTENDED RELEASE ORAL at 08:48

## 2024-12-13 RX ADMIN — INSULIN LISPRO 4 UNITS: 100 INJECTION, SOLUTION INTRAVENOUS; SUBCUTANEOUS at 17:32

## 2024-12-13 RX ADMIN — INSULIN GLARGINE 30 UNITS: 100 INJECTION, SOLUTION SUBCUTANEOUS at 08:59

## 2024-12-13 RX ADMIN — TOPIRAMATE 200 MG: 100 TABLET, FILM COATED ORAL at 08:48

## 2024-12-13 RX ADMIN — OXYBUTYNIN CHLORIDE 5 MG: 5 TABLET ORAL at 20:02

## 2024-12-13 RX ADMIN — SODIUM CHLORIDE 1000 ML: 9 INJECTION, SOLUTION INTRAVENOUS at 03:02

## 2024-12-13 SDOH — SOCIAL STABILITY: SOCIAL INSECURITY: DO YOU FEEL UNSAFE GOING BACK TO THE PLACE WHERE YOU ARE LIVING?: NO

## 2024-12-13 SDOH — ECONOMIC STABILITY: FOOD INSECURITY: WITHIN THE PAST 12 MONTHS, YOU WORRIED THAT YOUR FOOD WOULD RUN OUT BEFORE YOU GOT THE MONEY TO BUY MORE.: NEVER TRUE

## 2024-12-13 SDOH — HEALTH STABILITY: PHYSICAL HEALTH: ON AVERAGE, HOW MANY DAYS PER WEEK DO YOU ENGAGE IN MODERATE TO STRENUOUS EXERCISE (LIKE A BRISK WALK)?: 0 DAYS

## 2024-12-13 SDOH — HEALTH STABILITY: MENTAL HEALTH
DO YOU FEEL STRESS - TENSE, RESTLESS, NERVOUS, OR ANXIOUS, OR UNABLE TO SLEEP AT NIGHT BECAUSE YOUR MIND IS TROUBLED ALL THE TIME - THESE DAYS?: NOT AT ALL

## 2024-12-13 SDOH — SOCIAL STABILITY: SOCIAL INSECURITY: ABUSE: ADULT

## 2024-12-13 SDOH — SOCIAL STABILITY: SOCIAL INSECURITY: WITHIN THE LAST YEAR, HAVE YOU BEEN HUMILIATED OR EMOTIONALLY ABUSED IN OTHER WAYS BY YOUR PARTNER OR EX-PARTNER?: NO

## 2024-12-13 SDOH — SOCIAL STABILITY: SOCIAL INSECURITY: HAS ANYONE EVER THREATENED TO HURT YOUR FAMILY OR YOUR PETS?: NO

## 2024-12-13 SDOH — ECONOMIC STABILITY: INCOME INSECURITY: IN THE PAST 12 MONTHS HAS THE ELECTRIC, GAS, OIL, OR WATER COMPANY THREATENED TO SHUT OFF SERVICES IN YOUR HOME?: NO

## 2024-12-13 SDOH — SOCIAL STABILITY: SOCIAL INSECURITY: ARE THERE ANY APPARENT SIGNS OF INJURIES/BEHAVIORS THAT COULD BE RELATED TO ABUSE/NEGLECT?: NO

## 2024-12-13 SDOH — HEALTH STABILITY: PHYSICAL HEALTH: ON AVERAGE, HOW MANY MINUTES DO YOU ENGAGE IN EXERCISE AT THIS LEVEL?: 0 MIN

## 2024-12-13 SDOH — ECONOMIC STABILITY: FOOD INSECURITY: WITHIN THE PAST 12 MONTHS, THE FOOD YOU BOUGHT JUST DIDN'T LAST AND YOU DIDN'T HAVE MONEY TO GET MORE.: NEVER TRUE

## 2024-12-13 SDOH — SOCIAL STABILITY: SOCIAL INSECURITY: HAVE YOU HAD THOUGHTS OF HARMING ANYONE ELSE?: NO

## 2024-12-13 SDOH — SOCIAL STABILITY: SOCIAL INSECURITY: DO YOU FEEL ANYONE HAS EXPLOITED OR TAKEN ADVANTAGE OF YOU FINANCIALLY OR OF YOUR PERSONAL PROPERTY?: NO

## 2024-12-13 SDOH — ECONOMIC STABILITY: FOOD INSECURITY: HOW HARD IS IT FOR YOU TO PAY FOR THE VERY BASICS LIKE FOOD, HOUSING, MEDICAL CARE, AND HEATING?: NOT VERY HARD

## 2024-12-13 SDOH — SOCIAL STABILITY: SOCIAL INSECURITY: WITHIN THE LAST YEAR, HAVE YOU BEEN AFRAID OF YOUR PARTNER OR EX-PARTNER?: NO

## 2024-12-13 SDOH — SOCIAL STABILITY: SOCIAL INSECURITY: ARE YOU OR HAVE YOU BEEN THREATENED OR ABUSED PHYSICALLY, EMOTIONALLY, OR SEXUALLY BY ANYONE?: NO

## 2024-12-13 SDOH — SOCIAL STABILITY: SOCIAL INSECURITY: HAVE YOU HAD ANY THOUGHTS OF HARMING ANYONE ELSE?: NO

## 2024-12-13 SDOH — SOCIAL STABILITY: SOCIAL INSECURITY: DOES ANYONE TRY TO KEEP YOU FROM HAVING/CONTACTING OTHER FRIENDS OR DOING THINGS OUTSIDE YOUR HOME?: NO

## 2024-12-13 ASSESSMENT — PAIN SCALES - GENERAL
PAINLEVEL_OUTOF10: 5 - MODERATE PAIN
PAINLEVEL_OUTOF10: 5 - MODERATE PAIN
PAINLEVEL_OUTOF10: 0 - NO PAIN

## 2024-12-13 ASSESSMENT — PAIN - FUNCTIONAL ASSESSMENT
PAIN_FUNCTIONAL_ASSESSMENT: 0-10

## 2024-12-13 ASSESSMENT — COGNITIVE AND FUNCTIONAL STATUS - GENERAL
MOBILITY SCORE: 24
DAILY ACTIVITIY SCORE: 24
MOBILITY SCORE: 24
PATIENT BASELINE BEDBOUND: NO
DAILY ACTIVITIY SCORE: 24
MOBILITY SCORE: 24
DAILY ACTIVITIY SCORE: 24

## 2024-12-13 ASSESSMENT — VISUAL ACUITY: OU: 1

## 2024-12-13 ASSESSMENT — PAIN DESCRIPTION - DESCRIPTORS: DESCRIPTORS: PRESSURE

## 2024-12-13 ASSESSMENT — COLUMBIA-SUICIDE SEVERITY RATING SCALE - C-SSRS

## 2024-12-13 ASSESSMENT — PAIN DESCRIPTION - LOCATION: LOCATION: CHEST

## 2024-12-13 ASSESSMENT — ACTIVITIES OF DAILY LIVING (ADL)
HEARING - LEFT EAR: FUNCTIONAL
JUDGMENT_ADEQUATE_SAFELY_COMPLETE_DAILY_ACTIVITIES: YES
WALKS IN HOME: INDEPENDENT
BATHING: INDEPENDENT
HEARING - RIGHT EAR: FUNCTIONAL
LACK_OF_TRANSPORTATION: NO
FEEDING YOURSELF: INDEPENDENT
DRESSING YOURSELF: INDEPENDENT
ADEQUATE_TO_COMPLETE_ADL: YES
ASSISTIVE_DEVICE: EYEGLASSES
TOILETING: INDEPENDENT
GROOMING: INDEPENDENT
PATIENT'S MEMORY ADEQUATE TO SAFELY COMPLETE DAILY ACTIVITIES?: YES

## 2024-12-13 ASSESSMENT — PATIENT HEALTH QUESTIONNAIRE - PHQ9
2. FEELING DOWN, DEPRESSED OR HOPELESS: SEVERAL DAYS
SUM OF ALL RESPONSES TO PHQ9 QUESTIONS 1 & 2: 2
1. LITTLE INTEREST OR PLEASURE IN DOING THINGS: SEVERAL DAYS

## 2024-12-13 ASSESSMENT — LIFESTYLE VARIABLES
AUDIT-C TOTAL SCORE: 2
HOW OFTEN DO YOU HAVE 6 OR MORE DRINKS ON ONE OCCASION: NEVER
SKIP TO QUESTIONS 9-10: 1
HOW OFTEN DO YOU HAVE A DRINK CONTAINING ALCOHOL: 2-4 TIMES A MONTH
HOW MANY STANDARD DRINKS CONTAINING ALCOHOL DO YOU HAVE ON A TYPICAL DAY: 1 OR 2
AUDIT-C TOTAL SCORE: 2

## 2024-12-13 NOTE — CONSULTS
"Reason For Consult  Tobacco Assessment.   Pt. States \"she is not currently smoking cigars anymore, and hasn't for about 2 months now\".   Pt. Does smoke Mariajuana.   Pt. Feels she is doing well with not smoking cigars.   Pt. Does not want any help, or discussion on quitting smoking.   I did leave the quit smoking book and handout with pt. At bedside.        Ashley Betancourt, RRT    "

## 2024-12-13 NOTE — PROGRESS NOTES
12/13/24 1515   Discharge Planning   Living Arrangements Spouse/significant other   Support Systems Spouse/significant other   Assistance Needed none   Type of Residence Private residence   Number of Stairs to Enter Residence 7   Number of Stairs Within Residence 14   Do you have animals or pets at home? Yes   Type of Animals or Pets 3 dogs 1 cat   Who is requesting discharge planning? Provider   Home or Post Acute Services None   Expected Discharge Disposition Home   Does the patient need discharge transport arranged? No   Stroke Family Assessment   Stroke Family Assessment Needed No   Intensity of Service   Intensity of Service 0-30 min     Met with pt at the bedside and verified address, phone number and emergency contact information. PCP is Chantelle Duke and preferred pharmacy is [facility], denies issues obtaining or affording medications and takes as ordered. Pt is independent, lives at home with  and feels safe. [facility] 24/24. ADOD 12/14. Care transitions to follow. Meghan Mccormick BSN/RN-TCC

## 2024-12-13 NOTE — CARE PLAN
Her anion gap resolved, it was 12 still has low bicarb, I will continue IV fluids for now, will recheck a BMP around noon, continue Lantus, insulin sliding scale changed to 0 to 20 units instead of 0 to 5 mL send another troponin as she had mild elevation of troponin most likely demand ischemia not complaining of any chest pain.  Creatinine is improving, nausea and vomiting has resolved, encouraged p.o. intake, do daily INR's, continue Coumadin and rest of the home meds she is on.

## 2024-12-13 NOTE — H&P
History Of Present Illness  Ne Richardson is a 64 y.o. female  Who presented to the emergency room for abdominal cramps, nausea vomiting and elevated sugar levels.  On presentation, blood pressure 148/92, heart rate 120, respiratory 22, afebrile, saturation oxygen 97% on room air.  Pertinent findings on blood workup; WBC 15,800, glucose 393, bicarbonate 19, anion gap 23, BUN 25, creatinine 1.35 and troponin 42 with a repeat of 44.  COVID-19 and influenza came back negative.  Chest x-ray did not show any acute findings.  Patient was given in the emergency room IV fluids and started on insulin drip as well as given Bentyl and Zofran and then admitted to the medical service for further investigation and management.  Upon encounter, patient reports feeling better in general.  She did spike her history to around 24 hours ago when she started experiencing severe nausea followed by recurrent episodes of vomiting.  She was also having abdominal cramps.  Did not have any fever or chills.  No diarrhea.  Denies eating any fast food.  She said that she is compliant with her insulin regimen.  But she had missed  The night before her evening insulin dose    ROS  10 systems were reviewed and were negative except for those noted in the history of present illness.    Past Medical History  Past Medical History:   Diagnosis Date    Acute myocardial infarction, unspecified 01/10/2022    Acute MI    Body mass index (BMI) 33.0-33.9, adult 02/01/2022    BMI 33.0-33.9,adult    Diabetes mellitus (Multi)     Disorder of the skin and subcutaneous tissue, unspecified 07/01/2020    Back skin lesion    Encounter for preprocedural cardiovascular examination 01/10/2022    Pre-operative cardiovascular examination    Hypertensive heart disease with heart failure 05/21/2020    Hypertensive heart disease with chronic combined systolic and diastolic congestive heart failure    Intracranial and intraspinal phlebitis and thrombophlebitis ([facility])  01/10/2022    Cavernous sinus thrombosis    Nausea 01/10/2022    Chronic nausea    Obesity, unspecified 2022    Class 1 obesity with body mass index (BMI) of 33.0 to 33.9 in adult    Obesity, unspecified 2022    Class 1 obesity with body mass index (BMI) of 32.0 to 32.9 in adult    Old myocardial infarction     History of myocardial infarction    Other mechanical complication of other cardiac electronic device, initial encounter 2022    Mechanical complication of implantable cardioverter-defibrillator (ICD)    Other specified counseling 2022    Encounter for medication counseling    Other specified diseases of gallbladder 2020    Porcelain gallbladder    Overweight 01/10/2022    Overweight    Person consulting for explanation of examination or test findings 2022    Encounter to discuss test results    Personal history of other diseases of the digestive system 2020    History of gallbladder disease    Personal history of other venous thrombosis and embolism     History of deep venous thrombosis    Transient cerebral ischemic attack, unspecified     TIA (transient ischemic attack)     Pertinent medical history also documented in my below narrative    Surgical History  Past Surgical History:   Procedure Laterality Date    CARDIAC CATHETERIZATION N/A 2024    Procedure: Left Heart Cath, With LV;  Surgeon: Jose Barclay MD;  Location: [facility];  Service: Cardiovascular;  Laterality: N/A;    CT ABDOMEN PELVIS ANGIOGRAM W AND/OR WO IV CONTRAST  2019    CT ABDOMEN PELVIS ANGIOGRAM W AND/OR WO IV CONTRAST 2019 [facility]    OTHER SURGICAL HISTORY  2020     section    OTHER SURGICAL HISTORY  10/12/2022    Corneal lasik    OTHER SURGICAL HISTORY  10/12/2022    Skin lesion excision    OTHER SURGICAL HISTORY  01/10/2022    Colonoscopy    OTHER SURGICAL HISTORY  01/10/2022    Hysterectomy    OTHER SURGICAL HISTORY  01/10/2022     Surgery    OTHER SURGICAL HISTORY  01/10/2022    Median nerve neuroplasty    OTHER SURGICAL HISTORY  01/10/2022    Cardioverter defibrillator insertion    OTHER SURGICAL HISTORY  01/10/2022    Knee surgery    OTHER SURGICAL HISTORY  01/10/2022    Ankle surgery    OTHER SURGICAL HISTORY  01/10/2022    Tonsillectomy    OTHER SURGICAL HISTORY  01/10/2022    Hand surgery    OTHER SURGICAL HISTORY  02/01/2022    Knee fracture repair    OTHER SURGICAL HISTORY  02/01/2022    Cholecystectomy      Pertinent surgical history also documented in my below narrative    Social History  She reports that she quit smoking about 4 weeks ago. Her smoking use included cigars. She has never used smokeless tobacco. She reports current alcohol use of about 1.0 standard drink of alcohol per week. She reports current drug use. Frequency: 3.00 times per week. Drug: Marijuana.    Family History  Family History   Problem Relation Name Age of Onset    Coronary artery disease Mother      Diabetes Mother      Other (Cardiac pacemaker) Father      Coronary artery disease Father      Other (Stroke syndrome) Other          Allergies  Ropinirole    Medications Prior to Admission   Medication Sig Dispense Refill Last Dose/Taking    albuterol 90 mcg/actuation inhaler Inhale 1-2 puffs every 4 hours if needed for wheezing. Patient states she seldom takes this   12/12/2024    buPROPion SR (Wellbutrin SR) 200 mg 12 hr tablet Take 1 tablet (200 mg) by mouth twice a day.   12/12/2024    cariprazine (Vraylar) 1.5 mg capsule Take 1 capsule (1.5 mg) by mouth. Once every other day   Past Week    cholecalciferol (Vitamin D-3) 25 MCG (1000 UT) capsule Take 3 capsules (75 mcg) by mouth once daily.   12/12/2024    clonazePAM (KlonoPIN) 2 mg tablet Take 1 tablet (2 mg) by mouth once daily at bedtime.   12/12/2024    desvenlafaxine 100 mg 24 hr tablet Take 1 tablet (100 mg) by mouth once daily at bedtime.   12/12/2024    docusate sodium (Colace) 100 mg capsule Take 1  capsule (100 mg) by mouth once daily.   Past Month    furosemide (Lasix) 40 mg tablet Take 1 tablet (40 mg) by mouth once daily.   12/12/2024    insulin glargine (Lantus) 100 unit/mL injection Inject 30 Units under the skin once daily at bedtime.   Past Week    levothyroxine (Synthroid, Levoxyl) 88 mcg tablet Take 1 tablet (88 mcg) by mouth once daily.   12/12/2024    metoclopramide (Reglan) 5 mg tablet Take 1 tablet (5 mg) by mouth 2 times a day.   12/12/2024    metoprolol succinate XL (Toprol-XL) 25 mg 24 hr tablet Take 1 tablet (25 mg) by mouth once daily. Do not crush or chew. 90 tablet 3 12/12/2024    oxybutynin XL (Ditropan-XL) 10 mg 24 hr tablet Take 1 tablet (10 mg) by mouth once daily at bedtime.   12/12/2024    potassium chloride CR 20 mEq ER tablet Take 1 tablet (20 mEq) by mouth once daily.   12/12/2024    rOPINIRole (Requip) 2 mg tablet Take 1 tablet (2 mg) by mouth once daily at bedtime.   12/12/2024    rosuvastatin (Crestor) 40 mg tablet Take 1 tablet (40 mg) by mouth once daily.   12/12/2024    topiramate (Topamax) 200 mg tablet Take 1 tablet (200 mg) by mouth 2 times a day.   12/12/2024    warfarin (Coumadin) 5 mg tablet Take 2.5 tablets (12.5 mg) by mouth 5 times a week. Tuesday, Wednesday, Friday, Saturday & Sunday, in the evening.   Past Week    warfarin (Coumadin) 7.5 mg tablet Take 2 tablets (15 mg) by mouth 2 times a week. Monday & Thursday in the evening   Past Week    ezetimibe (Zetia) 10 mg tablet Take 1 tablet (10 mg) by mouth once daily at bedtime.       glucagon (Baqsimi) 3 mg/actuation spray,non-aerosol Administer 1 spray into affected nostril(s) if needed (for low blood sugar. May repeat after 15 minutes using a new device if there is no response).       glucagon 1 mg injection Use as directed for hypoglycemia       magnesium oxide 500 mg capsule Take 1 capsule (500 mg) by mouth once daily at bedtime.   Unknown    pantoprazole (ProtoNix) 40 mg EC tablet Take 1 tablet (40 mg) by mouth  "once daily at bedtime. Do not crush, chew, or split. 30 tablet 0     spironolactone (Aldactone) 25 mg tablet Take 0.5 tablets (12.5 mg) by mouth once daily. 15 tablet 0         Last Recorded Vitals  Blood pressure (!) 184/95, pulse (!) 120, temperature 37.6 °C (99.7 °F), temperature source Temporal, resp. rate 20, height 1.575 m (5' 2\"), weight 74.8 kg (165 lb), SpO2 98%.    Physical Exam  Constitutional:       General: She is not in acute distress.     Appearance: She is ill-appearing.      Comments: Awake alert and oriented x3   HENT:      Mouth/Throat:      Pharynx: Oropharynx is clear.   Eyes:      Pupils: Pupils are equal, round, and reactive to light.   Cardiovascular:      Rate and Rhythm: Regular rhythm. Tachycardia present.      Heart sounds: Normal heart sounds.   Pulmonary:      Effort: No respiratory distress.      Breath sounds: Normal breath sounds. No wheezing or rhonchi.   Abdominal:      General: Abdomen is flat. Bowel sounds are normal. There is no distension.      Palpations: Abdomen is soft.      Tenderness: There is no abdominal tenderness.   Musculoskeletal:         General: No swelling.   Skin:     General: Skin is warm.   Neurological:      General: No focal deficit present.   Psychiatric:         Mood and Affect: Mood normal.         Behavior: Behavior normal.         Thought Content: Thought content normal.         Judgment: Judgment normal.           Relevant Results  Results for orders placed or performed during the hospital encounter of 12/13/24 (from the past 24 hours)   POCT GLUCOSE   Result Value Ref Range    POCT Glucose 383 (H) 74 - 99 mg/dL   Beta Hydroxybutyrate   Result Value Ref Range    Beta-Hydroxybutyrate 2.95 (H) 0.02 - 0.27 mmol/L   Comprehensive Metabolic Panel   Result Value Ref Range    Glucose 393 (H) 74 - 99 mg/dL    Sodium 135 (L) 136 - 145 mmol/L    Potassium 4.4 3.5 - 5.3 mmol/L    Chloride 97 (L) 98 - 107 mmol/L    Bicarbonate 19 (L) 21 - 32 mmol/L    Anion Gap 23 " (H) 10 - 20 mmol/L    Urea Nitrogen 25 (H) 6 - 23 mg/dL    Creatinine 1.34 (H) 0.50 - 1.05 mg/dL    eGFR 45 (L) >60 mL/min/1.73m*2    Calcium 9.5 8.6 - 10.3 mg/dL    Albumin 4.7 3.4 - 5.0 g/dL    Alkaline Phosphatase 94 33 - 136 U/L    Total Protein 8.4 (H) 6.4 - 8.2 g/dL    AST 18 9 - 39 U/L    Bilirubin, Total 0.9 0.0 - 1.2 mg/dL    ALT 28 7 - 45 U/L   CBC and Auto Differential   Result Value Ref Range    WBC 15.8 (H) 4.4 - 11.3 x10*3/uL    nRBC 0.0 0.0 - 0.0 /100 WBCs    RBC 4.47 4.00 - 5.20 x10*6/uL    Hemoglobin 16.0 12.0 - 16.0 g/dL    Hematocrit 47.4 (H) 36.0 - 46.0 %     (H) 80 - 100 fL    MCH 35.8 (H) 26.0 - 34.0 pg    MCHC 33.8 32.0 - 36.0 g/dL    RDW 12.7 11.5 - 14.5 %    Platelets 171 150 - 450 x10*3/uL    Neutrophils % 89.1 40.0 - 80.0 %    Immature Granulocytes %, Automated 1.1 (H) 0.0 - 0.9 %    Lymphocytes % 7.0 13.0 - 44.0 %    Monocytes % 2.6 2.0 - 10.0 %    Eosinophils % 0.0 0.0 - 6.0 %    Basophils % 0.2 0.0 - 2.0 %    Neutrophils Absolute 14.09 (H) 1.20 - 7.70 x10*3/uL    Immature Granulocytes Absolute, Automated 0.18 0.00 - 0.70 x10*3/uL    Lymphocytes Absolute 1.10 (L) 1.20 - 4.80 x10*3/uL    Monocytes Absolute 0.41 0.10 - 1.00 x10*3/uL    Eosinophils Absolute 0.00 0.00 - 0.70 x10*3/uL    Basophils Absolute 0.03 0.00 - 0.10 x10*3/uL   Lactate   Result Value Ref Range    Lactate 3.6 (H) 0.4 - 2.0 mmol/L   Troponin I, High Sensitivity, Initial   Result Value Ref Range    Troponin I, High Sensitivity 42 (H) 0 - 13 ng/L   Influenza A, and B PCR   Result Value Ref Range    Flu A Result Not Detected Not Detected    Flu B Result Not Detected Not Detected   RSV PCR   Result Value Ref Range    RSV PCR Not Detected Not Detected   Sars-CoV-2 PCR   Result Value Ref Range    Coronavirus 2019, PCR Not Detected Not Detected   Blood Gas Arterial   Result Value Ref Range    POCT pH, Arterial 7.37 (L) 7.38 - 7.42 pH    POCT pCO2, Arterial 29 (L) 38 - 42 mm Hg    POCT pO2, Arterial 81 (L) 85 - 95 mm Hg     POCT SO2, Arterial 97 94 - 100 %    POCT Oxy Hemoglobin, Arterial 94.4 94.0 - 98.0 %    POCT Base Excess, Arterial -7.1 (L) -2.0 - 3.0 mmol/L    POCT HCO3 Calculated, Arterial 16.8 (L) 22.0 - 26.0 mmol/L    Patient Temperature 37.0 degrees Celsius    FiO2 21 %    Site of Arterial Puncture Radial Right     Edgard's Test Positive    Troponin, High Sensitivity, 1 Hour   Result Value Ref Range    Troponin I, High Sensitivity 44 (H) 0 - 13 ng/L   SST TOP   Result Value Ref Range    Extra Tube Hold for add-ons.    POCT GLUCOSE   Result Value Ref Range    POCT Glucose 400 (H) 74 - 99 mg/dL   POCT GLUCOSE   Result Value Ref Range    POCT Glucose 362 (H) 74 - 99 mg/dL   Lactate   Result Value Ref Range    Lactate 2.5 (H) 0.4 - 2.0 mmol/L   POCT GLUCOSE   Result Value Ref Range    POCT Glucose 297 (H) 74 - 99 mg/dL   POCT GLUCOSE   Result Value Ref Range    POCT Glucose 282 (H) 74 - 99 mg/dL        XR chest 1 view    Result Date: 12/13/2024  Interpreted By:  Sandra Tiwari, STUDY: XR CHEST 1 VIEW;  12/13/2024 1:20 am   INDICATION: Signs/Symptoms:Weakness.   COMPARISON: 07/08/2024   ACCESSION NUMBER(S): [id number]   ORDERING CLINICIAN: BRAYDON LANDERS   FINDINGS:     CARDIOMEDIASTINAL SILHOUETTE: Cardiomediastinal silhouette is normal in size and configuration. Pacemaker/ICD lead overlies the right ventricle.   LUNGS: No pulmonary consolidation, pleural effusion or pneumothorax.   ABDOMEN: No remarkable upper abdominal findings.   BONES: No acute osseous abnormality.       No acute cardiopulmonary process.   MACRO: None   Signed by: Sandra Tiwari 12/13/2024 1:26 AM Dictation workstation:   [id number]         Assessment & Plan  Diabetic ketoacidosis without coma associated with type 1 diabetes mellitus (Multi)  64-year-old obese female with a past medical history of insulin-dependent diabetes mellitus, coronary artery disease s/p cardiac stent placement, congestive heart failure with an ejection fraction of 30% s/p ICD  placement, chronic kidney disease with a baseline creatinine around 1.2, hypertension, porcelain gallbladder, TIA, and DVT who presented to the emergency room for abdominal cramps, nausea and vomiting.  Patient was found to have a DKA associated with sepsis of noninfectious etiology [tachypnea, tachycardia, leukocytosis] as well as elevated creatinine level.    Admit patient to the intensive care unit.  Telemetry and vital signs monitoring.  Continue with the current insulin drip 2 units/hour.  Goal blood sugar levels should remain greater than 200.  If it drops below 200, then we will drop the insulin drip to 1 unit/h until the anion gap closes.  Regarding the IV fluids, the corrected sodium is 142 so we will give half-normal saline at the rate of 200 cc/hour.  If sugar levels drop below 200, then we would add dextrose 5% to the half-normal saline until the anion gap closes.  Repeat BMP every 4 hours.  Once the anion gap closes, give patient food to eat and give her back her Lantus and sliding scale.  Keep the insulin drip running for 2 hours and then discontinue the insulin drip and the fluids.  Resume home medications except for the Lasix and the spironolactone  SCDs for DVT prophylaxis  Patient is full code    I have reviewed and evaluated the most recent data and results, personally examined the patient, and formulated the plan of care as presented above.  Patient is at-risk for clinically significant deterioration / failure due to the above mentioned dysfunctional, unstable organ systems and requires continued critical care treatment. I have personally identified and managed all complex critical care issues to prevent aforementioned clinical deterioration.      60 minutes were spent in the critical care management of the patient excluding billable procedures        (This note was generated with voice recognition software and may contain errors including spelling, grammar, syntax and misrecognition of what was  dictated, that are not fully corrected)     Skip Che MD

## 2024-12-13 NOTE — ASSESSMENT & PLAN NOTE
64-year-old obese female with a past medical history of insulin-dependent diabetes mellitus, coronary artery disease s/p cardiac stent placement, congestive heart failure with an ejection fraction of 30% s/p ICD placement, chronic kidney disease with a baseline creatinine around 1.2, hypertension, porcelain gallbladder, TIA, and DVT who presented to the emergency room for abdominal cramps, nausea and vomiting.  Patient was found to have a DKA associated with sepsis of noninfectious etiology [tachypnea, tachycardia, leukocytosis] as well as elevated creatinine level.    Admit patient to the intensive care unit.  Telemetry and vital signs monitoring.  Continue with the current insulin drip 2 units/hour.  Goal blood sugar levels should remain greater than 200.  If it drops below 200, then we will drop the insulin drip to 1 unit/h until the anion gap closes.  Regarding the IV fluids, the corrected sodium is 142 so we will give half-normal saline at the rate of 200 cc/hour.  If sugar levels drop below 200, then we would add dextrose 5% to the half-normal saline until the anion gap closes.  Repeat BMP every 4 hours.  Once the anion gap closes, give patient food to eat and give her back her Lantus and sliding scale.  Keep the insulin drip running for 2 hours and then discontinue the insulin drip and the fluids.  Resume home medications except for the Lasix and the spironolactone  SCDs for DVT prophylaxis  Patient is full code

## 2024-12-13 NOTE — ED PROVIDER NOTES
Nausea, vomiting, hyperglycemia, abdominal cramps, hypertension.  This 63-year-old female states that all day yesterday she was ill with nausea vomiting.  She states that she was unable to keep anything down.  She believes she took her insulin in the morning but did not take her evening insulin.  She was noted to be hypertensive at home with a blood pressure of 189/115 with a pulse of ED.  Patient denies any use of cigarettes and admits to rare use of alcohol currently she does admit to occasional use of marijuana.  She states that she is a type I diabetic.  She has a history of cardiac disease with stents and has a defibrillator in place.  She denies any chest pain or shortness of breath or other systemic symptoms such as fevers or chills.           Physical Exam  Vitals and nursing note reviewed.   Constitutional:       General: She is awake.      Appearance: Normal appearance. She is normal weight.   HENT:      Head: Normocephalic and atraumatic.      Right Ear: Hearing and external ear normal.      Left Ear: Hearing and external ear normal.      Nose: Nose normal. No congestion or rhinorrhea.      Mouth/Throat:      Lips: Pink.      Mouth: Mucous membranes are moist.      Pharynx: Oropharynx is clear. Uvula midline. No oropharyngeal exudate or posterior oropharyngeal erythema.   Eyes:      General: Lids are normal. Vision grossly intact.         Right eye: No discharge.         Left eye: No discharge.      Extraocular Movements: Extraocular movements intact.      Conjunctiva/sclera: Conjunctivae normal.      Pupils: Pupils are equal, round, and reactive to light.   Cardiovascular:      Rate and Rhythm: Regular rhythm. Tachycardia present.      Pulses: Normal pulses.      Heart sounds: Normal heart sounds. No murmur heard.     No friction rub. No gallop.   Pulmonary:      Effort: Pulmonary effort is normal. Tachypnea present. No respiratory distress.      Breath sounds: Normal breath sounds. No stridor. No  wheezing, rhonchi or rales.   Chest:      Chest wall: No tenderness.   Abdominal:      General: Abdomen is protuberant. Bowel sounds are normal. There is no distension.      Palpations: Abdomen is soft. There is no mass.      Tenderness: There is no abdominal tenderness. There is no guarding or rebound.      Hernia: No hernia is present.      Comments: Patient has a benign abdominal exam.   Musculoskeletal:         General: No swelling, tenderness, deformity or signs of injury. Normal range of motion.      Cervical back: Full passive range of motion without pain, normal range of motion and neck supple.      Right lower leg: Normal. No edema.      Left lower leg: Normal. No edema.   Skin:     General: Skin is warm and dry.      Capillary Refill: Capillary refill takes less than 2 seconds.      Coloration: Skin is not jaundiced or pale.      Findings: No bruising, erythema, lesion or rash.   Neurological:      General: No focal deficit present.      Mental Status: She is alert and oriented to person, place, and time.      GCS: GCS eye subscore is 4. GCS verbal subscore is 5. GCS motor subscore is 6.      Cranial Nerves: Cranial nerves 2-12 are intact. No cranial nerve deficit.      Sensory: Sensation is intact. No sensory deficit.      Motor: Motor function is intact. No weakness.      Coordination: Coordination is intact. Coordination normal.      Deep Tendon Reflexes: Reflexes normal.   Psychiatric:         Attention and Perception: Attention and perception normal.         Mood and Affect: Mood and affect normal.         Speech: Speech normal.         Behavior: Behavior normal. Behavior is cooperative.         Thought Content: Thought content normal.         Cognition and Memory: Cognition and memory normal.         Judgment: Judgment normal.          Labs Reviewed   BETA HYDROXYBUTYRATE - Abnormal       Result Value    Beta-Hydroxybutyrate 2.95 (*)     Narrative:     The beta-hydroxybutyrate test performance  characteristics have been validated by  [facility]. This test has not been approved by the FDA; however, such approval is not necessary.     COMPREHENSIVE METABOLIC PANEL - Abnormal    Glucose 393 (*)     Sodium 135 (*)     Potassium 4.4      Chloride 97 (*)     Bicarbonate 19 (*)     Anion Gap 23 (*)     Urea Nitrogen 25 (*)     Creatinine 1.34 (*)     eGFR 45 (*)     Calcium 9.5      Albumin 4.7      Alkaline Phosphatase 94      Total Protein 8.4 (*)     AST 18      Bilirubin, Total 0.9      ALT 28     CBC WITH AUTO DIFFERENTIAL - Abnormal    WBC 15.8 (*)     nRBC 0.0      RBC 4.47      Hemoglobin 16.0      Hematocrit 47.4 (*)      (*)     MCH 35.8 (*)     MCHC 33.8      RDW 12.7      Platelets 171      Neutrophils % 89.1      Immature Granulocytes %, Automated 1.1 (*)     Lymphocytes % 7.0      Monocytes % 2.6      Eosinophils % 0.0      Basophils % 0.2      Neutrophils Absolute 14.09 (*)     Immature Granulocytes Absolute, Automated 0.18      Lymphocytes Absolute 1.10 (*)     Monocytes Absolute 0.41      Eosinophils Absolute 0.00      Basophils Absolute 0.03     LACTATE - Abnormal    Lactate 3.6 (*)     Narrative:     Venipuncture immediately after or during the administration of Metamizole may lead to falsely low results. Testing should be performed immediately prior to Metamizole dosing.   BLOOD GAS ARTERIAL - Abnormal    POCT pH, Arterial 7.37 (*)     POCT pCO2, Arterial 29 (*)     POCT pO2, Arterial 81 (*)     POCT SO2, Arterial 97      POCT Oxy Hemoglobin, Arterial 94.4      POCT Base Excess, Arterial -7.1 (*)     POCT HCO3 Calculated, Arterial 16.8 (*)     Patient Temperature 37.0      FiO2 21      Site of Arterial Puncture Radial Right      Edgard's Test Positive     SERIAL TROPONIN-INITIAL - Abnormal    Troponin I, High Sensitivity 42 (*)     Narrative:     Less than 99th percentile of normal range cutoff-  Female and children under 18 years old <14  ng/L; Male <21 ng/L: Negative  Repeat testing should be performed if clinically indicated.     Female and children under 18 years old 14-50 ng/L; Male 21-50 ng/L:  Consistent with possible cardiac damage and possible increased clinical   risk. Serial measurements may help to assess extent of myocardial damage.     >50 ng/L: Consistent with cardiac damage, increased clinical risk and  myocardial infarction. Serial measurements may help assess extent of   myocardial damage.      NOTE: Children less than 1 year old may have higher baseline troponin   levels and results should be interpreted in conjunction with the overall   clinical context.     NOTE: Troponin I testing is performed using a different   testing methodology at [facility] than at [facility]. Direct result comparisons should only   be made within the same method.   POCT GLUCOSE - Abnormal    POCT Glucose 383 (*)    POCT GLUCOSE - Abnormal    POCT Glucose 400 (*)    INFLUENZA A AND B PCR - Normal    Flu A Result Not Detected      Flu B Result Not Detected      Narrative:     This assay is an in vitro diagnostic multiplex nucleic acid amplification test for the detection and discrimination of Influenza A & B from nasopharyngeal specimens, and has been validated for use at [facility]. Negative results do not preclude Influenza A/B infections, and should not be used as the sole basis for diagnosis, treatment, or other management decisions. If Influenza A/B and RSV PCR results are negative, testing for Parainfluenza virus, Adenovirus and Metapneumovirus is routinely performed for [facility] [place] and intensive care inpatients, and is available on other patients by placing an add-on request.   RSV PCR - Normal    RSV PCR Not Detected      Narrative:     This assay is an FDA-cleared, in vitro diagnostic nucleic acid amplification test for the detection of RSV from nasopharyngeal specimens, and has been  validated for use at [facility]. Negative results do not preclude RSV infections, and should not be used as the sole basis for diagnosis, treatment, or other management decisions. If Influenza A/B and RSV PCR results are negative, testing for Parainfluenza virus, Adenovirus and Metapneumovirus is routinely performed for pediatric oncology and intensive care inpatients at [facility], and is available on other patients by placing an add-on request.       SARS-COV-2 PCR - Normal    Coronavirus 2019, PCR Not Detected      Narrative:     This assay has received FDA Emergency Use Authorization (EUA) and is only authorized for the duration of time that circumstances exist to justify the authorization of the emergency use of in vitro diagnostic tests for the detection of SARS-CoV-2 virus and/or diagnosis of COVID-19 infection under section 564(b)(1) of the Act, 21 U.S.C. 360bbb-3(b)(1). This assay is an in vitro diagnostic nucleic acid amplification test for the qualitative detection of SARS-CoV-2 from nasopharyngeal specimens and has been validated for use at [facility]. Negative results do not preclude COVID-19 infections and should not be used as the sole basis for diagnosis, treatment, or other management decisions.     URINALYSIS WITH REFLEX CULTURE AND MICROSCOPIC    Narrative:     The following orders were created for panel order Urinalysis with Reflex Culture and Microscopic.  Procedure                               Abnormality         Status                     ---------                               -----------         ------                     Urinalysis with Reflex C...[[id number]]                                                 Extra Urine Gray Tube[[id number]]                                                         Please view results for these tests on the individual orders.   TROPONIN SERIES- (INITIAL, 1 HR)    Narrative:     The following orders were created for panel  order Troponin I Series, High Sensitivity (0, 1 HR).  Procedure                               Abnormality         Status                     ---------                               -----------         ------                     Troponin I, High Sensiti...[[id number]]  Abnormal            Final result               Troponin, High Sensitivi...[[id number]]                      In process                   Please view results for these tests on the individual orders.   URINALYSIS WITH REFLEX CULTURE AND MICROSCOPIC   EXTRA URINE GRAY TUBE   SERIAL TROPONIN, 1 HOUR   LACTATE   MAGNESIUM   PHOSPHORUS   BASIC METABOLIC PANEL   POCT GLUCOSE METER   POCT GLUCOSE METER   POCT GLUCOSE METER   POCT GLUCOSE METER   POCT GLUCOSE METER   POCT GLUCOSE METER   POCT GLUCOSE METER   POCT GLUCOSE METER   POCT GLUCOSE METER   POCT GLUCOSE METER   POCT GLUCOSE METER   POCT GLUCOSE METER   POCT GLUCOSE METER   POCT GLUCOSE METER   POCT GLUCOSE METER   POCT GLUCOSE METER   POCT GLUCOSE METER   POCT GLUCOSE METER   POCT GLUCOSE METER   POCT GLUCOSE METER   POCT GLUCOSE METER   POCT GLUCOSE METER        XR chest 1 view   Final Result   No acute cardiopulmonary process.        MACRO:   None        Signed by: Sandra Tiwari 12/13/2024 1:26 AM   Dictation workstation:   [id number]           Critical Care    Performed by: Diego Wilson DO  Authorized by: Diego Wilson DO    Critical care provider statement:     Critical care time (minutes):  40    Critical care time was exclusive of:  Separately billable procedures and treating other patients    Critical care was necessary to treat or prevent imminent or life-threatening deterioration of the following conditions:  Metabolic crisis    Critical care was time spent personally by me on the following activities:  Development of treatment plan with patient or surrogate, discussions with primary provider, evaluation of patient's response to treatment, examination of patient, obtaining  history from patient or surrogate, ordering and performing treatments and interventions, ordering and review of laboratory studies, ordering and review of radiographic studies, pulse oximetry and re-evaluation of patient's condition    Care discussed with: admitting provider         Medical Decision Making  Patient was seen and evaluated ED due to complaints of nausea, vomiting with abdominal cramps and elevation of her blood pressure.  Patient is a type I diabetic and concern for possible DKA patient was noted to have a resting tachycardia on arrival to ED and initial blood sugar was elevated.  Testing for COVID, RSV and influenza was negative.  Troponin was slightly elevated at 42.  CMP revealed glucose of 393 with a sodium of 135 chloride is 97 potassium was normal at 4.4.  Bicarb was low at 19 and anion gap was up at 25 BUN was 25 creatinine was elevated at 1.35 with a GFR of 45 this is worse than usual for the patient.  Total protein was 8.4 liver enzymes were normal.  Arterial blood gas revealed a pH of 7.37 pCO2 of 29 and pO2 of 81.  Beta hydroxybutyrate was elevated 2.95 lactic acid was elevated 3.6 and white cell count was elevated 15.8 with a hemoglobin of 16 hematocrit 47.4 and platelets were 171 neutrophil count of 14.09.  Chest x-ray revealed no acute disease process.  The patient was ordered 2 L of normal saline wide open after 800 mL of normal saline is given to the patient repeat fingerstick blood sugar was 400.  After getting the fingerstick blood sugar results I did have a discussion with hospitalist and the patient will be started on a insulin drip admitted to the ICU for treatment of DKA.    Amount and/or Complexity of Data Reviewed  ECG/medicine tests: independent interpretation performed.     Details: EKG was interpreted by myself at 12:52 AM reveals sinus tachycardia with right atrial enlargement with Q waves in the inferior lateral leads.  The heart rates 118 bpm.  The KS intervals 132 ms.   The QRS durations 80 ms.  The QTc is 484 ms.  Axis is 105 degrees.         Diagnoses as of 12/13/24 0244   Diabetic ketoacidosis without coma associated with type 1 diabetes mellitus (Multi)   Renal insufficiency                    Diego Wilson DO  12/13/24 0688

## 2024-12-13 NOTE — CARE PLAN
Problem: Pain - Adult  Goal: Verbalizes/displays adequate comfort level or baseline comfort level  Outcome: Progressing  Flowsheets (Taken 12/13/2024 0633)  Verbalizes/displays adequate comfort level or baseline comfort level:   Assess pain using appropriate pain scale   Encourage patient to monitor pain and request assistance     Problem: Safety - Adult  Goal: Free from fall injury  Outcome: Progressing  Flowsheets (Taken 12/13/2024 0633)  Free from fall injury: Instruct family/caregiver on patient safety     Problem: Diabetes  Goal: Achieve decreasing blood glucose levels by end of shift  Outcome: Progressing  Flowsheets (Taken 12/13/2024 0633)  Achieve decreasing blood glucose levels by end of shift: Med administration/monitoring of effect  Goal: Increase stability of blood glucose readings by end of shift  Outcome: Progressing  Flowsheets (Taken 12/13/2024 0633)  Increase stability of blood glucose readings by end of shift: Med administration/monitoring of effect  Goal: Maintain electrolyte levels within acceptable range throughout shift  Outcome: Progressing  Flowsheets (Taken 12/13/2024 0633)  Maintain electrolyte levels within acceptable range throughout shift:   Med administration/monitoring of effect   Monitor urine output  Goal: Maintain glucose levels >70mg/dl to <250mg/dl throughout shift  Outcome: Progressing  Flowsheets (Taken 12/13/2024 0633)  Maintain glucose levels >70mg/dl to <250mg/dl throughout shift:   Med administration/monitoring of effect   Self monitor blood glucose with staff oversight  Goal: No changes in neurological exam by end of shift  Outcome: Progressing  Flowsheets (Taken 12/13/2024 0633)  No changes in neurological exam by end of shift: Complete frequent neurological assessments     Problem: Pain  Goal: Takes deep breaths with improved pain control throughout the shift  Outcome: Progressing  Goal: Turns in bed with improved pain control throughout the shift  Outcome:  Progressing   The patient's goals for the shift include to rest and have nonausea/vomiting    The clinical goals for the shift include to feel better and keep glucose levels between 150-200    Over the shift, the patient did not make progress toward the following goals. Barriers to progression include pt cont to have anion gap open. Recommendations to address these barriers include cont BMP as physician ordered . Cont insulin gtt per ICU protocol with glucose checks hourly as per protocol ordered. Encouraged pt to call for needs/wants. Call light within pt reach. Bed alarm maintained. Will cont to monitor.

## 2024-12-14 LAB
ANION GAP SERPL CALC-SCNC: 9 MMOL/L (ref 10–20)
ATRIAL RATE: 118 BPM
BASOPHILS # BLD AUTO: 0.04 X10*3/UL (ref 0–0.1)
BASOPHILS NFR BLD AUTO: 0.4 %
BUN SERPL-MCNC: 24 MG/DL (ref 6–23)
CALCIUM SERPL-MCNC: 7.5 MG/DL (ref 8.6–10.3)
CARDIAC TROPONIN I PNL SERPL HS: 30 NG/L (ref 0–13)
CHLORIDE SERPL-SCNC: 113 MMOL/L (ref 98–107)
CO2 SERPL-SCNC: 21 MMOL/L (ref 21–32)
CREAT SERPL-MCNC: 1.15 MG/DL (ref 0.5–1.05)
EGFRCR SERPLBLD CKD-EPI 2021: 54 ML/MIN/1.73M*2
EOSINOPHIL # BLD AUTO: 0.03 X10*3/UL (ref 0–0.7)
EOSINOPHIL NFR BLD AUTO: 0.3 %
ERYTHROCYTE [DISTWIDTH] IN BLOOD BY AUTOMATED COUNT: 13.2 % (ref 11.5–14.5)
GLUCOSE BLD MANUAL STRIP-MCNC: 170 MG/DL (ref 74–99)
GLUCOSE SERPL-MCNC: 159 MG/DL (ref 74–99)
HCT VFR BLD AUTO: 34.9 % (ref 36–46)
HGB BLD-MCNC: 11.7 G/DL (ref 12–16)
HOLD SPECIMEN: NORMAL
IMM GRANULOCYTES # BLD AUTO: 0.04 X10*3/UL (ref 0–0.7)
IMM GRANULOCYTES NFR BLD AUTO: 0.4 % (ref 0–0.9)
INR PPP: 4.4 (ref 0.9–1.1)
LYMPHOCYTES # BLD AUTO: 3.32 X10*3/UL (ref 1.2–4.8)
LYMPHOCYTES NFR BLD AUTO: 33.6 %
MCH RBC QN AUTO: 36.6 PG (ref 26–34)
MCHC RBC AUTO-ENTMCNC: 33.2 G/DL (ref 32–36)
MCV RBC AUTO: 110 FL (ref 80–100)
MONOCYTES # BLD AUTO: 0.78 X10*3/UL (ref 0.1–1)
MONOCYTES NFR BLD AUTO: 7.9 %
NEUTROPHILS # BLD AUTO: 5.68 X10*3/UL (ref 1.2–7.7)
NEUTROPHILS NFR BLD AUTO: 57.4 %
NRBC BLD-RTO: 0 /100 WBCS (ref 0–0)
P AXIS: 88 DEGREES
P OFFSET: 203 MS
P ONSET: 153 MS
PLATELET # BLD AUTO: 118 X10*3/UL (ref 150–450)
POTASSIUM SERPL-SCNC: 3.6 MMOL/L (ref 3.5–5.3)
PR INTERVAL: 132 MS
PROTHROMBIN TIME: 51.9 SECONDS (ref 9.8–12.8)
Q ONSET: 219 MS
QRS COUNT: 19 BEATS
QRS DURATION: 80 MS
QT INTERVAL: 346 MS
QTC CALCULATION(BAZETT): 484 MS
QTC FREDERICIA: 433 MS
R AXIS: 105 DEGREES
RBC # BLD AUTO: 3.22 X10*6/UL (ref 4–5.2)
SODIUM SERPL-SCNC: 139 MMOL/L (ref 136–145)
T AXIS: 74 DEGREES
T OFFSET: 392 MS
VENTRICULAR RATE: 118 BPM
WBC # BLD AUTO: 9.9 X10*3/UL (ref 4.4–11.3)

## 2024-12-14 PROCEDURE — 85610 PROTHROMBIN TIME: CPT | Performed by: INTERNAL MEDICINE

## 2024-12-14 PROCEDURE — 80048 BASIC METABOLIC PNL TOTAL CA: CPT | Performed by: INTERNAL MEDICINE

## 2024-12-14 PROCEDURE — 2500000001 HC RX 250 WO HCPCS SELF ADMINISTERED DRUGS (ALT 637 FOR MEDICARE OP): Performed by: HOSPITALIST

## 2024-12-14 PROCEDURE — 2500000002 HC RX 250 W HCPCS SELF ADMINISTERED DRUGS (ALT 637 FOR MEDICARE OP, ALT 636 FOR OP/ED): Performed by: INTERNAL MEDICINE

## 2024-12-14 PROCEDURE — 2500000002 HC RX 250 W HCPCS SELF ADMINISTERED DRUGS (ALT 637 FOR MEDICARE OP, ALT 636 FOR OP/ED): Performed by: HOSPITALIST

## 2024-12-14 PROCEDURE — 1100000001 HC PRIVATE ROOM DAILY

## 2024-12-14 PROCEDURE — 82947 ASSAY GLUCOSE BLOOD QUANT: CPT

## 2024-12-14 PROCEDURE — 85025 COMPLETE CBC W/AUTO DIFF WBC: CPT | Performed by: INTERNAL MEDICINE

## 2024-12-14 PROCEDURE — 99232 SBSQ HOSP IP/OBS MODERATE 35: CPT | Performed by: INTERNAL MEDICINE

## 2024-12-14 PROCEDURE — 36415 COLL VENOUS BLD VENIPUNCTURE: CPT | Performed by: INTERNAL MEDICINE

## 2024-12-14 PROCEDURE — 84484 ASSAY OF TROPONIN QUANT: CPT | Performed by: STUDENT IN AN ORGANIZED HEALTH CARE EDUCATION/TRAINING PROGRAM

## 2024-12-14 PROCEDURE — 2500000001 HC RX 250 WO HCPCS SELF ADMINISTERED DRUGS (ALT 637 FOR MEDICARE OP): Performed by: INTERNAL MEDICINE

## 2024-12-14 PROCEDURE — 2500000004 HC RX 250 GENERAL PHARMACY W/ HCPCS (ALT 636 FOR OP/ED): Performed by: STUDENT IN AN ORGANIZED HEALTH CARE EDUCATION/TRAINING PROGRAM

## 2024-12-14 RX ORDER — INSULIN GLARGINE 100 [IU]/ML
30 INJECTION, SOLUTION SUBCUTANEOUS DAILY
Status: DISCONTINUED | OUTPATIENT
Start: 2024-12-14 | End: 2024-12-15 | Stop reason: HOSPADM

## 2024-12-14 RX ORDER — INSULIN LISPRO 100 [IU]/ML
0-20 INJECTION, SOLUTION INTRAVENOUS; SUBCUTANEOUS
Status: DISCONTINUED | OUTPATIENT
Start: 2024-12-14 | End: 2024-12-15 | Stop reason: HOSPADM

## 2024-12-14 RX ADMIN — BUPROPION HYDROCHLORIDE 200 MG: 100 TABLET, FILM COATED, EXTENDED RELEASE ORAL at 09:19

## 2024-12-14 RX ADMIN — METOPROLOL SUCCINATE 25 MG: 25 TABLET, EXTENDED RELEASE ORAL at 09:19

## 2024-12-14 RX ADMIN — ROSUVASTATIN CALCIUM 40 MG: 20 TABLET, FILM COATED ORAL at 09:19

## 2024-12-14 RX ADMIN — CARIPRAZINE 1.5 MG: 1.5 CAPSULE, GELATIN COATED ORAL at 13:35

## 2024-12-14 RX ADMIN — OXYBUTYNIN CHLORIDE 5 MG: 5 TABLET ORAL at 20:08

## 2024-12-14 RX ADMIN — OXYBUTYNIN CHLORIDE 5 MG: 5 TABLET ORAL at 09:19

## 2024-12-14 RX ADMIN — INSULIN LISPRO 8 UNITS: 100 INJECTION, SOLUTION INTRAVENOUS; SUBCUTANEOUS at 11:51

## 2024-12-14 RX ADMIN — TOPIRAMATE 200 MG: 100 TABLET, FILM COATED ORAL at 09:19

## 2024-12-14 RX ADMIN — ROPINIROLE 2 MG: 2 TABLET, FILM COATED ORAL at 20:08

## 2024-12-14 RX ADMIN — INSULIN GLARGINE 30 UNITS: 100 INJECTION, SOLUTION SUBCUTANEOUS at 09:19

## 2024-12-14 RX ADMIN — CLONAZEPAM 2 MG: 0.5 TABLET ORAL at 20:08

## 2024-12-14 RX ADMIN — EZETIMIBE 10 MG: 10 TABLET ORAL at 20:08

## 2024-12-14 RX ADMIN — TOPIRAMATE 200 MG: 100 TABLET, FILM COATED ORAL at 20:08

## 2024-12-14 RX ADMIN — LEVOTHYROXINE SODIUM 88 MCG: 88 TABLET ORAL at 09:19

## 2024-12-14 RX ADMIN — INSULIN LISPRO 4 UNITS: 100 INJECTION, SOLUTION INTRAVENOUS; SUBCUTANEOUS at 20:20

## 2024-12-14 RX ADMIN — SODIUM CHLORIDE 100 ML/HR: 9 INJECTION, SOLUTION INTRAVENOUS at 06:00

## 2024-12-14 RX ADMIN — INSULIN LISPRO 4 UNITS: 100 INJECTION, SOLUTION INTRAVENOUS; SUBCUTANEOUS at 09:19

## 2024-12-14 RX ADMIN — INSULIN LISPRO 4 UNITS: 100 INJECTION, SOLUTION INTRAVENOUS; SUBCUTANEOUS at 16:58

## 2024-12-14 ASSESSMENT — COGNITIVE AND FUNCTIONAL STATUS - GENERAL
DAILY ACTIVITIY SCORE: 24
MOBILITY SCORE: 24

## 2024-12-14 ASSESSMENT — PAIN - FUNCTIONAL ASSESSMENT
PAIN_FUNCTIONAL_ASSESSMENT: 0-10
PAIN_FUNCTIONAL_ASSESSMENT: 0-10

## 2024-12-14 ASSESSMENT — PAIN SCALES - GENERAL
PAINLEVEL_OUTOF10: 0 - NO PAIN
PAINLEVEL_OUTOF10: 0 - NO PAIN

## 2024-12-14 NOTE — PROGRESS NOTES
Pt reviewed during Care Rounds today and she is not ready for discharge; ADOD is tomorrow/Sunday.  SW met with pt to review her plan and she confirms her desires to discharge home without needs. We reviewed the IM with no questions/concerns- copy added to pt's chart, another copy provided to pt.  Care Transitions will follow as needed.     BRUNILDA Faria

## 2024-12-14 NOTE — CARE PLAN
Problem: Pain - Adult  Goal: Verbalizes/displays adequate comfort level or baseline comfort level  Outcome: Met     Problem: Safety - Adult  Goal: Free from fall injury  Outcome: Met     Problem: Chronic Conditions and Co-morbidities  Goal: Patient's chronic conditions and co-morbidity symptoms are monitored and maintained or improved  Outcome: Met     Problem: Diabetes  Goal: Achieve decreasing blood glucose levels by end of shift  Outcome: Met  Goal: Maintain glucose levels >70mg/dl to <250mg/dl throughout shift  Outcome: Met   The patient's goals for the shift include to rest and have nonausea/vomiting    The clinical goals for the shift include maintain a BS between 150-200 throughout shift

## 2024-12-14 NOTE — ASSESSMENT & PLAN NOTE
64-year-old obese female with a past medical history of insulin-dependent diabetes mellitus, coronary artery disease s/p cardiac stent placement, congestive heart failure with an ejection fraction of 30% s/p ICD placement, chronic kidney disease with a baseline creatinine around 1.2, hypertension, porcelain gallbladder, TIA, and DVT who presented to the emergency room for abdominal cramps, nausea and vomiting.  Patient was found to have a DKA associated with sepsis of noninfectious etiology [tachypnea, tachycardia, leukocytosis] as well as elevated creatinine level.    DKA resolved.  Patient asymptomatic.  Home dose Lantus resumed.  High-dose insulin sliding scale.  Will monitor for another 24 hours and discharge the patient home tomorrow.  SCDs for DVT prophylaxis   full code

## 2024-12-14 NOTE — PROGRESS NOTES
"Ne Richardson is a 64 y.o. female on day 1 of admission presenting with Diabetic ketoacidosis without coma associated with type 1 diabetes mellitus (Multi).    Subjective   No acute events overnight.  Reports patient reports feeling better today.  No nausea.  No abdominal pain.  Tolerating very well oral intake.       Objective   Last Recorded Vitals  Blood pressure 117/71, pulse 83, temperature 36.8 °C (98.2 °F), temperature source Temporal, resp. rate 18, height 1.575 m (5' 2\"), weight 76.2 kg (167 lb 15.9 oz), SpO2 98%.  Intake/Output last 3 Shifts:  I/O last 3 completed shifts:  In: 4927.5 (64.7 mL/kg) [P.O.:240; I.V.:1587.5 (20.8 mL/kg); IV Piggyback:3100]  Out: 300 (3.9 mL/kg) [Urine:300 (0.1 mL/kg/hr)]  Weight: 76.2 kg     Physical Exam  Constitutional:       General: She is not in acute distress.     Appearance: She is not ill-appearing.      Comments: Awake alert and oriented x3   HENT:      Mouth/Throat:      Pharynx: Oropharynx is clear.   Eyes:      Pupils: Pupils are equal, round, and reactive to light.   Cardiovascular:      Rate and Rhythm: Normal rate and regular rhythm.      Heart sounds: Normal heart sounds.   Pulmonary:      Effort: No respiratory distress.      Breath sounds: Normal breath sounds. No wheezing or rhonchi.   Abdominal:      General: Abdomen is flat. Bowel sounds are normal. There is no distension.      Palpations: Abdomen is soft.      Tenderness: There is no abdominal tenderness.   Musculoskeletal:         General: No swelling.   Skin:     General: Skin is warm.   Neurological:      General: No focal deficit present.   Psychiatric:         Mood and Affect: Mood normal.         Behavior: Behavior normal.         Thought Content: Thought content normal.         Judgment: Judgment normal.           Current Facility-Administered Medications:     acetaminophen (Tylenol) tablet 650 mg, 650 mg, oral, q4h PRN **OR** acetaminophen (Tylenol) oral liquid 650 mg, 650 mg, oral, q4h PRN **OR** " acetaminophen (Tylenol) suppository 650 mg, 650 mg, rectal, q4h PRN, Aramndo Fung MD    buPROPion SR (Wellbutrin SR) 12 hr tablet 200 mg, 200 mg, oral, Daily, Armando Fung MD, 200 mg at 12/14/24 0919    [START ON 12/15/2024] cariprazine (Vraylar) capsule 1.5 mg, 1.5 mg, oral, Every other day, Skip Che MD    clonazePAM (KlonoPIN) tablet 2 mg, 2 mg, oral, Nightly, Armando Fung MD, 2 mg at 12/13/24 2002    desvenlafaxine (Pristiq) 24 hr tablet 100 mg, 100 mg, oral, Nightly, Armando Fung MD    dextrose 50 % injection 12.5 g, 12.5 g, intravenous, q15 min PRN, Armando Fung MD    dextrose 50 % injection 25 g, 25 g, intravenous, q15 min PRN, Armando Fung MD    dextrose 50 % injection 50 mL, 50 mL, intravenous, q15 min PRN, Armando Fung MD    ezetimibe (Zetia) tablet 10 mg, 10 mg, oral, Nightly, Armando Fung MD, 10 mg at 12/13/24 2002    glucagon (Glucagen) injection 1 mg, 1 mg, intramuscular, q15 min PRN, Armando Fung MD    glucagon (Glucagen) injection 1 mg, 1 mg, intramuscular, q15 min PRN, Armando Fung MD    insulin glargine (Lantus) injection 30 Units, 30 Units, subcutaneous, Daily, Armando Fung MD, 30 Units at 12/14/24 0919    insulin lispro injection 0-20 Units, 0-20 Units, subcutaneous, Before meals & nightly, Armando Fung MD, 8 Units at 12/14/24 1151    insulin regular (HumuLIN, NovoLIN) bolus from bag 7 Units, 0.1 Units/kg, intravenous, PRN, Armando Fung MD, 7 Units at 12/13/24 0315    ipratropium-albuteroL (Duo-Neb) 0.5-2.5 mg/3 mL nebulizer solution 3 mL, 3 mL, nebulization, q6h PRN, Armando Fung MD    levothyroxine (Synthroid, Levoxyl) tablet 88 mcg, 88 mcg, oral, Daily, Armando Fung MD, 88 mcg at 12/14/24 0919    magnesium hydroxide (Milk of Magnesia) 400 mg/5 mL suspension 30 mL, 30 mL, oral, Daily PRN, Armando Fung MD    metoprolol succinate XL (Toprol-XL) 24 hr tablet 25 mg, 25 mg, oral, Daily, Armando Fung MD, 25 mg at 12/14/24 0919    ondansetron ODT (Zofran-ODT) disintegrating  tablet 4 mg, 4 mg, oral, q8h PRN **OR** ondansetron (Zofran) injection 4 mg, 4 mg, intravenous, q8h PRN, Armando Fung MD    oxybutynin (Ditropan) tablet 5 mg, 5 mg, oral, BID, Armando Fung MD, 5 mg at 12/14/24 0919    rOPINIRole (Requip) tablet 2 mg, 2 mg, oral, Nightly, Armando Fung MD, 2 mg at 12/13/24 2002    rosuvastatin (Crestor) tablet 40 mg, 40 mg, oral, Daily, Armando Fung MD, 40 mg at 12/14/24 0919    sodium chloride 0.9% infusion, 100 mL/hr, intravenous, Continuous, Armando Fung MD, Last Rate: 100 mL/hr at 12/14/24 0600, 100 mL/hr at 12/14/24 0600    topiramate (Topamax) tablet 200 mg, 200 mg, oral, BID, Armando Fung MD, 200 mg at 12/14/24 0919    [Held by provider] warfarin (Coumadin) tablet 12.5 mg, 12.5 mg, oral, Once per day on Sunday Tuesday Wednesday Friday Saturday, Armando Fung MD, 12.5 mg at 12/13/24 1732    [Held by provider] warfarin (Coumadin) tablet 15 mg, 15 mg, oral, Once per day on Monday Thursday, Armando Fung MD     Relevant Results  Results for orders placed or performed during the hospital encounter of 12/13/24 (from the past 24 hours)   POCT GLUCOSE   Result Value Ref Range    POCT Glucose 156 (H) 74 - 99 mg/dL   CBC and Auto Differential   Result Value Ref Range    WBC 9.9 4.4 - 11.3 x10*3/uL    nRBC 0.0 0.0 - 0.0 /100 WBCs    RBC 3.22 (L) 4.00 - 5.20 x10*6/uL    Hemoglobin 11.7 (L) 12.0 - 16.0 g/dL    Hematocrit 34.9 (L) 36.0 - 46.0 %     (H) 80 - 100 fL    MCH 36.6 (H) 26.0 - 34.0 pg    MCHC 33.2 32.0 - 36.0 g/dL    RDW 13.2 11.5 - 14.5 %    Platelets 118 (L) 150 - 450 x10*3/uL    Neutrophils % 57.4 40.0 - 80.0 %    Immature Granulocytes %, Automated 0.4 0.0 - 0.9 %    Lymphocytes % 33.6 13.0 - 44.0 %    Monocytes % 7.9 2.0 - 10.0 %    Eosinophils % 0.3 0.0 - 6.0 %    Basophils % 0.4 0.0 - 2.0 %    Neutrophils Absolute 5.68 1.20 - 7.70 x10*3/uL    Immature Granulocytes Absolute, Automated 0.04 0.00 - 0.70 x10*3/uL    Lymphocytes Absolute 3.32 1.20 - 4.80 x10*3/uL     Monocytes Absolute 0.78 0.10 - 1.00 x10*3/uL    Eosinophils Absolute 0.03 0.00 - 0.70 x10*3/uL    Basophils Absolute 0.04 0.00 - 0.10 x10*3/uL   Basic Metabolic Panel   Result Value Ref Range    Glucose 159 (H) 74 - 99 mg/dL    Sodium 139 136 - 145 mmol/L    Potassium 3.6 3.5 - 5.3 mmol/L    Chloride 113 (H) 98 - 107 mmol/L    Bicarbonate 21 21 - 32 mmol/L    Anion Gap 9 (L) 10 - 20 mmol/L    Urea Nitrogen 24 (H) 6 - 23 mg/dL    Creatinine 1.15 (H) 0.50 - 1.05 mg/dL    eGFR 54 (L) >60 mL/min/1.73m*2    Calcium 7.5 (L) 8.6 - 10.3 mg/dL   Protime-INR   Result Value Ref Range    Protime 51.9 (H) 9.8 - 12.8 seconds    INR 4.4 (H) 0.9 - 1.1   Troponin I, High Sensitivity   Result Value Ref Range    Troponin I, High Sensitivity 30 (H) 0 - 13 ng/L   SST TOP   Result Value Ref Range    Extra Tube Hold for add-ons.       ECG 12 Lead    Result Date: 12/13/2024  Sinus tachycardia Right atrial enlargement Cannot rule out Inferior infarct (cited on or before 08-JUL-2024) Anterolateral infarct (cited on or before 22-APR-2024) Abnormal ECG When compared with ECG of 13-OCT-2024 07:14, ST no longer elevated in Inferior leads    XR chest 1 view    Result Date: 12/13/2024  Interpreted By:  Sandra Tiwari, STUDY: XR CHEST 1 VIEW;  12/13/2024 1:20 am   INDICATION: Signs/Symptoms:Weakness.   COMPARISON: 07/08/2024   ACCESSION NUMBER(S): [id number]   ORDERING CLINICIAN: BRAYDON LANDERS   FINDINGS:     CARDIOMEDIASTINAL SILHOUETTE: Cardiomediastinal silhouette is normal in size and configuration. Pacemaker/ICD lead overlies the right ventricle.   LUNGS: No pulmonary consolidation, pleural effusion or pneumothorax.   ABDOMEN: No remarkable upper abdominal findings.   BONES: No acute osseous abnormality.       No acute cardiopulmonary process.   MACRO: None   Signed by: Sandra Tiwari 12/13/2024 1:26 AM Dictation workstation:   [id number]      Assessment & Plan  Diabetic ketoacidosis without coma associated with type 1 diabetes mellitus  (Multi)  64-year-old obese female with a past medical history of insulin-dependent diabetes mellitus, coronary artery disease s/p cardiac stent placement, congestive heart failure with an ejection fraction of 30% s/p ICD placement, chronic kidney disease with a baseline creatinine around 1.2, hypertension, porcelain gallbladder, TIA, and DVT who presented to the emergency room for abdominal cramps, nausea and vomiting.  Patient was found to have a DKA associated with sepsis of noninfectious etiology [tachypnea, tachycardia, leukocytosis] as well as elevated creatinine level.    DKA resolved.  Patient asymptomatic.  Home dose Lantus resumed.  High-dose insulin sliding scale.  Will monitor for another 24 hours and discharge the patient home tomorrow.  SCDs for DVT prophylaxis   full code      (This note was generated with voice recognition software and may contain errors including spelling, grammar, syntax and misrecognition of what was dictated, that are not fully corrected)     Skip Che MD

## 2024-12-14 NOTE — PROGRESS NOTES
Pharmacy Consult for Warfarin (Coumadin) Management - Daily Progress Note     - Warfarin history:  admitted on warfarin     - Bleeding/bruising events in past 24 hours:  not applicable    - Notable medication changes in past 24 hours:  not applicable    - Additional notes:  not applicable      Anticoagulation Dosing History  Previous home dose:  not applicable    Labs  INR   Date Value Ref Range Status   12/14/2024 4.4 (H) 0.9 - 1.1 Final   12/13/2024 2.1 (H) 0.9 - 1.1 Final   10/16/2024 1.7 (H) 0.9 - 1.1 Final     Results from last 7 days   Lab Units 12/14/24  0420 12/13/24  0112   HEMOGLOBIN g/dL 11.7* 16.0     Results from last 7 days   Lab Units 12/14/24  0420 12/13/24  0112   HEMATOCRIT % 34.9* 47.4*     Results from last 7 days   Lab Units 12/14/24  0420 12/13/24  0112   PLATELETS AUTO x10*3/uL 118* 171     Review    Treatment Indication:  Deep Vein Thrombosis (DVT)  Target INR:  2-3    Inpatient dosing pattern:    Date INR Dose   12/13/24 2.1 12.5 mg   12/14/24 4.4 Hold       Warfarin Dosing Plan: INR of 4.4 is supratherapeutic for goal of 2-3. Will hold today's dose of warfarin    Orders placed per pharmacy consult. Pharmacy will continue to monitor and adjust therapy as needed.     Norm Guthrie, PharmD

## 2024-12-15 VITALS
SYSTOLIC BLOOD PRESSURE: 145 MMHG | HEART RATE: 83 BPM | WEIGHT: 167.99 LBS | DIASTOLIC BLOOD PRESSURE: 77 MMHG | OXYGEN SATURATION: 98 % | BODY MASS INDEX: 30.91 KG/M2 | HEIGHT: 62 IN | TEMPERATURE: 98.1 F | RESPIRATION RATE: 18 BRPM

## 2024-12-15 PROBLEM — E10.10 DIABETIC KETOACIDOSIS WITHOUT COMA ASSOCIATED WITH TYPE 1 DIABETES MELLITUS (MULTI): Status: RESOLVED | Noted: 2024-12-13 | Resolved: 2024-12-15

## 2024-12-15 LAB
GLUCOSE BLD MANUAL STRIP-MCNC: 107 MG/DL (ref 74–99)
HOLD SPECIMEN: NORMAL
HOLD SPECIMEN: NORMAL
INR PPP: 3.1 (ref 0.9–1.1)
PROTHROMBIN TIME: 36.8 SECONDS (ref 9.8–12.8)

## 2024-12-15 PROCEDURE — 85610 PROTHROMBIN TIME: CPT | Performed by: HOSPITALIST

## 2024-12-15 PROCEDURE — 82947 ASSAY GLUCOSE BLOOD QUANT: CPT

## 2024-12-15 PROCEDURE — 2500000002 HC RX 250 W HCPCS SELF ADMINISTERED DRUGS (ALT 637 FOR MEDICARE OP, ALT 636 FOR OP/ED): Performed by: HOSPITALIST

## 2024-12-15 PROCEDURE — 36415 COLL VENOUS BLD VENIPUNCTURE: CPT | Performed by: HOSPITALIST

## 2024-12-15 PROCEDURE — 2500000001 HC RX 250 WO HCPCS SELF ADMINISTERED DRUGS (ALT 637 FOR MEDICARE OP): Performed by: HOSPITALIST

## 2024-12-15 PROCEDURE — 99238 HOSP IP/OBS DSCHRG MGMT 30/<: CPT | Performed by: NURSE PRACTITIONER

## 2024-12-15 RX ORDER — WARFARIN 7.5 MG/1
7.5 TABLET ORAL ONCE
Status: DISCONTINUED | OUTPATIENT
Start: 2024-12-15 | End: 2024-12-15 | Stop reason: HOSPADM

## 2024-12-15 RX ORDER — WARFARIN 7.5 MG/1
TABLET ORAL
Start: 2024-12-15

## 2024-12-15 RX ADMIN — ROSUVASTATIN CALCIUM 40 MG: 20 TABLET, FILM COATED ORAL at 08:14

## 2024-12-15 RX ADMIN — BUPROPION HYDROCHLORIDE 200 MG: 100 TABLET, FILM COATED, EXTENDED RELEASE ORAL at 08:14

## 2024-12-15 RX ADMIN — METOPROLOL SUCCINATE 25 MG: 25 TABLET, EXTENDED RELEASE ORAL at 08:14

## 2024-12-15 RX ADMIN — LEVOTHYROXINE SODIUM 88 MCG: 88 TABLET ORAL at 07:16

## 2024-12-15 RX ADMIN — TOPIRAMATE 200 MG: 100 TABLET, FILM COATED ORAL at 08:14

## 2024-12-15 RX ADMIN — INSULIN GLARGINE 30 UNITS: 100 INJECTION, SOLUTION SUBCUTANEOUS at 08:14

## 2024-12-15 RX ADMIN — OXYBUTYNIN CHLORIDE 5 MG: 5 TABLET ORAL at 08:14

## 2024-12-15 ASSESSMENT — PAIN - FUNCTIONAL ASSESSMENT: PAIN_FUNCTIONAL_ASSESSMENT: 0-10

## 2024-12-15 ASSESSMENT — PAIN SCALES - GENERAL: PAINLEVEL_OUTOF10: 0 - NO PAIN

## 2024-12-15 NOTE — DISCHARGE SUMMARY
Discharge Diagnosis  Diabetic ketoacidosis without coma associated with type 1 diabetes mellitus (Multi)    Issues Requiring Follow-Up  Coronary artery disease  Gastroparesis  CHF  Long-term use of anticoagulant    Discharge Meds     Medication List      CONTINUE taking these medications     albuterol 90 mcg/actuation inhaler   * glucagon 1 mg injection; Commonly known as: Glucagen   * Baqsimi 3 mg/actuation spray,non-aerosol; Generic drug: glucagon   buPROPion  mg 12 hr tablet; Commonly known as: Wellbutrin SR   cholecalciferol 25 MCG (1000 UT) capsule; Commonly known as: Vitamin D-3   clonazePAM 2 mg tablet; Commonly known as: KlonoPIN   desvenlafaxine 100 mg 24 hr tablet; Commonly known as: Pristiq   docusate sodium 100 mg capsule; Commonly known as: Colace   ezetimibe 10 mg tablet; Commonly known as: Zetia   furosemide 40 mg tablet; Commonly known as: Lasix; Take 1 tablet (40 mg)   by mouth once daily.   insulin glargine 100 unit/mL injection; Commonly known as: Lantus   levothyroxine 88 mcg tablet; Commonly known as: Synthroid, Levoxyl   magnesium oxide 500 mg capsule   metoclopramide 5 mg tablet; Commonly known as: Reglan   metoprolol succinate XL 25 mg 24 hr tablet; Commonly known as:   Toprol-XL; Take 1 tablet (25 mg) by mouth once daily. Do not crush or   chew.   NovoLOG Flexpen U-100 Insulin 100 unit/mL (3 mL) pen; Generic drug:   insulin aspart   oxybutynin XL 10 mg 24 hr tablet; Commonly known as: Ditropan-XL   pantoprazole 40 mg EC tablet; Commonly known as: ProtoNix; Take 1 tablet   (40 mg) by mouth once daily at bedtime. Do not crush, chew, or split.   potassium chloride CR 20 mEq ER tablet; Commonly known as: Klor-Con M20   rOPINIRole 2 mg tablet; Commonly known as: Requip   rosuvastatin 40 mg tablet; Commonly known as: Crestor   spironolactone 25 mg tablet; Commonly known as: Aldactone; Take 0.5   tablets (12.5 mg) by mouth once daily.   Topamax 200 mg tablet; Generic drug: topiramate    Vraylar 1.5 mg capsule; Generic drug: cariprazine   * warfarin 7.5 mg tablet; Commonly known as: Coumadin   * warfarin 5 mg tablet; Commonly known as: Coumadin  * This list has 4 medication(s) that are the same as other medications   prescribed for you. Read the directions carefully, and ask your doctor or   other care provider to review them with you.       Test Results Pending At Discharge  Pending Labs       No current pending labs.            Hospital Course   Ne Richardson is a 64 y.o. female  Who presented to the emergency room for abdominal cramps, nausea vomiting and elevated sugar levels.  On presentation, blood pressure 148/92, heart rate 120, respiratory 22, afebrile, saturation oxygen 97% on room air.  Pertinent findings on blood workup; WBC 15,800, glucose 393, bicarbonate 19, anion gap 23, BUN 25, creatinine 1.35 and troponin 42 with a repeat of 44.  COVID-19 and influenza came back negative.  Chest x-ray did not show any acute findings.  Patient was given in the emergency room IV fluids and started on insulin drip as well as given Bentyl and Zofran and then admitted to the medical service for further investigation and management.   During admission patient's DKA resolved.  She was transitioned from IV insulin to sliding scale insulin.  The patient's home dose of Lantus was resumed.  Patient's bicarb 21 and anion gap 9.  Patient creatinine 1.15 GFR 54.  This appears to be about baseline.  The patient remained asymptomatic.      Patient's INR at discharge 3.1.    Patient is hemodynamically stable and will be discharged home today.    Pertinent Physical Exam At Time of Discharge  Physical Exam  Constitutional:       General: She is not in acute distress.     Appearance: She is not ill-appearing.      Comments: Awake alert and oriented x3   HENT:      Mouth/Throat:      Pharynx: Oropharynx is clear.   Eyes:      Pupils: Pupils are equal, round, and reactive to light.   Cardiovascular:      Rate and  Rhythm: Normal rate and regular rhythm.      Heart sounds: Normal heart sounds.   Pulmonary:      Effort: No respiratory distress.      Breath sounds: Normal breath sounds. No wheezing or rhonchi.   Abdominal:      General: Abdomen is flat. Bowel sounds are normal. There is no distension.      Palpations: Abdomen is soft.      Tenderness: There is no abdominal tenderness.   Musculoskeletal:         General: No swelling.   Skin:     General: Skin is warm.   Neurological:      General: No focal deficit present.   Psychiatric:         Mood and Affect: Mood normal.         Behavior: Behavior normal.         Thought Content: Thought content normal.         Judgment: Judgment normal.  Outpatient Follow-Up  Future Appointments   Date Time Provider Department Center   1/13/2025  1:20 PM [facility] [place]   1/31/2025  1:00 PM Elodia Jenkins MD [id number] [place]   4/9/2025  7:45 AM Jose Barclay MD [id number] [place]   11/20/2025  3:15 PM Jose Barclay MD [id number] [place]         Milena Parker, APRN-CNP

## 2024-12-15 NOTE — NURSING NOTE
1149 Discharge instructions discussed with patient, including DKA handout. Home medication Vraylar returned to patient. Patient transported off unit in stable condition by wheelchair to private car. All belongings in hand. Accompanied by RN.

## 2024-12-15 NOTE — PROGRESS NOTES
Pharmacy Consult for Warfarin (Coumadin) Management - Daily Progress Note     - Warfarin history:  admitted on warfarin     - Bleeding/bruising events in past 24 hours:  not applicable    - Notable medication changes in past 24 hours:  not applicable    - Additional notes:  not applicable      Anticoagulation Dosing History  Previous home dose:  12.5 mg Tues, Wed, Fri, Sat, Sun; 15 mg Mon, Thurs    Labs  INR   Date Value Ref Range Status   12/15/2024 3.1 (H) 0.9 - 1.1 Final   12/14/2024 4.4 (H) 0.9 - 1.1 Final   12/13/2024 2.1 (H) 0.9 - 1.1 Final     Results from last 7 days   Lab Units 12/14/24  0420 12/13/24  0112   HEMOGLOBIN g/dL 11.7* 16.0     Results from last 7 days   Lab Units 12/14/24  0420 12/13/24  0112   HEMATOCRIT % 34.9* 47.4*     Results from last 7 days   Lab Units 12/14/24  0420 12/13/24  0112   PLATELETS AUTO x10*3/uL 118* 171     Review    Treatment Indication:  Deep Vein Thrombosis (DVT)  Target INR:  2-3    Inpatient dosing pattern:    Date INR Dose   12/13/24 2.1 12.5 mg   12/14/24 4.4 Hold   12/15/24 3.1 7.5 mg       Warfarin Dosing Plan: Patient is to be discharged today. Patient is still slightly supratherapeutic for range of 2-3. If patient is not discharged before next dose is due, order for slightly lower dose of 7.5 mg placed. If patient is discharged before next dose is due, okay to resume normal home regimen.    Orders placed per pharmacy consult. Pharmacy will continue to monitor and adjust therapy as needed.     Norm Guthrie, PharmD

## 2025-01-13 ENCOUNTER — APPOINTMENT (OUTPATIENT)
Dept: CARDIOLOGY | Facility: HOSPITAL | Age: 65
End: 2025-01-13
Payer: MEDICARE

## 2025-01-31 ENCOUNTER — HOSPITAL ENCOUNTER (OUTPATIENT)
Dept: CARDIOLOGY | Facility: HOSPITAL | Age: 65
Discharge: HOME | End: 2025-01-31
Payer: MEDICARE

## 2025-01-31 ENCOUNTER — APPOINTMENT (OUTPATIENT)
Dept: CARDIOLOGY | Facility: CLINIC | Age: 65
End: 2025-01-31
Payer: MEDICARE

## 2025-01-31 VITALS
BODY MASS INDEX: 32.2 KG/M2 | DIASTOLIC BLOOD PRESSURE: 74 MMHG | SYSTOLIC BLOOD PRESSURE: 120 MMHG | HEART RATE: 86 BPM | HEIGHT: 62 IN | WEIGHT: 175 LBS

## 2025-01-31 DIAGNOSIS — Z71.89 ENCOUNTER FOR MEDICATION REVIEW AND COUNSELING: ICD-10-CM

## 2025-01-31 DIAGNOSIS — Z95.810 CARDIAC DEFIBRILLATOR IN SITU: Primary | ICD-10-CM

## 2025-01-31 DIAGNOSIS — E78.2 MIXED HYPERLIPIDEMIA: ICD-10-CM

## 2025-01-31 DIAGNOSIS — F17.200 CURRENT SMOKER: ICD-10-CM

## 2025-01-31 DIAGNOSIS — I47.20 VENTRICULAR TACHYCARDIA (MULTI): ICD-10-CM

## 2025-01-31 DIAGNOSIS — Z71.89 ENCOUNTER TO DISCUSS TREATMENT OPTIONS: ICD-10-CM

## 2025-01-31 DIAGNOSIS — R55 SYNCOPE AND COLLAPSE: ICD-10-CM

## 2025-01-31 DIAGNOSIS — I25.10 CAD S/P PERCUTANEOUS CORONARY ANGIOPLASTY: ICD-10-CM

## 2025-01-31 DIAGNOSIS — I47.10 PSVT (PAROXYSMAL SUPRAVENTRICULAR TACHYCARDIA) (CMS-HCC): ICD-10-CM

## 2025-01-31 DIAGNOSIS — I50.9 CHRONIC CONGESTIVE HEART FAILURE, UNSPECIFIED HEART FAILURE TYPE: ICD-10-CM

## 2025-01-31 DIAGNOSIS — I25.2 HISTORY OF MI (MYOCARDIAL INFARCTION): ICD-10-CM

## 2025-01-31 DIAGNOSIS — I50.9 CONGESTIVE HEART FAILURE, UNSPECIFIED HF CHRONICITY, UNSPECIFIED HEART FAILURE TYPE: ICD-10-CM

## 2025-01-31 DIAGNOSIS — I21.4 NSTEMI (NON-ST ELEVATED MYOCARDIAL INFARCTION) (MULTI): ICD-10-CM

## 2025-01-31 DIAGNOSIS — I50.22 CHRONIC SYSTOLIC HEART FAILURE: ICD-10-CM

## 2025-01-31 DIAGNOSIS — F12.90 MARIJUANA SMOKER: ICD-10-CM

## 2025-01-31 DIAGNOSIS — I50.9 ACUTE CONGESTIVE HEART FAILURE, UNSPECIFIED HEART FAILURE TYPE: ICD-10-CM

## 2025-01-31 DIAGNOSIS — Z98.61 CAD S/P PERCUTANEOUS CORONARY ANGIOPLASTY: ICD-10-CM

## 2025-01-31 DIAGNOSIS — I25.5 ISCHEMIC CARDIOMYOPATHY: ICD-10-CM

## 2025-01-31 DIAGNOSIS — Z95.810 CARDIAC DEFIBRILLATOR IN SITU: ICD-10-CM

## 2025-01-31 DIAGNOSIS — Z45.02 ICD (IMPLANTABLE CARDIOVERTER-DEFIBRILLATOR) DISCHARGE: ICD-10-CM

## 2025-01-31 DIAGNOSIS — I10 ESSENTIAL HYPERTENSION, BENIGN: ICD-10-CM

## 2025-01-31 DIAGNOSIS — Z86.73 HISTORY OF CVA (CEREBROVASCULAR ACCIDENT): ICD-10-CM

## 2025-01-31 DIAGNOSIS — Z01.810 PREOP CARDIOVASCULAR EXAM: ICD-10-CM

## 2025-01-31 PROCEDURE — 93282 PRGRMG EVAL IMPLANTABLE DFB: CPT

## 2025-01-31 RX ORDER — PRAMIPEXOLE DIHYDROCHLORIDE 0.25 MG/1
0.25 TABLET ORAL NIGHTLY
COMMUNITY
Start: 2025-01-20

## 2025-01-31 RX ORDER — METOPROLOL SUCCINATE 50 MG/1
50 TABLET, EXTENDED RELEASE ORAL DAILY
Qty: 90 TABLET | Refills: 3 | Status: SHIPPED | OUTPATIENT
Start: 2025-01-31 | End: 2026-01-31

## 2025-01-31 ASSESSMENT — ENCOUNTER SYMPTOMS
PALPITATIONS: 0
DYSPNEA ON EXERTION: 0

## 2025-01-31 NOTE — PATIENT INSTRUCTIONS
Continue same medications/treatment.  Patient educated on proper medication use.  Patient educated on risk factor modification.  Please bring any lab results from other providers/physicians to your next appointment.    Please bring all medicines, vitamins, and herbal supplements with you when you come to the office.    Prescriptions will not be filled unless you are compliant with your follow up appointments or have a follow up appointment scheduled as per instruction of your physician. Refills should be requested at the time of your visit.    INCREASE metoprolol succinate to 50mg daily  SCHEDULE lexiscan nuclear stress test  Follow up with Dr. Barclay after stress test   Follow up with Dr. Jenkins in 6 months with device check   Continue remote checks at 3 and 9 months    LAURA QUINONES RN, AM SCRIBING FOR AND IN THE PRESENCE OF DR. CURT JENKINS MD, FACC, FACP, FHRS

## 2025-01-31 NOTE — PROGRESS NOTES
"Chief Complaint:   Follow-up (Patient is present for 6 month follow up with device check /)     History Of Present Illness:    Ne Richardson is a 64 y.o. female presenting with follow-up.  She does not recall shock.  She states she might have been sleeping at home.    Last Recorded Vitals:  Vitals:    01/31/25 1244 01/31/25 1329   BP: 142/70 120/74   BP Location: Right arm    Patient Position: Sitting    Pulse: 86    Weight: 79.4 kg (175 lb)    Height: 1.575 m (5' 2\")        Past Medical History:  See List    Past Surgical History:  See List      Social History:  She reports that she quit smoking about 2 months ago. Her smoking use included cigars. She has never used smokeless tobacco. She reports current alcohol use of about 1.0 standard drink of alcohol per week. She reports current drug use. Frequency: 3.00 times per week. Drug: Marijuana.    Family History:  Family History   Problem Relation Name Age of Onset    Coronary artery disease Mother      Diabetes Mother      Other (Cardiac pacemaker) Father      Coronary artery disease Father      Other (Stroke syndrome) Other          Allergies:  Ropinirole    Outpatient Medications:  Current Outpatient Medications   Medication Instructions    albuterol 90 mcg/actuation inhaler 1-2 puffs, Every 4 hours PRN    buPROPion SR (WELLBUTRIN SR) 200 mg, 2 times daily    cariprazine (VRAYLAR) 1.5 mg    cholecalciferol (VITAMIN D-3) 3,000 Units, Daily    clonazePAM (KlonoPIN) 2 mg tablet 1 tablet, Nightly    desvenlafaxine (PRISTIQ) 100 mg, Nightly    docusate sodium (COLACE) 100 mg, Daily    ezetimibe (Zetia) 10 mg tablet 1 tablet, Nightly    furosemide (LASIX) 40 mg, oral, Daily    glucagon (Baqsimi) 3 mg/actuation spray,non-aerosol 1 spray, As needed    glucagon 1 mg injection Use as directed for hypoglycemia    insulin aspart (NovoLOG Flexpen U-100 Insulin) 100 unit/mL (3 mL) pen 3 times daily before meals    insulin glargine (LANTUS) 30 Units, Nightly    levothyroxine " (SYNTHROID, LEVOXYL) 88 mcg, Daily RT    magnesium oxide 500 mg capsule 1 capsule, Nightly    metoclopramide (REGLAN) 5 mg, 2 times daily    metoprolol succinate XL (TOPROL-XL) 50 mg, oral, Daily, Do not crush or chew.    oxybutynin XL (DITROPAN-XL) 10 mg, Nightly    pantoprazole (PROTONIX) 40 mg, oral, Nightly, Do not crush, chew, or split.    potassium chloride CR 20 mEq ER tablet 20 mEq, Daily    pramipexole (MIRAPEX) 0.25 mg, Nightly    rOPINIRole (Requip) 2 mg tablet 1 tablet, Nightly    rosuvastatin (Crestor) 40 mg tablet 1 tablet, Daily    spironolactone (ALDACTONE) 12.5 mg, oral, Daily    topiramate (Topamax) 200 mg tablet 1 tablet, 2 times daily    warfarin (Coumadin) 5 mg tablet 2.5 tablets, 5 times weekly    warfarin (Coumadin) 7.5 mg tablet 2 tablets, 2 times weekly    warfarin (Coumadin) 7.5 mg tablet Take as directed per After Visit Summary.   Review of Systems   Cardiovascular:  Negative for chest pain, dyspnea on exertion and palpitations.   All other systems reviewed and are negative.        Physical Exam:  Marijuana  Constitutional:       General: Awake.      Appearance: Normal and healthy appearance. Well-developed and not in distress. Obese.   Neck:      Vascular: No JVR. JVD normal.   Pulmonary:      Effort: Pulmonary effort is normal.      Breath sounds: Normal breath sounds. No wheezing. No rhonchi. No rales.   Chest:      Chest wall: Not tender to palpatation.      Comments: Left sided device pocket- healed and well approximated. No swelling or hematoma      Cardiovascular:      PMI at left midclavicular line. Normal rate. Regular rhythm. Normal S1. Normal S2.       Murmurs: There is no murmur.      No gallop.  No click. No rub.   Pulses:     Intact distal pulses.   Edema:     Peripheral edema absent.   Abdominal:      Tenderness: There is no abdominal tenderness.   Musculoskeletal: Normal range of motion.         General: No tenderness. Skin:     General: Skin is warm and dry.    Neurological:      General: No focal deficit present.      Mental Status: Alert and oriented to person, place and time.     Marijuana       Last Labs:  CBC -  Lab Results   Component Value Date    WBC 9.9 12/14/2024    HGB 11.7 (L) 12/14/2024    HCT 34.9 (L) 12/14/2024     (H) 12/14/2024     (L) 12/14/2024       CMP -  Lab Results   Component Value Date    CALCIUM 7.5 (L) 12/14/2024    PHOS 2.0 (L) 12/13/2024    PROT 8.4 (H) 12/13/2024    ALBUMIN 4.7 12/13/2024    AST 18 12/13/2024    ALT 28 12/13/2024    ALKPHOS 94 12/13/2024    BILITOT 0.9 12/13/2024       LIPID PANEL -   Lab Results   Component Value Date    CHOL 103 04/24/2024    TRIG 177 (H) 04/24/2024    HDL 30.8 04/24/2024    CHHDL 3.3 04/24/2024    LDLF 63 08/15/2019    VLDL 35 04/24/2024    NHDL 72 04/24/2024       RENAL FUNCTION PANEL -   Lab Results   Component Value Date    GLUCOSE 159 (H) 12/14/2024     12/14/2024    K 3.6 12/14/2024     (H) 12/14/2024    CO2 21 12/14/2024    ANIONGAP 9 (L) 12/14/2024    BUN 24 (H) 12/14/2024    CREATININE 1.15 (H) 12/14/2024    CALCIUM 7.5 (L) 12/14/2024    PHOS 2.0 (L) 12/13/2024    ALBUMIN 4.7 12/13/2024        Lab Results   Component Value Date     (H) 10/14/2024    HGBA1C 7.6 (H) 01/28/2025    HGBA1C 8.1 (H) 10/13/2024       Last Cardiology Tests:  ECG:  ECG 12 Lead 12/13/2024    Today.  Normal sinus rhythm.  Right axis deviation.  Corrected QT at 400 ms    Device evaluation today.  [employer] CD  Q ICD on field alert.  Longevity device over 8 years.  Unsuccessful ATP.  Appropriate 30 J shock.  Appropriate treatment of ventricular fibrillation.    Echo:  Transthoracic Echo (TTE) Complete 10/14/2024      Ejection Fractions:  EF   Date/Time Value Ref Range Status   10/14/2024 07:39 AM 30 %        Cath:  Cardiac Catheterization Procedure 04/23/2024        Lab review: I have personally reviewed the laboratory result(s) see above    Assessment/Plan   Diagnoses and all orders for  this visit:  Cardiac defibrillator in situ  -     ECG 12 lead (Clinic Performed)  -     Cardiac Device Check - In Clinic; Future  -     Cardiac Device Check - Remote; Standing  Acute congestive heart failure, unspecified heart failure type  CAD S/P percutaneous coronary angioplasty  Chronic congestive heart failure, unspecified heart failure type  Essential hypertension, benign  History of MI (myocardial infarction)  Ischemic cardiomyopathy  Mixed hyperlipidemia  NSTEMI (non-ST elevated myocardial infarction) (Multi)  PSVT (paroxysmal supraventricular tachycardia) (CMS-HCC)  Ventricular tachycardia (Multi)  -     Nuclear Stress Test; Future  Current smoker  Syncope and collapse  History of CVA (cerebrovascular accident)  BMI 32.0-32.9,adult  Encounter for medication review and counseling  Marijuana smoker  Encounter to discuss treatment options  ICD (implantable cardioverter-defibrillator) discharge  -     Nuclear Stress Test; Future  Congestive heart failure, unspecified HF chronicity, unspecified heart failure type  -     metoprolol succinate XL (Toprol-XL) 50 mg 24 hr tablet; Take 1 tablet (50 mg) by mouth once daily. Do not crush or chew.  Preop cardiovascular exam  -     Nuclear Stress Test; Future    Ischemic cardiomyopathy, chronic, stable. LVEF 30% per echo 2017, [place] Heart Association  class II stage B heart failure. Reviewed meds. Continue meds. Discussed refills  Coronary artery disease, chronic. Status post remote PCI stent mid LAD. Last cath Apr 2024. Given VF and shock, recommend eval for ischemia. Nuclear stress test ordered. Then followup with Dr. Barclay for possible need for cath again.  Now secondary prevention ICD with VF shock. Initially was primary prevention ICD 2013 and gen change 2022.  History of noncompliance with follow-ups. Reenforced need for device followup and compliance with followup.  [employer] ICD.  Field alert device. Discussed field alert with pt and family.  Reviewed  standard of care for device checks. Ordered device checks.  Continue remote checks alternating with device clinic paired with EP office. Increase Metop succinate to 50 mg daily given VF. May need to add amiodarone in future.  PSVT, chronic. Stable. Reviewed meds.  See above regarding change in metoprolol.  Hypertension, chronic, stable. Reviewed meds. Continue meds. Discussed refills  Diabetes mellitus. Chronic. Follows with PCP  Hyperlipidemia. Chronic.On statin  Hypothyroidism, chronic. Stable, on replacement therapy. Follows with PCP.  Remote cerebrovascular accident, chronic. Stable, with no ongoing neurological deficits.  History of cerebral cavernous sinus thrombosis, on chronic warfarin therapy.  Medical marijuana usage.  Remote syncope. No arrhythmias to correlate with symptoms.  Overweight.  AHA recommendations for exercise, diet, and behavioral modification reviewed with pt.     Counseling greater than 50% visit performed.  The patient, , and I discussed the mechanism of arrhythmia, ventricular fibrillation, appropriate defibrillator shock , treated sudden cardiac arrest by shock, device check, magnesium oxide, increase metoprolol, potential triggers of VF, what are indications for and if refills needed of medications, new prescription for increased dose of beta blocker, nuclear stress test, followup with Dr. Barclay,  treatment options, risks, benefits, and imponderables. American Heart Association lifestyle changes and behavioral modification discussed. All questions answered in detail. Counseling over 50% visit regarding above. Patient appreciative of care.  I also left message for Dr. Barclay regarding patient being sent for stress testing.  Elodia Jenkins MD

## 2025-02-25 ENCOUNTER — HOSPITAL ENCOUNTER (OUTPATIENT)
Dept: RADIOLOGY | Facility: CLINIC | Age: 65
Discharge: HOME | End: 2025-02-25
Payer: MEDICARE

## 2025-02-25 ENCOUNTER — PREP FOR PROCEDURE (OUTPATIENT)
Dept: CARDIOLOGY | Facility: CLINIC | Age: 65
End: 2025-02-25
Payer: MEDICARE

## 2025-02-25 ENCOUNTER — HOSPITAL ENCOUNTER (OUTPATIENT)
Dept: CARDIOLOGY | Facility: CLINIC | Age: 65
Discharge: HOME | End: 2025-02-25
Payer: MEDICARE

## 2025-02-25 ENCOUNTER — TELEPHONE (OUTPATIENT)
Dept: CARDIOLOGY | Facility: CLINIC | Age: 65
End: 2025-02-25
Payer: MEDICARE

## 2025-02-25 DIAGNOSIS — I47.20 VENTRICULAR TACHYCARDIA (MULTI): Primary | ICD-10-CM

## 2025-02-25 DIAGNOSIS — Z45.02 ICD (IMPLANTABLE CARDIOVERTER-DEFIBRILLATOR) DISCHARGE: ICD-10-CM

## 2025-02-25 DIAGNOSIS — I47.20 VT (VENTRICULAR TACHYCARDIA) (MULTI): Primary | ICD-10-CM

## 2025-02-25 DIAGNOSIS — I47.20 VENTRICULAR TACHYCARDIA (MULTI): ICD-10-CM

## 2025-02-25 DIAGNOSIS — Z01.810 PREOP CARDIOVASCULAR EXAM: ICD-10-CM

## 2025-02-25 PROCEDURE — 3430000001 HC RX 343 DIAGNOSTIC RADIOPHARMACEUTICALS: Performed by: INTERNAL MEDICINE

## 2025-02-25 PROCEDURE — 2500000004 HC RX 250 GENERAL PHARMACY W/ HCPCS (ALT 636 FOR OP/ED): Performed by: INTERNAL MEDICINE

## 2025-02-25 PROCEDURE — 93017 CV STRESS TEST TRACING ONLY: CPT

## 2025-02-25 PROCEDURE — A9502 TC99M TETROFOSMIN: HCPCS | Performed by: INTERNAL MEDICINE

## 2025-02-25 PROCEDURE — 78452 HT MUSCLE IMAGE SPECT MULT: CPT

## 2025-02-25 RX ORDER — REGADENOSON 0.08 MG/ML
0.4 INJECTION, SOLUTION INTRAVENOUS ONCE
Status: COMPLETED | OUTPATIENT
Start: 2025-02-25 | End: 2025-02-25

## 2025-02-25 RX ORDER — AMINOPHYLLINE 25 MG/ML
50 INJECTION, SOLUTION INTRAVENOUS ONCE
Status: COMPLETED | OUTPATIENT
Start: 2025-02-25 | End: 2025-02-25

## 2025-02-25 RX ADMIN — TETROFOSMIN 11.1 MILLICURIE: 0.23 INJECTION, POWDER, LYOPHILIZED, FOR SOLUTION INTRAVENOUS at 08:00

## 2025-02-25 RX ADMIN — TETROFOSMIN 35.8 MILLICURIE: 0.23 INJECTION, POWDER, LYOPHILIZED, FOR SOLUTION INTRAVENOUS at 09:31

## 2025-02-25 RX ADMIN — REGADENOSON 0.4 MG: 0.08 INJECTION, SOLUTION INTRAVENOUS at 09:31

## 2025-02-25 RX ADMIN — AMINOPHYLLINE 50 MG: 25 INJECTION, SOLUTION INTRAVENOUS at 09:38

## 2025-02-25 NOTE — TELEPHONE ENCOUNTER
Patient informed and verbalized understanding. Patient advised of medications to hold. Routed to Bantam B in procedure scheduling for scheduling upon provider signing orders.

## 2025-02-25 NOTE — TELEPHONE ENCOUNTER
Received: Today  MD Chelsea Krueger, RN  Abnormal stress test  Call pt  Cath with Dr. Barclay  I called Dr. RAOMS and discussed      2/25/25  Left vm asking pt to call office regarding testing results and further testing that is needed.  Order placed for cath.  Per Dr. Jenkins hold Warfarin 2 days prior, no insulin AM of the procedure, and only take 1/2 dose of Lantus the night before.  Jeanette

## 2025-02-26 PROBLEM — I47.20 VT (VENTRICULAR TACHYCARDIA) (MULTI): Status: ACTIVE | Noted: 2025-02-25

## 2025-02-26 NOTE — TELEPHONE ENCOUNTER
Ying Ríos  You6 minutes ago (8:55 AM)     MB  No cath, KJQ said she didn't have to sign. Can you please check/thanks     2/26/25  Routed back to physician for clarification.  Original note said cath with Dr. Barclay.  Jeanette

## 2025-03-06 ENCOUNTER — HOSPITAL ENCOUNTER (OUTPATIENT)
Facility: HOSPITAL | Age: 65
Setting detail: OUTPATIENT SURGERY
Discharge: HOME | End: 2025-03-06
Attending: INTERNAL MEDICINE | Admitting: INTERNAL MEDICINE
Payer: MEDICARE

## 2025-03-06 ENCOUNTER — APPOINTMENT (OUTPATIENT)
Dept: CARDIOLOGY | Facility: HOSPITAL | Age: 65
End: 2025-03-06
Payer: MEDICARE

## 2025-03-06 VITALS
HEART RATE: 88 BPM | SYSTOLIC BLOOD PRESSURE: 173 MMHG | OXYGEN SATURATION: 96 % | BODY MASS INDEX: 31.56 KG/M2 | DIASTOLIC BLOOD PRESSURE: 93 MMHG | WEIGHT: 171.52 LBS | TEMPERATURE: 97.2 F | HEIGHT: 62 IN | RESPIRATION RATE: 12 BRPM

## 2025-03-06 DIAGNOSIS — I47.20 VT (VENTRICULAR TACHYCARDIA) (MULTI): Primary | ICD-10-CM

## 2025-03-06 LAB
ANION GAP SERPL CALC-SCNC: 12 MMOL/L (ref 10–20)
APTT PPP: 30 SECONDS (ref 26–36)
ATRIAL RATE: 77 BPM
BUN SERPL-MCNC: 31 MG/DL (ref 6–23)
CALCIUM SERPL-MCNC: 8.5 MG/DL (ref 8.6–10.3)
CHLORIDE SERPL-SCNC: 107 MMOL/L (ref 98–107)
CO2 SERPL-SCNC: 25 MMOL/L (ref 21–32)
CREAT SERPL-MCNC: 1.33 MG/DL (ref 0.5–1.05)
EGFRCR SERPLBLD CKD-EPI 2021: 45 ML/MIN/1.73M*2
GLUCOSE SERPL-MCNC: 164 MG/DL (ref 74–99)
INR PPP: 1 (ref 0.9–1.1)
P AXIS: 68 DEGREES
P OFFSET: 201 MS
P ONSET: 146 MS
POTASSIUM SERPL-SCNC: 4.4 MMOL/L (ref 3.5–5.3)
PR INTERVAL: 142 MS
PROTHROMBIN TIME: 11.2 SECONDS (ref 9.8–12.4)
Q ONSET: 217 MS
QRS COUNT: 13 BEATS
QRS DURATION: 84 MS
QT INTERVAL: 414 MS
QTC CALCULATION(BAZETT): 468 MS
QTC FREDERICIA: 449 MS
R AXIS: 98 DEGREES
SODIUM SERPL-SCNC: 140 MMOL/L (ref 136–145)
T AXIS: 84 DEGREES
T OFFSET: 424 MS
VENTRICULAR RATE: 77 BPM

## 2025-03-06 PROCEDURE — 82374 ASSAY BLOOD CARBON DIOXIDE: CPT | Performed by: NURSE PRACTITIONER

## 2025-03-06 PROCEDURE — 93458 L HRT ARTERY/VENTRICLE ANGIO: CPT | Performed by: INTERNAL MEDICINE

## 2025-03-06 PROCEDURE — G0269 OCCLUSIVE DEVICE IN VEIN ART: HCPCS | Mod: 59 | Performed by: INTERNAL MEDICINE

## 2025-03-06 PROCEDURE — 99153 MOD SED SAME PHYS/QHP EA: CPT | Performed by: INTERNAL MEDICINE

## 2025-03-06 PROCEDURE — 80048 BASIC METABOLIC PNL TOTAL CA: CPT | Performed by: NURSE PRACTITIONER

## 2025-03-06 PROCEDURE — 2500000005 HC RX 250 GENERAL PHARMACY W/O HCPCS: Performed by: INTERNAL MEDICINE

## 2025-03-06 PROCEDURE — C1760 CLOSURE DEV, VASC: HCPCS | Performed by: INTERNAL MEDICINE

## 2025-03-06 PROCEDURE — 2550000001 HC RX 255 CONTRASTS: Performed by: INTERNAL MEDICINE

## 2025-03-06 PROCEDURE — 2720000007 HC OR 272 NO HCPCS: Performed by: INTERNAL MEDICINE

## 2025-03-06 PROCEDURE — 99152 MOD SED SAME PHYS/QHP 5/>YRS: CPT | Performed by: INTERNAL MEDICINE

## 2025-03-06 PROCEDURE — 7100000010 HC PHASE TWO TIME - EACH INCREMENTAL 1 MINUTE: Performed by: INTERNAL MEDICINE

## 2025-03-06 PROCEDURE — 36415 COLL VENOUS BLD VENIPUNCTURE: CPT | Performed by: NURSE PRACTITIONER

## 2025-03-06 PROCEDURE — 99024 POSTOP FOLLOW-UP VISIT: CPT | Performed by: NURSE PRACTITIONER

## 2025-03-06 PROCEDURE — 93005 ELECTROCARDIOGRAM TRACING: CPT

## 2025-03-06 PROCEDURE — 7100000002 HC RECOVERY ROOM TIME - EACH INCREMENTAL 1 MINUTE: Performed by: INTERNAL MEDICINE

## 2025-03-06 PROCEDURE — 99223 1ST HOSP IP/OBS HIGH 75: CPT | Performed by: NURSE PRACTITIONER

## 2025-03-06 PROCEDURE — 2780000003 HC OR 278 NO HCPCS: Performed by: INTERNAL MEDICINE

## 2025-03-06 PROCEDURE — 85610 PROTHROMBIN TIME: CPT | Performed by: NURSE PRACTITIONER

## 2025-03-06 PROCEDURE — 2500000001 HC RX 250 WO HCPCS SELF ADMINISTERED DRUGS (ALT 637 FOR MEDICARE OP): Performed by: INTERNAL MEDICINE

## 2025-03-06 PROCEDURE — 7100000001 HC RECOVERY ROOM TIME - INITIAL BASE CHARGE: Performed by: INTERNAL MEDICINE

## 2025-03-06 PROCEDURE — 7100000009 HC PHASE TWO TIME - INITIAL BASE CHARGE: Performed by: INTERNAL MEDICINE

## 2025-03-06 PROCEDURE — 2500000001 HC RX 250 WO HCPCS SELF ADMINISTERED DRUGS (ALT 637 FOR MEDICARE OP): Performed by: NURSE PRACTITIONER

## 2025-03-06 PROCEDURE — 2500000004 HC RX 250 GENERAL PHARMACY W/ HCPCS (ALT 636 FOR OP/ED): Performed by: INTERNAL MEDICINE

## 2025-03-06 RX ORDER — FENTANYL CITRATE 50 UG/ML
INJECTION, SOLUTION INTRAMUSCULAR; INTRAVENOUS AS NEEDED
Status: DISCONTINUED | OUTPATIENT
Start: 2025-03-06 | End: 2025-03-06 | Stop reason: HOSPADM

## 2025-03-06 RX ORDER — LIDOCAINE HYDROCHLORIDE 20 MG/ML
INJECTION, SOLUTION INFILTRATION; PERINEURAL AS NEEDED
Status: DISCONTINUED | OUTPATIENT
Start: 2025-03-06 | End: 2025-03-06 | Stop reason: HOSPADM

## 2025-03-06 RX ORDER — RANOLAZINE 500 MG/1
500 TABLET, EXTENDED RELEASE ORAL ONCE
Status: COMPLETED | OUTPATIENT
Start: 2025-03-06 | End: 2025-03-06

## 2025-03-06 RX ORDER — ASPIRIN 325 MG
TABLET ORAL AS NEEDED
Status: DISCONTINUED | OUTPATIENT
Start: 2025-03-06 | End: 2025-03-06 | Stop reason: HOSPADM

## 2025-03-06 RX ORDER — MIDAZOLAM HYDROCHLORIDE 1 MG/ML
INJECTION, SOLUTION INTRAMUSCULAR; INTRAVENOUS AS NEEDED
Status: DISCONTINUED | OUTPATIENT
Start: 2025-03-06 | End: 2025-03-06 | Stop reason: HOSPADM

## 2025-03-06 RX ADMIN — RANOLAZINE 500 MG: 500 TABLET, FILM COATED, EXTENDED RELEASE ORAL at 07:11

## 2025-03-06 ASSESSMENT — COLUMBIA-SUICIDE SEVERITY RATING SCALE - C-SSRS
6. HAVE YOU EVER DONE ANYTHING, STARTED TO DO ANYTHING, OR PREPARED TO DO ANYTHING TO END YOUR LIFE?: NO
2. HAVE YOU ACTUALLY HAD ANY THOUGHTS OF KILLING YOURSELF?: NO
1. IN THE PAST MONTH, HAVE YOU WISHED YOU WERE DEAD OR WISHED YOU COULD GO TO SLEEP AND NOT WAKE UP?: NO

## 2025-03-06 ASSESSMENT — PAIN SCALES - GENERAL
PAINLEVEL_OUTOF10: 0 - NO PAIN

## 2025-03-06 ASSESSMENT — ENCOUNTER SYMPTOMS
EYES NEGATIVE: 1
CARDIOVASCULAR NEGATIVE: 1
ALLERGIC/IMMUNOLOGIC NEGATIVE: 1
PALPITATIONS: 0
ENDOCRINE NEGATIVE: 1
CONSTITUTIONAL NEGATIVE: 1
LIGHT-HEADEDNESS: 0
WEAKNESS: 0
RESPIRATORY NEGATIVE: 1
CHEST TIGHTNESS: 0
PSYCHIATRIC NEGATIVE: 1
SHORTNESS OF BREATH: 0
HEMATOLOGIC/LYMPHATIC NEGATIVE: 1
DIZZINESS: 0
NEUROLOGICAL NEGATIVE: 1
MUSCULOSKELETAL NEGATIVE: 1
GASTROINTESTINAL NEGATIVE: 1

## 2025-03-06 ASSESSMENT — PAIN - FUNCTIONAL ASSESSMENT: PAIN_FUNCTIONAL_ASSESSMENT: 0-10

## 2025-03-06 NOTE — H&P
History Of Present Illness  Ne Richardson is a 65 y.o. female with past medical history significant for ischemic cardiomyopathy, LVEF 30% per echocardiogram October, 2024, chronic diastolic heart failure, VT, s/p remote ICD, right remote MI, CAD, remote PCI, HTN, HLD, history of CVA who was evaluated by electrophysiologist, Dr. Jenkins on 1/31/2025 after receiving an ICD shock reported on device check.  Patient was unaware that she received a shock.  It was an appropriate shock for the treatment of V-fib.  Patient believes that she may have been sleeping when it occurred.  It was recommended patient undergo nuclear stress test to further evaluate for progression of ischemic disease.  Findings of the Lexiscan stress test revealed a large severe anterior and anterior apical fixed defect suggesting myocardial infarction and an LVEF of 29%.  It was recommended patient undergo LHC/possible PCI.  Patient is at [facility] today for this procedure under the care of Dr. Barclay.    Patient denies recent complaints of illness, fevers, chills, sweats or exposure to COVID-19.  Denies complaints of lightheadedness, dizziness, chest pain, shortness of breath or palpitations.  Denies complaints of nausea, vomiting, diarrhea or constipation.  Denies complaints of lower extremity edema or weakness.     Past Medical History  Past Medical History:   Diagnosis Date    Acute myocardial infarction, unspecified 01/10/2022    Acute MI    Body mass index (BMI) 33.0-33.9, adult 02/01/2022    BMI 33.0-33.9,adult    CHF (congestive heart failure)     Coronary artery disease     Diabetes mellitus (Multi)     Disorder of the skin and subcutaneous tissue, unspecified 07/01/2020    Back skin lesion    Encounter for preprocedural cardiovascular examination 01/10/2022    Pre-operative cardiovascular examination    Hyperlipidemia     Hypertensive heart disease with heart failure 05/21/2020    Hypertensive heart disease with chronic  combined systolic and diastolic congestive heart failure    Intracranial and intraspinal phlebitis and thrombophlebitis ([facility]) 01/10/2022    Cavernous sinus thrombosis    Ischemic cardiomyopathy     Nausea 01/10/2022    Chronic nausea    Obesity, unspecified 2022    Class 1 obesity with body mass index (BMI) of 33.0 to 33.9 in adult    Obesity, unspecified 2022    Class 1 obesity with body mass index (BMI) of 32.0 to 32.9 in adult    Old myocardial infarction     History of myocardial infarction    Other mechanical complication of other cardiac electronic device, initial encounter 2022    Mechanical complication of implantable cardioverter-defibrillator (ICD)    Other specified counseling 2022    Encounter for medication counseling    Other specified diseases of gallbladder 2020    Porcelain gallbladder    Overweight 01/10/2022    Overweight    Person consulting for explanation of examination or test findings 2022    Encounter to discuss test results    Personal history of other diseases of the digestive system 2020    History of gallbladder disease    Personal history of other venous thrombosis and embolism     History of deep venous thrombosis    Stroke (Multi)     Transient cerebral ischemic attack, unspecified     TIA (transient ischemic attack)     Surgical History  Past Surgical History:   Procedure Laterality Date    CARDIAC CATHETERIZATION N/A 2024    Procedure: Left Heart Cath, With LV;  Surgeon: Jose Barclay MD;  Location: [facility];  Service: Cardiovascular;  Laterality: N/A;    CT ABDOMEN PELVIS ANGIOGRAM W AND/OR WO IV CONTRAST  2019    CT ABDOMEN PELVIS ANGIOGRAM W AND/OR WO IV CONTRAST 2019 [facility] CLINICAL LEGACY    OTHER SURGICAL HISTORY  2020     section    OTHER SURGICAL HISTORY  10/12/2022    Corneal lasik    OTHER SURGICAL HISTORY  10/12/2022    Skin lesion excision    OTHER SURGICAL HISTORY  01/10/2022     Colonoscopy    OTHER SURGICAL HISTORY  01/10/2022    Hysterectomy    OTHER SURGICAL HISTORY  01/10/2022    Surgery    OTHER SURGICAL HISTORY  01/10/2022    Median nerve neuroplasty    OTHER SURGICAL HISTORY  01/10/2022    Cardioverter defibrillator insertion    OTHER SURGICAL HISTORY  01/10/2022    Knee surgery    OTHER SURGICAL HISTORY  01/10/2022    Ankle surgery    OTHER SURGICAL HISTORY  01/10/2022    Tonsillectomy    OTHER SURGICAL HISTORY  01/10/2022    Hand surgery    OTHER SURGICAL HISTORY  02/01/2022    Knee fracture repair    OTHER SURGICAL HISTORY  02/01/2022    Cholecystectomy     Social History  Marijuana use (3 times weekly)  Tobacco use  Occasional alcohol use (1 drink weekly)    Family History  Family History   Problem Relation Name Age of Onset    Coronary artery disease Mother      Diabetes Mother      Other (Cardiac pacemaker) Father      Coronary artery disease Father      Other (Stroke syndrome) Other       Allergies  Patient has no known allergies.    Review of Systems   Constitutional: Negative.    HENT: Negative.     Eyes: Negative.    Respiratory: Negative.  Negative for chest tightness and shortness of breath.    Cardiovascular: Negative.  Negative for chest pain and palpitations.   Gastrointestinal: Negative.    Endocrine: Negative.    Genitourinary: Negative.    Musculoskeletal: Negative.    Skin: Negative.    Allergic/Immunologic: Negative.    Neurological: Negative.  Negative for dizziness, weakness and light-headedness.   Hematological: Negative.    Psychiatric/Behavioral: Negative.       Physical Exam  Vitals reviewed.   Constitutional:       Appearance: Normal appearance. She is obese.   HENT:      Head: Normocephalic and atraumatic.      Nose: Nose normal.      Mouth/Throat:      Mouth: Mucous membranes are moist.      Pharynx: Oropharynx is clear.   Eyes:      Pupils: Pupils are equal, round, and reactive to light.   Cardiovascular:      Rate and Rhythm: Normal rate and regular  "rhythm.      Pulses: Normal pulses.      Heart sounds: Normal heart sounds.   Pulmonary:      Effort: Pulmonary effort is normal.      Breath sounds: Normal breath sounds.   Abdominal:      General: Bowel sounds are normal.      Palpations: Abdomen is soft.   Musculoskeletal:         General: Normal range of motion.      Cervical back: Normal range of motion and neck supple.   Skin:     General: Skin is warm and dry.   Neurological:      General: No focal deficit present.      Mental Status: She is alert and oriented to person, place, and time.   Psychiatric:         Mood and Affect: Mood normal.         Behavior: Behavior normal.       Last Recorded Vitals  Blood pressure 126/73, pulse 81, temperature 36.2 °C (97.2 °F), temperature source Temporal, resp. rate 18, height 1.575 m (5' 2\"), weight 77.8 kg (171 lb 8.3 oz), SpO2 95%.    Relevant Results  Results for orders placed or performed during the hospital encounter of 03/06/25 (from the past 24 hours)   Basic Metabolic Panel   Result Value Ref Range    Glucose 164 (H) 74 - 99 mg/dL    Sodium 140 136 - 145 mmol/L    Potassium 4.4 3.5 - 5.3 mmol/L    Chloride 107 98 - 107 mmol/L    Bicarbonate 25 21 - 32 mmol/L    Anion Gap 12 10 - 20 mmol/L    Urea Nitrogen 31 (H) 6 - 23 mg/dL    Creatinine 1.33 (H) 0.50 - 1.05 mg/dL    eGFR 45 (L) >60 mL/min/1.73m*2    Calcium 8.5 (L) 8.6 - 10.3 mg/dL   Coagulation Screen   Result Value Ref Range    Protime 11.2 9.8 - 12.4 seconds    INR 1.0 0.9 - 1.1    aPTT 30 26 - 36 seconds   ECG 12 lead STAT   Result Value Ref Range    Ventricular Rate 77 BPM    Atrial Rate 77 BPM    AK Interval 142 ms    QRS Duration 84 ms    QT Interval 414 ms    QTC Calculation(Bazett) 468 ms    P Axis 68 degrees    R Axis 98 degrees    T Axis 84 degrees    QRS Count 13 beats    Q Onset 217 ms    P Onset 146 ms    P Offset 201 ms    T Offset 424 ms    QTC Fredericia 449 ms      ECG 12 lead STAT  Result Date: 3/6/2025  Normal sinus rhythm Anterolateral " infarct (cited on or before 22-APR-2024) Abnormal ECG When compared with ECG of 25-FEB-2025 09:29, T wave inversion now evident in Anterior leads    Nuclear Stress Test  Result Date: 2/25/2025  Abnormal Lexiscan Myoview cardiac perfusion stress test. No myocardial ischemia by perfusion imaging. Large severe anterior and anterior apical fixed defect suggesting myocardial infarction Abnormal left ventricular systolic function. Left ventricular ejection fraction 29% The above study is abnormal. It shows a large anterior apical and anterior wall myocardial infarction previously documented on the study done here in 2020. No independent ischemia seen. Significantly in. Ejection fraction is consistent with prior study. Patient did have some symptomatology from the infusion during the study which was reversed by aminophylline.     Signed by: Nader Wilkinson 2/25/2025 2:54 PM Dictation workstation:   [id number]     Assessment/Plan   Abnormal nuclear stress test   -Findings of the stress test revealed large severe anterior and anterior apical fixed defect suggesting MI with an LVEF of 29%.   -LHC/possible PCI today under the care of Dr. Barclay.  2.   Ischemic cardiomyopathy/chronic diastolic heart failure   -S/p remote DC ICD  3.   ICD shock/V-fib   -Appropriate shock for the treatment of V-fib reported on device check 1/31/2025.   -Patient was not aware she received a shock.  4.   Coronary artery disease/remote PCI/remote MI  5.   Benign essential hypertension -stable  6.   Hyperlipidemia -on statin therapy  7.   Diabetes mellitus 2 -insulin-dependent  8.   Remote CVA  9.   Obesity -BMI 31.4  10.   Hypothyroidism -replacement therapy    Further recommendations pending procedure results.  I spent 75 minutes in the professional and overall care of this patient.      Christina Staley, APRJOSSUE-CNP

## 2025-03-06 NOTE — PROGRESS NOTES
Patient is stable status post C under the care of Dr. Barclay.  Discussed results of procedure with patient and her family member.  Pictures provided.  Findings of the LHC revealed chronically occluded LAD with collaterals, mild disease elsewhere and an EF of 20 to 25%.  Medical management is advised.  Please see procedural report for complete details.  Patient is scheduled to follow-up with Dr. Barclay in April, 2025 or sooner as needed.  She is to follow the instructions provided to her from the [facility] for resumption of her Warfarin and bridging with Lovenox.  Postprocedural activity, restrictions, potential complications, medications and future follow-up discussed at length.  All questions answered.  Both verbalized understanding.

## 2025-03-06 NOTE — DISCHARGE INSTRUCTIONS
**Please follow the orders provided to you from the [facility] for resuming your warfarin and bridging with Lovenox injections.              CARDIAC CATHETERIZATION DISCHARGE INSTRUCTIONS     FOR SUDDEN AND SEVERE CHEST PAIN, SHORTNESS OF BREATH, EXCESSIVE BLEEDING, SIGNS OF STROKE, OR CHANGES IN MENTAL STATUS YOU SHOULD CALL 911 IMMEDIATELY.       FOR NEXT 24 HOURS  - Upon discharge, you should return home and rest for the remainder of the day and evening. You do not have to stay on bed rest but should not be very active.  It is recommended a responsible adult be with you for the first 24 hours after the procedure.    - No driving for 24 hours after procedure. Please arrange for someone to drive you home from the hospital today.     - Do not drive, operate machinery, or use power tools for 24 hours after your procedure.     - Do not make any legal decisions for 24 hours after your procedure.     - Do not drink alcoholic beverages for 24 hours after your procedure.    WOUND CARE   *FOR FEMORAL (LEG) ACCESS*  ·      Avoid heavy lifting (over 10 pounds) for 7 days, squatting or excessive bending for 2 days, and strenuous exercise for 7 days.  ·      No submerged bathing, swimming, or hot tubs for the next 7 days, or until fully healed.  ·      Avoid sexual activity for 3-4 days until any groin discomfort has ceased.    - The transparent dressing should be removed from the site 24 hours after the procedure.  Wash the site gently with soap and water. Rinse well and pat dry. Keep the area clean and dry. You may apply a Band-Aid to the site. Avoid lotions, ointments, or powders until fully healed.     - You may shower the day after your procedure.      - It is normal to notice a small bruise around the puncture site and/or a small grape sized or smaller lump. Any large bruising or large lump warrants a call to the office.     - If bleeding should occur, lay down and apply pressure to the affected  area for 10 minutes.  If the bleeding stops notify your physician.  If there is a large amount of bleeding or spurting of blood CALL 911 immediately.  DO NOT drive yourself to the hospital.    - You may experience some tenderness, bruising or minimal inflammation.  If you have any concerns, you may contact the Cath Lab or if any of these symptoms become excessive, contact your cardiologist or go to the emergency room.     OTHER INSTRUCTIONS  - You may take acetaminophen (Tylenol) as directed for discomfort.  If pain is not relieved with acetaminophen (Tylenol), contact your doctor.    - If you notice or experience any of the following, you should notify your doctor or seek medical attention  Chest pain or discomfort  Change in mental status or weakness in extremities.  Dizziness, light headedness, or feeling faint.  Change in the site where the procedure was performed, such as bleeding or an increased area of bruising or swelling.  Tingling, numbness, pain, or coolness in the leg/arm beyond the site where the procedure was performed.  Signs of infection (i.e. shaking chills, temperature > 100 degrees Fahrenheit, warmth, redness) in the leg/arm area where the procedure was performed.  Changes in urination   Bloody or black stools  Vomiting blood  Severe nose bleeds  Any excessive bleeding    - If you DO NOT have an appointment with your cardiologist within 2-4 weeks following your procedure, please contact their office.

## 2025-03-06 NOTE — POST-PROCEDURE NOTE
Physician Transition of Care Summary  Invasive Cardiovascular Lab    Procedure Date: 3/6/2025  Attending:    * Jose Barclay - Primary  Resident/Fellow/Other Assistant: Surgeons and Role:  * No surgeons found with a matching role *    Pre Procedure Diagnosis:   CAD, remote anterior myocardial infarction, ischemic cardiomyopathy, ICD appropriate shock for ventricular tachycardia    Post Procedure Diagnosis:   CAD, single-vessel, chronically occluded mid LAD with distal collateral filling, left ventricular ejection fraction 20 to 25% with large area of apical and anterior infarction    Complications:   None    Stents/Implants:   None    Anticoagulation/Antiplatelet Plan:   As prior to cardiac catheterization, restart warfarin today    Estimated Blood Loss:   0 mL    Electronically signed by: Jose Barclay MD, 3/6/2025 8:37 AM    Anesthesia: Moderate                            anesthesia Staff: None

## 2025-03-06 NOTE — SIGNIFICANT EVENT
Discharge instructions given to pt including wound care, medications and follow up, verbalized understanding

## 2025-03-06 NOTE — SIGNIFICANT EVENT
Pt back from cath lab, right groin site stable, no bleeding or hematoma, pt awake, no complaints of pain, family at bedside

## 2025-03-10 ENCOUNTER — APPOINTMENT (OUTPATIENT)
Dept: CARDIOLOGY | Facility: CLINIC | Age: 65
End: 2025-03-10
Payer: MEDICARE

## 2025-03-11 LAB
ATRIAL RATE: 77 BPM
P AXIS: 68 DEGREES
P OFFSET: 201 MS
P ONSET: 146 MS
PR INTERVAL: 142 MS
Q ONSET: 217 MS
QRS COUNT: 13 BEATS
QRS DURATION: 84 MS
QT INTERVAL: 414 MS
QTC CALCULATION(BAZETT): 468 MS
QTC FREDERICIA: 449 MS
R AXIS: 98 DEGREES
T AXIS: 84 DEGREES
T OFFSET: 424 MS
VENTRICULAR RATE: 77 BPM

## 2025-04-09 ENCOUNTER — APPOINTMENT (OUTPATIENT)
Dept: CARDIOLOGY | Facility: CLINIC | Age: 65
End: 2025-04-09
Payer: MEDICARE

## 2025-05-12 ENCOUNTER — TELEPHONE (OUTPATIENT)
Dept: CARDIOLOGY | Facility: CLINIC | Age: 65
End: 2025-05-12
Payer: MEDICARE

## 2025-05-12 NOTE — TELEPHONE ENCOUNTER
Left voicemail requesting patient call back to reschedule appointment with  and device checkon 08/05 since  will be out of office. Included call back number for our office in voicemail.

## 2025-08-04 ENCOUNTER — APPOINTMENT (OUTPATIENT)
Dept: CARDIOLOGY | Facility: HOSPITAL | Age: 65
End: 2025-08-04
Payer: MEDICARE

## 2025-08-04 ENCOUNTER — HOSPITAL ENCOUNTER (EMERGENCY)
Facility: HOSPITAL | Age: 65
Discharge: HOME | End: 2025-08-04
Attending: EMERGENCY MEDICINE
Payer: MEDICARE

## 2025-08-04 VITALS
SYSTOLIC BLOOD PRESSURE: 135 MMHG | DIASTOLIC BLOOD PRESSURE: 70 MMHG | WEIGHT: 170 LBS | HEART RATE: 70 BPM | BODY MASS INDEX: 31.28 KG/M2 | OXYGEN SATURATION: 97 % | RESPIRATION RATE: 18 BRPM | TEMPERATURE: 98.1 F | HEIGHT: 62 IN

## 2025-08-04 DIAGNOSIS — R73.9 HYPERGLYCEMIA: Primary | ICD-10-CM

## 2025-08-04 LAB
ANION GAP SERPL CALC-SCNC: 12 MMOL/L (ref 10–20)
APPEARANCE UR: CLEAR
B-OH-BUTYR SERPL-SCNC: 0.12 MMOL/L (ref 0.02–0.27)
BASOPHILS # BLD AUTO: 0.04 X10*3/UL (ref 0–0.1)
BASOPHILS NFR BLD AUTO: 0.5 %
BILIRUB UR STRIP.AUTO-MCNC: NEGATIVE MG/DL
BUN SERPL-MCNC: 32 MG/DL (ref 6–23)
CALCIUM SERPL-MCNC: 8.4 MG/DL (ref 8.6–10.3)
CARDIAC TROPONIN I PNL SERPL HS: 7 NG/L (ref 0–13)
CARDIAC TROPONIN I PNL SERPL HS: 7 NG/L (ref 0–13)
CHLORIDE SERPL-SCNC: 100 MMOL/L (ref 98–107)
CO2 SERPL-SCNC: 26 MMOL/L (ref 21–32)
COLOR UR: ABNORMAL
CREAT SERPL-MCNC: 1.85 MG/DL (ref 0.5–1.05)
EGFRCR SERPLBLD CKD-EPI 2021: 30 ML/MIN/1.73M*2
EOSINOPHIL # BLD AUTO: 0.08 X10*3/UL (ref 0–0.7)
EOSINOPHIL NFR BLD AUTO: 1 %
ERYTHROCYTE [DISTWIDTH] IN BLOOD BY AUTOMATED COUNT: 13.4 % (ref 11.5–14.5)
GLUCOSE BLD MANUAL STRIP-MCNC: 174 MG/DL (ref 74–99)
GLUCOSE BLD MANUAL STRIP-MCNC: 258 MG/DL (ref 74–99)
GLUCOSE BLD MANUAL STRIP-MCNC: 296 MG/DL (ref 74–99)
GLUCOSE SERPL-MCNC: 316 MG/DL (ref 74–99)
GLUCOSE UR STRIP.AUTO-MCNC: ABNORMAL MG/DL
HCT VFR BLD AUTO: 36.9 % (ref 36–46)
HGB BLD-MCNC: 12.5 G/DL (ref 12–16)
IMM GRANULOCYTES # BLD AUTO: 0.06 X10*3/UL (ref 0–0.7)
IMM GRANULOCYTES NFR BLD AUTO: 0.7 % (ref 0–0.9)
KETONES UR STRIP.AUTO-MCNC: NEGATIVE MG/DL
LACTATE SERPL-SCNC: 1.4 MMOL/L (ref 0.4–2)
LACTATE SERPL-SCNC: 2.5 MMOL/L (ref 0.4–2)
LEUKOCYTE ESTERASE UR QL STRIP.AUTO: NEGATIVE
LYMPHOCYTES # BLD AUTO: 3.09 X10*3/UL (ref 1.2–4.8)
LYMPHOCYTES NFR BLD AUTO: 37.6 %
MAGNESIUM SERPL-MCNC: 2.07 MG/DL (ref 1.6–2.4)
MCH RBC QN AUTO: 37.9 PG (ref 26–34)
MCHC RBC AUTO-ENTMCNC: 33.9 G/DL (ref 32–36)
MCV RBC AUTO: 112 FL (ref 80–100)
MONOCYTES # BLD AUTO: 0.59 X10*3/UL (ref 0.1–1)
MONOCYTES NFR BLD AUTO: 7.2 %
NEUTROPHILS # BLD AUTO: 4.35 X10*3/UL (ref 1.2–7.7)
NEUTROPHILS NFR BLD AUTO: 53 %
NITRITE UR QL STRIP.AUTO: NEGATIVE
NRBC BLD-RTO: 0 /100 WBCS (ref 0–0)
PH UR STRIP.AUTO: 7 [PH]
PLATELET # BLD AUTO: 146 X10*3/UL (ref 150–450)
POTASSIUM SERPL-SCNC: 4.3 MMOL/L (ref 3.5–5.3)
PROT UR STRIP.AUTO-MCNC: NEGATIVE MG/DL
RBC # BLD AUTO: 3.3 X10*6/UL (ref 4–5.2)
RBC # UR STRIP.AUTO: NEGATIVE MG/DL
SODIUM SERPL-SCNC: 134 MMOL/L (ref 136–145)
SP GR UR STRIP.AUTO: 1.01
UROBILINOGEN UR STRIP.AUTO-MCNC: NORMAL MG/DL
WBC # BLD AUTO: 8.2 X10*3/UL (ref 4.4–11.3)

## 2025-08-04 PROCEDURE — 2500000004 HC RX 250 GENERAL PHARMACY W/ HCPCS (ALT 636 FOR OP/ED): Performed by: EMERGENCY MEDICINE

## 2025-08-04 PROCEDURE — 36415 COLL VENOUS BLD VENIPUNCTURE: CPT | Performed by: EMERGENCY MEDICINE

## 2025-08-04 PROCEDURE — 85025 COMPLETE CBC W/AUTO DIFF WBC: CPT | Performed by: EMERGENCY MEDICINE

## 2025-08-04 PROCEDURE — 84484 ASSAY OF TROPONIN QUANT: CPT | Performed by: EMERGENCY MEDICINE

## 2025-08-04 PROCEDURE — 96361 HYDRATE IV INFUSION ADD-ON: CPT

## 2025-08-04 PROCEDURE — 82947 ASSAY GLUCOSE BLOOD QUANT: CPT

## 2025-08-04 PROCEDURE — 96374 THER/PROPH/DIAG INJ IV PUSH: CPT

## 2025-08-04 PROCEDURE — 81003 URINALYSIS AUTO W/O SCOPE: CPT | Performed by: EMERGENCY MEDICINE

## 2025-08-04 PROCEDURE — 80048 BASIC METABOLIC PNL TOTAL CA: CPT | Performed by: EMERGENCY MEDICINE

## 2025-08-04 PROCEDURE — 2500000002 HC RX 250 W HCPCS SELF ADMINISTERED DRUGS (ALT 637 FOR MEDICARE OP, ALT 636 FOR OP/ED): Performed by: EMERGENCY MEDICINE

## 2025-08-04 PROCEDURE — 93005 ELECTROCARDIOGRAM TRACING: CPT

## 2025-08-04 PROCEDURE — 83735 ASSAY OF MAGNESIUM: CPT | Performed by: EMERGENCY MEDICINE

## 2025-08-04 PROCEDURE — 96375 TX/PRO/DX INJ NEW DRUG ADDON: CPT

## 2025-08-04 PROCEDURE — 82010 KETONE BODYS QUAN: CPT | Performed by: EMERGENCY MEDICINE

## 2025-08-04 PROCEDURE — 83605 ASSAY OF LACTIC ACID: CPT | Performed by: EMERGENCY MEDICINE

## 2025-08-04 PROCEDURE — 99284 EMERGENCY DEPT VISIT MOD MDM: CPT | Mod: 25 | Performed by: EMERGENCY MEDICINE

## 2025-08-04 RX ORDER — DEXTROSE 50 % IN WATER (D50W) INTRAVENOUS SYRINGE
12.5
Status: DISCONTINUED | OUTPATIENT
Start: 2025-08-04 | End: 2025-08-04 | Stop reason: HOSPADM

## 2025-08-04 RX ORDER — VILAZODONE HYDROCHLORIDE 20 MG/1
20 TABLET ORAL DAILY
COMMUNITY

## 2025-08-04 RX ORDER — ONDANSETRON 4 MG/1
4 TABLET, ORALLY DISINTEGRATING ORAL EVERY 8 HOURS PRN
Qty: 10 TABLET | Refills: 0 | Status: SHIPPED | OUTPATIENT
Start: 2025-08-04 | End: 2025-08-07

## 2025-08-04 RX ORDER — ONDANSETRON HYDROCHLORIDE 2 MG/ML
4 INJECTION, SOLUTION INTRAVENOUS ONCE
Status: COMPLETED | OUTPATIENT
Start: 2025-08-04 | End: 2025-08-04

## 2025-08-04 RX ORDER — FAMOTIDINE 10 MG/ML
20 INJECTION, SOLUTION INTRAVENOUS ONCE
Status: COMPLETED | OUTPATIENT
Start: 2025-08-04 | End: 2025-08-04

## 2025-08-04 RX ADMIN — FAMOTIDINE 20 MG: 10 INJECTION, SOLUTION INTRAVENOUS at 14:21

## 2025-08-04 RX ADMIN — ONDANSETRON 4 MG: 2 INJECTION INTRAMUSCULAR; INTRAVENOUS at 14:21

## 2025-08-04 RX ADMIN — SODIUM CHLORIDE 1000 ML: 0.9 INJECTION, SOLUTION INTRAVENOUS at 13:48

## 2025-08-04 RX ADMIN — SODIUM CHLORIDE 1000 ML: 0.9 INJECTION, SOLUTION INTRAVENOUS at 15:40

## 2025-08-04 RX ADMIN — INSULIN HUMAN 2 UNITS: 100 INJECTION, SOLUTION PARENTERAL at 15:41

## 2025-08-04 ASSESSMENT — PAIN - FUNCTIONAL ASSESSMENT
PAIN_FUNCTIONAL_ASSESSMENT: 0-10
PAIN_FUNCTIONAL_ASSESSMENT: 0-10

## 2025-08-04 ASSESSMENT — PAIN SCALES - GENERAL
PAINLEVEL_OUTOF10: 0 - NO PAIN
PAINLEVEL_OUTOF10: 0 - NO PAIN

## 2025-08-04 NOTE — ED PROVIDER NOTES
"HPI   Chief Complaint   Patient presents with    Hyperglycemia     Pt reports high blood sugar at home. Meter would not read at home. Pt gave additional 10 units of humalog for high blood sugar. Pt 296 on arrival,.       Patient presents secondary to hyperglycemia.  States that her glucometer read \"high\" at home.  She states that she started a new psychiatric medication about a week ago however other than this no other new medications.  Physically, she states that she has nausea \"feels jittery\", and also states \"I feel like I could cry at any time\".  Denies chest pain, shortness of breath, fever, abdominal pain, or other symptoms.  She did take an additional dose of subcutaneous insulin prior to arrival.  Glucose was noted to be just under 300 by fingerstick at the time of arrival here.      History provided by:  Patient   used: No            Patient History   Medical History[1]  Surgical History[2]  Family History[3]  Social History[4]    Physical Exam   ED Triage Vitals [08/04/25 1326]   Temperature Heart Rate Respirations BP   37.2 °C (98.9 °F) (!) 104 18 148/86      Pulse Ox Temp src Heart Rate Source Patient Position   97 % -- -- --      BP Location FiO2 (%)     -- --       Physical Exam  Vitals and nursing note reviewed.   Constitutional:       General: She is not in acute distress.     Appearance: Normal appearance. She is obese. She is not ill-appearing, toxic-appearing or diaphoretic.   HENT:      Head: Normocephalic and atraumatic.      Nose: Nose normal. No rhinorrhea.   Neck:      Comments: Trachea is midline  Cardiovascular:      Rate and Rhythm: Normal rate and regular rhythm.      Heart sounds: No murmur heard.  Pulmonary:      Effort: Pulmonary effort is normal.      Breath sounds: Normal breath sounds. No wheezing.   Abdominal:      General: Abdomen is flat. Bowel sounds are normal. There is no distension.      Palpations: Abdomen is soft.      Tenderness: There is no abdominal " tenderness.     Musculoskeletal:         General: Normal range of motion.      Cervical back: Normal range of motion.     Skin:     General: Skin is warm and dry.      Findings: No rash.     Neurological:      General: No focal deficit present.      Mental Status: She is alert and oriented to person, place, and time. Mental status is at baseline.     Psychiatric:         Mood and Affect: Mood normal.         Behavior: Behavior normal.         Thought Content: Thought content normal.         Judgment: Judgment normal.      Comments: Slightly tearful but otherwise unremarkable.           ED Course & MDM                  No data recorded     [place] Coma Scale Score: 15 (08/04/25 1328 : Molina Yan, KWESI)                           Medical Decision Making  Twelve-lead EKG was interpreted by myself and this was noted to contribute regular patient care.  Study reveals a normal sinus rhythm at 100 bpm, normal axis, normal R wave progression, no acute ischemic changes.    Patient was hydrated here and given antiemetics and Pepcid for her upset stomach.  She was up and ambulatory to the bathroom without difficulty.  There is no evidence of DKA therefore I feel the patient is appropriate for discharge.  She was instructed to speak to her private physician about adjusting her diabetic regimen.  Short prescription for Zofran as needed.  I did inform her that her creatinine is slightly elevated today from her baseline and recommended increasing oral fluid intake.  Continue to monitor blood sugar at home and return to the ER at any time if worse.        Procedure  Procedures         [1]   Past Medical History:  Diagnosis Date    Acute myocardial infarction, unspecified 01/10/2022    Acute MI    Body mass index (BMI) 33.0-33.9, adult 02/01/2022    BMI 33.0-33.9,adult    CHF (congestive heart failure)     Coronary artery disease     Diabetes mellitus (Multi)     Disorder of the skin and subcutaneous tissue, unspecified 07/01/2020     Back skin lesion    Encounter for preprocedural cardiovascular examination 01/10/2022    Pre-operative cardiovascular examination    Hyperlipidemia     Hypertensive heart disease with heart failure 05/21/2020    Hypertensive heart disease with chronic combined systolic and diastolic congestive heart failure    Intracranial and intraspinal phlebitis and thrombophlebitis ([facility]) 01/10/2022    Cavernous sinus thrombosis    Ischemic cardiomyopathy     Nausea 01/10/2022    Chronic nausea    Obesity, unspecified 03/21/2022    Class 1 obesity with body mass index (BMI) of 33.0 to 33.9 in adult    Obesity, unspecified 08/01/2022    Class 1 obesity with body mass index (BMI) of 32.0 to 32.9 in adult    Old myocardial infarction     History of myocardial infarction    Other mechanical complication of other cardiac electronic device, initial encounter 02/01/2022    Mechanical complication of implantable cardioverter-defibrillator (ICD)    Other specified counseling 02/01/2022    Encounter for medication counseling    Other specified diseases of gallbladder 06/02/2020    Porcelain gallbladder    Overweight 01/10/2022    Overweight    Person consulting for explanation of examination or test findings 02/01/2022    Encounter to discuss test results    Personal history of other diseases of the digestive system 07/01/2020    History of gallbladder disease    Personal history of other venous thrombosis and embolism     History of deep venous thrombosis    Stroke (Multi)     Transient cerebral ischemic attack, unspecified     TIA (transient ischemic attack)   [2]   Past Surgical History:  Procedure Laterality Date    CARDIAC CATHETERIZATION N/A 4/23/2024    Procedure: Left Heart Cath, With LV;  Surgeon: Jose Barclay MD;  Location: [facility];  Service: Cardiovascular;  Laterality: N/A;    CARDIAC CATHETERIZATION Left 3/6/2025    Procedure: Left Heart Cath;  Surgeon: Jose Barclay MD;  Location:  [facility];  Service: Cardiovascular;  Laterality: Left;  holding Warfarin x2/1/2 dose mykel insulin /hold am insulin    CT ABDOMEN PELVIS ANGIOGRAM W AND/OR WO IV CONTRAST  2019    CT ABDOMEN PELVIS ANGIOGRAM W AND/OR WO IV CONTRAST 2019 [facility]ICAL [facility]GAC[facility]    OTHER SURGICAL HISTORY  2020     section    OTHER SURGICAL HISTORY  10/12/2022    Corneal lasik    OTHER SURGICAL HISTORY  10/12/2022    Skin lesion excision    OTHER SURGICAL HISTORY  01/10/2022    Colonoscopy    OTHER SURGICAL HISTORY  01/10/2022    Hysterectomy    OTHER SURGICAL HISTORY  01/10/2022    Surgery    OTHER SURGICAL HISTORY  01/10/2022    Median nerve neuroplasty    OTHER SURGICAL HISTORY  01/10/2022    Cardioverter defibrillator insertion    OTHER SURGICAL HISTORY  01/10/2022    Knee surgery    OTHER SURGICAL HISTORY  01/10/2022    Ankle surgery    OTHER SURGICAL HISTORY  01/10/2022    Tonsillectomy    OTHER SURGICAL HISTORY  01/10/2022    Hand surgery    OTHER SURGICAL HISTORY  2022    Knee fracture repair    OTHER SURGICAL HISTORY  2022    Cholecystectomy   [3]   Family History  Problem Relation Name Age of Onset    Coronary artery disease Mother      Diabetes Mother      Other (Cardiac pacemaker) Father      Coronary artery disease Father      Other (Stroke syndrome) Other     [4]   Social History  Tobacco Use    Smoking status: Former     Types: Cigars     Quit date: 2024     Years since quittin.7    Smokeless tobacco: Never    Tobacco comments:     Pt states \"quit a month ago\"   Vaping Use    Vaping status: Former   Substance Use Topics    Alcohol use: Yes     Alcohol/week: 1.0 standard drink of alcohol     Types: 1 Cans of beer per week     Comment: social    Drug use: Yes     Frequency: 3.0 times per week     Types: Marijuana        Tee Bautista DO  [id number]    "

## 2025-08-05 ENCOUNTER — APPOINTMENT (OUTPATIENT)
Dept: CARDIOLOGY | Facility: HOSPITAL | Age: 65
End: 2025-08-05
Payer: MEDICARE

## 2025-08-05 ENCOUNTER — APPOINTMENT (OUTPATIENT)
Dept: CARDIOLOGY | Facility: CLINIC | Age: 65
End: 2025-08-05
Payer: MEDICARE

## 2025-08-05 LAB
HOLD SPECIMEN: NORMAL

## 2025-08-07 LAB
ATRIAL RATE: 100 BPM
P AXIS: 65 DEGREES
P OFFSET: 199 MS
P ONSET: 150 MS
PR INTERVAL: 136 MS
Q ONSET: 218 MS
QRS COUNT: 16 BEATS
QRS DURATION: 72 MS
QT INTERVAL: 332 MS
QTC CALCULATION(BAZETT): 428 MS
QTC FREDERICIA: 393 MS
R AXIS: 85 DEGREES
T AXIS: 59 DEGREES
T OFFSET: 384 MS
VENTRICULAR RATE: 100 BPM

## 2025-08-15 ENCOUNTER — APPOINTMENT (OUTPATIENT)
Dept: CARDIOLOGY | Facility: CLINIC | Age: 65
End: 2025-08-15
Payer: MEDICARE

## 2025-08-15 ENCOUNTER — HOSPITAL ENCOUNTER (OUTPATIENT)
Dept: CARDIOLOGY | Facility: HOSPITAL | Age: 65
Discharge: HOME | End: 2025-08-15
Payer: MEDICARE

## 2025-08-15 VITALS
HEIGHT: 62 IN | SYSTOLIC BLOOD PRESSURE: 120 MMHG | HEART RATE: 92 BPM | DIASTOLIC BLOOD PRESSURE: 70 MMHG | WEIGHT: 175 LBS | BODY MASS INDEX: 32.2 KG/M2

## 2025-08-15 DIAGNOSIS — I50.9 CHRONIC CONGESTIVE HEART FAILURE, UNSPECIFIED HEART FAILURE TYPE: ICD-10-CM

## 2025-08-15 DIAGNOSIS — I50.22 CHRONIC SYSTOLIC HEART FAILURE: ICD-10-CM

## 2025-08-15 DIAGNOSIS — Z95.810 CARDIAC DEFIBRILLATOR IN SITU: ICD-10-CM

## 2025-08-15 DIAGNOSIS — F17.200 CURRENT SMOKER: ICD-10-CM

## 2025-08-15 DIAGNOSIS — I25.5 ISCHEMIC CARDIOMYOPATHY: ICD-10-CM

## 2025-08-15 DIAGNOSIS — Z79.01 LONG TERM (CURRENT) USE OF ANTICOAGULANTS: ICD-10-CM

## 2025-08-15 DIAGNOSIS — I21.4 NSTEMI (NON-ST ELEVATED MYOCARDIAL INFARCTION) (MULTI): ICD-10-CM

## 2025-08-15 DIAGNOSIS — I25.10 CAD S/P PERCUTANEOUS CORONARY ANGIOPLASTY: ICD-10-CM

## 2025-08-15 DIAGNOSIS — I25.2 OLD MI (MYOCARDIAL INFARCTION): ICD-10-CM

## 2025-08-15 DIAGNOSIS — E78.2 MIXED HYPERLIPIDEMIA: ICD-10-CM

## 2025-08-15 DIAGNOSIS — Z45.02 ICD (IMPLANTABLE CARDIOVERTER-DEFIBRILLATOR) DISCHARGE: ICD-10-CM

## 2025-08-15 DIAGNOSIS — I10 ESSENTIAL HYPERTENSION, BENIGN: ICD-10-CM

## 2025-08-15 DIAGNOSIS — I47.20 VT (VENTRICULAR TACHYCARDIA) (MULTI): ICD-10-CM

## 2025-08-15 DIAGNOSIS — Z71.89 ENCOUNTER TO DISCUSS TREATMENT OPTIONS: ICD-10-CM

## 2025-08-15 DIAGNOSIS — I50.9 CONGESTIVE HEART FAILURE, UNSPECIFIED HF CHRONICITY, UNSPECIFIED HEART FAILURE TYPE: ICD-10-CM

## 2025-08-15 DIAGNOSIS — Z71.2 ENCOUNTER TO DISCUSS TEST RESULTS: ICD-10-CM

## 2025-08-15 DIAGNOSIS — Z71.89 ENCOUNTER FOR MEDICATION REVIEW AND COUNSELING: ICD-10-CM

## 2025-08-15 DIAGNOSIS — Z98.61 CAD S/P PERCUTANEOUS CORONARY ANGIOPLASTY: ICD-10-CM

## 2025-08-15 PROBLEM — E66.811 CLASS 1 OBESITY WITH BODY MASS INDEX (BMI) OF 30.0 TO 30.9 IN ADULT: Status: RESOLVED | Noted: 2023-08-29 | Resolved: 2025-08-15

## 2025-08-15 PROCEDURE — 3078F DIAST BP <80 MM HG: CPT | Performed by: INTERNAL MEDICINE

## 2025-08-15 PROCEDURE — 3074F SYST BP LT 130 MM HG: CPT | Performed by: INTERNAL MEDICINE

## 2025-08-15 PROCEDURE — 93282 PRGRMG EVAL IMPLANTABLE DFB: CPT

## 2025-08-15 PROCEDURE — 3008F BODY MASS INDEX DOCD: CPT | Performed by: INTERNAL MEDICINE

## 2025-08-15 PROCEDURE — 99214 OFFICE O/P EST MOD 30 MIN: CPT | Performed by: INTERNAL MEDICINE

## 2025-08-15 RX ORDER — ENOXAPARIN SODIUM 100 MG/ML
80 INJECTION SUBCUTANEOUS 2 TIMES DAILY
COMMUNITY
Start: 2025-06-12

## 2025-08-15 RX ORDER — METOPROLOL SUCCINATE 50 MG/1
50 TABLET, EXTENDED RELEASE ORAL NIGHTLY
Qty: 90 TABLET | Refills: 3 | Status: SHIPPED | OUTPATIENT
Start: 2025-08-15 | End: 2026-08-15

## 2025-08-15 RX ORDER — METOPROLOL SUCCINATE 25 MG/1
25 TABLET, EXTENDED RELEASE ORAL NIGHTLY
Qty: 90 TABLET | Refills: 3 | Status: SHIPPED | OUTPATIENT
Start: 2025-08-15 | End: 2026-08-15

## 2025-08-15 ASSESSMENT — ENCOUNTER SYMPTOMS
CARDIOVASCULAR NEGATIVE: 1
COUGH: 0
IRREGULAR HEARTBEAT: 0
SYNCOPE: 0
CLAUDICATION: 0
WHEEZING: 0
SNORING: 0
NEAR-SYNCOPE: 0
SHORTNESS OF BREATH: 0
ORTHOPNEA: 0
PND: 0

## 2025-11-20 ENCOUNTER — APPOINTMENT (OUTPATIENT)
Dept: CARDIOLOGY | Facility: CLINIC | Age: 65
End: 2025-11-20
Payer: MEDICARE

## 2026-02-16 ENCOUNTER — APPOINTMENT (OUTPATIENT)
Dept: CARDIOLOGY | Facility: CLINIC | Age: 66
End: 2026-02-16
Payer: MEDICARE

## (undated) DEVICE — GLOVE ORANGE PI 8   MSG9080

## (undated) DEVICE — ADAPTER FLSH PMP FLD MGMT GI IRRIG OFP 2 DISPOSABLE

## (undated) DEVICE — CATHETER, DIAGNOSTIC, 5FR,  PIG-145, 110CM, 6SH ANGLED

## (undated) DEVICE — CATHETER, INFINITI DIAGNOSTIC, 5 FR 100CM 3DRC, WILLIAMS RIGHT OR NO TORQUE

## (undated) DEVICE — CATHETER, DIAGNOSTIC, JUDKINS, LEFT, 5 FR-JL 4.0

## (undated) DEVICE — CONMED SCOPE SAVER BITE BLOCK, 20X27 MM: Brand: SCOPE SAVER

## (undated) DEVICE — TUBE SET 96 MM 64 MM H2O PERISTALTIC STD AUX CHANNEL

## (undated) DEVICE — BW-412T DISP COMBO CLEANING BRUSH: Brand: SINGLE USE COMBINATION CLEANING BRUSH

## (undated) DEVICE — SYRINGE, ANGIOGRAPHIC, MULTIDOSE

## (undated) DEVICE — SHEATH, PINNACLE, W/.038 GW 10CM, 5FR INTRODUCER, 2.5 CM DIALATOR

## (undated) DEVICE — TUBING, MANIFOLD, LOW PRESSURE

## (undated) DEVICE — PRECISOR HOT DISPOSABLE HOT BIOPSY FORCEPS, 2.8 MM X 230 CM, OLYMPUS CORD: Brand: PRECISOR

## (undated) DEVICE — CLOSURE SYSTEM, VASCULAR, VASCADE, 5 F

## (undated) DEVICE — ENDO CARRY-ON PROCEDURE KIT: Brand: ENDO CARRY-ON PROCEDURE KIT

## (undated) DEVICE — Device: Brand: ENDO SMARTCAP

## (undated) DEVICE — SONY PRINTER PAPER

## (undated) DEVICE — BANDAGE, QUIKCLOT, INTERVENTIONAL HEMO, W/O SLIT

## (undated) DEVICE — FORCEPS ENDOSCP BX OVL CUP SERR W/NEEDLE 2.3MM DIA 160CML